# Patient Record
Sex: MALE | Race: BLACK OR AFRICAN AMERICAN | NOT HISPANIC OR LATINO | Employment: FULL TIME | URBAN - METROPOLITAN AREA
[De-identification: names, ages, dates, MRNs, and addresses within clinical notes are randomized per-mention and may not be internally consistent; named-entity substitution may affect disease eponyms.]

---

## 2023-06-23 PROBLEM — F10.10 ETOH ABUSE: Status: ACTIVE | Noted: 2023-06-23

## 2023-06-23 PROBLEM — R00.0 TACHYCARDIA: Status: ACTIVE | Noted: 2023-06-23

## 2023-06-23 PROBLEM — K92.1 GASTROINTESTINAL HEMORRHAGE WITH MELENA: Status: ACTIVE | Noted: 2023-06-23

## 2023-06-23 PROBLEM — K92.2 UPPER GI BLEED: Status: ACTIVE | Noted: 2023-06-23

## 2023-06-23 PROBLEM — D62 ACUTE BLOOD LOSS ANEMIA: Status: ACTIVE | Noted: 2023-06-23

## 2023-06-23 PROBLEM — Z72.0 TOBACCO ABUSE: Status: ACTIVE | Noted: 2023-06-23

## 2023-06-23 PROBLEM — G43.909 MIGRAINE: Status: ACTIVE | Noted: 2021-02-09

## 2023-06-29 PROBLEM — F17.200 CURRENT EVERY DAY SMOKER: Status: ACTIVE | Noted: 2023-06-23

## 2023-06-29 PROBLEM — Z83.3 FAMILY HISTORY OF DIABETES MELLITUS: Status: ACTIVE | Noted: 2023-06-29

## 2023-07-07 PROBLEM — K92.2 GI BLEED: Status: ACTIVE | Noted: 2023-07-07

## 2023-07-08 PROBLEM — D50.9 IRON DEFICIENCY ANEMIA: Status: ACTIVE | Noted: 2023-07-08

## 2023-07-10 PROBLEM — K92.2 GI BLEED: Status: RESOLVED | Noted: 2023-07-07 | Resolved: 2023-07-10

## 2023-07-12 ENCOUNTER — APPOINTMENT (OUTPATIENT)
Dept: CT IMAGING | Facility: HOSPITAL | Age: 43
DRG: 356 | End: 2023-07-12
Payer: COMMERCIAL

## 2023-07-12 ENCOUNTER — APPOINTMENT (OUTPATIENT)
Dept: GENERAL RADIOLOGY | Facility: HOSPITAL | Age: 43
DRG: 356 | End: 2023-07-12
Payer: COMMERCIAL

## 2023-07-12 ENCOUNTER — HOSPITAL ENCOUNTER (INPATIENT)
Facility: HOSPITAL | Age: 43
LOS: 9 days | Discharge: HOME | DRG: 356 | End: 2023-07-21
Attending: EMERGENCY MEDICINE | Admitting: INTERNAL MEDICINE
Payer: COMMERCIAL

## 2023-07-12 DIAGNOSIS — D72.829 LEUKOCYTOSIS, UNSPECIFIED TYPE: ICD-10-CM

## 2023-07-12 DIAGNOSIS — D62 ACUTE BLOOD LOSS ANEMIA: ICD-10-CM

## 2023-07-12 DIAGNOSIS — D62 ABLA (ACUTE BLOOD LOSS ANEMIA): Primary | ICD-10-CM

## 2023-07-12 DIAGNOSIS — K62.5 BLOOD PER RECTUM: ICD-10-CM

## 2023-07-12 DIAGNOSIS — D50.8 OTHER IRON DEFICIENCY ANEMIA: ICD-10-CM

## 2023-07-12 LAB
ABO GROUP BLD: NORMAL
ADV 40+41 DNA STL QL NAA+NON-PROBE: NOT DETECTED
ALBUMIN SERPL-MCNC: 3.8 G/DL (ref 3.5–5.2)
ALBUMIN/GLOB SERPL: 1.4 G/DL
ALP SERPL-CCNC: 70 U/L (ref 39–117)
ALT SERPL W P-5'-P-CCNC: 12 U/L (ref 1–41)
ANION GAP SERPL CALCULATED.3IONS-SCNC: 14.8 MMOL/L (ref 5–15)
ANISOCYTOSIS BLD QL: ABNORMAL
APTT PPP: 25.7 SECONDS (ref 22.7–35.4)
AST SERPL-CCNC: 13 U/L (ref 1–40)
ASTRO TYP 1-8 RNA STL QL NAA+NON-PROBE: NOT DETECTED
BILIRUB SERPL-MCNC: 0.3 MG/DL (ref 0–1.2)
BILIRUB UR QL STRIP: NEGATIVE
BLD GP AB SCN SERPL QL: NEGATIVE
BUN SERPL-MCNC: 21 MG/DL (ref 6–20)
BUN/CREAT SERPL: 15.3 (ref 7–25)
C CAYETANENSIS DNA STL QL NAA+NON-PROBE: NOT DETECTED
C COLI+JEJ+UPSA DNA STL QL NAA+NON-PROBE: NOT DETECTED
CALCIUM SPEC-SCNC: 9.1 MG/DL (ref 8.6–10.5)
CHLORIDE SERPL-SCNC: 103 MMOL/L (ref 98–107)
CLARITY UR: CLEAR
CO2 SERPL-SCNC: 18.2 MMOL/L (ref 22–29)
COLOR UR: YELLOW
CREAT SERPL-MCNC: 1.37 MG/DL (ref 0.76–1.27)
CRYPTOSP DNA STL QL NAA+NON-PROBE: NOT DETECTED
DEPRECATED RDW RBC AUTO: 48.4 FL (ref 37–54)
E HISTOLYT DNA STL QL NAA+NON-PROBE: NOT DETECTED
EAEC PAA PLAS AGGR+AATA ST NAA+NON-PRB: NOT DETECTED
EC STX1+STX2 GENES STL QL NAA+NON-PROBE: NOT DETECTED
EGFRCR SERPLBLD CKD-EPI 2021: 66 ML/MIN/1.73
EPEC EAE GENE STL QL NAA+NON-PROBE: NOT DETECTED
ERYTHROCYTE [DISTWIDTH] IN BLOOD BY AUTOMATED COUNT: 15.4 % (ref 12.3–15.4)
ETEC LTA+ST1A+ST1B TOX ST NAA+NON-PROBE: NOT DETECTED
FERRITIN SERPL-MCNC: 541 NG/ML (ref 30–400)
G LAMBLIA DNA STL QL NAA+NON-PROBE: NOT DETECTED
GLOBULIN UR ELPH-MCNC: 2.7 GM/DL
GLUCOSE SERPL-MCNC: 131 MG/DL (ref 65–99)
GLUCOSE UR STRIP-MCNC: NEGATIVE MG/DL
HCT VFR BLD AUTO: 21.3 % (ref 37.5–51)
HCT VFR BLD AUTO: 23.9 % (ref 37.5–51)
HGB BLD-MCNC: 7.1 G/DL (ref 13–17.7)
HGB BLD-MCNC: 7.7 G/DL (ref 13–17.7)
HGB UR QL STRIP.AUTO: NEGATIVE
INR PPP: 1.07 (ref 0.9–1.1)
IRON 24H UR-MRATE: 18 MCG/DL (ref 59–158)
IRON SATN MFR SERPL: 5 % (ref 20–50)
KETONES UR QL STRIP: NEGATIVE
LEUKOCYTE ESTERASE UR QL STRIP.AUTO: NEGATIVE
LIPASE SERPL-CCNC: 11 U/L (ref 13–60)
LYMPHOCYTES # BLD MANUAL: 3.93 10*3/MM3 (ref 0.7–3.1)
LYMPHOCYTES NFR BLD MANUAL: 7 % (ref 5–12)
MCH RBC QN AUTO: 29.3 PG (ref 26.6–33)
MCHC RBC AUTO-ENTMCNC: 33.3 G/DL (ref 31.5–35.7)
MCV RBC AUTO: 88 FL (ref 79–97)
MONOCYTES # BLD: 1.45 10*3/MM3 (ref 0.1–0.9)
NEUTROPHILS # BLD AUTO: 15.3 10*3/MM3 (ref 1.7–7)
NEUTROPHILS NFR BLD MANUAL: 74 % (ref 42.7–76)
NITRITE UR QL STRIP: NEGATIVE
NOROVIRUS GI+II RNA STL QL NAA+NON-PROBE: NOT DETECTED
NRBC BLD AUTO-RTO: 0.1 /100 WBC (ref 0–0.2)
P SHIGELLOIDES DNA STL QL NAA+NON-PROBE: NOT DETECTED
PH UR STRIP.AUTO: <=5 [PH] (ref 5–8)
PLAT MORPH BLD: NORMAL
PLATELET # BLD AUTO: 399 10*3/MM3 (ref 140–450)
PMV BLD AUTO: 10.4 FL (ref 6–12)
POTASSIUM SERPL-SCNC: 3.5 MMOL/L (ref 3.5–5.2)
PROCALCITONIN SERPL-MCNC: 0.15 NG/ML (ref 0–0.25)
PROT SERPL-MCNC: 6.5 G/DL (ref 6–8.5)
PROT UR QL STRIP: NEGATIVE
PROTHROMBIN TIME: 14.1 SECONDS (ref 11.7–14.2)
RBC # BLD AUTO: 2.42 10*6/MM3 (ref 4.14–5.8)
RETICS # AUTO: 0.21 10*6/MM3 (ref 0.02–0.13)
RETICS/RBC NFR AUTO: 8.45 % (ref 0.7–1.9)
RH BLD: POSITIVE
RVA RNA STL QL NAA+NON-PROBE: NOT DETECTED
S ENT+BONG DNA STL QL NAA+NON-PROBE: NOT DETECTED
SAPO I+II+IV+V RNA STL QL NAA+NON-PROBE: NOT DETECTED
SHIGELLA SP+EIEC IPAH ST NAA+NON-PROBE: NOT DETECTED
SODIUM SERPL-SCNC: 136 MMOL/L (ref 136–145)
SP GR UR STRIP: >=1.03 (ref 1–1.03)
T&S EXPIRATION DATE: NORMAL
TIBC SERPL-MCNC: 355 MCG/DL (ref 298–536)
TRANSFERRIN SERPL-MCNC: 238 MG/DL (ref 200–360)
UROBILINOGEN UR QL STRIP: NORMAL
V CHOL+PARA+VUL DNA STL QL NAA+NON-PROBE: NOT DETECTED
V CHOLERAE DNA STL QL NAA+NON-PROBE: NOT DETECTED
VARIANT LYMPHS NFR BLD MANUAL: 19 % (ref 19.6–45.3)
WBC MORPH BLD: NORMAL
WBC NRBC COR # BLD: 20.68 10*3/MM3 (ref 3.4–10.8)
Y ENTEROCOL DNA STL QL NAA+NON-PROBE: NOT DETECTED

## 2023-07-12 PROCEDURE — 99285 EMERGENCY DEPT VISIT HI MDM: CPT

## 2023-07-12 PROCEDURE — 86900 BLOOD TYPING SEROLOGIC ABO: CPT

## 2023-07-12 PROCEDURE — 86922 COMPATIBILITY TEST ANTIGLOB: CPT

## 2023-07-12 PROCEDURE — 81003 URINALYSIS AUTO W/O SCOPE: CPT | Performed by: EMERGENCY MEDICINE

## 2023-07-12 PROCEDURE — 25510000001 IOPAMIDOL 61 % SOLUTION: Performed by: EMERGENCY MEDICINE

## 2023-07-12 PROCEDURE — 86920 COMPATIBILITY TEST SPIN: CPT

## 2023-07-12 PROCEDURE — 74177 CT ABD & PELVIS W/CONTRAST: CPT

## 2023-07-12 PROCEDURE — 86850 RBC ANTIBODY SCREEN: CPT | Performed by: EMERGENCY MEDICINE

## 2023-07-12 PROCEDURE — P9016 RBC LEUKOCYTES REDUCED: HCPCS

## 2023-07-12 PROCEDURE — G0378 HOSPITAL OBSERVATION PER HR: HCPCS

## 2023-07-12 PROCEDURE — 86901 BLOOD TYPING SEROLOGIC RH(D): CPT

## 2023-07-12 PROCEDURE — 85610 PROTHROMBIN TIME: CPT | Performed by: EMERGENCY MEDICINE

## 2023-07-12 PROCEDURE — 85014 HEMATOCRIT: CPT | Performed by: STUDENT IN AN ORGANIZED HEALTH CARE EDUCATION/TRAINING PROGRAM

## 2023-07-12 PROCEDURE — 87507 IADNA-DNA/RNA PROBE TQ 12-25: CPT | Performed by: STUDENT IN AN ORGANIZED HEALTH CARE EDUCATION/TRAINING PROGRAM

## 2023-07-12 PROCEDURE — 85018 HEMOGLOBIN: CPT | Performed by: STUDENT IN AN ORGANIZED HEALTH CARE EDUCATION/TRAINING PROGRAM

## 2023-07-12 PROCEDURE — 85045 AUTOMATED RETICULOCYTE COUNT: CPT | Performed by: INTERNAL MEDICINE

## 2023-07-12 PROCEDURE — 84466 ASSAY OF TRANSFERRIN: CPT | Performed by: INTERNAL MEDICINE

## 2023-07-12 PROCEDURE — 84145 PROCALCITONIN (PCT): CPT | Performed by: STUDENT IN AN ORGANIZED HEALTH CARE EDUCATION/TRAINING PROGRAM

## 2023-07-12 PROCEDURE — 85007 BL SMEAR W/DIFF WBC COUNT: CPT | Performed by: EMERGENCY MEDICINE

## 2023-07-12 PROCEDURE — 71045 X-RAY EXAM CHEST 1 VIEW: CPT

## 2023-07-12 PROCEDURE — 83690 ASSAY OF LIPASE: CPT | Performed by: EMERGENCY MEDICINE

## 2023-07-12 PROCEDURE — 80053 COMPREHEN METABOLIC PANEL: CPT | Performed by: EMERGENCY MEDICINE

## 2023-07-12 PROCEDURE — 83540 ASSAY OF IRON: CPT | Performed by: INTERNAL MEDICINE

## 2023-07-12 PROCEDURE — 82728 ASSAY OF FERRITIN: CPT | Performed by: INTERNAL MEDICINE

## 2023-07-12 PROCEDURE — 85025 COMPLETE CBC W/AUTO DIFF WBC: CPT | Performed by: EMERGENCY MEDICINE

## 2023-07-12 PROCEDURE — 36415 COLL VENOUS BLD VENIPUNCTURE: CPT

## 2023-07-12 PROCEDURE — 36430 TRANSFUSION BLD/BLD COMPNT: CPT

## 2023-07-12 PROCEDURE — 99214 OFFICE O/P EST MOD 30 MIN: CPT | Performed by: INTERNAL MEDICINE

## 2023-07-12 PROCEDURE — 86900 BLOOD TYPING SEROLOGIC ABO: CPT | Performed by: EMERGENCY MEDICINE

## 2023-07-12 PROCEDURE — 85730 THROMBOPLASTIN TIME PARTIAL: CPT | Performed by: EMERGENCY MEDICINE

## 2023-07-12 PROCEDURE — 86901 BLOOD TYPING SEROLOGIC RH(D): CPT | Performed by: EMERGENCY MEDICINE

## 2023-07-12 RX ORDER — AMOXICILLIN 250 MG
2 CAPSULE ORAL 2 TIMES DAILY
Status: DISCONTINUED | OUTPATIENT
Start: 2023-07-12 | End: 2023-07-21 | Stop reason: HOSPADM

## 2023-07-12 RX ORDER — BISACODYL 10 MG
10 SUPPOSITORY, RECTAL RECTAL DAILY PRN
Status: DISCONTINUED | OUTPATIENT
Start: 2023-07-12 | End: 2023-07-21 | Stop reason: HOSPADM

## 2023-07-12 RX ORDER — SODIUM CHLORIDE 0.9 % (FLUSH) 0.9 %
10 SYRINGE (ML) INJECTION AS NEEDED
Status: DISCONTINUED | OUTPATIENT
Start: 2023-07-12 | End: 2023-07-21 | Stop reason: HOSPADM

## 2023-07-12 RX ORDER — PANTOPRAZOLE SODIUM 40 MG/10ML
80 INJECTION, POWDER, LYOPHILIZED, FOR SOLUTION INTRAVENOUS ONCE
Status: COMPLETED | OUTPATIENT
Start: 2023-07-12 | End: 2023-07-12

## 2023-07-12 RX ORDER — SODIUM CHLORIDE 0.9 % (FLUSH) 0.9 %
10 SYRINGE (ML) INJECTION EVERY 12 HOURS SCHEDULED
Status: DISCONTINUED | OUTPATIENT
Start: 2023-07-12 | End: 2023-07-16

## 2023-07-12 RX ORDER — ONDANSETRON 2 MG/ML
4 INJECTION INTRAMUSCULAR; INTRAVENOUS ONCE
Status: DISCONTINUED | OUTPATIENT
Start: 2023-07-12 | End: 2023-07-21 | Stop reason: HOSPADM

## 2023-07-12 RX ORDER — SODIUM CHLORIDE 9 MG/ML
40 INJECTION, SOLUTION INTRAVENOUS AS NEEDED
Status: DISCONTINUED | OUTPATIENT
Start: 2023-07-12 | End: 2023-07-21 | Stop reason: HOSPADM

## 2023-07-12 RX ORDER — POLYETHYLENE GLYCOL 3350 17 G/17G
17 POWDER, FOR SOLUTION ORAL DAILY PRN
Status: DISCONTINUED | OUTPATIENT
Start: 2023-07-12 | End: 2023-07-21 | Stop reason: HOSPADM

## 2023-07-12 RX ORDER — BISACODYL 5 MG/1
5 TABLET, DELAYED RELEASE ORAL DAILY PRN
Status: DISCONTINUED | OUTPATIENT
Start: 2023-07-12 | End: 2023-07-21 | Stop reason: HOSPADM

## 2023-07-12 RX ADMIN — PANTOPRAZOLE SODIUM 8 MG/HR: 40 INJECTION, POWDER, FOR SOLUTION INTRAVENOUS at 19:55

## 2023-07-12 RX ADMIN — IOPAMIDOL 85 ML: 612 INJECTION, SOLUTION INTRAVENOUS at 07:46

## 2023-07-12 RX ADMIN — SODIUM CHLORIDE 1000 ML: 9 INJECTION, SOLUTION INTRAVENOUS at 07:30

## 2023-07-12 RX ADMIN — PANTOPRAZOLE SODIUM 8 MG/HR: 40 INJECTION, POWDER, FOR SOLUTION INTRAVENOUS at 13:15

## 2023-07-12 RX ADMIN — PANTOPRAZOLE SODIUM 80 MG: 40 INJECTION, POWDER, FOR SOLUTION INTRAVENOUS at 06:06

## 2023-07-12 RX ADMIN — Medication 10 ML: at 20:00

## 2023-07-12 RX ADMIN — Medication 10 ML: at 13:15

## 2023-07-12 NOTE — PLAN OF CARE
Goal Outcome Evaluation: Pt doing well. Seems to understand his diagnosis well. Pt AxOx4. RA. No complaints of pain at this time. Vital signs are stable. Will continue to monitor.

## 2023-07-12 NOTE — ED NOTES
Second unit of blood to be initiated pending patient returning to bed from commode for full set of vitals

## 2023-07-12 NOTE — ED TRIAGE NOTES
To ER via EMS from home.  C/o low abd pain and rectal bleeding.  Pt was seen here on 6/23 for same and had HGB 6.0 at that time.  Pt was discharged on 6/27.  Abd pain started last pm.

## 2023-07-12 NOTE — CONSULTS
"[facility]  Initial Inpatient Consult Note    Referring Provider: Dr. BERTO Child    Reason for Consultation: Anemia, rectal bleeding, needs capsule endoscopy.  Was supposed to happen today but presented to the hospital.    Subjective     History of present illness:    43 y.o. male with a h/o alcoholism (though claims to ETOH x 1 mo) and with GI bleeding of obscure etiology who was admitted 7/12/2023 with melena that started yesterday.  He denies nonsteroidal anti-inflammatory drug use.  He has had some abdominal cramps with constipation alternating with diarrhea.  No nausea or vomiting.  No fever or chills.  His weight is stable.  He denies nonsteroidal anti-inflammatory drug use.  He was admitted 7/6/2023 through 7/10/2023 with black or bloody stool, iron deficiency anemia, alcohol abuse, and he is a smoker.  His work-up as outlined below.       On 7/9/2023 I did a small bowel enteroscopy that was normal.       On 6/25/2023 he had a colonoscopy by Dr. BERTO Lee that showed nodular terminal ileal mucosa, 2 small rectal polyps were removed, and internal hemorrhoids.  Pathology from the terminal ileal mucosa was benign and the rectal polyps were hyperplastic.        On 6/24/2023 Dr. EM Lee did an EGD showed grade a distal esophagitis and gastritis.        On 7/8/2023 the patient had a CT angiogram of the abdomen and pelvis that was normal.        The patient was supposed to have a capsule endoscopy in our office today but was admitted today instead.  This morning the patient's creatinine was 1.37 with a BUN of 21.  Also in the emergency room his hemoglobin was 7.1, hematocrit of 21.3, platelet count 399,000, white count of 20.68, his MCV was 88.  He had a CT of the abdomen and pelvis earlier today that showed \"mild gastritis is suspected.  Diffuse thickening involving the duodenum and the proximal jejunal loops favored to be secondary to peristalsis.\"    Past Medical History:  Past Medical " History:   Diagnosis Date    Anemia     ETOH abuse     Tachycardia 06/23/2023    Tobacco dependence      Past Surgical History:  Past Surgical History:   Procedure Laterality Date    APPENDECTOMY      COLONOSCOPY N/A 06/25/2023    Procedure: COLONOSCOPY TO CECUM AND TERMINAL ILEUM WITH BIOPSIES AND COLD BIOPSY POLYPECTOMIES;  Surgeon: Imtiaz Lee MD;  Location: [facility] ENDOSCOPY;  Service: Gastroenterology;  Laterality: N/A;  PRE- MELENA  POST- COLON POLYPS, HEMORRHOIDS    ENDOSCOPY Left 06/24/2023    Procedure: ESOPHAGOGASTRODUODENOSCOPY;  Surgeon: Imtiaz Lee MD;  Location: [facility] ENDOSCOPY;  Service: Gastroenterology;  Laterality: Left;  small hiatal hernia, esophagitis    ENTEROSCOPY SMALL BOWEL N/A 7/9/2023    Procedure: ENTEROSCOPY SMALL BOWEL;  Surgeon: Isrrael Gibson MD;  Location: [facility] ENDOSCOPY;  Service: Gastroenterology;  Laterality: N/A;  PRE- GI BLEED  POST- NORMAL      Social History:   Social History     Tobacco Use    Smoking status: Every Day     Packs/day: 1.00     Years: 20.00     Pack years: 20.00     Types: Cigarettes    Smokeless tobacco: Never   Substance Use Topics    Alcohol use: Not Currently     Comment: one pint to one fifth of marielos a day      Family History:  Family History   Problem Relation Age of Onset    Diabetes Father     Diabetes Sister     Hypertension Sister     Diabetes Brother     Diabetes Maternal Grandmother     Diabetes Maternal Grandfather     Diabetes Paternal Grandmother     Diabetes Paternal Grandfather     Heart attack Maternal Aunt 73    Colon cancer Neg Hx     Prostate cancer Neg Hx        Home Meds:  Medications Prior to Admission   Medication Sig Dispense Refill Last Dose    ferrous sulfate (FerrouSul) 325 (65 FE) MG tablet Take 1 tablet by mouth Daily With Breakfast. 30 tablet 0 7/11/2023    folic acid (FOLVITE) 1 MG tablet Take 1 tablet by mouth Daily for 30 days. 30 tablet 0 7/11/2023    pantoprazole (PROTONIX) 40 MG EC tablet Take 1 tablet by mouth  Consent: The patient's consent was obtained including but not limited to risks of crusting, scabbing, blistering, scarring, darker or lighter pigmentary change, recurrence, incomplete removal and infection. Every Morning. 30 tablet 0 7/11/2023    polycarbophil 625 MG tablet tablet Take 1 tablet by mouth Daily for 30 days. 30 tablet 0 7/11/2023    thiamine (VITAMIN B1) 100 MG tablet Take 1 tablet by mouth Daily. 30 tablet 0 7/11/2023     Current Meds:   ondansetron, 4 mg, Intravenous, Once  senna-docusate sodium, 2 tablet, Oral, BID  sodium chloride, 10 mL, Intravenous, Q12H      Allergies:  No Known Allergies  Review of Systems  The following systems were reviewed and negative;  respiratory, cardiovascular, musculoskeletal, and neurological     Objective     Vital Signs  Temp:  [97.1 °F (36.2 °C)-98.9 °F (37.2 °C)] 97.7 °F (36.5 °C)  Heart Rate:  [] 86  Resp:  [16-20] 16  BP: (109-128)/(61-84) 120/67  Physical Exam:  General Appearance:    Alert, cooperative, in no acute distress   Head:    Normocephalic, without obvious abnormality, atraumatic   Eyes:            Lids and lashes normal, conjunctivae and sclerae normal, no   icterus   Throat:   No oral lesions, no thrush, oral mucosa moist   Neck:   No adenopathy, supple, trachea midline, no thyromegaly, no   carotid bruit, no JVD   Lungs:     Clear to auscultation,respirations regular, even and                   unlabored    Heart:    Regular rhythm and normal rate, normal S1 and S2, no            murmur, no gallop, no rub, no click   Chest Wall:    No abnormalities observed   Abdomen:     Normal bowel sounds, no masses, no organomegaly, soft        nontender, nondistended, no guarding, no rebound                 tenderness   Rectal:     Deferred   Extremities:   no edema, no cyanosis, no redness   Skin:   No bleeding, bruising or rash   Lymph nodes:   No palpable adenopathy   Psychiatric:  Judgement and insight: normal   Orientation to person place and time: normal   Mood and affect: normal   Results Review:   I reviewed the patient's new clinical results.    Results from last 7 days   Lab Units 07/12/23  0609 07/10/23  0806 07/10/23  0011 07/08/23  1685  07/08/23  0418   WBC 10*3/mm3 20.68* 10.78  --   --  7.64   HEMOGLOBIN g/dL 7.1* 8.4* 7.5*   < > 7.4*   HEMATOCRIT % 21.3* 25.0* 22.4*   < > 22.5*   PLATELETS 10*3/mm3 399 372  --   --  300    < > = values in this interval not displayed.     Results from last 7 days   Lab Units 07/12/23  0612 07/08/23  0418 07/07/23  0026   SODIUM mmol/L 136 139 140   POTASSIUM mmol/L 3.5 4.1 3.6   CHLORIDE mmol/L 103 110* 109*   CO2 mmol/L 18.2* 23.8 19.0*   BUN mg/dL 21* 10 23*   CREATININE mg/dL 1.37* 1.17 1.36*   CALCIUM mg/dL 9.1 8.4* 8.3*   BILIRUBIN mg/dL 0.3 <0.2 <0.2   ALK PHOS U/L 70 51 56   ALT (SGPT) U/L 12 13 16   AST (SGOT) U/L 13 10 14   GLUCOSE mg/dL 131* 92 144*     Results from last 7 days   Lab Units 07/12/23  0612 07/07/23  0026   INR  1.07 1.15*     Lab Results   Lab Value Date/Time    LIPASE 11 (L) 07/12/2023 0612    LIPASE 26 06/23/2023 1201       Radiology:  CT Abdomen Pelvis With Contrast   Preliminary Result   Mild gastritis is suspected. Diffuse thickening involving   the duodenum and the proximal jejunal loops favored to be secondary to   peristalsis.       Radiation dose reduction techniques were utilized, including automated   exposure control and exposure modulation based on body size.              XR Chest 1 View   Final Result   No acute findings       This report was finalized on 7/12/2023 7:30 AM by Dr. Moise Walker M.D.              Assessment & Plan   Assessment:   GI bleeding of obscure etiology.      Plan:   I would get a tagged red cell bleeding scan see if that gives us any idea of where the bleeding is coming from.  I would check serial H&H's and transfuse as necessary.  He will eventually need to have a capsule endoscopy done as an outpatient.  I will check anemia labs.    I discussed the patient's findings and my recommendations with patient.         Isrrael Gibson M.D.  [facility]  [address] [place] [address]  Office: [phone]    Include Z78.9 (Other Specified Conditions Influencing Health Status) As An Associated Diagnosis?: No Medical Necessity Information: It is in your best interest to select a reason for this procedure from the list below. All of these items fulfill various CMS LCD requirements except the new and changing color options. Pared With?: 15 blade Post-Care Instructions: I reviewed with the patient in detail post-care instructions. Patient is to wear sunprotection, and avoid picking at any of the treated lesions. Pt may apply Vaseline to crusted or scabbing areas. Detail Level: Detailed Medical Necessity Clause: This procedure was medically necessary because the lesions that were treated were:

## 2023-07-12 NOTE — ED PROVIDER NOTES
EMERGENCY DEPARTMENT ENCOUNTER    Room Number:  14/14  PCP: Hellen Skelton APRN      HPI:  Chief Complaint: Abdominal pain  A complete HPI/ROS/PMH/PSH/SH/FH are unobtainable due to: None  Context: Radha Noriega V is a 43 y.o. male who presents to the ED c/o abdominal pain.  Right lower and suprapubic pain.  He has had associated bright red blood in his stool that occurs intermittently with lightheadedness and feeling that his heart is racing.  Symptoms been persistent.  He has had next of work-up in the recent past without any clear explanation for his anemia.          PAST MEDICAL HISTORY  Active Ambulatory Problems     Diagnosis Date Noted    Gastrointestinal hemorrhage with melena 06/23/2023    ETOH abuse 06/23/2023    Migraine 02/09/2021    Current every day smoker 06/23/2023    Tachycardia 06/23/2023    Acute blood loss anemia 06/23/2023    Family history of diabetes mellitus 06/29/2023    Iron deficiency anemia 07/08/2023     Resolved Ambulatory Problems     Diagnosis Date Noted    GI bleed 07/07/2023     Past Medical History:   Diagnosis Date    Anemia     Tobacco dependence          PAST SURGICAL HISTORY  Past Surgical History:   Procedure Laterality Date    APPENDECTOMY      COLONOSCOPY N/A 06/25/2023    Procedure: COLONOSCOPY TO CECUM AND TERMINAL ILEUM WITH BIOPSIES AND COLD BIOPSY POLYPECTOMIES;  Surgeon: Imtiaz Lee MD;  Location: [facility] ENDOSCOPY;  Service: Gastroenterology;  Laterality: N/A;  PRE- MELENA  POST- COLON POLYPS, HEMORRHOIDS    ENDOSCOPY Left 06/24/2023    Procedure: ESOPHAGOGASTRODUODENOSCOPY;  Surgeon: Imtiaz Lee MD;  Location: [facility] ENDOSCOPY;  Service: Gastroenterology;  Laterality: Left;  small hiatal hernia, esophagitis    ENTEROSCOPY SMALL BOWEL N/A 7/9/2023    Procedure: ENTEROSCOPY SMALL BOWEL;  Surgeon: Isrrael Gibson MD;  Location: [facility] ENDOSCOPY;  Service: Gastroenterology;  Laterality: N/A;  PRE- GI BLEED  POST- NORMAL         FAMILY HISTORY  Family History    Problem Relation Age of Onset    Diabetes Father     Diabetes Sister     Hypertension Sister     Diabetes Brother     Diabetes Maternal Grandmother     Diabetes Maternal Grandfather     Diabetes Paternal Grandmother     Diabetes Paternal Grandfather     Heart attack Maternal Aunt 73    Colon cancer Neg Hx     Prostate cancer Neg Hx          SOCIAL HISTORY  Social History     Socioeconomic History    Marital status:    Tobacco Use    Smoking status: Every Day     Packs/day: 1.00     Years: 20.00     Pack years: 20.00     Types: Cigarettes    Smokeless tobacco: Never   Vaping Use    Vaping Use: Never used   Substance and Sexual Activity    Alcohol use: Not Currently     Comment: one pint to one fifth of marielos a day    Drug use: Yes     Types: Marijuana     Comment: daily.    Sexual activity: Yes     Partners: Female         ALLERGIES  Patient has no known allergies.        REVIEW OF SYSTEMS  Review of Systems     All systems reviewed and negative except for those discussed in HPI.       PHYSICAL EXAM  ED Triage Vitals [07/12/23 0525]   Temp Heart Rate Resp BP SpO2   97.1 °F (36.2 °C) 120 20 109/77 99 %      Temp src Heart Rate Source Patient Position BP Location FiO2 (%)   -- -- -- -- --       Physical Exam      GENERAL: no acute distress  HENT: nares patent  EYES: no scleral icterus  CV: regular rhythm, tachycardic  RESPIRATORY: normal effort, clear to auscultation bilaterally  ABDOMEN: soft, mild suprapubic and right lower quadrant abdominal pain  MUSCULOSKELETAL: no deformity  NEURO: alert, moves all extremities, follows commands  PSYCH:  calm, cooperative  SKIN: warm, dry    Vital signs and nursing notes reviewed.          LAB RESULTS  Recent Results (from the past 24 hour(s))   CBC Auto Differential    Collection Time: 07/12/23  6:09 AM    Specimen: Blood   Result Value Ref Range    WBC 20.68 (H) 3.40 - 10.80 10*3/mm3    RBC 2.42 (L) 4.14 - 5.80 10*6/mm3    Hemoglobin 7.1 (L) 13.0 - 17.7 g/dL     Hematocrit 21.3 (L) 37.5 - 51.0 %    MCV 88.0 79.0 - 97.0 fL    MCH 29.3 26.6 - 33.0 pg    MCHC 33.3 31.5 - 35.7 g/dL    RDW 15.4 12.3 - 15.4 %    RDW-SD 48.4 37.0 - 54.0 fl    MPV 10.4 6.0 - 12.0 fL    Platelets 399 140 - 450 10*3/mm3    nRBC 0.1 0.0 - 0.2 /100 WBC   Manual Differential    Collection Time: 07/12/23  6:09 AM    Specimen: Blood   Result Value Ref Range    Neutrophil % 74.0 42.7 - 76.0 %    Lymphocyte % 19.0 (L) 19.6 - 45.3 %    Monocyte % 7.0 5.0 - 12.0 %    Neutrophils Absolute 15.30 (H) 1.70 - 7.00 10*3/mm3    Lymphocytes Absolute 3.93 (H) 0.70 - 3.10 10*3/mm3    Monocytes Absolute 1.45 (H) 0.10 - 0.90 10*3/mm3    Anisocytosis Mod/2+ None Seen    WBC Morphology Normal Normal    Platelet Morphology Normal Normal   Comprehensive Metabolic Panel    Collection Time: 07/12/23  6:12 AM    Specimen: Blood   Result Value Ref Range    Glucose 131 (H) 65 - 99 mg/dL    BUN 21 (H) 6 - 20 mg/dL    Creatinine 1.37 (H) 0.76 - 1.27 mg/dL    Sodium 136 136 - 145 mmol/L    Potassium 3.5 3.5 - 5.2 mmol/L    Chloride 103 98 - 107 mmol/L    CO2 18.2 (L) 22.0 - 29.0 mmol/L    Calcium 9.1 8.6 - 10.5 mg/dL    Total Protein 6.5 6.0 - 8.5 g/dL    Albumin 3.8 3.5 - 5.2 g/dL    ALT (SGPT) 12 1 - 41 U/L    AST (SGOT) 13 1 - 40 U/L    Alkaline Phosphatase 70 39 - 117 U/L    Total Bilirubin 0.3 0.0 - 1.2 mg/dL    Globulin 2.7 gm/dL    A/G Ratio 1.4 g/dL    BUN/Creatinine Ratio 15.3 7.0 - 25.0    Anion Gap 14.8 5.0 - 15.0 mmol/L    eGFR 66.0 >60.0 mL/min/1.73   Protime-INR    Collection Time: 07/12/23  6:12 AM    Specimen: Blood   Result Value Ref Range    Protime 14.1 11.7 - 14.2 Seconds    INR 1.07 0.90 - 1.10   aPTT    Collection Time: 07/12/23  6:12 AM    Specimen: Blood   Result Value Ref Range    PTT 25.7 22.7 - 35.4 seconds   Lipase    Collection Time: 07/12/23  6:12 AM    Specimen: Blood   Result Value Ref Range    Lipase 11 (L) 13 - 60 U/L   Type & Screen    Collection Time: 07/12/23  6:12 AM    Specimen: Blood   Result  Value Ref Range    ABO Type B     RH type Positive     Antibody Screen Negative     T&S Expiration Date 7/15/2023 11:59:59 PM    Prepare RBC, 2 Units    Collection Time: 07/12/23  7:18 AM   Result Value Ref Range    Product Code D6271M77     Unit Number [id number]     UNIT  ABO B     UNIT  RH POS     Crossmatch Interpretation Compatible     Dispense Status XM     Blood Expiration Date 202308032359     Blood Type Barcode 7300     Product Code M2570A23     Unit Number [id number]     UNIT  ABO B     UNIT  RH POS     Crossmatch Interpretation Compatible     Dispense Status IS     Blood Expiration Date 202308022359     Blood Type Barcode 7300        Ordered the above labs and reviewed the results.        RADIOLOGY  CT Abdomen Pelvis With Contrast    Result Date: 7/12/2023  CT ABDOMEN AND PELVIS WITH CONTRAST  HISTORY: 43-year-old male with lower abdominal pain and nausea and blood in stool.  TECHNIQUE: Axial CT images of the abdomen and pelvis were obtained following administration of intravenous contrast. The patient was not given oral contrast Coronal and sagittal reformats were obtained.  COMPARISON: 07/08/2023  FINDINGS: Mild wall thickening of the duodenum and the proximal jejunal loops likely related to peristalsis. Mild diffuse gastric wall thickening in a pattern consistent with gastritis. No evidence of bowel obstruction.  The liver demonstrates normal attenuation. The gallbladder is normal. The spleen is normal. The pancreas is normal. Bilateral adrenal glands are normal. No renal calculi or hydronephrosis. The urinary bladder is partially distended and normal.      Mild gastritis is suspected. Diffuse thickening involving the duodenum and the proximal jejunal loops favored to be secondary to peristalsis.  Radiation dose reduction techniques were utilized, including automated exposure control and exposure modulation based on body size.       XR Chest 1 View    Result Date: 7/12/2023  AP CHEST   HISTORY: Leukocytosis  COMPARISON: None available  FINDINGS: Mildly elevated left hemidiaphragm. No evidence of airspace infiltrate. Heart size normal. No pneumothorax. Visualized osseous structures are unremarkable.      No acute findings  This report was finalized on 7/12/2023 7:30 AM by Dr. Moise Walker M.D.       Ordered the above noted radiological studies. Reviewed by me in PACS.          PROCEDURES  Critical Care  Performed by: Tamir Harris II, MD  Authorized by: Tamir Harris II, MD     Critical care provider statement:     Critical care time (minutes):  30    Critical care was necessary to treat or prevent imminent or life-threatening deterioration of the following conditions:  Circulatory failure    Critical care was time spent personally by me on the following activities:  Ordering and performing treatments and interventions, ordering and review of laboratory studies, ordering and review of radiographic studies, pulse oximetry, re-evaluation of patient's condition, obtaining history from patient or surrogate, discussions with consultants, evaluation of patient's response to treatment and examination of patient          MEDICATIONS GIVEN IN ER  Medications   sodium chloride 0.9 % flush 10 mL (has no administration in time range)   ondansetron (ZOFRAN) injection 4 mg (0 mg Intravenous Hold 7/12/23 0731)   pantoprazole (PROTONIX) injection 80 mg (80 mg Intravenous Given 7/12/23 0606)   sodium chloride 0.9 % bolus 1,000 mL (1,000 mL Intravenous New Bag 7/12/23 0730)   iopamidol (ISOVUE-300) 61 % injection 100 mL (85 mL Intravenous Given 7/12/23 0746)         MEDICAL DECISION MAKING, PROGRESS, and CONSULTS    Discussion below represents my analysis of pertinent findings related to patient's condition, differential diagnosis, treatment plan and final disposition.      Orders placed during this visit:  Orders Placed This Encounter   Procedures    Critical Care    CT Abdomen Pelvis With Contrast     XR Chest 1 View    Comprehensive Metabolic Panel    Protime-INR    aPTT    Lipase    Urinalysis With Microscopic If Indicated (No Culture) - Urine, Clean Catch    CBC Auto Differential    Manual Differential    Monitor Blood Pressure    Pulse Oximetry, Continuous    Verify Informed Consent for Blood Product Administration    [facility] (on-call MD unless specified) Details    Type & Screen    Prepare RBC, 2 Units    Insert Peripheral IV    Initiate Observation Status    CBC & Differential         Differential diagnosis:    Differential diagnosis includes but not limited to:  - hepatobiliary pathology such as cholecystitis, cholangitis, and symptomatic cholelithiasis  - Pancreatitis  - Dyspepsia  - Small bowel obstruction  - Appendicitis  - Diverticulitis  - UTI including pyelonephritis  - Ureteral stone  - Zoster  - Colitis, including infectious and ischemic  - GI bleed      Independent interpretation of labs, radiology studies, and discussions with consultants:  ED Course as of 07/12/23 1019   Wed Jul 12, 2023   0658 Creatinine(!): 1.37 [TD]   0659 WBC(!): 20.68 [TD]   0659 Hemoglobin(!): 7.1 [TD]   0700 On medical chart review, patient was discharged on 7/10/2023.  I reviewed the discharge summary from Dr. Mccormack.  Patient has a history of alcohol dependence, admitted for GI bleed.  He had EGD/colonoscopy that were negative for source of bleeding.  Outpatient video capsule endoscopy was planned with the patient returned with continued hematochezia before could be completed.  CT angiogram of the abdomen pelvis was performed twice without an identified source of bleeding.  He underwent push enteroscopy which was negative as well. [TD]   1017 I discussed the case with Dr. Child, hospitalist.  We reviewed the patient's labs, history, imaging.  He will admit. [TD]      ED Course User Index  [TD] Tamir Harris II, MD               DIAGNOSIS  Final diagnoses:   ABLA (acute blood loss anemia)   Leukocytosis, unspecified  type         DISPOSITION  Admit      Latest Documented Vital Signs:  As of 10:19 EDT  BP- 121/78 HR- 79 Temp- 97.2 °F (36.2 °C) (Tympanic) O2 sat- 100%      --    Please note that portions of this were completed with a voice recognition program.       Note Disclaimer: At [facility], we believe that sharing information builds trust and better relationships. You are receiving this note because you are receiving care at [facility] or recently visited. It is possible you will see health information before a provider has talked with you about it. This kind of information can be easy to misunderstand. To help you fully understand what it means for your health, we urge you to discuss this note with your provider.         Tamir Harris II, MD  07/12/23 8812

## 2023-07-12 NOTE — ED NOTES
Nursing report ED to floor  Radha Noriega V  43 y.o.  male    HPI :   Chief Complaint   Patient presents with    Abdominal Pain       Admitting doctor:   Hasmukh Child MD    Admitting diagnosis:   The primary encounter diagnosis was ABLA (acute blood loss anemia). A diagnosis of Leukocytosis, unspecified type was also pertinent to this visit.    Code status:   Current Code Status       Date Active Code Status Order ID Comments User Context       Prior            Allergies:   Patient has no known allergies.    Isolation:   No active isolations    Intake and Output    Intake/Output Summary (Last 24 hours) at 7/12/2023 1056  Last data filed at 7/12/2023 1024  Gross per 24 hour   Intake 300 ml   Output --   Net 300 ml       Weight:       07/12/23  0525   Weight: 78 kg (172 lb)       Most recent vitals:   Vitals:    07/12/23 0759 07/12/23 0900 07/12/23 0901 07/12/23 1003   BP: 113/61  116/73 121/78   BP Location:       Patient Position:       Pulse: 101  95 79   Resp:       Temp: 97.7 °F (36.5 °C) 98.7 °F (37.1 °C)  97.2 °F (36.2 °C)   TempSrc: Tympanic Tympanic  Tympanic   SpO2: 100%   100%   Weight:       Height:           Active LDAs/IV Access:   Lines, Drains & Airways       Active LDAs       Name Placement date Placement time Site Days    Peripheral IV 07/12/23 0606 Anterior;Left Forearm 07/12/23  0606  Forearm  less than 1                    Labs (abnormal labs have a star):   Labs Reviewed   COMPREHENSIVE METABOLIC PANEL - Abnormal; Notable for the following components:       Result Value    Glucose 131 (*)     BUN 21 (*)     Creatinine 1.37 (*)     CO2 18.2 (*)     All other components within normal limits    Narrative:     GFR Normal >60  Chronic Kidney Disease <60  Kidney Failure <15     LIPASE - Abnormal; Notable for the following components:    Lipase 11 (*)     All other components within normal limits   CBC WITH AUTO DIFFERENTIAL - Abnormal; Notable for the following components:    WBC 20.68 (*)     RBC  2.42 (*)     Hemoglobin 7.1 (*)     Hematocrit 21.3 (*)     All other components within normal limits   MANUAL DIFFERENTIAL - Abnormal; Notable for the following components:    Lymphocyte % 19.0 (*)     Neutrophils Absolute 15.30 (*)     Lymphocytes Absolute 3.93 (*)     Monocytes Absolute 1.45 (*)     All other components within normal limits   PROTIME-INR - Normal   APTT - Normal   URINALYSIS W/ MICROSCOPIC IF INDICATED (NO CULTURE)   TYPE AND SCREEN   PREPARE RBC   CBC AND DIFFERENTIAL    Narrative:     The following orders were created for panel order CBC & Differential.  Procedure                               Abnormality         Status                     ---------                               -----------         ------                     CBC Auto Differential[[id number]]        Abnormal            Final result                 Please view results for these tests on the individual orders.       EKG:   No orders to display       Meds given in ED:   Medications   sodium chloride 0.9 % flush 10 mL (has no administration in time range)   ondansetron (ZOFRAN) injection 4 mg (0 mg Intravenous Hold 7/12/23 0731)   pantoprazole (PROTONIX) injection 80 mg (80 mg Intravenous Given 7/12/23 0606)   sodium chloride 0.9 % bolus 1,000 mL (1,000 mL Intravenous New Bag 7/12/23 0730)   iopamidol (ISOVUE-300) 61 % injection 100 mL (85 mL Intravenous Given 7/12/23 0746)       Imaging results:  CT Abdomen Pelvis With Contrast    Result Date: 7/12/2023  Mild gastritis is suspected. Diffuse thickening involving the duodenum and the proximal jejunal loops favored to be secondary to peristalsis.  Radiation dose reduction techniques were utilized, including automated exposure control and exposure modulation based on body size.       XR Chest 1 View    Result Date: 7/12/2023  No acute findings  This report was finalized on 7/12/2023 7:30 AM by Dr. Moise Walker M.D.       Ambulatory status:   - ad ignacio    Social issues:   Social  History     Socioeconomic History    Marital status:    Tobacco Use    Smoking status: Every Day     Packs/day: 1.00     Years: 20.00     Pack years: 20.00     Types: Cigarettes    Smokeless tobacco: Never   Vaping Use    Vaping Use: Never used   Substance and Sexual Activity    Alcohol use: Not Currently     Comment: one pint to one fifth of marielos a day    Drug use: Yes     Types: Marijuana     Comment: daily.    Sexual activity: Yes     Partners: Female       NIH Stroke Scale:       Mickey Jacques RN  07/12/23 10:56 EDT

## 2023-07-12 NOTE — H&P
Patient Name:  Radha Noriega V  YOB: 1980  MRN:  [id number]  Admit Date:  7/12/2023  Patient Care Team:  Hellen Skelton APRN as PCP - General (Nurse Practitioner)      Subjective   History Present Illness     Chief Complaint   Patient presents with    Abdominal Pain     This is a 43-year-old male with alcohol dependence, tobacco use as well as multiple previous admissions for GI bleed.  During a recent admission he was admitted for the same issue.  EGD and colonoscopy were negative for source of bleeding.  An outpatient video endoscopy was planned, but the patient had return to the hospital with hematochezia before could be performed.  During the most recent admission prior to this, his hemoglobin was 6.0 and he was admitted and given pRBC and iron transfusions.  GI saw the patient in consultation at that point time.  2 CT abdomen and pelvis were performed but no bleeding source was not identified.  Question anoscopy was negative as well.  He was subsequently discharged  He was actually supposed to have of video capsule study today however he started experiencing abdominal pain.  He had reported bright red blood in his stool and lightheadedness and palpitations.  His hemoglobin was noted to be 7.1, WBC 21,000, creatinine 1.37.  Urinalysis i was negative for UTI.  Chest x-ray was unremarkable.  CT of the abdomen pelvis showed mild gastritis as well as thickening of the duodenum and proximal jejunal loops, favored to be secondary to peristalsis. PRBC transfusion was initiated.  He was admitted for further evaluation and management.    Personal History     Past Medical History:   Diagnosis Date    Anemia     ETOH abuse     Tachycardia 06/23/2023    Tobacco dependence      Past Surgical History:   Procedure Laterality Date    APPENDECTOMY      COLONOSCOPY N/A 06/25/2023    Procedure: COLONOSCOPY TO CECUM AND TERMINAL ILEUM WITH BIOPSIES AND COLD BIOPSY POLYPECTOMIES;  Surgeon: Imtiaz Lee MD;   Location:  [facility];  Service: Gastroenterology;  Laterality: N/A;  PRE- MELENA  POST- COLON POLYPS, HEMORRHOIDS    ENDOSCOPY Left 06/24/2023    Procedure: ESOPHAGOGASTRODUODENOSCOPY;  Surgeon: Imtiaz Lee MD;  Location:  [facility];  Service: Gastroenterology;  Laterality: Left;  small hiatal hernia, esophagitis    ENTEROSCOPY SMALL BOWEL N/A 7/9/2023    Procedure: ENTEROSCOPY SMALL BOWEL;  Surgeon: Isrrael Gibson MD;  Location:  [facility];  Service: Gastroenterology;  Laterality: N/A;  PRE- GI BLEED  POST- NORMAL     Family History   Problem Relation Age of Onset    Diabetes Father     Diabetes Sister     Hypertension Sister     Diabetes Brother     Diabetes Maternal Grandmother     Diabetes Maternal Grandfather     Diabetes Paternal Grandmother     Diabetes Paternal Grandfather     Heart attack Maternal Aunt 73    Colon cancer Neg Hx     Prostate cancer Neg Hx      Social History     Tobacco Use    Smoking status: Every Day     Packs/day: 1.00     Years: 20.00     Pack years: 20.00     Types: Cigarettes    Smokeless tobacco: Never   Vaping Use    Vaping Use: Never used   Substance Use Topics    Alcohol use: Not Currently     Comment: one pint to one fifth of marielos a day    Drug use: Yes     Types: Marijuana     Comment: daily.     No current facility-administered medications on file prior to encounter.     Current Outpatient Medications on File Prior to Encounter   Medication Sig Dispense Refill    ferrous sulfate (FerrouSul) 325 (65 FE) MG tablet Take 1 tablet by mouth Daily With Breakfast. 30 tablet 0    folic acid (FOLVITE) 1 MG tablet Take 1 tablet by mouth Daily for 30 days. 30 tablet 0    pantoprazole (PROTONIX) 40 MG EC tablet Take 1 tablet by mouth Every Morning. 30 tablet 0    polycarbophil 625 MG tablet tablet Take 1 tablet by mouth Daily for 30 days. 30 tablet 0    thiamine (VITAMIN B1) 100 MG tablet Take 1 tablet by mouth Daily. 30 tablet 0     No Known Allergies    Objective     Objective     Vital Signs  Temp:  [97.1 °F (36.2 °C)-98.9 °F (37.2 °C)] 97.7 °F (36.5 °C)  Heart Rate:  [] 86  Resp:  [16-20] 16  BP: (109-128)/(61-84) 120/67  SpO2:  [99 %-100 %] 100 %  on   ;   Device (Oxygen Therapy): room air  Body mass index is 26.94 kg/m².    Physical Exam  Constitutional:       Appearance: Normal appearance.   HENT:      Head: Normocephalic and atraumatic.   Cardiovascular:      Rate and Rhythm: Normal rate and regular rhythm.   Pulmonary:      Effort: Pulmonary effort is normal. No respiratory distress.   Abdominal:      General: There is no distension.      Palpations: Abdomen is soft.      Tenderness: There is no abdominal tenderness.   Musculoskeletal:      Right lower leg: No edema.      Left lower leg: No edema.   Skin:     General: Skin is warm and dry.   Neurological:      General: No focal deficit present.      Mental Status: He is alert and oriented to person, place, and time.       Results Review:  I reviewed the patient's new clinical results.  I reviewed the patient's new imaging results and agree with the interpretation.  I reviewed the patient's other test results and agree with the interpretation  I personally viewed and interpreted the patient's EKG/Telemetry data  Discussed with ED provider.    Lab Results (last 24 hours)       Procedure Component Value Units Date/Time    CBC & Differential [[id number]]  (Abnormal) Collected: 07/12/23 0609    Specimen: Blood Updated: 07/12/23 0626    Narrative:      The following orders were created for panel order CBC & Differential.  Procedure                               Abnormality         Status                     ---------                               -----------         ------                     CBC Auto Differential[[id number]]        Abnormal            Final result                 Please view results for these tests on the individual orders.    CBC Auto Differential [[id number]]  (Abnormal) Collected: 07/12/23 0609     Specimen: Blood Updated: 07/12/23 0626     WBC 20.68 10*3/mm3      RBC 2.42 10*6/mm3      Hemoglobin 7.1 g/dL      Hematocrit 21.3 %      MCV 88.0 fL      MCH 29.3 pg      MCHC 33.3 g/dL      RDW 15.4 %      RDW-SD 48.4 fl      MPV 10.4 fL      Platelets 399 10*3/mm3      nRBC 0.1 /100 WBC     Manual Differential [[id number]]  (Abnormal) Collected: 07/12/23 0609    Specimen: Blood Updated: 07/12/23 0656     Neutrophil % 74.0 %      Lymphocyte % 19.0 %      Monocyte % 7.0 %      Neutrophils Absolute 15.30 10*3/mm3      Lymphocytes Absolute 3.93 10*3/mm3      Monocytes Absolute 1.45 10*3/mm3      Anisocytosis Mod/2+     WBC Morphology Normal     Platelet Morphology Normal    Comprehensive Metabolic Panel [[id number]]  (Abnormal) Collected: 07/12/23 0612    Specimen: Blood Updated: 07/12/23 0639     Glucose 131 mg/dL      BUN 21 mg/dL      Creatinine 1.37 mg/dL      Sodium 136 mmol/L      Potassium 3.5 mmol/L      Chloride 103 mmol/L      CO2 18.2 mmol/L      Calcium 9.1 mg/dL      Total Protein 6.5 g/dL      Albumin 3.8 g/dL      ALT (SGPT) 12 U/L      AST (SGOT) 13 U/L      Alkaline Phosphatase 70 U/L      Total Bilirubin 0.3 mg/dL      Globulin 2.7 gm/dL      A/G Ratio 1.4 g/dL      BUN/Creatinine Ratio 15.3     Anion Gap 14.8 mmol/L      eGFR 66.0 mL/min/1.73     Narrative:      GFR Normal >60  Chronic Kidney Disease <60  Kidney Failure <15      Protime-INR [[id number]]  (Normal) Collected: 07/12/23 0612    Specimen: Blood Updated: 07/12/23 0645     Protime 14.1 Seconds      INR 1.07    aPTT [[id number]]  (Normal) Collected: 07/12/23 0612    Specimen: Blood Updated: 07/12/23 0645     PTT 25.7 seconds     Lipase [[id number]]  (Abnormal) Collected: 07/12/23 0612    Specimen: Blood Updated: 07/12/23 0639     Lipase 11 U/L     Procalcitonin [[id number]]  (Normal) Collected: 07/12/23 0612    Specimen: Blood Updated: 07/12/23 1157     Procalcitonin 0.15 ng/mL     Narrative:      As a Marker for Sepsis (Non-Neonates):    1.  "<0.5 ng/mL represents a low risk of severe sepsis and/or septic shock.  2. >2 ng/mL represents a high risk of severe sepsis and/or septic shock.    As a Marker for Lower Respiratory Tract Infections that require antibiotic therapy:    PCT on Admission    Antibiotic Therapy       6-12 Hrs later    >0.5                Strongly Recommended  >0.25 - <0.5        Recommended   0.1 - 0.25          Discouraged              Remeasure/reassess PCT  <0.1                Strongly Discouraged     Remeasure/reassess PCT    As 28 day mortality risk marker: \"Change in Procalcitonin Result\" (>80% or <=80%) if Day 0 (or Day 1) and Day 4 values are available. Refer to http://[email or web address] [email or web address]pct[facility][email or web address]    Change in PCT <=80%  A decrease of PCT levels below or equal to 80% defines a positive change in PCT test result representing a higher risk for 28-day all-cause mortality of patients diagnosed with severe sepsis for septic shock.    Change in PCT >80%  A decrease of PCT levels of more than 80% defines a negative change in PCT result representing a lower risk for 28-day all-cause mortality of patients diagnosed with severe sepsis or septic shock.       Urinalysis With Microscopic If Indicated (No Culture) - Urine, Clean Catch [[id number]]  (Normal) Collected: 07/12/23 1219    Specimen: Urine, Clean Catch Updated: 07/12/23 1229     Color, UA Yellow     Appearance, UA Clear     pH, UA <=5.0     Specific Gravity, UA >=1.030     Glucose, UA Negative     Ketones, UA Negative     Bilirubin, UA Negative     Blood, UA Negative     Protein, UA Negative     Leuk Esterase, UA Negative     Nitrite, UA Negative     Urobilinogen, UA 0.2 E.U./dL    Narrative:      Urine microscopic not indicated.            No results found for this or any previous visit.    Imaging Results (Last 24 Hours)       Procedure Component Value Units Date/Time    CT Abdomen Pelvis With Contrast [[id number]] Collected: 07/12/23 0846     Updated: 07/12/23 0846    Narrative:   "    CT ABDOMEN AND PELVIS WITH CONTRAST     HISTORY: 43-year-old male with lower abdominal pain and nausea and blood  in stool.     TECHNIQUE: Axial CT images of the abdomen and pelvis were obtained  following administration of intravenous contrast. The patient was not  given oral contrast Coronal and sagittal reformats were obtained.     COMPARISON: 07/08/2023     FINDINGS: Mild wall thickening of the duodenum and the proximal jejunal  loops likely related to peristalsis. Mild diffuse gastric wall  thickening in a pattern consistent with gastritis. No evidence of bowel  obstruction.     The liver demonstrates normal attenuation. The gallbladder is normal.  The spleen is normal. The pancreas is normal. Bilateral adrenal glands  are normal. No renal calculi or hydronephrosis. The urinary bladder is  partially distended and normal.       Impression:      Mild gastritis is suspected. Diffuse thickening involving  the duodenum and the proximal jejunal loops favored to be secondary to  peristalsis.     Radiation dose reduction techniques were utilized, including automated  exposure control and exposure modulation based on body size.          XR Chest 1 View [[id number]] Collected: 07/12/23 0729     Updated: 07/12/23 0733    Narrative:      AP CHEST     HISTORY: Leukocytosis     COMPARISON: None available     FINDINGS: Mildly elevated left hemidiaphragm. No evidence of airspace  infiltrate. Heart size normal. No pneumothorax. Visualized osseous  structures are unremarkable.       Impression:      No acute findings     This report was finalized on 7/12/2023 7:30 AM by Dr. Moise Walker M.D.                   No orders to display              Assessment/Plan     Active Hospital Problems    Diagnosis  POA    **ABLA (acute blood loss anemia) [D62]  Yes      Resolved Hospital Problems   No resolved problems to display.     Acute blood loss anemia  GI bleed  Extensive negative work-up includes EGD, colonoscopy, CT abdomen  angiography, which enteroscopy without definite source of bleeding.  We will start pantoprazole GGT.  Every 8 hemoglobin hematocrit.  Transfuse to keep hemoglobin over 7  Consult GI for possible inpatient capsule endoscopy.  N.p.o. diet    Leukocytosis  Etiology is unclear.  Infectious work-up has been negative.  Procalcitonin is 1.5  Check GI PCR panel.  Holding off antibiotics for now.  Patient is afebrile and hemodynamically stable.      Hyperglycemia  Carbohydrate consistent diet when appropriate for p.o. intake      I discussed the patient's findings and my recommendations with patient and ED provider.    VTE Prophylaxis - SCDs.  Code Status - Full code.       Hasmukh Child MD  [facility]  07/12/23  14:23 EDT

## 2023-07-13 ENCOUNTER — APPOINTMENT (OUTPATIENT)
Dept: NUCLEAR MEDICINE | Facility: HOSPITAL | Age: 43
DRG: 356 | End: 2023-07-13
Payer: COMMERCIAL

## 2023-07-13 PROBLEM — A41.9 SEPSIS: Status: ACTIVE | Noted: 2023-07-13

## 2023-07-13 LAB
ALBUMIN SERPL-MCNC: 2.9 G/DL (ref 3.5–5.2)
ALBUMIN/GLOB SERPL: 1.2 G/DL
ALP SERPL-CCNC: 50 U/L (ref 39–117)
ALT SERPL W P-5'-P-CCNC: 11 U/L (ref 1–41)
ANION GAP SERPL CALCULATED.3IONS-SCNC: 11.1 MMOL/L (ref 5–15)
AST SERPL-CCNC: 9 U/L (ref 1–40)
BASOPHILS # BLD AUTO: 0.05 10*3/MM3 (ref 0–0.2)
BASOPHILS NFR BLD AUTO: 0.3 % (ref 0–1.5)
BH BB BLOOD EXPIRATION DATE: NORMAL
BH BB BLOOD EXPIRATION DATE: NORMAL
BH BB BLOOD TYPE BARCODE: 7300
BH BB BLOOD TYPE BARCODE: 7300
BH BB DISPENSE STATUS: NORMAL
BH BB DISPENSE STATUS: NORMAL
BH BB PRODUCT CODE: NORMAL
BH BB PRODUCT CODE: NORMAL
BH BB UNIT NUMBER: NORMAL
BH BB UNIT NUMBER: NORMAL
BILIRUB SERPL-MCNC: 0.3 MG/DL (ref 0–1.2)
BUN SERPL-MCNC: 28 MG/DL (ref 6–20)
BUN/CREAT SERPL: 21.2 (ref 7–25)
CALCIUM SPEC-SCNC: 8.3 MG/DL (ref 8.6–10.5)
CHLORIDE SERPL-SCNC: 109 MMOL/L (ref 98–107)
CO2 SERPL-SCNC: 17.9 MMOL/L (ref 22–29)
CREAT SERPL-MCNC: 1.32 MG/DL (ref 0.76–1.27)
CROSSMATCH INTERPRETATION: NORMAL
CROSSMATCH INTERPRETATION: NORMAL
D-LACTATE SERPL-SCNC: 1.2 MMOL/L (ref 0.5–2)
DEPRECATED RDW RBC AUTO: 57.1 FL (ref 37–54)
EGFRCR SERPLBLD CKD-EPI 2021: 69.1 ML/MIN/1.73
EOSINOPHIL # BLD AUTO: 0.16 10*3/MM3 (ref 0–0.4)
EOSINOPHIL NFR BLD AUTO: 0.8 % (ref 0.3–6.2)
ERYTHROCYTE [DISTWIDTH] IN BLOOD BY AUTOMATED COUNT: 17.7 % (ref 12.3–15.4)
GLOBULIN UR ELPH-MCNC: 2.4 GM/DL
GLUCOSE SERPL-MCNC: 105 MG/DL (ref 65–99)
HCT VFR BLD AUTO: 17.1 % (ref 37.5–51)
HCT VFR BLD AUTO: 21.2 % (ref 37.5–51)
HCT VFR BLD AUTO: 26.8 % (ref 37.5–51)
HGB BLD-MCNC: 5.6 G/DL (ref 13–17.7)
HGB BLD-MCNC: 7 G/DL (ref 13–17.7)
HGB BLD-MCNC: 8.7 G/DL (ref 13–17.7)
LYMPHOCYTES # BLD AUTO: 2.68 10*3/MM3 (ref 0.7–3.1)
LYMPHOCYTES NFR BLD AUTO: 14 % (ref 19.6–45.3)
MCH RBC QN AUTO: 28.8 PG (ref 26.6–33)
MCHC RBC AUTO-ENTMCNC: 32.5 G/DL (ref 31.5–35.7)
MCV RBC AUTO: 88.7 FL (ref 79–97)
MONOCYTES # BLD AUTO: 1.94 10*3/MM3 (ref 0.1–0.9)
MONOCYTES NFR BLD AUTO: 10.1 % (ref 5–12)
NEUTROPHILS NFR BLD AUTO: 13.91 10*3/MM3 (ref 1.7–7)
NEUTROPHILS NFR BLD AUTO: 72.4 % (ref 42.7–76)
PLATELET # BLD AUTO: 275 10*3/MM3 (ref 140–450)
PMV BLD AUTO: 11 FL (ref 6–12)
POTASSIUM SERPL-SCNC: 4.3 MMOL/L (ref 3.5–5.2)
PROT SERPL-MCNC: 5.3 G/DL (ref 6–8.5)
RBC # BLD AUTO: 3.02 10*6/MM3 (ref 4.14–5.8)
SODIUM SERPL-SCNC: 138 MMOL/L (ref 136–145)
UNIT  ABO: NORMAL
UNIT  ABO: NORMAL
UNIT  RH: NORMAL
UNIT  RH: NORMAL
VIT B12 BLD-MCNC: 425 PG/ML (ref 211–946)
WBC NRBC COR # BLD: 19.21 10*3/MM3 (ref 3.4–10.8)

## 2023-07-13 PROCEDURE — 82607 VITAMIN B-12: CPT | Performed by: INTERNAL MEDICINE

## 2023-07-13 PROCEDURE — 83605 ASSAY OF LACTIC ACID: CPT | Performed by: NURSE PRACTITIONER

## 2023-07-13 PROCEDURE — 99214 OFFICE O/P EST MOD 30 MIN: CPT | Performed by: NURSE PRACTITIONER

## 2023-07-13 PROCEDURE — 85018 HEMOGLOBIN: CPT | Performed by: STUDENT IN AN ORGANIZED HEALTH CARE EDUCATION/TRAINING PROGRAM

## 2023-07-13 PROCEDURE — 25010000002 PIPERACILLIN SOD-TAZOBACTAM PER 1 G: Performed by: INTERNAL MEDICINE

## 2023-07-13 PROCEDURE — A9560 TC99M LABELED RBC: HCPCS | Performed by: INTERNAL MEDICINE

## 2023-07-13 PROCEDURE — 93010 ELECTROCARDIOGRAM REPORT: CPT | Performed by: INTERNAL MEDICINE

## 2023-07-13 PROCEDURE — 25010000002 MORPHINE PER 10 MG: Performed by: INTERNAL MEDICINE

## 2023-07-13 PROCEDURE — 25010000002 MORPHINE PER 10 MG: Performed by: NURSE PRACTITIONER

## 2023-07-13 PROCEDURE — 25010000002 ONDANSETRON PER 1 MG: Performed by: INTERNAL MEDICINE

## 2023-07-13 PROCEDURE — 85025 COMPLETE CBC W/AUTO DIFF WBC: CPT | Performed by: NURSE PRACTITIONER

## 2023-07-13 PROCEDURE — 36430 TRANSFUSION BLD/BLD COMPNT: CPT

## 2023-07-13 PROCEDURE — 0 TECHNETIUM LABELED RED BLOOD CELLS: Performed by: INTERNAL MEDICINE

## 2023-07-13 PROCEDURE — 80053 COMPREHEN METABOLIC PANEL: CPT | Performed by: STUDENT IN AN ORGANIZED HEALTH CARE EDUCATION/TRAINING PROGRAM

## 2023-07-13 PROCEDURE — 78278 ACUTE GI BLOOD LOSS IMAGING: CPT

## 2023-07-13 PROCEDURE — 25010000002 ONDANSETRON PER 1 MG: Performed by: NURSE PRACTITIONER

## 2023-07-13 PROCEDURE — 86900 BLOOD TYPING SEROLOGIC ABO: CPT

## 2023-07-13 PROCEDURE — 93005 ELECTROCARDIOGRAM TRACING: CPT | Performed by: INTERNAL MEDICINE

## 2023-07-13 PROCEDURE — G0378 HOSPITAL OBSERVATION PER HR: HCPCS

## 2023-07-13 PROCEDURE — 85014 HEMATOCRIT: CPT | Performed by: STUDENT IN AN ORGANIZED HEALTH CARE EDUCATION/TRAINING PROGRAM

## 2023-07-13 PROCEDURE — 87040 BLOOD CULTURE FOR BACTERIA: CPT | Performed by: NURSE PRACTITIONER

## 2023-07-13 PROCEDURE — P9016 RBC LEUKOCYTES REDUCED: HCPCS

## 2023-07-13 RX ORDER — SODIUM CHLORIDE 9 MG/ML
100 INJECTION, SOLUTION INTRAVENOUS CONTINUOUS
Status: DISCONTINUED | OUTPATIENT
Start: 2023-07-13 | End: 2023-07-14

## 2023-07-13 RX ORDER — HEPARIN SODIUM (PORCINE) LOCK FLUSH IV SOLN 100 UNIT/ML 100 UNIT/ML
0.3 SOLUTION INTRAVENOUS
Status: DISCONTINUED | OUTPATIENT
Start: 2023-07-13 | End: 2023-07-21 | Stop reason: HOSPADM

## 2023-07-13 RX ORDER — MORPHINE SULFATE 2 MG/ML
2 INJECTION, SOLUTION INTRAMUSCULAR; INTRAVENOUS EVERY 4 HOURS PRN
Status: DISPENSED | OUTPATIENT
Start: 2023-07-13 | End: 2023-07-20

## 2023-07-13 RX ORDER — FOLIC ACID 1 MG/1
1 TABLET ORAL DAILY
Status: DISCONTINUED | OUTPATIENT
Start: 2023-07-13 | End: 2023-07-21 | Stop reason: HOSPADM

## 2023-07-13 RX ORDER — ONDANSETRON 2 MG/ML
4 INJECTION INTRAMUSCULAR; INTRAVENOUS EVERY 6 HOURS PRN
Status: DISCONTINUED | OUTPATIENT
Start: 2023-07-13 | End: 2023-07-21 | Stop reason: HOSPADM

## 2023-07-13 RX ORDER — MORPHINE SULFATE 2 MG/ML
2 INJECTION, SOLUTION INTRAMUSCULAR; INTRAVENOUS ONCE
Status: COMPLETED | OUTPATIENT
Start: 2023-07-13 | End: 2023-07-13

## 2023-07-13 RX ADMIN — ONDANSETRON 4 MG: 2 INJECTION INTRAMUSCULAR; INTRAVENOUS at 05:57

## 2023-07-13 RX ADMIN — PANTOPRAZOLE SODIUM 8 MG/HR: 40 INJECTION, POWDER, FOR SOLUTION INTRAVENOUS at 22:26

## 2023-07-13 RX ADMIN — TAZOBACTAM SODIUM AND PIPERACILLIN SODIUM 3.38 G: 375; 3 INJECTION, SOLUTION INTRAVENOUS at 23:04

## 2023-07-13 RX ADMIN — TECHNETIUM TC 99M-LABELED RED BLOOD CELLS 1 DOSE: KIT at 18:38

## 2023-07-13 RX ADMIN — MORPHINE SULFATE 2 MG: 2 INJECTION, SOLUTION INTRAMUSCULAR; INTRAVENOUS at 14:42

## 2023-07-13 RX ADMIN — FOLIC ACID 1 MG: 1 TABLET ORAL at 21:20

## 2023-07-13 RX ADMIN — ONDANSETRON 4 MG: 2 INJECTION INTRAMUSCULAR; INTRAVENOUS at 13:02

## 2023-07-13 RX ADMIN — MORPHINE SULFATE 2 MG: 2 INJECTION, SOLUTION INTRAMUSCULAR; INTRAVENOUS at 10:12

## 2023-07-13 RX ADMIN — PANTOPRAZOLE SODIUM 8 MG/HR: 40 INJECTION, POWDER, FOR SOLUTION INTRAVENOUS at 16:16

## 2023-07-13 RX ADMIN — PANTOPRAZOLE SODIUM 8 MG/HR: 40 INJECTION, POWDER, FOR SOLUTION INTRAVENOUS at 00:34

## 2023-07-13 RX ADMIN — SODIUM CHLORIDE 100 ML/HR: 9 INJECTION, SOLUTION INTRAVENOUS at 22:26

## 2023-07-13 RX ADMIN — PANTOPRAZOLE SODIUM 8 MG/HR: 40 INJECTION, POWDER, FOR SOLUTION INTRAVENOUS at 10:11

## 2023-07-13 RX ADMIN — MORPHINE SULFATE 2 MG: 2 INJECTION, SOLUTION INTRAMUSCULAR; INTRAVENOUS at 05:56

## 2023-07-13 RX ADMIN — SODIUM CHLORIDE 100 ML/HR: 9 INJECTION, SOLUTION INTRAVENOUS at 11:03

## 2023-07-13 RX ADMIN — TECHNETIUM TC 99M-LABELED RED BLOOD CELLS 1 DOSE: KIT at 13:30

## 2023-07-13 RX ADMIN — Medication 10 ML: at 09:21

## 2023-07-13 RX ADMIN — Medication 100 MG: at 21:20

## 2023-07-13 RX ADMIN — Medication 10 ML: at 21:21

## 2023-07-13 NOTE — NURSING NOTE
Nuclear medicine called this nurse to come see the patient due to shakes and pain.  This RN took patient's temp 98.5, , oxygen sats 100%.  Pt did not receive any pain medication due to his pain being a zero at that time.  PRN pain medication returned to the accudose.

## 2023-07-13 NOTE — PROGRESS NOTES
Name: Radha Noriega V ADMIT: 2023   : 1980  PCP: Hellen Skelton APRN    MRN: [id number] LOS: 0 days   AGE/SEX: 43 y.o. male  ROOM: [facility]     Subjective   Subjective   C/O abd pain, nausea and feeling dehydrated. Urinated before visit. No vomiting. Afebrile.        Objective   Objective   Vital Signs  Temp:  [97.5 °F (36.4 °C)-98.8 °F (37.1 °C)] 98.1 °F (36.7 °C)  Heart Rate:  [] 112  Resp:  [16-18] 16  BP: (103-134)/(72-93) 128/86  SpO2:  [100 %] 100 %  on   ;   Device (Oxygen Therapy): room air  Body mass index is 26.94 kg/m².  Physical Exam  Vitals and nursing note reviewed.   Constitutional:       Appearance: He is ill-appearing.   HENT:      Head: Normocephalic.      Mouth/Throat:      Mouth: Mucous membranes are moist.   Eyes:      Conjunctiva/sclera: Conjunctivae normal.   Cardiovascular:      Rate and Rhythm: Regular rhythm. Tachycardia present.   Pulmonary:      Effort: Pulmonary effort is normal. No respiratory distress.      Breath sounds: No wheezing or rales.   Abdominal:      Palpations: Abdomen is soft.      Tenderness: There is guarding.   Musculoskeletal:      Cervical back: Neck supple.      Right lower leg: No edema.      Left lower leg: No edema.   Skin:     General: Skin is warm and dry.   Neurological:      Mental Status: He is alert and oriented to person, place, and time.   Psychiatric:         Mood and Affect: Affect is flat.     Results Review     I reviewed the patient's new clinical results.  Results from last 7 days   Lab Units 23  1146 23  2356 23  1854 23  0609 07/10/23  0806 23  1619 23  0418   WBC 10*3/mm3 19.21*  --   --  20.68* 10.78  --  7.64   HEMOGLOBIN g/dL 8.7* 7.0* 7.7* 7.1* 8.4*   < > 7.4*   PLATELETS 10*3/mm3 275  --   --  399 372  --  300    < > = values in this interval not displayed.     Results from last 7 days   Lab Units 23  1146 23  0612 23  0418 23  0026   SODIUM mmol/L 138 136  139 140   POTASSIUM mmol/L 4.3 3.5 4.1 3.6   CHLORIDE mmol/L 109* 103 110* 109*   CO2 mmol/L 17.9* 18.2* 23.8 19.0*   BUN mg/dL 28* 21* 10 23*   CREATININE mg/dL 1.32* 1.37* 1.17 1.36*   GLUCOSE mg/dL 105* 131* 92 144*   EGFR mL/min/1.73 69.1 66.0 79.8 66.6     Results from last 7 days   Lab Units 07/13/23  1146 07/12/23 0612 07/08/23 0418 07/07/23  0026   ALBUMIN g/dL 2.9* 3.8 2.9* 3.3*   BILIRUBIN mg/dL 0.3 0.3 <0.2 <0.2   ALK PHOS U/L 50 70 51 56   AST (SGOT) U/L 9 13 10 14   ALT (SGPT) U/L 11 12 13 16     Results from last 7 days   Lab Units 07/13/23 1146 07/12/23 0612 07/08/23 0418 07/07/23  0026   CALCIUM mg/dL 8.3* 9.1 8.4* 8.3*   ALBUMIN g/dL 2.9* 3.8 2.9* 3.3*   MAGNESIUM mg/dL  --   --  2.0  --    PHOSPHORUS mg/dL  --   --  3.3  --      Results from last 7 days   Lab Units 07/12/23 0612   PROCALCITONIN ng/mL 0.15     No results found for: HGBA1C, POCGLU    CT Abdomen Pelvis With Contrast    Result Date: 7/12/2023  Mild gastritis is suspected. Diffuse thickening involving the duodenum and the proximal jejunal loops favored to be secondary to peristalsis.  Radiation dose reduction techniques were utilized, including automated exposure control and exposure modulation based on body size.       XR Chest 1 View    Result Date: 7/12/2023  No acute findings  This report was finalized on 7/12/2023 7:30 AM by Dr. Moise Walker M.D.     I have personally reviewed all medications:  Scheduled Medications  ondansetron, 4 mg, Intravenous, Once  senna-docusate sodium, 2 tablet, Oral, BID  sodium chloride, 10 mL, Intravenous, Q12H    Infusions  pantoprazole, 8 mg/hr, Last Rate: 8 mg/hr (07/13/23 1011)  Pharmacy to Dose Zosyn,   sodium chloride, 100 mL/hr, Last Rate: 100 mL/hr (07/13/23 1103)    Diet  NPO Diet NPO Type: Strict NPO, Ice Chips    I have personally reviewed:  [x]  Laboratory   [x]  Microbiology   [x]  Radiology   [x]  EKG/Telemetry  [x]  Cardiology/Vascular   []  Pathology    []  Records        Assessment/Plan     Active Hospital Problems    Diagnosis  POA    **ABLA (acute blood loss anemia) [D62]  Yes    Sepsis [A41.9]  Yes    Iron deficiency anemia [D50.9]  Yes    Current every day smoker [F17.200]  Yes      Resolved Hospital Problems   No resolved problems to display.       43 y.o. male admitted with ABLA (acute blood loss anemia).    Acute blood loss anemia/MARCE  GI bleed  Extensive negative work-up includes EGD, colonoscopy, CT abdomen angiography, enteroscopy without definite source of bleeding.  Continue pantoprazole GGT.  Serial H&H; up to 8.7 s/p PRBC  GI assistance appreciated. Plan tagged RBC today  N.p.o. diet, add IVF's  Holding po iron for now    Leukocytosis/Sepsis  Etiology is unclear; WBC remains 19.   Procalcitonin is 1.5. Check lactate and blood cultures as pt meets criteria for sepsis (leukocytosis, tachycardia). GI PCR neg. Cover w/IV zosyn and follow cultures. CT A/P mild gastritis, thickening duodenum & prox jejunal loops favored to be peristalsis. UA, CXR unremarkable     Hyperglycemia  Carbohydrate consistent diet when appropriate for p.o. intake      SCDs for DVT prophylaxis.  Full code.  Discussed with patient, family, and nursing staff.  Anticipate discharge  TBD  .      CHETNA Sparks  [facility]  07/13/23  13:07 EDT

## 2023-07-13 NOTE — PLAN OF CARE
Goal Outcome Evaluation:  Plan of Care Reviewed With: patient        Progress: no change  Outcome Evaluation: Pt lying in bed, reports pain and nausea better after zofran and morphine inj. Pt too weak to get into bsc, Hr was 160's after pain inj now down to 130's, ekg obtained, showing ST, denies chest pain, no adverse reaction from blood transfusion. bm with blood x1 this shift. st on tele, nad, monitored closely.

## 2023-07-13 NOTE — CASE MANAGEMENT/SOCIAL WORK
Discharge Planning Assessment  [facility]     Patient Name: Radha Noriega V  MRN: [id number]  Today's Date: 7/13/2023    Admit Date: 7/12/2023    Plan: Return home with spouse   Discharge Needs Assessment       Row Name 07/13/23 1026       Living Environment    People in Home spouse    Name(s) of People in Home Tyler Silva    Current Living Arrangements apartment    Potentially Unsafe Housing Conditions none    Primary Care Provided by self    Provides Primary Care For no one    Family Caregiver if Needed spouse    Family Caregiver Names Wife Shira [phone]    Quality of Family Relationships helpful    Able to Return to Prior Arrangements yes       Resource/Environmental Concerns    Resource/Environmental Concerns none    Transportation Concerns none       Food Insecurity    Within the past 12 months, you worried that your food would run out before you got the money to buy more. Never true    Within the past 12 months, the food you bought just didn't last and you didn't have money to get more. Never true       Transition Planning    Patient/Family Anticipates Transition to home with family    Patient/Family Anticipated Services at Transition none    Transportation Anticipated family or friend will provide       Discharge Needs Assessment    Readmission Within the Last 30 Days no previous admission in last 30 days    Equipment Currently Used at Home none    Concerns to be Addressed denies needs/concerns at this time    Anticipated Changes Related to Illness none    Equipment Needed After Discharge none                   Discharge Plan       Row Name 07/13/23 1031       Plan    Plan Return home with spouse    Patient/Family in Agreement with Plan yes    Plan Comments Spoke with patient at bedside.  Patient lives with wife Shira [phone], is IADL, uses no DME, has never used HH.  PCP is Hellen BASILIO and pharmacy is [facility].  Wife will assist if needed.  He plans to return home at  [place].  CCP will follow.  Sridhar MARIE                  Continued Care and Services - Admitted Since 7/12/2023    Coordination has not been started for this encounter.       Expected Discharge Date and Time       Expected Discharge Date Expected Discharge Time    Jul 14, 2023            Demographic Summary       Row Name 07/13/23 1024       General Information    Admission Type observation    Arrived From home    Referral Source admission list    Reason for Consult discharge planning    Preferred Language English                   Functional Status       Row Name 07/13/23 1025       Functional Status    Usual Activity Tolerance good    Current Activity Tolerance moderate       Functional Status, IADL    Medications independent    Meal Preparation independent;assistive person  Wife    Housekeeping assistive person;independent    Laundry independent;assistive person    Shopping independent;assistive person       Mental Status    General Appearance WDL WDL       Mental Status Summary    Recent Changes in Mental Status/Cognitive Functioning no changes                               Becky S. [place], CYNTHIA

## 2023-07-13 NOTE — PROGRESS NOTES
Gastroenterology   Inpatient Progress Note    Reason for Follow Up: Anemia    Subjective  Interval History:   Patient received 2 units PRBC, hemoglobin has improved, currently 8.7.  Patient with melanic stools this morning.  Patient on clear liquid diet.    Current Facility-Administered Medications:     sennosides-docusate (PERICOLACE) 8.6-50 MG per tablet 2 tablet, 2 tablet, Oral, BID **AND** polyethylene glycol (MIRALAX) packet 17 g, 17 g, Oral, Daily PRN **AND** bisacodyl (DULCOLAX) EC tablet 5 mg, 5 mg, Oral, Daily PRN **AND** bisacodyl (DULCOLAX) suppository 10 mg, 10 mg, Rectal, Daily PRN, Hasmukh Child MD    folic acid (FOLVITE) tablet 1 mg, 1 mg, Oral, Daily, Xiomy Mckeon APRN    heparin injection 30 Units, 0.3 mL, Other, Once in imaging, Harshal Casillas MD    morphine injection 2 mg, 2 mg, Intravenous, Q4H PRN, Harshal Casillas MD, 2 mg at 07/13/23 1442    ondansetron (ZOFRAN) injection 4 mg, 4 mg, Intravenous, Once, Tamir Harris II, MD    ondansetron (ZOFRAN) injection 4 mg, 4 mg, Intravenous, Q6H PRN, Kenia Pruitt APRN, 4 mg at 07/13/23 1302    pantoprazole (PROTONIX) 40 mg in 100 mL NS (VTB), 8 mg/hr, Intravenous, Continuous, Hasmukh Child MD, Last Rate: 20 mL/hr at 07/13/23 1616, 8 mg/hr at 07/13/23 1616    Pharmacy to Dose Zosyn, , Does not apply, Continuous PRN, Xiomy Mckeon APRN    piperacillin-tazobactam (ZOSYN) 3.375 g in iso-osmotic dextrose 50 ml (premix), 3.375 g, Intravenous, Once, Xiomy Mckeon APRN    piperacillin-tazobactam (ZOSYN) 3.375 g in iso-osmotic dextrose 50 ml (premix), 3.375 g, Intravenous, Q8H, Xiomy Mckeon APRN    [COMPLETED] Insert Peripheral IV, , , Once **AND** sodium chloride 0.9 % flush 10 mL, 10 mL, Intravenous, PRN, Tamir Del Rosario MD    sodium chloride 0.9 % flush 10 mL, 10 mL, Intravenous, Q12H, Hasmukh Child MD, 10 mL at 07/13/23 0921    sodium chloride 0.9 % flush 10 mL, 10 mL, Intravenous,  PRN, Hasmukh Child MD    sodium chloride 0.9 % infusion 40 mL, 40 mL, Intravenous, PRN, Hasmukh Child MD    sodium chloride 0.9 % infusion, 100 mL/hr, Intravenous, Continuous, Xiomy Mckeon APRN, Last Rate: 100 mL/hr at 07/13/23 1103, 100 mL/hr at 07/13/23 1103    technetium labeled red blood cells (ULTRATAG) injection 1 dose, 1 dose, Intravenous, Once in imaging, Harshal Casillas MD    thiamine (VITAMIN B-1) tablet 100 mg, 100 mg, Oral, Daily, Xiomy Mckeon APRN  Review of Systems:                Gastroenterology positive for melanic stools    Objective     Vital Signs  Temp:  [97.5 °F (36.4 °C)-98.8 °F (37.1 °C)] 98 °F (36.7 °C)  Heart Rate:  [] 147  Resp:  [16-18] 18  BP: (103-134)/(72-93) 126/93  Body mass index is 26.94 kg/m².                  Physical Exam:              General: patient awake, alert and cooperative              Eyes: no scleral icterus              Skin: warm and dry, not jaundiced              Abdomen: soft, normal bowel sounds, guarding              Psychiatric: Flat affect                Results Review:                I reviewed the patient's new clinical results.    Results from last 7 days   Lab Units 07/13/23  1146 07/12/23  2356 07/12/23  1854 07/12/23  0609 07/10/23  0806   WBC 10*3/mm3 19.21*  --   --  20.68* 10.78   HEMOGLOBIN g/dL 8.7* 7.0* 7.7* 7.1* 8.4*   HEMATOCRIT % 26.8* 21.2* 23.9* 21.3* 25.0*   PLATELETS 10*3/mm3 275  --   --  399 372     Results from last 7 days   Lab Units 07/13/23  1146 07/12/23  0612 07/08/23  0418   SODIUM mmol/L 138 136 139   POTASSIUM mmol/L 4.3 3.5 4.1   CHLORIDE mmol/L 109* 103 110*   CO2 mmol/L 17.9* 18.2* 23.8   BUN mg/dL 28* 21* 10   CREATININE mg/dL 1.32* 1.37* 1.17   CALCIUM mg/dL 8.3* 9.1 8.4*   BILIRUBIN mg/dL 0.3 0.3 <0.2   ALK PHOS U/L 50 70 51   ALT (SGPT) U/L 11 12 13   AST (SGOT) U/L 9 13 10   GLUCOSE mg/dL 105* 131* 92     Results from last 7 days   Lab Units 07/12/23  0612 07/07/23  0026   INR  1.07 1.15*      Lab Results   Lab Value Date/Time    LIPASE 11 (L) 07/12/2023 0612    LIPASE 26 06/23/2023 1201       Radiology:  CT Abdomen Pelvis With Contrast   Final Result   Mild gastritis is suspected. Diffuse thickening involving   the duodenum and the proximal jejunal loops favored to be secondary to   peristalsis.       Radiation dose reduction techniques were utilized, including automated   exposure control and exposure modulation based on body size.       This report was finalized on 7/13/2023 1:35 PM by Dr. Len Herndon M.D.          XR Chest 1 View   Final Result   No acute findings       This report was finalized on 7/12/2023 7:30 AM by Dr. Moise Walker M.D.          NM GI Blood Loss    (Results Pending)       Assessment & Plan     Active Hospital Problems    Diagnosis     **ABLA (acute blood loss anemia)     Sepsis     Iron deficiency anemia     Current every day smoker        Assessment:  Obscure GI bleed with previous work-up including EGD, colonoscopy, CT abdomen angiograph, enteroscopy without definitive source of bleeding  CT abdomen pelvis with mild gastritis, thickening of the duodenum and proximal jejunal loops favored to be peristalsis  Leukocytosis      Plan:  Tagged red blood cell scan ordered, pending  Continue to monitor H&H and transfuse per primary  Patient will need eventual capsule endoscopy outpatient      I discussed the patients findings and my recommendations with patient and nursing staff.           Ira BASILIO  [facility]  [address] [place] [address]

## 2023-07-13 NOTE — PROGRESS NOTES
[facility] Clinical Pharmacy Services: Piperacillin-Tazobactam Consult    Pt Name: Radha Noriega V   : 1980    Indication: Sepsis    Relevant clinical data and objective history reviewed:    Past Medical History:   Diagnosis Date    Anemia     ETOH abuse     Tachycardia 2023    Tobacco dependence      Creatinine   Date Value Ref [place] Status   2023 1.32 (H) 0.76 - 1.27 mg/dL Final   2023 1.37 (H) 0.76 - 1.27 mg/dL Final   2023 1.17 0.76 - 1.27 mg/dL Final     BUN   Date Value Ref [place] Status   2023 28 (H) 6 - 20 mg/dL Final     Estimated Creatinine Clearance: 80.4 mL/min (A) (by C-G formula based on SCr of 1.32 mg/dL (H)).    Lab Results   Component Value Date    WBC 19.21 (H) 2023     Temp Readings from Last 3 Encounters:   23 98.1 °F (36.7 °C)   23 97.4 °F (36.3 °C) (Oral)   23 98 °F (36.7 °C) (Oral)      Assessment/Plan  Estimated CrCl >20 mL/min at this time; BMI 26.94 kg/m2  Will start piperacillin-tazobactam 3.375 g IV every 8 hours     Pharmacy will continue to follow daily while on piperacillin-tazobactam and adjust as needed. Thank you for this consult.    Niko Cardoso [facility]  Clinical Pharmacist

## 2023-07-14 ENCOUNTER — APPOINTMENT (OUTPATIENT)
Dept: CT IMAGING | Facility: HOSPITAL | Age: 43
DRG: 356 | End: 2023-07-14
Payer: COMMERCIAL

## 2023-07-14 PROBLEM — K62.5 BLOOD PER RECTUM: Status: ACTIVE | Noted: 2023-07-12

## 2023-07-14 PROBLEM — E87.20 METABOLIC ACIDOSIS: Status: ACTIVE | Noted: 2023-07-14

## 2023-07-14 PROBLEM — A09 ENTERITIS OF INFECTIOUS ORIGIN: Status: ACTIVE | Noted: 2023-07-14

## 2023-07-14 LAB
ANION GAP SERPL CALCULATED.3IONS-SCNC: 6.1 MMOL/L (ref 5–15)
BASOPHILS # BLD AUTO: 0.05 10*3/MM3 (ref 0–0.2)
BASOPHILS NFR BLD AUTO: 0.4 % (ref 0–1.5)
BH BB BLOOD EXPIRATION DATE: NORMAL
BH BB BLOOD EXPIRATION DATE: NORMAL
BH BB BLOOD TYPE BARCODE: 7300
BH BB BLOOD TYPE BARCODE: 7300
BH BB DISPENSE STATUS: NORMAL
BH BB DISPENSE STATUS: NORMAL
BH BB PRODUCT CODE: NORMAL
BH BB PRODUCT CODE: NORMAL
BH BB UNIT NUMBER: NORMAL
BH BB UNIT NUMBER: NORMAL
BUN SERPL-MCNC: 19 MG/DL (ref 6–20)
BUN/CREAT SERPL: 16.5 (ref 7–25)
CALCIUM SPEC-SCNC: 7.5 MG/DL (ref 8.6–10.5)
CHLORIDE SERPL-SCNC: 108 MMOL/L (ref 98–107)
CO2 SERPL-SCNC: 18.9 MMOL/L (ref 22–29)
CREAT SERPL-MCNC: 1.15 MG/DL (ref 0.76–1.27)
CROSSMATCH INTERPRETATION: NORMAL
CROSSMATCH INTERPRETATION: NORMAL
DEPRECATED RDW RBC AUTO: 50.7 FL (ref 37–54)
EGFRCR SERPLBLD CKD-EPI 2021: 81.5 ML/MIN/1.73
EOSINOPHIL # BLD AUTO: 0.21 10*3/MM3 (ref 0–0.4)
EOSINOPHIL NFR BLD AUTO: 1.7 % (ref 0.3–6.2)
ERYTHROCYTE [DISTWIDTH] IN BLOOD BY AUTOMATED COUNT: 16.3 % (ref 12.3–15.4)
GLUCOSE SERPL-MCNC: 98 MG/DL (ref 65–99)
HCT VFR BLD AUTO: 16.5 % (ref 37.5–51)
HCT VFR BLD AUTO: 16.7 % (ref 37.5–51)
HCT VFR BLD AUTO: 18 % (ref 37.5–51)
HCT VFR BLD AUTO: 18.1 % (ref 37.5–51)
HGB BLD-MCNC: 5.5 G/DL (ref 13–17.7)
HGB BLD-MCNC: 5.5 G/DL (ref 13–17.7)
HGB BLD-MCNC: 6.1 G/DL (ref 13–17.7)
HGB BLD-MCNC: 6.1 G/DL (ref 13–17.7)
IMM GRANULOCYTES # BLD AUTO: 0.32 10*3/MM3 (ref 0–0.05)
IMM GRANULOCYTES NFR BLD AUTO: 2.6 % (ref 0–0.5)
LYMPHOCYTES # BLD AUTO: 2.17 10*3/MM3 (ref 0.7–3.1)
LYMPHOCYTES NFR BLD AUTO: 17.3 % (ref 19.6–45.3)
MCH RBC QN AUTO: 29 PG (ref 26.6–33)
MCHC RBC AUTO-ENTMCNC: 33.9 G/DL (ref 31.5–35.7)
MCV RBC AUTO: 85.7 FL (ref 79–97)
MONOCYTES # BLD AUTO: 1.4 10*3/MM3 (ref 0.1–0.9)
MONOCYTES NFR BLD AUTO: 11.2 % (ref 5–12)
NEUTROPHILS NFR BLD AUTO: 66.8 % (ref 42.7–76)
NEUTROPHILS NFR BLD AUTO: 8.39 10*3/MM3 (ref 1.7–7)
NRBC BLD AUTO-RTO: 0.6 /100 WBC (ref 0–0.2)
PLATELET # BLD AUTO: 241 10*3/MM3 (ref 140–450)
PMV BLD AUTO: 10.6 FL (ref 6–12)
POTASSIUM SERPL-SCNC: 3.8 MMOL/L (ref 3.5–5.2)
QT INTERVAL: 278 MS
RBC # BLD AUTO: 2.1 10*6/MM3 (ref 4.14–5.8)
SODIUM SERPL-SCNC: 133 MMOL/L (ref 136–145)
UNIT  ABO: NORMAL
UNIT  ABO: NORMAL
UNIT  RH: NORMAL
UNIT  RH: NORMAL
WBC NRBC COR # BLD: 12.54 10*3/MM3 (ref 3.4–10.8)

## 2023-07-14 PROCEDURE — 85018 HEMOGLOBIN: CPT | Performed by: INTERNAL MEDICINE

## 2023-07-14 PROCEDURE — 85014 HEMATOCRIT: CPT | Performed by: STUDENT IN AN ORGANIZED HEALTH CARE EDUCATION/TRAINING PROGRAM

## 2023-07-14 PROCEDURE — P9016 RBC LEUKOCYTES REDUCED: HCPCS

## 2023-07-14 PROCEDURE — 05HY33Z INSERTION OF INFUSION DEVICE INTO UPPER VEIN, PERCUTANEOUS APPROACH: ICD-10-PCS | Performed by: HOSPITALIST

## 2023-07-14 PROCEDURE — 85025 COMPLETE CBC W/AUTO DIFF WBC: CPT | Performed by: NURSE PRACTITIONER

## 2023-07-14 PROCEDURE — 36430 TRANSFUSION BLD/BLD COMPNT: CPT

## 2023-07-14 PROCEDURE — 85014 HEMATOCRIT: CPT | Performed by: INTERNAL MEDICINE

## 2023-07-14 PROCEDURE — 82747 ASSAY OF FOLIC ACID RBC: CPT | Performed by: INTERNAL MEDICINE

## 2023-07-14 PROCEDURE — 25010000002 MORPHINE PER 10 MG: Performed by: INTERNAL MEDICINE

## 2023-07-14 PROCEDURE — 25010000002 ONDANSETRON PER 1 MG: Performed by: NURSE PRACTITIONER

## 2023-07-14 PROCEDURE — 86900 BLOOD TYPING SEROLOGIC ABO: CPT

## 2023-07-14 PROCEDURE — 99232 SBSQ HOSP IP/OBS MODERATE 35: CPT | Performed by: NURSE PRACTITIONER

## 2023-07-14 PROCEDURE — 80048 BASIC METABOLIC PNL TOTAL CA: CPT | Performed by: NURSE PRACTITIONER

## 2023-07-14 PROCEDURE — 74174 CTA ABD&PLVS W/CONTRAST: CPT

## 2023-07-14 PROCEDURE — 25510000001 IOPAMIDOL PER 1 ML: Performed by: INTERNAL MEDICINE

## 2023-07-14 PROCEDURE — 25010000002 PIPERACILLIN SOD-TAZOBACTAM PER 1 G: Performed by: INTERNAL MEDICINE

## 2023-07-14 PROCEDURE — 85018 HEMOGLOBIN: CPT | Performed by: STUDENT IN AN ORGANIZED HEALTH CARE EDUCATION/TRAINING PROGRAM

## 2023-07-14 PROCEDURE — C1751 CATH, INF, PER/CENT/MIDLINE: HCPCS

## 2023-07-14 RX ORDER — SODIUM CHLORIDE 0.9 % (FLUSH) 0.9 %
10 SYRINGE (ML) INJECTION EVERY 12 HOURS SCHEDULED
Status: DISCONTINUED | OUTPATIENT
Start: 2023-07-14 | End: 2023-07-21 | Stop reason: HOSPADM

## 2023-07-14 RX ORDER — SODIUM CHLORIDE 0.9 % (FLUSH) 0.9 %
20 SYRINGE (ML) INJECTION AS NEEDED
Status: DISCONTINUED | OUTPATIENT
Start: 2023-07-14 | End: 2023-07-21 | Stop reason: HOSPADM

## 2023-07-14 RX ORDER — ACETAMINOPHEN 325 MG/1
650 TABLET ORAL ONCE
Status: COMPLETED | OUTPATIENT
Start: 2023-07-14 | End: 2023-07-14

## 2023-07-14 RX ORDER — POLYETHYLENE GLYCOL 3350 17 G/17G
0.5 POWDER, FOR SOLUTION ORAL 2 TIMES DAILY
Status: DISPENSED | OUTPATIENT
Start: 2023-07-14 | End: 2023-07-15

## 2023-07-14 RX ORDER — SODIUM CHLORIDE 9 MG/ML
40 INJECTION, SOLUTION INTRAVENOUS AS NEEDED
Status: DISCONTINUED | OUTPATIENT
Start: 2023-07-14 | End: 2023-07-21 | Stop reason: HOSPADM

## 2023-07-14 RX ORDER — FAMOTIDINE 20 MG/1
20 TABLET, FILM COATED ORAL ONCE
Status: COMPLETED | OUTPATIENT
Start: 2023-07-14 | End: 2023-07-14

## 2023-07-14 RX ORDER — SODIUM CHLORIDE 0.9 % (FLUSH) 0.9 %
10 SYRINGE (ML) INJECTION AS NEEDED
Status: DISCONTINUED | OUTPATIENT
Start: 2023-07-14 | End: 2023-07-21 | Stop reason: HOSPADM

## 2023-07-14 RX ADMIN — IOPAMIDOL 85 ML: 755 INJECTION, SOLUTION INTRAVENOUS at 12:27

## 2023-07-14 RX ADMIN — FAMOTIDINE 20 MG: 20 TABLET, FILM COATED ORAL at 09:10

## 2023-07-14 RX ADMIN — ACETAMINOPHEN 650 MG: 325 TABLET, FILM COATED ORAL at 09:10

## 2023-07-14 RX ADMIN — Medication 10 ML: at 20:40

## 2023-07-14 RX ADMIN — PANTOPRAZOLE SODIUM 8 MG/HR: 40 INJECTION, POWDER, FOR SOLUTION INTRAVENOUS at 14:06

## 2023-07-14 RX ADMIN — SODIUM CHLORIDE 500 ML: 9 INJECTION, SOLUTION INTRAVENOUS at 17:21

## 2023-07-14 RX ADMIN — PANTOPRAZOLE SODIUM 8 MG/HR: 40 INJECTION, POWDER, FOR SOLUTION INTRAVENOUS at 20:39

## 2023-07-14 RX ADMIN — Medication 10 ML: at 11:54

## 2023-07-14 RX ADMIN — Medication 10 ML: at 08:33

## 2023-07-14 RX ADMIN — PIPERACILLIN SODIUM AND TAZOBACTAM SODIUM 3.38 G: 3; .375 INJECTION, SOLUTION INTRAVENOUS at 15:15

## 2023-07-14 RX ADMIN — FOLIC ACID 1 MG: 1 TABLET ORAL at 08:33

## 2023-07-14 RX ADMIN — PIPERACILLIN SODIUM AND TAZOBACTAM SODIUM 3.38 G: 3; .375 INJECTION, SOLUTION INTRAVENOUS at 23:04

## 2023-07-14 RX ADMIN — Medication 10 ML: at 20:39

## 2023-07-14 RX ADMIN — Medication 100 MG: at 08:33

## 2023-07-14 RX ADMIN — ONDANSETRON 4 MG: 2 INJECTION INTRAMUSCULAR; INTRAVENOUS at 21:45

## 2023-07-14 RX ADMIN — PIPERACILLIN SODIUM AND TAZOBACTAM SODIUM 3.38 G: 3; .375 INJECTION, SOLUTION INTRAVENOUS at 05:38

## 2023-07-14 RX ADMIN — MORPHINE SULFATE 2 MG: 2 INJECTION, SOLUTION INTRAMUSCULAR; INTRAVENOUS at 20:54

## 2023-07-14 RX ADMIN — PANTOPRAZOLE SODIUM 8 MG/HR: 40 INJECTION, POWDER, FOR SOLUTION INTRAVENOUS at 03:36

## 2023-07-14 RX ADMIN — POLYETHYLENE GLYCOL 3350 0.5 BOTTLE: 17 POWDER, FOR SOLUTION ORAL at 20:39

## 2023-07-14 RX ADMIN — PANTOPRAZOLE SODIUM 8 MG/HR: 40 INJECTION, POWDER, FOR SOLUTION INTRAVENOUS at 09:10

## 2023-07-14 NOTE — PAYOR COMM NOTE
"Radha Noriega (43 y.o. Male)        PLEASE SEE ATTACHED FOR INPT AUTH AND DAYS APPROVED.     PLEASE CALL   OR  [phone]     THANK YOU    HERBERTH WHALEY LPN CCP       Date of Birth   1980    Social Security Number       Address   [address] [facility] [address] [facility] [address]    Home Phone   [phone]    MRN   [id number]       Spiritism   None    Marital Status                               Admission Date   7/12/23    Admission Type   Emergency    Admitting Provider   Hasmukh Child MD    Attending Provider   Harshal Casillas MD    Department, Room/Bed   [address], S601/1       Discharge Date       Discharge Disposition       Discharge Destination                                 Attending Provider: Harshal Casillas MD    Allergies: No Known Allergies    Isolation: None   Infection: None   Code Status: CPR    Ht: 170.2 cm (67\")   Wt: 78 kg (172 lb)    Admission Cmt: None   Principal Problem: ABLA (acute blood loss anemia) [D62]                   Active Insurance as of 7/12/2023       Primary Coverage       Payor Plan Insurance Group Employer/Plan Group    [facility] [employer] [facility] BY [employer] [id number]       Payor Plan Address Payor Plan Phone Number Payor Plan Fax Number Effective Dates    [address] [address]   1/1/2021 - None Entered    [facility] [phone]         Subscriber Name Subscriber Birth Date Member ID       RADHA NORIEGA 1980 [id number]                     Emergency Contacts        (Rel.) Home Phone Work Phone Mobile Phone    NED NORIEGA (Spouse) [phone] -- [phone]    CheleZainab paredes (Mother) -- -- [phone]              [place]: [facility] [id number]  Tax ID [id number]     History & Physical        Hasmukh Child MD at 07/12/23 1423              Patient Name:  Radha Noriega V  YOB: 1980  MRN:  [id number]  Admit Date:  7/12/2023  Patient Care Team:  Hellen Seklton APRN " as PCP - General (Nurse Practitioner)      Subjective  History Present Illness     Chief Complaint   Patient presents with    Abdominal Pain     This is a 43-year-old male with alcohol dependence, tobacco use as well as multiple previous admissions for GI bleed.  During a recent admission he was admitted for the same issue.  EGD and colonoscopy were negative for source of bleeding.  An outpatient video endoscopy was planned, but the patient had return to the hospital with hematochezia before could be performed.  During the most recent admission prior to this, his hemoglobin was 6.0 and he was admitted and given pRBC and iron transfusions.  GI saw the patient in consultation at that point time.  2 CT abdomen and pelvis were performed but no bleeding source was not identified.  Question anoscopy was negative as well.  He was subsequently discharged  He was actually supposed to have of video capsule study today however he started experiencing abdominal pain.  He had reported bright red blood in his stool and lightheadedness and palpitations.  His hemoglobin was noted to be 7.1, WBC 21,000, creatinine 1.37.  Urinalysis i was negative for UTI.  Chest x-ray was unremarkable.  CT of the abdomen pelvis showed mild gastritis as well as thickening of the duodenum and proximal jejunal loops, favored to be secondary to peristalsis. PRBC transfusion was initiated.  He was admitted for further evaluation and management.    Personal History     Past Medical History:   Diagnosis Date    Anemia     ETOH abuse     Tachycardia 06/23/2023    Tobacco dependence      Past Surgical History:   Procedure Laterality Date    APPENDECTOMY      COLONOSCOPY N/A 06/25/2023    Procedure: COLONOSCOPY TO CECUM AND TERMINAL ILEUM WITH BIOPSIES AND COLD BIOPSY POLYPECTOMIES;  Surgeon: Imtiaz Lee MD;  Location: [facility] ENDOSCOPY;  Service: Gastroenterology;  Laterality: N/A;  PRE- MELENA  POST- COLON POLYPS, HEMORRHOIDS    ENDOSCOPY Left 06/24/2023     Procedure: ESOPHAGOGASTRODUODENOSCOPY;  Surgeon: Imtiaz Lee MD;  Location:  [facility] ENDOSCOPY;  Service: Gastroenterology;  Laterality: Left;  small hiatal hernia, esophagitis    ENTEROSCOPY SMALL BOWEL N/A 7/9/2023    Procedure: ENTEROSCOPY SMALL BOWEL;  Surgeon: Isrrael Gibson MD;  Location:  [facility] ENDOSCOPY;  Service: Gastroenterology;  Laterality: N/A;  PRE- GI BLEED  POST- NORMAL     Family History   Problem Relation Age of Onset    Diabetes Father     Diabetes Sister     Hypertension Sister     Diabetes Brother     Diabetes Maternal Grandmother     Diabetes Maternal Grandfather     Diabetes Paternal Grandmother     Diabetes Paternal Grandfather     Heart attack Maternal Aunt 73    Colon cancer Neg Hx     Prostate cancer Neg Hx      Social History     Tobacco Use    Smoking status: Every Day     Packs/day: 1.00     Years: 20.00     Pack years: 20.00     Types: Cigarettes    Smokeless tobacco: Never   Vaping Use    Vaping Use: Never used   Substance Use Topics    Alcohol use: Not Currently     Comment: one pint to one fifth of marielos a day    Drug use: Yes     Types: Marijuana     Comment: daily.     No current facility-administered medications on file prior to encounter.     Current Outpatient Medications on File Prior to Encounter   Medication Sig Dispense Refill    ferrous sulfate (FerrouSul) 325 (65 FE) MG tablet Take 1 tablet by mouth Daily With Breakfast. 30 tablet 0    folic acid (FOLVITE) 1 MG tablet Take 1 tablet by mouth Daily for 30 days. 30 tablet 0    pantoprazole (PROTONIX) 40 MG EC tablet Take 1 tablet by mouth Every Morning. 30 tablet 0    polycarbophil 625 MG tablet tablet Take 1 tablet by mouth Daily for 30 days. 30 tablet 0    thiamine (VITAMIN B1) 100 MG tablet Take 1 tablet by mouth Daily. 30 tablet 0     No Known Allergies    Objective   Objective     Vital Signs  Temp:  [97.1 °F (36.2 °C)-98.9 °F (37.2 °C)] 97.7 °F (36.5 °C)  Heart Rate:  [] 86  Resp:  [16-20] 16  BP:  (109-128)/(61-84) 120/67  SpO2:  [99 %-100 %] 100 %  on   ;   Device (Oxygen Therapy): room air  Body mass index is 26.94 kg/m².    Physical Exam  Constitutional:       Appearance: Normal appearance.   HENT:      Head: Normocephalic and atraumatic.   Cardiovascular:      Rate and Rhythm: Normal rate and regular rhythm.   Pulmonary:      Effort: Pulmonary effort is normal. No respiratory distress.   Abdominal:      General: There is no distension.      Palpations: Abdomen is soft.      Tenderness: There is no abdominal tenderness.   Musculoskeletal:      Right lower leg: No edema.      Left lower leg: No edema.   Skin:     General: Skin is warm and dry.   Neurological:      General: No focal deficit present.      Mental Status: He is alert and oriented to person, place, and time.       Results Review:  I reviewed the patient's new clinical results.  I reviewed the patient's new imaging results and agree with the interpretation.  I reviewed the patient's other test results and agree with the interpretation  I personally viewed and interpreted the patient's EKG/Telemetry data  Discussed with ED provider.    Lab Results (last 24 hours)       Procedure Component Value Units Date/Time    CBC & Differential [[id number]]  (Abnormal) Collected: 07/12/23 0609    Specimen: Blood Updated: 07/12/23 0626    Narrative:      The following orders were created for panel order CBC & Differential.  Procedure                               Abnormality         Status                     ---------                               -----------         ------                     CBC Auto Differential[[id number]]        Abnormal            Final result                 Please view results for these tests on the individual orders.    CBC Auto Differential [[id number]]  (Abnormal) Collected: 07/12/23 0609    Specimen: Blood Updated: 07/12/23 0626     WBC 20.68 10*3/mm3      RBC 2.42 10*6/mm3      Hemoglobin 7.1 g/dL      Hematocrit 21.3 %      MCV 88.0  fL      MCH 29.3 pg      MCHC 33.3 g/dL      RDW 15.4 %      RDW-SD 48.4 fl      MPV 10.4 fL      Platelets 399 10*3/mm3      nRBC 0.1 /100 WBC     Manual Differential [[id number]]  (Abnormal) Collected: 07/12/23 0609    Specimen: Blood Updated: 07/12/23 0656     Neutrophil % 74.0 %      Lymphocyte % 19.0 %      Monocyte % 7.0 %      Neutrophils Absolute 15.30 10*3/mm3      Lymphocytes Absolute 3.93 10*3/mm3      Monocytes Absolute 1.45 10*3/mm3      Anisocytosis Mod/2+     WBC Morphology Normal     Platelet Morphology Normal    Comprehensive Metabolic Panel [[id number]]  (Abnormal) Collected: 07/12/23 0612    Specimen: Blood Updated: 07/12/23 0639     Glucose 131 mg/dL      BUN 21 mg/dL      Creatinine 1.37 mg/dL      Sodium 136 mmol/L      Potassium 3.5 mmol/L      Chloride 103 mmol/L      CO2 18.2 mmol/L      Calcium 9.1 mg/dL      Total Protein 6.5 g/dL      Albumin 3.8 g/dL      ALT (SGPT) 12 U/L      AST (SGOT) 13 U/L      Alkaline Phosphatase 70 U/L      Total Bilirubin 0.3 mg/dL      Globulin 2.7 gm/dL      A/G Ratio 1.4 g/dL      BUN/Creatinine Ratio 15.3     Anion Gap 14.8 mmol/L      eGFR 66.0 mL/min/1.73     Narrative:      GFR Normal >60  Chronic Kidney Disease <60  Kidney Failure <15      Protime-INR [[id number]]  (Normal) Collected: 07/12/23 0612    Specimen: Blood Updated: 07/12/23 0645     Protime 14.1 Seconds      INR 1.07    aPTT [[id number]]  (Normal) Collected: 07/12/23 0612    Specimen: Blood Updated: 07/12/23 0645     PTT 25.7 seconds     Lipase [[id number]]  (Abnormal) Collected: 07/12/23 0612    Specimen: Blood Updated: 07/12/23 0639     Lipase 11 U/L     Procalcitonin [[id number]]  (Normal) Collected: 07/12/23 0612    Specimen: Blood Updated: 07/12/23 1157     Procalcitonin 0.15 ng/mL     Narrative:      As a Marker for Sepsis (Non-Neonates):    1. <0.5 ng/mL represents a low risk of severe sepsis and/or septic shock.  2. >2 ng/mL represents a high risk of severe sepsis and/or septic shock.    As  "a Marker for Lower Respiratory Tract Infections that require antibiotic therapy:    PCT on Admission    Antibiotic Therapy       6-12 Hrs later    >0.5                Strongly Recommended  >0.25 - <0.5        Recommended   0.1 - 0.25          Discouraged              Remeasure/reassess PCT  <0.1                Strongly Discouraged     Remeasure/reassess PCT    As 28 day mortality risk marker: \"Change in Procalcitonin Result\" (>80% or <=80%) if Day 0 (or Day 1) and Day 4 values are available. Refer to http://[email or web address]-pct-calculator.com    Change in PCT <=80%  A decrease of PCT levels below or equal to 80% defines a positive change in PCT test result representing a higher risk for 28-day all-cause mortality of patients diagnosed with severe sepsis for septic shock.    Change in PCT >80%  A decrease of PCT levels of more than 80% defines a negative change in PCT result representing a lower risk for 28-day all-cause mortality of patients diagnosed with severe sepsis or septic shock.       Urinalysis With Microscopic If Indicated (No Culture) - Urine, Clean Catch [[id number]]  (Normal) Collected: 07/12/23 1219    Specimen: Urine, Clean Catch Updated: 07/12/23 1229     Color, UA Yellow     Appearance, UA Clear     pH, UA <=5.0     Specific Gravity, UA >=1.030     Glucose, UA Negative     Ketones, UA Negative     Bilirubin, UA Negative     Blood, UA Negative     Protein, UA Negative     Leuk Esterase, UA Negative     Nitrite, UA Negative     Urobilinogen, UA 0.2 E.U./dL    Narrative:      Urine microscopic not indicated.            No results found for this or any previous visit.    Imaging Results (Last 24 Hours)       Procedure Component Value Units Date/Time    CT Abdomen Pelvis With Contrast [[id number]] Collected: 07/12/23 0846     Updated: 07/12/23 0846    Narrative:      CT ABDOMEN AND PELVIS WITH CONTRAST     HISTORY: 43-year-old male with lower abdominal pain and nausea and blood  in stool.     TECHNIQUE: Axial CT " images of the abdomen and pelvis were obtained  following administration of intravenous contrast. The patient was not  given oral contrast Coronal and sagittal reformats were obtained.     COMPARISON: 07/08/2023     FINDINGS: Mild wall thickening of the duodenum and the proximal jejunal  loops likely related to peristalsis. Mild diffuse gastric wall  thickening in a pattern consistent with gastritis. No evidence of bowel  obstruction.     The liver demonstrates normal attenuation. The gallbladder is normal.  The spleen is normal. The pancreas is normal. Bilateral adrenal glands  are normal. No renal calculi or hydronephrosis. The urinary bladder is  partially distended and normal.       Impression:      Mild gastritis is suspected. Diffuse thickening involving  the duodenum and the proximal jejunal loops favored to be secondary to  peristalsis.     Radiation dose reduction techniques were utilized, including automated  exposure control and exposure modulation based on body size.          XR Chest 1 View [[id number]] Collected: 07/12/23 0729     Updated: 07/12/23 0733    Narrative:      AP CHEST     HISTORY: Leukocytosis     COMPARISON: None available     FINDINGS: Mildly elevated left hemidiaphragm. No evidence of airspace  infiltrate. Heart size normal. No pneumothorax. Visualized osseous  structures are unremarkable.       Impression:      No acute findings     This report was finalized on 7/12/2023 7:30 AM by Dr. Moise Walker M.D.                   No orders to display              Assessment/Plan     Active Hospital Problems    Diagnosis  POA    **ABLA (acute blood loss anemia) [D62]  Yes      Resolved Hospital Problems   No resolved problems to display.     Acute blood loss anemia  GI bleed  Extensive negative work-up includes EGD, colonoscopy, CT abdomen angiography, which enteroscopy without definite source of bleeding.  We will start pantoprazole GGT.  Every 8 hemoglobin hematocrit.  Transfuse to keep  hemoglobin over 7  Consult GI for possible inpatient capsule endoscopy.  N.p.o. diet    Leukocytosis  Etiology is unclear.  Infectious work-up has been negative.  Procalcitonin is 1.5  Check GI PCR panel.  Holding off antibiotics for now.  Patient is afebrile and hemodynamically stable.      Hyperglycemia  Carbohydrate consistent diet when appropriate for p.o. intake      I discussed the patient's findings and my recommendations with patient and ED provider.    VTE Prophylaxis - SCDs.  Code Status - Full code.       Hasmukh Child MD  [facility]  07/12/23  14:23 EDT      Electronically signed by Hasmukh Child MD at 07/12/23 1434          Emergency Department Notes        Minesh Mccray, RN at 07/12/23 1143          Second unit of blood to be initiated pending patient returning to bed from commode for full set of vitals    Electronically signed by Minesh Mccray, RN at 07/12/23 1144       Mickey Jacques, RN at 07/12/23 1056          Nursing report ED to floor  Radha Noriega V  43 y.o.  male    HPI :   Chief Complaint   Patient presents with    Abdominal Pain       Admitting doctor:   Hasmukh Child MD    Admitting diagnosis:   The primary encounter diagnosis was ABLA (acute blood loss anemia). A diagnosis of Leukocytosis, unspecified type was also pertinent to this visit.    Code status:   Current Code Status       Date Active Code Status Order ID Comments User Context       Prior            Allergies:   Patient has no known allergies.    Isolation:   No active isolations    Intake and Output    Intake/Output Summary (Last 24 hours) at 7/12/2023 1056  Last data filed at 7/12/2023 1024  Gross per 24 hour   Intake 300 ml   Output --   Net 300 ml       Weight:       07/12/23  0525   Weight: 78 kg (172 lb)       Most recent vitals:   Vitals:    07/12/23 0759 07/12/23 0900 07/12/23 0901 07/12/23 1003   BP: 113/61  116/73 121/78   BP Location:       Patient Position:       Pulse: 101  95 79    Resp:       Temp: 97.7 °F (36.5 °C) 98.7 °F (37.1 °C)  97.2 °F (36.2 °C)   TempSrc: Tympanic Tympanic  Tympanic   SpO2: 100%   100%   Weight:       Height:           Active LDAs/IV Access:   Lines, Drains & Airways       Active LDAs       Name Placement date Placement time Site Days    Peripheral IV 07/12/23 0606 Anterior;Left Forearm 07/12/23 0606  Forearm  less than 1                    Labs (abnormal labs have a star):   Labs Reviewed   COMPREHENSIVE METABOLIC PANEL - Abnormal; Notable for the following components:       Result Value    Glucose 131 (*)     BUN 21 (*)     Creatinine 1.37 (*)     CO2 18.2 (*)     All other components within normal limits    Narrative:     GFR Normal >60  Chronic Kidney Disease <60  Kidney Failure <15     LIPASE - Abnormal; Notable for the following components:    Lipase 11 (*)     All other components within normal limits   CBC WITH AUTO DIFFERENTIAL - Abnormal; Notable for the following components:    WBC 20.68 (*)     RBC 2.42 (*)     Hemoglobin 7.1 (*)     Hematocrit 21.3 (*)     All other components within normal limits   MANUAL DIFFERENTIAL - Abnormal; Notable for the following components:    Lymphocyte % 19.0 (*)     Neutrophils Absolute 15.30 (*)     Lymphocytes Absolute 3.93 (*)     Monocytes Absolute 1.45 (*)     All other components within normal limits   PROTIME-INR - Normal   APTT - Normal   URINALYSIS W/ MICROSCOPIC IF INDICATED (NO CULTURE)   TYPE AND SCREEN   PREPARE RBC   CBC AND DIFFERENTIAL    Narrative:     The following orders were created for panel order CBC & Differential.  Procedure                               Abnormality         Status                     ---------                               -----------         ------                     CBC Auto Differential[[id number]]        Abnormal            Final result                 Please view results for these tests on the individual orders.       EKG:   No orders to display       Meds given in ED:    Medications   sodium chloride 0.9 % flush 10 mL (has no administration in time range)   ondansetron (ZOFRAN) injection 4 mg (0 mg Intravenous Hold 7/12/23 0731)   pantoprazole (PROTONIX) injection 80 mg (80 mg Intravenous Given 7/12/23 0606)   sodium chloride 0.9 % bolus 1,000 mL (1,000 mL Intravenous New Bag 7/12/23 0730)   iopamidol (ISOVUE-300) 61 % injection 100 mL (85 mL Intravenous Given 7/12/23 0746)       Imaging results:  CT Abdomen Pelvis With Contrast    Result Date: 7/12/2023  Mild gastritis is suspected. Diffuse thickening involving the duodenum and the proximal jejunal loops favored to be secondary to peristalsis.  Radiation dose reduction techniques were utilized, including automated exposure control and exposure modulation based on body size.       XR Chest 1 View    Result Date: 7/12/2023  No acute findings  This report was finalized on 7/12/2023 7:30 AM by Dr. Moise Walker M.D.       Ambulatory status:   - ad ignacio    Social issues:   Social History     Socioeconomic History    Marital status:    Tobacco Use    Smoking status: Every Day     Packs/day: 1.00     Years: 20.00     Pack years: 20.00     Types: Cigarettes    Smokeless tobacco: Never   Vaping Use    Vaping Use: Never used   Substance and Sexual Activity    Alcohol use: Not Currently     Comment: one pint to one fifth of marielos a day    Drug use: Yes     Types: Marijuana     Comment: daily.    Sexual activity: Yes     Partners: Female       NIH Stroke Scale:       Mickey Jacques RN  07/12/23 10:56 EDT          Electronically signed by Mickey Jacques RN at 07/12/23 1056       Tamir Harris II, MD at 07/12/23 0704        Procedure Orders    1. Critical Care [[id number]] ordered by Tamir Harris II, MD                  EMERGENCY DEPARTMENT ENCOUNTER    Room Number:  14/14  PCP: Hellen Skelton APRN      HPI:  Chief Complaint: Abdominal pain  A complete HPI/ROS/PMH/PSH/SH/FH are unobtainable due to: None  Context:  Radha Noriega V is a 43 y.o. male who presents to the ED c/o abdominal pain.  Right lower and suprapubic pain.  He has had associated bright red blood in his stool that occurs intermittently with lightheadedness and feeling that his heart is racing.  Symptoms been persistent.  He has had next of work-up in the recent past without any clear explanation for his anemia.          PAST MEDICAL HISTORY  Active Ambulatory Problems     Diagnosis Date Noted    Gastrointestinal hemorrhage with melena 06/23/2023    ETOH abuse 06/23/2023    Migraine 02/09/2021    Current every day smoker 06/23/2023    Tachycardia 06/23/2023    Acute blood loss anemia 06/23/2023    Family history of diabetes mellitus 06/29/2023    Iron deficiency anemia 07/08/2023     Resolved Ambulatory Problems     Diagnosis Date Noted    GI bleed 07/07/2023     Past Medical History:   Diagnosis Date    Anemia     Tobacco dependence          PAST SURGICAL HISTORY  Past Surgical History:   Procedure Laterality Date    APPENDECTOMY      COLONOSCOPY N/A 06/25/2023    Procedure: COLONOSCOPY TO CECUM AND TERMINAL ILEUM WITH BIOPSIES AND COLD BIOPSY POLYPECTOMIES;  Surgeon: Imtiaz Lee MD;  Location: [facility] ENDOSCOPY;  Service: Gastroenterology;  Laterality: N/A;  PRE- MELENA  POST- COLON POLYPS, HEMORRHOIDS    ENDOSCOPY Left 06/24/2023    Procedure: ESOPHAGOGASTRODUODENOSCOPY;  Surgeon: Imtiaz Lee MD;  Location: [facility] ENDOSCOPY;  Service: Gastroenterology;  Laterality: Left;  small hiatal hernia, esophagitis    ENTEROSCOPY SMALL BOWEL N/A 7/9/2023    Procedure: ENTEROSCOPY SMALL BOWEL;  Surgeon: Isrrael Gibson MD;  Location: [facility] ENDOSCOPY;  Service: Gastroenterology;  Laterality: N/A;  PRE- GI BLEED  POST- NORMAL         FAMILY HISTORY  Family History   Problem Relation Age of Onset    Diabetes Father     Diabetes Sister     Hypertension Sister     Diabetes Brother     Diabetes Maternal Grandmother     Diabetes Maternal Grandfather     Diabetes Paternal  Grandmother     Diabetes Paternal Grandfather     Heart attack Maternal Aunt 73    Colon cancer Neg Hx     Prostate cancer Neg Hx          SOCIAL HISTORY  Social History     Socioeconomic History    Marital status:    Tobacco Use    Smoking status: Every Day     Packs/day: 1.00     Years: 20.00     Pack years: 20.00     Types: Cigarettes    Smokeless tobacco: Never   Vaping Use    Vaping Use: Never used   Substance and Sexual Activity    Alcohol use: Not Currently     Comment: one pint to one fifth of marielos a day    Drug use: Yes     Types: Marijuana     Comment: daily.    Sexual activity: Yes     Partners: Female         ALLERGIES  Patient has no known allergies.        REVIEW OF SYSTEMS  Review of Systems     All systems reviewed and negative except for those discussed in HPI.       PHYSICAL EXAM  ED Triage Vitals [07/12/23 0525]   Temp Heart Rate Resp BP SpO2   97.1 °F (36.2 °C) 120 20 109/77 99 %      Temp src Heart Rate Source Patient Position BP Location FiO2 (%)   -- -- -- -- --       Physical Exam      GENERAL: no acute distress  HENT: nares patent  EYES: no scleral icterus  CV: regular rhythm, tachycardic  RESPIRATORY: normal effort, clear to auscultation bilaterally  ABDOMEN: soft, mild suprapubic and right lower quadrant abdominal pain  MUSCULOSKELETAL: no deformity  NEURO: alert, moves all extremities, follows commands  PSYCH:  calm, cooperative  SKIN: warm, dry    Vital signs and nursing notes reviewed.          LAB RESULTS  Recent Results (from the past 24 hour(s))   CBC Auto Differential    Collection Time: 07/12/23  6:09 AM    Specimen: Blood   Result Value Ref Range    WBC 20.68 (H) 3.40 - 10.80 10*3/mm3    RBC 2.42 (L) 4.14 - 5.80 10*6/mm3    Hemoglobin 7.1 (L) 13.0 - 17.7 g/dL    Hematocrit 21.3 (L) 37.5 - 51.0 %    MCV 88.0 79.0 - 97.0 fL    MCH 29.3 26.6 - 33.0 pg    MCHC 33.3 31.5 - 35.7 g/dL    RDW 15.4 12.3 - 15.4 %    RDW-SD 48.4 37.0 - 54.0 fl    MPV 10.4 6.0 - 12.0 fL    Platelets  399 140 - 450 10*3/mm3    nRBC 0.1 0.0 - 0.2 /100 WBC   Manual Differential    Collection Time: 07/12/23  6:09 AM    Specimen: Blood   Result Value Ref Range    Neutrophil % 74.0 42.7 - 76.0 %    Lymphocyte % 19.0 (L) 19.6 - 45.3 %    Monocyte % 7.0 5.0 - 12.0 %    Neutrophils Absolute 15.30 (H) 1.70 - 7.00 10*3/mm3    Lymphocytes Absolute 3.93 (H) 0.70 - 3.10 10*3/mm3    Monocytes Absolute 1.45 (H) 0.10 - 0.90 10*3/mm3    Anisocytosis Mod/2+ None Seen    WBC Morphology Normal Normal    Platelet Morphology Normal Normal   Comprehensive Metabolic Panel    Collection Time: 07/12/23  6:12 AM    Specimen: Blood   Result Value Ref Range    Glucose 131 (H) 65 - 99 mg/dL    BUN 21 (H) 6 - 20 mg/dL    Creatinine 1.37 (H) 0.76 - 1.27 mg/dL    Sodium 136 136 - 145 mmol/L    Potassium 3.5 3.5 - 5.2 mmol/L    Chloride 103 98 - 107 mmol/L    CO2 18.2 (L) 22.0 - 29.0 mmol/L    Calcium 9.1 8.6 - 10.5 mg/dL    Total Protein 6.5 6.0 - 8.5 g/dL    Albumin 3.8 3.5 - 5.2 g/dL    ALT (SGPT) 12 1 - 41 U/L    AST (SGOT) 13 1 - 40 U/L    Alkaline Phosphatase 70 39 - 117 U/L    Total Bilirubin 0.3 0.0 - 1.2 mg/dL    Globulin 2.7 gm/dL    A/G Ratio 1.4 g/dL    BUN/Creatinine Ratio 15.3 7.0 - 25.0    Anion Gap 14.8 5.0 - 15.0 mmol/L    eGFR 66.0 >60.0 mL/min/1.73   Protime-INR    Collection Time: 07/12/23  6:12 AM    Specimen: Blood   Result Value Ref Range    Protime 14.1 11.7 - 14.2 Seconds    INR 1.07 0.90 - 1.10   aPTT    Collection Time: 07/12/23  6:12 AM    Specimen: Blood   Result Value Ref Range    PTT 25.7 22.7 - 35.4 seconds   Lipase    Collection Time: 07/12/23  6:12 AM    Specimen: Blood   Result Value Ref Range    Lipase 11 (L) 13 - 60 U/L   Type & Screen    Collection Time: 07/12/23  6:12 AM    Specimen: Blood   Result Value Ref Range    ABO Type B     RH type Positive     Antibody Screen Negative     T&S Expiration Date 7/15/2023 11:59:59 PM    Prepare RBC, 2 Units    Collection Time: 07/12/23  7:18 AM   Result Value Ref Range     Product Code N9210P83     Unit Number [id number]     UNIT  ABO B     UNIT  RH POS     Crossmatch Interpretation Compatible     Dispense Status XM     Blood Expiration Date 202308032359     Blood Type Barcode 7300     Product Code E3011H31     Unit Number [id number]     UNIT  ABO B     UNIT  RH POS     Crossmatch Interpretation Compatible     Dispense Status IS     Blood Expiration Date 202308022359     Blood Type Barcode 7300        Ordered the above labs and reviewed the results.        RADIOLOGY  CT Abdomen Pelvis With Contrast    Result Date: 7/12/2023  CT ABDOMEN AND PELVIS WITH CONTRAST  HISTORY: 43-year-old male with lower abdominal pain and nausea and blood in stool.  TECHNIQUE: Axial CT images of the abdomen and pelvis were obtained following administration of intravenous contrast. The patient was not given oral contrast Coronal and sagittal reformats were obtained.  COMPARISON: 07/08/2023  FINDINGS: Mild wall thickening of the duodenum and the proximal jejunal loops likely related to peristalsis. Mild diffuse gastric wall thickening in a pattern consistent with gastritis. No evidence of bowel obstruction.  The liver demonstrates normal attenuation. The gallbladder is normal. The spleen is normal. The pancreas is normal. Bilateral adrenal glands are normal. No renal calculi or hydronephrosis. The urinary bladder is partially distended and normal.      Mild gastritis is suspected. Diffuse thickening involving the duodenum and the proximal jejunal loops favored to be secondary to peristalsis.  Radiation dose reduction techniques were utilized, including automated exposure control and exposure modulation based on body size.       XR Chest 1 View    Result Date: 7/12/2023  AP CHEST  HISTORY: Leukocytosis  COMPARISON: None available  FINDINGS: Mildly elevated left hemidiaphragm. No evidence of airspace infiltrate. Heart size normal. No pneumothorax. Visualized osseous structures are unremarkable.       No acute findings  This report was finalized on 7/12/2023 7:30 AM by Dr. Moise Walker M.D.       Ordered the above noted radiological studies. Reviewed by me in PACS.          PROCEDURES  Critical Care  Performed by: Tamir Harris II, MD  Authorized by: Tamir Harris II, MD     Critical care provider statement:     Critical care time (minutes):  30    Critical care was necessary to treat or prevent imminent or life-threatening deterioration of the following conditions:  Circulatory failure    Critical care was time spent personally by me on the following activities:  Ordering and performing treatments and interventions, ordering and review of laboratory studies, ordering and review of radiographic studies, pulse oximetry, re-evaluation of patient's condition, obtaining history from patient or surrogate, discussions with consultants, evaluation of patient's response to treatment and examination of patient          MEDICATIONS GIVEN IN ER  Medications   sodium chloride 0.9 % flush 10 mL (has no administration in time range)   ondansetron (ZOFRAN) injection 4 mg (0 mg Intravenous Hold 7/12/23 0731)   pantoprazole (PROTONIX) injection 80 mg (80 mg Intravenous Given 7/12/23 0606)   sodium chloride 0.9 % bolus 1,000 mL (1,000 mL Intravenous New Bag 7/12/23 0730)   iopamidol (ISOVUE-300) 61 % injection 100 mL (85 mL Intravenous Given 7/12/23 0746)         MEDICAL DECISION MAKING, PROGRESS, and CONSULTS    Discussion below represents my analysis of pertinent findings related to patient's condition, differential diagnosis, treatment plan and final disposition.      Orders placed during this visit:  Orders Placed This Encounter   Procedures    Critical Care    CT Abdomen Pelvis With Contrast    XR Chest 1 View    Comprehensive Metabolic Panel    Protime-INR    aPTT    Lipase    Urinalysis With Microscopic If Indicated (No Culture) - Urine, Clean Catch    CBC Auto Differential    Manual Differential     Monitor Blood Pressure    Pulse Oximetry, Continuous    Verify Informed Consent for Blood Product Administration    [facility] (on-call MD unless specified) Details    Type & Screen    Prepare RBC, 2 Units    Insert Peripheral IV    Initiate Observation Status    CBC & Differential         Differential diagnosis:    Differential diagnosis includes but not limited to:  - hepatobiliary pathology such as cholecystitis, cholangitis, and symptomatic cholelithiasis  - Pancreatitis  - Dyspepsia  - Small bowel obstruction  - Appendicitis  - Diverticulitis  - UTI including pyelonephritis  - Ureteral stone  - Zoster  - Colitis, including infectious and ischemic  - GI bleed      Independent interpretation of labs, radiology studies, and discussions with consultants:  ED Course as of 07/12/23 1019   Wed Jul 12, 2023   0658 Creatinine(!): 1.37 [TD]   0659 WBC(!): 20.68 [TD]   0659 Hemoglobin(!): 7.1 [TD]   0700 On medical chart review, patient was discharged on 7/10/2023.  I reviewed the discharge summary from Dr. Mccormack.  Patient has a history of alcohol dependence, admitted for GI bleed.  He had EGD/colonoscopy that were negative for source of bleeding.  Outpatient video capsule endoscopy was planned with the patient returned with continued hematochezia before could be completed.  CT angiogram of the abdomen pelvis was performed twice without an identified source of bleeding.  He underwent push enteroscopy which was negative as well. [TD]   1017 I discussed the case with Dr. Child, hospitalist.  We reviewed the patient's labs, history, imaging.  He will admit. [TD]      ED Course User Index  [TD] Tamir Harris II, MD               DIAGNOSIS  Final diagnoses:   ABLA (acute blood loss anemia)   Leukocytosis, unspecified type         DISPOSITION  Admit      Latest Documented Vital Signs:  As of 10:19 EDT  BP- 121/78 HR- 79 Temp- 97.2 °F (36.2 °C) (Tympanic) O2 sat- 100%      --    Please note that portions of this were completed  with a voice recognition program.       Note Disclaimer: At [facility], we believe that sharing information builds trust and better relationships. You are receiving this note because you are receiving care at [facility] or recently visited. It is possible you will see health information before a provider has talked with you about it. This kind of information can be easy to misunderstand. To help you fully understand what it means for your health, we urge you to discuss this note with your provider.         Tamir Harris II, MD  07/12/23 1019      Electronically signed by Tamir Harris II, MD at 07/12/23 1019       Carmen Berumen, RN at 07/12/23 0522          To ER via EMS from home.  C/o low abd pain and rectal bleeding.  Pt was seen here on 6/23 for same and had HGB 6.0 at that time.  Pt was discharged on 6/27.  Abd pain started last pm.      Electronically signed by Carmen Berumen, RN at 07/12/23 0526       Oxygen Therapy (since admission)       Date/Time SpO2 Device (Oxygen Therapy) Flow (L/min) Oxygen Concentration (%) ETCO2 (mmHg)    07/14/23 1202 100 -- -- -- --    07/14/23 1134 100 room air -- -- --    07/14/23 0840 -- room air -- -- --    07/14/23 0639 100 room air -- -- --    07/14/23 0401 100 room air -- -- --    07/14/23 0044 100 room air -- -- --    07/14/23 0021 100 room air -- -- --    07/14/23 0003 100 room air -- -- --    07/13/23 2121 -- room air -- -- --    07/13/23 1955 100 room air -- -- --    07/13/23 1835 100 room air -- -- --    07/13/23 1435 100 room air -- -- --    07/13/23 0921 100 -- -- -- --    07/13/23 0854 -- room air -- -- --    07/13/23 0731 100 room air -- -- --    07/13/23 0626 100 -- -- -- --    07/13/23 0531 -- room air -- -- --    07/12/23 2300 100 -- -- -- --    07/12/23 1959 -- room air -- -- --    07/12/23 1900 100 -- -- -- --    07/12/23 1740 100 room air -- -- --    07/12/23 1730 100 -- -- -- --    07/12/23 1505 100 room air -- -- --    07/12/23 3211  100 room air -- -- --    07/12/23 1304 -- room air -- -- --    07/12/23 1255 100 room air -- -- --    07/12/23 1200 100 -- -- -- --    07/12/23 1141 99 -- -- -- --    07/12/23 1003 100 -- -- -- --    07/12/23 0759 100 -- -- -- --    07/12/23 0732 100 -- -- -- --    07/12/23 0709 100 room air -- -- --    07/12/23 0630 100 -- -- -- --    07/12/23 0600 100 -- -- -- --    07/12/23 0525 99 room air -- -- --          Intake & Output (last 3 days)         07/11 0701  07/12 0700 07/12 0701  07/13 0700 07/13 0701  07/14 0700 07/14 0701  07/15 0700    P.O.   480     Blood  793.8 640.8     Total Intake(mL/kg)  793.8 (10.2) 1120.8 (14.4)     Urine (mL/kg/hr)   1800 (1)     Stool  300 0     Total Output  300 1800     Net  +493.8 -679.2             Stool Unmeasured Occurrence   1 x            Lines, Drains & Airways       Active LDAs       Name Placement date Placement time Site Days    PICC Double Lumen 07/14/23 Left Brachial 07/14/23  1144  Brachial  less than 1    Peripheral IV 07/13/23 1300 Posterior;Right Hand 07/13/23  1300  Hand  less than 1              Inactive LDAs       Name Placement date Placement time Removal date Removal time Site Days    [REMOVED] Peripheral IV 07/12/23 0606 Anterior;Left Forearm 07/12/23  0606  07/13/23  1442  Forearm  1    [REMOVED] Peripheral IV 07/13/23 0720 Left;Anterior Forearm 07/13/23  0720  07/13/23  1442  Forearm  less than 1    [REMOVED] Peripheral IV 07/13/23 1811 Anterior;Right;Upper Arm 07/13/23  1811  07/13/23  2124  Arm  less than 1    [REMOVED] Peripheral IV 07/13/23 2300 Left Antecubital 07/13/23  2300  07/14/23  1143  Antecubital  less than 1                  Blood Administration Record (From admission, onward)      Transfusions already released       Ordered     Start    07/14/23 0746  Transfuse RBC Infuse Each Unit Over: 3.5H  Transfusion        Released Time Blood Unit Number Status   07/14/23 1137   23  [id number]  [id number] Transfusing       07/14/23 0739             Completed transfusions       Ordered     Start    07/13/23 2305  Transfuse RBC Infuse Each Unit Over: 3.5H  Transfusion        Released Time Blood Unit Number Status   07/14/23 0023   23  [id number]  [id number] Completed 07/14/23 0403       07/13/23 2302    07/13/23 0345  Transfuse RBC Infuse Each Unit Over: 3.5H  Transfusion        Released Time Blood Unit Number Status   07/13/23 0608   23  [id number]  [id number] Completed 07/13/23 0924       07/13/23 0345    07/12/23 0642  Transfuse RBC, 2 Units Infuse Each Unit Over: 3.5H  Transfusion      Released Time Blood Unit Number Status   07/12/23 0710   23  [id number]  [id number] Completed 07/12/23 1743   07/12/23 1436   23  [id number]  [id number] Completed 07/12/23 1803       07/12/23 0642            Canceled transfusions       Ordered     Start    07/13/23 2305  Transfuse RBC, 1 Units Infuse Each Unit Over: 3.5H  Transfusion,   Status:  Canceled         07/13/23 2302                  Operative/Procedure Notes (last 72 hours)  Notes from 07/11/23 1240 through 07/14/23 1240   No notes of this type exist for this encounter.          Physician Progress Notes (all)        Ira Santiago APRN at 07/13/23 1710          Gastroenterology   Inpatient Progress Note    Reason for Follow Up: Anemia    Subjective  Interval History:   Patient received 2 units PRBC, hemoglobin has improved, currently 8.7.  Patient with melanic stools this morning.  Patient on clear liquid diet.    Current Facility-Administered Medications:     sennosides-docusate (PERICOLACE) 8.6-50 MG per tablet 2 tablet, 2 tablet, Oral, BID **AND** polyethylene glycol (MIRALAX) packet 17 g, 17 g, Oral, Daily PRN **AND** bisacodyl (DULCOLAX) EC tablet 5 mg, 5 mg, Oral, Daily PRN **AND** bisacodyl (DULCOLAX) suppository 10 mg, 10 mg, Rectal, Daily PRN, Hasmukh Child MD    folic acid (FOLVITE) tablet 1 mg, 1 mg, Oral, Daily, Xiomy Mckeon APRN    heparin injection 30 Units, 0.3 mL, Other, Once in  imaging, Harshal Casillas MD    morphine injection 2 mg, 2 mg, Intravenous, Q4H PRN, Harshal Casillas MD, 2 mg at 07/13/23 1442    ondansetron (ZOFRAN) injection 4 mg, 4 mg, Intravenous, Once, Tamir Harris II, MD    ondansetron (ZOFRAN) injection 4 mg, 4 mg, Intravenous, Q6H PRN, Kenia Pruitt APRN, 4 mg at 07/13/23 1302    pantoprazole (PROTONIX) 40 mg in 100 mL NS (VTB), 8 mg/hr, Intravenous, Continuous, Hasmukh Child MD, Last Rate: 20 mL/hr at 07/13/23 1616, 8 mg/hr at 07/13/23 1616    Pharmacy to Dose Zosyn, , Does not apply, Continuous PRN, Xiomy Mckeon APRN    piperacillin-tazobactam (ZOSYN) 3.375 g in iso-osmotic dextrose 50 ml (premix), 3.375 g, Intravenous, Once, Xiomy Mckeon APRN    piperacillin-tazobactam (ZOSYN) 3.375 g in iso-osmotic dextrose 50 ml (premix), 3.375 g, Intravenous, Q8H, Xiomy Mckeon APRN    [COMPLETED] Insert Peripheral IV, , , Once **AND** sodium chloride 0.9 % flush 10 mL, 10 mL, Intravenous, PRN, Tamir Del Rosario MD    sodium chloride 0.9 % flush 10 mL, 10 mL, Intravenous, Q12H, Hasmukh Child MD, 10 mL at 07/13/23 0921    sodium chloride 0.9 % flush 10 mL, 10 mL, Intravenous, PRN, Hasmukh Child MD    sodium chloride 0.9 % infusion 40 mL, 40 mL, Intravenous, PRN, Hasmukh Child MD    sodium chloride 0.9 % infusion, 100 mL/hr, Intravenous, Continuous, Xiomy Mckeon APRN, Last Rate: 100 mL/hr at 07/13/23 1103, 100 mL/hr at 07/13/23 1103    technetium labeled red blood cells (ULTRATAG) injection 1 dose, 1 dose, Intravenous, Once in imaging, Harshal Casillas MD    thiamine (VITAMIN B-1) tablet 100 mg, 100 mg, Oral, Daily, Xiomy Mckeon APRN  Review of Systems:                Gastroenterology positive for melanic stools    Objective     Vital Signs  Temp:  [97.5 °F (36.4 °C)-98.8 °F (37.1 °C)] 98 °F (36.7 °C)  Heart Rate:  [] 147  Resp:  [16-18] 18  BP: (103-134)/(72-93) 126/93  Body mass index  is 26.94 kg/m².                  Physical Exam:              General: patient awake, alert and cooperative              Eyes: no scleral icterus              Skin: warm and dry, not jaundiced              Abdomen: soft, normal bowel sounds, guarding              Psychiatric: Flat affect                Results Review:                I reviewed the patient's new clinical results.    Results from last 7 days   Lab Units 07/13/23  1146 07/12/23  2356 07/12/23  1854 07/12/23  0609 07/10/23  0806   WBC 10*3/mm3 19.21*  --   --  20.68* 10.78   HEMOGLOBIN g/dL 8.7* 7.0* 7.7* 7.1* 8.4*   HEMATOCRIT % 26.8* 21.2* 23.9* 21.3* 25.0*   PLATELETS 10*3/mm3 275  --   --  399 372     Results from last 7 days   Lab Units 07/13/23  1146 07/12/23  0612 07/08/23  0418   SODIUM mmol/L 138 136 139   POTASSIUM mmol/L 4.3 3.5 4.1   CHLORIDE mmol/L 109* 103 110*   CO2 mmol/L 17.9* 18.2* 23.8   BUN mg/dL 28* 21* 10   CREATININE mg/dL 1.32* 1.37* 1.17   CALCIUM mg/dL 8.3* 9.1 8.4*   BILIRUBIN mg/dL 0.3 0.3 <0.2   ALK PHOS U/L 50 70 51   ALT (SGPT) U/L 11 12 13   AST (SGOT) U/L 9 13 10   GLUCOSE mg/dL 105* 131* 92     Results from last 7 days   Lab Units 07/12/23  0612 07/07/23  0026   INR  1.07 1.15*     Lab Results   Lab Value Date/Time    LIPASE 11 (L) 07/12/2023 0612    LIPASE 26 06/23/2023 1201       Radiology:  CT Abdomen Pelvis With Contrast   Final Result   Mild gastritis is suspected. Diffuse thickening involving   the duodenum and the proximal jejunal loops favored to be secondary to   peristalsis.       Radiation dose reduction techniques were utilized, including automated   exposure control and exposure modulation based on body size.       This report was finalized on 7/13/2023 1:35 PM by Dr. Len Herndon M.D.          XR Chest 1 View   Final Result   No acute findings       This report was finalized on 7/12/2023 7:30 AM by Dr. Moise Walker M.D.          NM GI Blood Loss    (Results Pending)       Assessment & Plan      Active Hospital Problems    Diagnosis     **ABLA (acute blood loss anemia)     Sepsis     Iron deficiency anemia     Current every day smoker        Assessment:  Obscure GI bleed with previous work-up including EGD, colonoscopy, CT abdomen angiograph, enteroscopy without definitive source of bleeding  CT abdomen pelvis with mild gastritis, thickening of the duodenum and proximal jejunal loops favored to be peristalsis  Leukocytosis      Plan:  Tagged red blood cell scan ordered, pending  Continue to monitor H&H and transfuse per primary  Patient will need eventual capsule endoscopy outpatient      I discussed the patients findings and my recommendations with patient and nursing staff.           Ira BASILIO  [facility]  [address] [place] [address]      Electronically signed by Ira Santiago APRN at [phone]       Xiomy Mckeon APRN at [phone]              Name: Radha Noriega V ADMIT: 2023   : 1980  PCP: Hellen Skelton APRN    MRN: [id number] LOS: 0 days   AGE/SEX: 43 y.o. male  ROOM: [facility]     Subjective   Subjective   C/O abd pain, nausea and feeling dehydrated. Urinated before visit. No vomiting. Afebrile.       Objective   Objective   Vital Signs  Temp:  [97.5 °F (36.4 °C)-98.8 °F (37.1 °C)] 98.1 °F (36.7 °C)  Heart Rate:  [] 112  Resp:  [16-18] 16  BP: (103-134)/(72-93) 128/86  SpO2:  [100 %] 100 %  on   ;   Device (Oxygen Therapy): room air  Body mass index is 26.94 kg/m².  Physical Exam  Vitals and nursing note reviewed.   Constitutional:       Appearance: He is ill-appearing.   HENT:      Head: Normocephalic.      Mouth/Throat:      Mouth: Mucous membranes are moist.   Eyes:      Conjunctiva/sclera: Conjunctivae normal.   Cardiovascular:      Rate and Rhythm: Regular rhythm. Tachycardia present.   Pulmonary:      Effort: Pulmonary effort is normal. No respiratory distress.      Breath sounds: No  wheezing or rales.   Abdominal:      Palpations: Abdomen is soft.      Tenderness: There is guarding.   Musculoskeletal:      Cervical back: Neck supple.      Right lower leg: No edema.      Left lower leg: No edema.   Skin:     General: Skin is warm and dry.   Neurological:      Mental Status: He is alert and oriented to person, place, and time.   Psychiatric:         Mood and Affect: Affect is flat.     Results Review     I reviewed the patient's new clinical results.  Results from last 7 days   Lab Units 07/13/23  1146 07/12/23  2356 07/12/23  1854 07/12/23  0609 07/10/23  0806 07/08/23  1619 07/08/23  0418   WBC 10*3/mm3 19.21*  --   --  20.68* 10.78  --  7.64   HEMOGLOBIN g/dL 8.7* 7.0* 7.7* 7.1* 8.4*   < > 7.4*   PLATELETS 10*3/mm3 275  --   --  399 372  --  300    < > = values in this interval not displayed.     Results from last 7 days   Lab Units 07/13/23  1146 07/12/23  0612 07/08/23 0418 07/07/23  0026   SODIUM mmol/L 138 136 139 140   POTASSIUM mmol/L 4.3 3.5 4.1 3.6   CHLORIDE mmol/L 109* 103 110* 109*   CO2 mmol/L 17.9* 18.2* 23.8 19.0*   BUN mg/dL 28* 21* 10 23*   CREATININE mg/dL 1.32* 1.37* 1.17 1.36*   GLUCOSE mg/dL 105* 131* 92 144*   EGFR mL/min/1.73 69.1 66.0 79.8 66.6     Results from last 7 days   Lab Units 07/13/23  1146 07/12/23  0612 07/08/23 0418 07/07/23  0026   ALBUMIN g/dL 2.9* 3.8 2.9* 3.3*   BILIRUBIN mg/dL 0.3 0.3 <0.2 <0.2   ALK PHOS U/L 50 70 51 56   AST (SGOT) U/L 9 13 10 14   ALT (SGPT) U/L 11 12 13 16     Results from last 7 days   Lab Units 07/13/23  1146 07/12/23  0612 07/08/23  0418 07/07/23  0026   CALCIUM mg/dL 8.3* 9.1 8.4* 8.3*   ALBUMIN g/dL 2.9* 3.8 2.9* 3.3*   MAGNESIUM mg/dL  --   --  2.0  --    PHOSPHORUS mg/dL  --   --  3.3  --      Results from last 7 days   Lab Units 07/12/23  0612   PROCALCITONIN ng/mL 0.15     No results found for: HGBA1C, POCGLU    CT Abdomen Pelvis With Contrast    Result Date: 7/12/2023  Mild gastritis is suspected. Diffuse thickening  involving the duodenum and the proximal jejunal loops favored to be secondary to peristalsis.  Radiation dose reduction techniques were utilized, including automated exposure control and exposure modulation based on body size.       XR Chest 1 View    Result Date: 7/12/2023  No acute findings  This report was finalized on 7/12/2023 7:30 AM by Dr. Moise Walker M.D.     I have personally reviewed all medications:  Scheduled Medications  ondansetron, 4 mg, Intravenous, Once  senna-docusate sodium, 2 tablet, Oral, BID  sodium chloride, 10 mL, Intravenous, Q12H    Infusions  pantoprazole, 8 mg/hr, Last Rate: 8 mg/hr (07/13/23 1011)  Pharmacy to Dose Zosyn,   sodium chloride, 100 mL/hr, Last Rate: 100 mL/hr (07/13/23 1103)    Diet  NPO Diet NPO Type: Strict NPO, Ice Chips    I have personally reviewed:  [x]  Laboratory   [x]  Microbiology   [x]  Radiology   [x]  EKG/Telemetry  [x]  Cardiology/Vascular   []  Pathology    []  Records      Assessment/Plan     Active Hospital Problems    Diagnosis  POA    **ABLA (acute blood loss anemia) [D62]  Yes    Sepsis [A41.9]  Yes    Iron deficiency anemia [D50.9]  Yes    Current every day smoker [F17.200]  Yes      Resolved Hospital Problems   No resolved problems to display.       43 y.o. male admitted with ABLA (acute blood loss anemia).    Acute blood loss anemia/MARCE  GI bleed  Extensive negative work-up includes EGD, colonoscopy, CT abdomen angiography, enteroscopy without definite source of bleeding.  Continue pantoprazole GGT.  Serial H&H; up to 8.7 s/p PRBC  GI assistance appreciated. Plan tagged RBC today  N.p.o. diet, add IVF's  Holding po iron for now    Leukocytosis/Sepsis  Etiology is unclear; WBC remains 19.   Procalcitonin is 1.5. Check lactate and blood cultures as pt meets criteria for sepsis (leukocytosis, tachycardia). GI PCR neg. Cover w/IV zosyn and follow cultures. CT A/P mild gastritis, thickening duodenum & prox jejunal loops favored to be peristalsis. UA,  CXR unremarkable     Hyperglycemia  Carbohydrate consistent diet when appropriate for p.o. intake      SCDs for DVT prophylaxis.  Full code.  Discussed with patient, family, and nursing staff.  Anticipate discharge  TBD  .      CHETNA Sparks  [facility]  07/13/23  13:07 EDT        Electronically signed by Xiomy Mckeon APRN at 07/13/23 1308          Consult Notes (all)        Isrrael Gibson MD at 07/12/23 1430        Consult Orders    1. Inpatient Gastroenterology Consult [[id number]] ordered by Hasmukh Child MD at 07/12/23 1123                 [facility] [place], [facility] by [facility]  Initial Inpatient Consult Note    Referring Provider: Dr. BERTO Child    Reason for Consultation: Anemia, rectal bleeding, needs capsule endoscopy.  Was supposed to happen today but presented to the hospital.    Subjective     History of present illness:    43 y.o. male with a h/o alcoholism (though claims to ETOH x 1 mo) and with GI bleeding of obscure etiology who was admitted 7/12/2023 with melena that started yesterday.  He denies nonsteroidal anti-inflammatory drug use.  He has had some abdominal cramps with constipation alternating with diarrhea.  No nausea or vomiting.  No fever or chills.  His weight is stable.  He denies nonsteroidal anti-inflammatory drug use.  He was admitted 7/6/2023 through 7/10/2023 with black or bloody stool, iron deficiency anemia, alcohol abuse, and he is a smoker.  His work-up as outlined below.       On 7/9/2023 I did a small bowel enteroscopy that was normal.       On 6/25/2023 he had a colonoscopy by Dr. BERTO Lee that showed nodular terminal ileal mucosa, 2 small rectal polyps were removed, and internal hemorrhoids.  Pathology from the terminal ileal mucosa was benign and the rectal polyps were hyperplastic.        On 6/24/2023 Dr. EM Lee did an EGD showed grade a distal esophagitis and gastritis.        On 7/8/2023 the patient had a CT angiogram of the abdomen  "and pelvis that was normal.        The patient was supposed to have a capsule endoscopy in our office today but was admitted today instead.  This morning the patient's creatinine was 1.37 with a BUN of 21.  Also in the emergency room his hemoglobin was 7.1, hematocrit of 21.3, platelet count 399,000, white count of 20.68, his MCV was 88.  He had a CT of the abdomen and pelvis earlier today that showed \"mild gastritis is suspected.  Diffuse thickening involving the duodenum and the proximal jejunal loops favored to be secondary to peristalsis.\"    Past Medical History:  Past Medical History:   Diagnosis Date    Anemia     ETOH abuse     Tachycardia 06/23/2023    Tobacco dependence      Past Surgical History:  Past Surgical History:   Procedure Laterality Date    APPENDECTOMY      COLONOSCOPY N/A 06/25/2023    Procedure: COLONOSCOPY TO CECUM AND TERMINAL ILEUM WITH BIOPSIES AND COLD BIOPSY POLYPECTOMIES;  Surgeon: Imtiaz Lee MD;  Location: [facility] ENDOSCOPY;  Service: Gastroenterology;  Laterality: N/A;  PRE- MELENA  POST- COLON POLYPS, HEMORRHOIDS    ENDOSCOPY Left 06/24/2023    Procedure: ESOPHAGOGASTRODUODENOSCOPY;  Surgeon: Imtiaz Lee MD;  Location: [facility] ENDOSCOPY;  Service: Gastroenterology;  Laterality: Left;  small hiatal hernia, esophagitis    ENTEROSCOPY SMALL BOWEL N/A 7/9/2023    Procedure: ENTEROSCOPY SMALL BOWEL;  Surgeon: Isrrael Gibson MD;  Location: [facility] ENDOSCOPY;  Service: Gastroenterology;  Laterality: N/A;  PRE- GI BLEED  POST- NORMAL      Social History:   Social History     Tobacco Use    Smoking status: Every Day     Packs/day: 1.00     Years: 20.00     Pack years: 20.00     Types: Cigarettes    Smokeless tobacco: Never   Substance Use Topics    Alcohol use: Not Currently     Comment: one pint to one fifth of marielos a day      Family History:  Family History   Problem Relation Age of Onset    Diabetes Father     Diabetes Sister     Hypertension Sister     Diabetes Brother     Diabetes " Maternal Grandmother     Diabetes Maternal Grandfather     Diabetes Paternal Grandmother     Diabetes Paternal Grandfather     Heart attack Maternal Aunt 73    Colon cancer Neg Hx     Prostate cancer Neg Hx        Home Meds:  Medications Prior to Admission   Medication Sig Dispense Refill Last Dose    ferrous sulfate (FerrouSul) 325 (65 FE) MG tablet Take 1 tablet by mouth Daily With Breakfast. 30 tablet 0 7/11/2023    folic acid (FOLVITE) 1 MG tablet Take 1 tablet by mouth Daily for 30 days. 30 tablet 0 7/11/2023    pantoprazole (PROTONIX) 40 MG EC tablet Take 1 tablet by mouth Every Morning. 30 tablet 0 7/11/2023    polycarbophil 625 MG tablet tablet Take 1 tablet by mouth Daily for 30 days. 30 tablet 0 7/11/2023    thiamine (VITAMIN B1) 100 MG tablet Take 1 tablet by mouth Daily. 30 tablet 0 7/11/2023     Current Meds:   ondansetron, 4 mg, Intravenous, Once  senna-docusate sodium, 2 tablet, Oral, BID  sodium chloride, 10 mL, Intravenous, Q12H      Allergies:  No Known Allergies  Review of Systems  The following systems were reviewed and negative;  respiratory, cardiovascular, musculoskeletal, and neurological     Objective     Vital Signs  Temp:  [97.1 °F (36.2 °C)-98.9 °F (37.2 °C)] 97.7 °F (36.5 °C)  Heart Rate:  [] 86  Resp:  [16-20] 16  BP: (109-128)/(61-84) 120/67  Physical Exam:  General Appearance:    Alert, cooperative, in no acute distress   Head:    Normocephalic, without obvious abnormality, atraumatic   Eyes:            Lids and lashes normal, conjunctivae and sclerae normal, no   icterus   Throat:   No oral lesions, no thrush, oral mucosa moist   Neck:   No adenopathy, supple, trachea midline, no thyromegaly, no   carotid bruit, no JVD   Lungs:     Clear to auscultation,respirations regular, even and                   unlabored    Heart:    Regular rhythm and normal rate, normal S1 and S2, no            murmur, no gallop, no rub, no click   Chest Wall:    No abnormalities observed   Abdomen:      Normal bowel sounds, no masses, no organomegaly, soft        nontender, nondistended, no guarding, no rebound                 tenderness   Rectal:     Deferred   Extremities:   no edema, no cyanosis, no redness   Skin:   No bleeding, bruising or rash   Lymph nodes:   No palpable adenopathy   Psychiatric:  Judgement and insight: normal   Orientation to person place and time: normal   Mood and affect: normal   Results Review:   I reviewed the patient's new clinical results.    Results from last 7 days   Lab Units 07/12/23  0609 07/10/23  0806 07/10/23  0011 07/08/23  1619 07/08/23  0418   WBC 10*3/mm3 20.68* 10.78  --   --  7.64   HEMOGLOBIN g/dL 7.1* 8.4* 7.5*   < > 7.4*   HEMATOCRIT % 21.3* 25.0* 22.4*   < > 22.5*   PLATELETS 10*3/mm3 399 372  --   --  300    < > = values in this interval not displayed.     Results from last 7 days   Lab Units 07/12/23  0612 07/08/23  0418 07/07/23  0026   SODIUM mmol/L 136 139 140   POTASSIUM mmol/L 3.5 4.1 3.6   CHLORIDE mmol/L 103 110* 109*   CO2 mmol/L 18.2* 23.8 19.0*   BUN mg/dL 21* 10 23*   CREATININE mg/dL 1.37* 1.17 1.36*   CALCIUM mg/dL 9.1 8.4* 8.3*   BILIRUBIN mg/dL 0.3 <0.2 <0.2   ALK PHOS U/L 70 51 56   ALT (SGPT) U/L 12 13 16   AST (SGOT) U/L 13 10 14   GLUCOSE mg/dL 131* 92 144*     Results from last 7 days   Lab Units 07/12/23  0612 07/07/23  0026   INR  1.07 1.15*     Lab Results   Lab Value Date/Time    LIPASE 11 (L) 07/12/2023 0612    LIPASE 26 06/23/2023 1201       Radiology:  CT Abdomen Pelvis With Contrast   Preliminary Result   Mild gastritis is suspected. Diffuse thickening involving   the duodenum and the proximal jejunal loops favored to be secondary to   peristalsis.       Radiation dose reduction techniques were utilized, including automated   exposure control and exposure modulation based on body size.              XR Chest 1 View   Final Result   No acute findings       This report was finalized on 7/12/2023 7:30 AM by Dr. Moise Walker M.D.               Assessment & Plan   Assessment:   GI bleeding of obscure etiology.      Plan:   I would get a tagged red cell bleeding scan see if that gives us any idea of where the bleeding is coming from.  I would check serial H&H's and transfuse as necessary.  He will eventually need to have a capsule endoscopy done as an outpatient.  I will check anemia labs.    I discussed the patient's findings and my recommendations with patient.         Isrrael Gibson M.D.  [facility]  [address] [place] [address]  Office: [phone]       Electronically signed by Isrrael Gibson MD at 07/12/23 9505

## 2023-07-14 NOTE — PROGRESS NOTES
[facility] Medicine Services  PROGRESS NOTE    Patient Name: Radha Noriega V  : 1980  MRN: [id number]    Date of Admission: 2023  Primary Care Physician: Hellen Skelton APRN    Subjective   Subjective     CC:  Follow-up GI bleed    Subjective  Patient still having dark stools.  Feels weak.    Review of Systems  No current fevers or chills  No current shortness of breath or cough  No current chest pain or palpitations      Objective   Objective     Vital Signs:   Temp:  [98 °F (36.7 °C)-99.1 °F (37.3 °C)] 98.4 °F (36.9 °C)  Heart Rate:  [] 94  Resp:  [16-18] 16  BP: (117-129)/(52-93) 123/75        Physical Exam:  Constitutional:Awake, alert  HENT: NCAT, mucous membranes moist, neck supple  Respiratory: No cough or wheezing, respiratory effort normal, nonlabored breathing   Cardiovascular: Pulse rate is normal, normal radial pulses  Gastrointestinal:  soft, nontender, nondistended  Musculoskeletal: Normal musculature for age, no lower extremity edema, BMI 26  Psychiatric: Appropriate affect, cooperative, conversational  Neurologic: No slurred speech or facial droop, follows commands  Skin: No rashes or jaundice, warm      Results Reviewed:  Results from last 7 days   Lab Units 23  0747 23  0608 23  2356 23  2157 23  1146 23  1854 23  0612 23  0609   WBC 10*3/mm3  --  12.54*  --   --  19.21*  --   --  20.68*   HEMOGLOBIN g/dL 5.5* 6.1* 6.1*   < > 8.7*   < >  --  7.1*   HEMATOCRIT % 16.7* 18.0* 18.1*   < > 26.8*   < >  --  21.3*   PLATELETS 10*3/mm3  --  241  --   --  275  --   --  399   INR   --   --   --   --   --   --  1.07  --    PROCALCITONIN ng/mL  --   --   --   --   --   --  0.15  --     < > = values in this interval not displayed.     Results from last 7 days   Lab Units 23  0608 23  1146 23  0612 23  0418   SODIUM mmol/L 133* 138 136 139   POTASSIUM mmol/L 3.8 4.3 3.5 4.1   CHLORIDE mmol/L 108* 109* 103 110*    CO2 mmol/L 18.9* 17.9* 18.2* 23.8   BUN mg/dL 19 28* 21* 10   CREATININE mg/dL 1.15 1.32* 1.37* 1.17   GLUCOSE mg/dL 98 105* 131* 92   CALCIUM mg/dL 7.5* 8.3* 9.1 8.4*   ALT (SGPT) U/L  --  11 12 13   AST (SGOT) U/L  --  9 13 10     Estimated Creatinine Clearance: 92.3 mL/min (by C-G formula based on SCr of 1.15 mg/dL).    Microbiology Results Abnormal       Procedure Component Value - Date/Time    Gastrointestinal Panel, PCR - Stool, Per Rectum [[id number]]  (Normal) Collected: 07/12/23 1555    Lab Status: Final result Specimen: Stool from Per Rectum Updated: 07/12/23 1722     Campylobacter Not Detected     Plesiomonas shigelloides Not Detected     Salmonella Not Detected     Vibrio Not Detected     Vibrio cholerae Not Detected     Yersinia enterocolitica Not Detected     Enteroaggregative E. coli (EAEC) Not Detected     Enteropathogenic E. coli (EPEC) Not Detected     Enterotoxigenic E. coli (ETEC) lt/st Not Detected     Shiga-like toxin-producing E. coli (STEC) stx1/stx2 Not Detected     Shigella/Enteroinvasive E. coli (EIEC) Not Detected     Cryptosporidium Not Detected     Cyclospora cayetanensis Not Detected     Entamoeba histolytica Not Detected     Giardia lamblia Not Detected     Adenovirus F40/41 Not Detected     Astrovirus Not Detected     Norovirus GI/GII Not Detected     Rotavirus A Not Detected     Sapovirus (I, II, IV or V) Not Detected    Narrative:      If Aeromonas, Staphylococcus aureus or Bacillus cereus are suspected, please order [id number]: Stool Culture, Aeromonas, S aureus, B Cereus.            Imaging Results (Last 24 Hours)       Procedure Component Value Units Date/Time    CT Angiogram Abdomen Pelvis [[id number]] Collected: 07/14/23 1246     Updated: 07/14/23 1311    Narrative:      CT ANGIOGRAM OF THE ABDOMEN AND PELVIS     HISTORY: GI bleed     TECHNIQUE: Radiation dose reduction techniques were utilized, including  automated exposure control and exposure modulation based on body size.    CT angiogram of the abdomen and pelvis was performed following the  administration of IV contrast. Multiple coronal, sagittal, and 3-D  reconstruction images were obtained. Precontrast and venous phase  imaging performed.     COMPARISON: 07/12/2023, 07/08/2023     FINDINGS:      CTA:  There is no evidence of active contrast extravasation into the GI tract  to suggest active GI bleeding at this time. There is some high  attenuation material within the dependent portion of the stomach that  was present on the noncontrast portion of the study and is unchanged  following the administration of contrast. This is consistent with  ingested material. The celiac artery, SMA, and inferior mesenteric  artery are all widely patent. The renal arteries are widely patent. The  abdominal aorta is nonaneurysmal. The iliac arteries are widely patent  and nonaneurysmal.     CT ABDOMEN/PELVIS:  The lung bases are clear. The liver, gallbladder, and spleen are  unremarkable. The kidneys, adrenal glands, and pancreas are  unremarkable. Previously demonstrated thickening of the duodenum and  proximal jejunum has resolved. The bladder is unremarkable. The colon is  unremarkable. Previous appendectomy. No free fluid in the pelvis. No  acute bony abnormality.       Impression:      1. No evidence of active contrast extravasation into the GI tract to  suggest active GI bleeding at this time  2. Previously demonstrated thickening of the duodenum and proximal  jejunum has resolved  3. Appendectomy        Radiation dose reduction techniques were utilized, including automated  exposure control and exposure modulation based on body size.     This report was finalized on 7/14/2023 1:07 PM by Dr. Moise Walker M.D.       NM GI Blood Loss [[id number]] Collected: 07/13/23 1911     Updated: 07/13/23 1923    Narrative:      NM GI BLOOD LOSS-     INDICATIONS: Gastrointestinal bleeding     TECHNIQUE: Bleeding scan     COMPARISON: None of same type      FINDINGS:     Radiotracer: 29.7 mCi and 24.5 mCi technetium 99m radiolabeled red blood  cells, intravenous.     Despite special care in injecting the radiotracer, both attempted doses  extravasated. As such, the bowel could not be evaluated for bleeding.          Impression:         Unsuccessful bleeding scan evaluation, as described. Follow-up/further  evaluation can be obtained as indications persist.        This report was finalized on 7/13/2023 7:19 PM by Dr. Brice Velasco M.D.                   I have reviewed the medications:  Scheduled Meds:folic acid, 1 mg, Oral, Daily  heparin, 0.3 mL, Other, Once in imaging  ondansetron, 4 mg, Intravenous, Once  piperacillin-tazobactam, 3.375 g, Intravenous, Q8H  senna-docusate sodium, 2 tablet, Oral, BID  sodium chloride, 500 mL, Intravenous, Once  sodium chloride, 10 mL, Intravenous, Q12H  sodium chloride, 10 mL, Intravenous, Q12H  sodium chloride, 10 mL, Intravenous, Q12H  thiamine, 100 mg, Oral, Daily      Continuous Infusions:pantoprazole, 8 mg/hr, Last Rate: 8 mg/hr (07/14/23 0910)      PRN Meds:.  senna-docusate sodium **AND** polyethylene glycol **AND** bisacodyl **AND** bisacodyl    Morphine    ondansetron    [COMPLETED] Insert Peripheral IV **AND** sodium chloride    sodium chloride    sodium chloride    sodium chloride    sodium chloride    sodium chloride    Assessment & Plan   Assessment & Plan     Active Hospital Problems    Diagnosis  POA    **ABLA (acute blood loss anemia) [D62]  Yes    Metabolic acidosis [E87.20]  Yes    Enteritis of possible infectious origin [A09]  Yes    Sepsis [A41.9]  Yes    Iron deficiency anemia [D50.9]  Yes    Current every day smoker [F17.200]  Yes    Gastrointestinal hemorrhage with melena [K92.1]  Yes      Resolved Hospital Problems   No resolved problems to display.        Brief Hospital Course to date:  Radha Noriega V is a 43 y.o. male presents the hospital with sepsis and acute blood loss anemia with possible  enteritis noted on imaging.    Discussion/plan for today:  Case discussed with gastroenterology.  Patient again with significant blood loss.  Patient having tachycardia and intermittent borderline low blood pressures.  Stat blood transfusion ordered for today.  Plan to recheck hemoglobin 1 hour after transfusion and very likely may need a second unit.  Plan to give IV fluid bolus.  Stat CT angiogram tested today.  Neck for any obvious source of bleeding that could be intervened on with interventional radiology.    Patient has lost multiple IVs.  Access has been an issue.  IV PICC line ordered for today.  We will draw labs to the PICC line to expedite lab results for this patient.    Continue Zosyn.  Leukocytosis trending down.  CT images reviewed and possible enteritis noted.  Patient seems to be responding to therapy.  Monitor closely while requiring IV pain medicine.    Tobacco cessation counseled.    Supportive care and symptomatic treatment.    Careful monitoring.  Low threshold to transfer to intensive care unit if patient has any further adjustment.    35 minutes of critical care provided. This time excludes other billable procedures. Time does include preparation of documents, medical consultations, review of old records, and direct bedside care. Patient is at high risk for life-threatening deterioration due to critically low anemia, GI bleed.    DVT Prophylaxis: Ambulatory, mechanical      Disposition: I expect the patient to be discharged home pending stability    CODE STATUS:   Code Status and Medical Interventions:   Ordered at: 07/12/23 1658     Code Status (Patient has no pulse and is not breathing):    CPR (Attempt to Resuscitate)     Medical Interventions (Patient has pulse or is breathing):    Full Support       Harshal Casillas MD  07/14/23

## 2023-07-14 NOTE — PLAN OF CARE
Goal Outcome Evaluation:  Plan of Care Reviewed With: patient        Progress: no change  Outcome Evaluation: VSS, no complaints of pain this shift.  1 unit PRBC transfused this shift for hgb 5.5.  Repeat hgb reamined 5.5, another unit order to be given.  Pt had CTA of the abdomin to see where the bleeding is coming from, CTA normal, GI is proceding with a EGD and colonoscopy tomorrow.  Bowel prep ordered for tonight.  Consent signed and on the chart.  PICC line placed for multiple infusions and blood draws.  Conintinue IV Zosyn.

## 2023-07-14 NOTE — SIGNIFICANT NOTE
"   07/14/23 1145   PICC Double Lumen 07/14/23 Left Brachial   Placement date: If unknown, DO NOT use \"Add Comment\" note/Placement time: If unknown, DO NOT use \"Add Comment\" note: 07/14/23 1144   Hand Hygiene Completed: Yes  Size (Fr): 4  Description (optional): Lot# [id number]; Exp: 2024-04-30  Length (cm): 44 cm ...   Site Assessment Clean;Dry;Intact   #1 Lumen Status Blood return noted;Capped;Normal saline locked;Flushed   #2 Lumen Status Blood return noted;Capped;Normal saline locked;Flushed   Length irma (cm) 44 cm   Extremity Circumference (cm) 31 cm   Dressing Type Border Dressing;Transparent;Securing device;Antimicrobial dressing/disc   Dressing Status Clean;Dry;Intact   Dressing Change Due 07/21/23   Indication/Daily Review of Necessity multiple infusates     3 needles, 2 guidewires, 1 scalpel properly disposed of post procedure      "

## 2023-07-14 NOTE — PROGRESS NOTES
Gastroenterology   Inpatient Progress Note    Reason for Follow Up:  acute blood loss anemia    Subjective  Interval History:   Patient still having maroon stools, feels weak.  Tagged red blood cell scan was unable to be performed as 2 of his IVs did not tolerate contrast.  PICC line was placed today.      Current Facility-Administered Medications:     sennosides-docusate (PERICOLACE) 8.6-50 MG per tablet 2 tablet, 2 tablet, Oral, BID **AND** polyethylene glycol (MIRALAX) packet 17 g, 17 g, Oral, Daily PRN **AND** bisacodyl (DULCOLAX) EC tablet 5 mg, 5 mg, Oral, Daily PRN **AND** bisacodyl (DULCOLAX) suppository 10 mg, 10 mg, Rectal, Daily PRN, Hasmukh Child MD    folic acid (FOLVITE) tablet 1 mg, 1 mg, Oral, Daily, Xiomy Mckeon, APRN, 1 mg at 07/14/23 0833    heparin injection 30 Units, 0.3 mL, Other, Once in imaging, Harshal Casillas MD    morphine injection 2 mg, 2 mg, Intravenous, Q4H PRN, Harshal Casillas MD, 2 mg at 07/13/23 1442    ondansetron (ZOFRAN) injection 4 mg, 4 mg, Intravenous, Once, Tamir Harris II, MD    ondansetron (ZOFRAN) injection 4 mg, 4 mg, Intravenous, Q6H PRN, Kenia Pruitt APRN, 4 mg at 07/13/23 1302    pantoprazole (PROTONIX) 40 mg in 100 mL NS (VTB), 8 mg/hr, Intravenous, Continuous, Hasmukh Child MD, Last Rate: 20 mL/hr at 07/14/23 1406, 8 mg/hr at 07/14/23 1406    piperacillin-tazobactam (ZOSYN) 3.375 g in iso-osmotic dextrose 50 ml (premix), 3.375 g, Intravenous, Q8H, Harshal Casillas MD, 3.375 g at 07/14/23 1515    polyethylene glycol (MIRALAX) powder 0.5 bottle, 0.5 bottle, Oral, BID, Jack, Ira C, APRN    sodium chloride 0.9 % bolus 500 mL, 500 mL, Intravenous, Once, Harshal Casillas MD    [COMPLETED] Insert Peripheral IV, , , Once **AND** sodium chloride 0.9 % flush 10 mL, 10 mL, Intravenous, PRN, Tamir Del Rosario MD    sodium chloride 0.9 % flush 10 mL, 10 mL, Intravenous, Q12H, Hasmukh Child MD, 10 mL at  07/14/23 0833    sodium chloride 0.9 % flush 10 mL, 10 mL, Intravenous, PRN, Hasmukh Child MD    sodium chloride 0.9 % flush 10 mL, 10 mL, Intravenous, Q12H, Harshal Casillas MD, 10 mL at 07/14/23 1154    sodium chloride 0.9 % flush 10 mL, 10 mL, Intravenous, Q12H, Harshal Casillas MD, 10 mL at 07/14/23 1154    sodium chloride 0.9 % flush 10 mL, 10 mL, Intravenous, PRN, Harshal Casillas MD    sodium chloride 0.9 % flush 20 mL, 20 mL, Intravenous, PRSonja SANDRA Stephen Matthew, MD    sodium chloride 0.9 % infusion 40 mL, 40 mL, Intravenous, PRN, Hasmukh Child MD    sodium chloride 0.9 % infusion 40 mL, 40 mL, Intravenous, PRSonja SANDRA Stephen Matthew, MD    thiamine (VITAMIN B-1) tablet 100 mg, 100 mg, Oral, Daily, Xiomy Mckeon APRN, 100 mg at 07/14/23 0833  Review of Systems:               Gastroenterology positive for blood per rectum    Objective     Vital Signs  Temp:  [98 °F (36.7 °C)-99.1 °F (37.3 °C)] 98.7 °F (37.1 °C)  Heart Rate:  [] 82  Resp:  [16-18] 16  BP: (117-129)/(52-80) 119/78  Body mass index is 26.94 kg/m².                  Physical Exam:              General: patient awake, alert and cooperative, appears to not feel well but not hypotensive, no acute distress              Eyes: no scleral icterus              Skin: warm and dry, not jaundiced              Abdomen: soft, normal bowel sounds, nontender, nondistended              Psychiatric: Appropriate affect and behavior                Results Review:                I reviewed the patient's new clinical results.    Results from last 7 days   Lab Units 07/14/23  0747 07/14/23  0608 07/13/23  2356 07/13/23  2157 07/13/23  1146 07/12/23  1854 07/12/23  0609   WBC 10*3/mm3  --  12.54*  --   --  19.21*  --  20.68*   HEMOGLOBIN g/dL 5.5* 6.1* 6.1*   < > 8.7*   < > 7.1*   HEMATOCRIT % 16.7* 18.0* 18.1*   < > 26.8*   < > 21.3*   PLATELETS 10*3/mm3  --  241  --   --  275  --  399    < > = values in this interval not  displayed.     Results from last 7 days   Lab Units 07/14/23  0608 07/13/23  1146 07/12/23  0612 07/08/23  0418   SODIUM mmol/L 133* 138 136 139   POTASSIUM mmol/L 3.8 4.3 3.5 4.1   CHLORIDE mmol/L 108* 109* 103 110*   CO2 mmol/L 18.9* 17.9* 18.2* 23.8   BUN mg/dL 19 28* 21* 10   CREATININE mg/dL 1.15 1.32* 1.37* 1.17   CALCIUM mg/dL 7.5* 8.3* 9.1 8.4*   BILIRUBIN mg/dL  --  0.3 0.3 <0.2   ALK PHOS U/L  --  50 70 51   ALT (SGPT) U/L  --  11 12 13   AST (SGOT) U/L  --  9 13 10   GLUCOSE mg/dL 98 105* 131* 92     Results from last 7 days   Lab Units 07/12/23  0612   INR  1.07     Lab Results   Lab Value Date/Time    LIPASE 11 (L) 07/12/2023 0612    LIPASE 26 06/23/2023 1201       Radiology:  CT Angiogram Abdomen Pelvis   Final Result   1. No evidence of active contrast extravasation into the GI tract to   suggest active GI bleeding at this time   2. Previously demonstrated thickening of the duodenum and proximal   jejunum has resolved   3. Appendectomy           Radiation dose reduction techniques were utilized, including automated   exposure control and exposure modulation based on body size.       This report was finalized on 7/14/2023 1:07 PM by Dr. Moise Walker M.D.          NM GI Blood Loss   Final Result       Unsuccessful bleeding scan evaluation, as described. Follow-up/further   evaluation can be obtained as indications persist.           This report was finalized on 7/13/2023 7:19 PM by Dr. Brice Velasco M.D.          CT Abdomen Pelvis With Contrast   Final Result   Mild gastritis is suspected. Diffuse thickening involving   the duodenum and the proximal jejunal loops favored to be secondary to   peristalsis.       Radiation dose reduction techniques were utilized, including automated   exposure control and exposure modulation based on body size.       This report was finalized on 7/13/2023 1:35 PM by Dr. Len Herndon M.D.          XR Chest 1 View   Final Result   No acute findings        This report was finalized on 7/12/2023 7:30 AM by Dr. Moise Walker M.D.              Assessment & Plan     Active Hospital Problems    Diagnosis     **ABLA (acute blood loss anemia)     Metabolic acidosis     Enteritis of possible infectious origin     Sepsis     Blood per rectum     Iron deficiency anemia     Current every day smoker     Gastrointestinal hemorrhage with melena     Acute blood loss anemia        Assessment:  Blood loss anemia  Maroon stools  Possible enteritis on imaging  Leukocytosis, trending down  Iron deficiency anemia        Plan:  Continue pantoprazole drip  I spoke with interventional radiologist Dr. Walker, patient with maroon blood per rectum, assistant declining hemoglobin despite recent transfusions, he recommended CTA instead of tagged red blood cell scan and this was performed, no cause for GI bleed noted  Patient has had recent work-up with EGD, colonoscopy, CT angiogram, enteroscopy without definitive source of bleeding, he was scheduled for small bowel capsule endoscopy the day he was remitted to the hospital.  Discussed with Dr. Salvador, orders placed for EGD and colonoscopy.  Continue to monitor H&H and transfuse per primary  Based on clinical course, patient will need eventual capsule endoscopy as an outpatient.    I discussed the patients findings and my recommendations with patient, nursing staff, and primary care team.           Ira BASILIO  [facility]  [address] [place] [address]

## 2023-07-15 ENCOUNTER — APPOINTMENT (OUTPATIENT)
Dept: NUCLEAR MEDICINE | Facility: HOSPITAL | Age: 43
DRG: 356 | End: 2023-07-15
Payer: COMMERCIAL

## 2023-07-15 ENCOUNTER — APPOINTMENT (OUTPATIENT)
Dept: INTERVENTIONAL RADIOLOGY/VASCULAR | Facility: HOSPITAL | Age: 43
DRG: 356 | End: 2023-07-15
Payer: COMMERCIAL

## 2023-07-15 LAB
ALBUMIN SERPL-MCNC: 1.9 G/DL (ref 3.5–5.2)
ALBUMIN/GLOB SERPL: 1.6 G/DL
ALP SERPL-CCNC: 32 U/L (ref 39–117)
ALT SERPL W P-5'-P-CCNC: 5 U/L (ref 1–41)
ANION GAP SERPL CALCULATED.3IONS-SCNC: 6 MMOL/L (ref 5–15)
AST SERPL-CCNC: 9 U/L (ref 1–40)
BASOPHILS # BLD AUTO: 0.02 10*3/MM3 (ref 0–0.2)
BASOPHILS NFR BLD AUTO: 0.2 % (ref 0–1.5)
BH BB BLOOD EXPIRATION DATE: NORMAL
BH BB BLOOD TYPE BARCODE: 7300
BH BB DISPENSE STATUS: NORMAL
BH BB PRODUCT CODE: NORMAL
BH BB UNIT NUMBER: NORMAL
BILIRUB SERPL-MCNC: 0.2 MG/DL (ref 0–1.2)
BUN SERPL-MCNC: 9 MG/DL (ref 6–20)
BUN/CREAT SERPL: 6.9 (ref 7–25)
CALCIUM SPEC-SCNC: 6.8 MG/DL (ref 8.6–10.5)
CHLORIDE SERPL-SCNC: 110 MMOL/L (ref 98–107)
CO2 SERPL-SCNC: 19 MMOL/L (ref 22–29)
CREAT SERPL-MCNC: 1.3 MG/DL (ref 0.76–1.27)
CROSSMATCH INTERPRETATION: NORMAL
DEPRECATED RDW RBC AUTO: 45.9 FL (ref 37–54)
DEPRECATED RDW RBC AUTO: 49 FL (ref 37–54)
EGFRCR SERPLBLD CKD-EPI 2021: 70.3 ML/MIN/1.73
EOSINOPHIL # BLD AUTO: 0.15 10*3/MM3 (ref 0–0.4)
EOSINOPHIL NFR BLD AUTO: 1.1 % (ref 0.3–6.2)
ERYTHROCYTE [DISTWIDTH] IN BLOOD BY AUTOMATED COUNT: 14.2 % (ref 12.3–15.4)
ERYTHROCYTE [DISTWIDTH] IN BLOOD BY AUTOMATED COUNT: 15.2 % (ref 12.3–15.4)
GLOBULIN UR ELPH-MCNC: 1.2 GM/DL
GLUCOSE SERPL-MCNC: 105 MG/DL (ref 65–99)
HCT VFR BLD AUTO: 14.9 % (ref 37.5–51)
HCT VFR BLD AUTO: 17.5 % (ref 37.5–51)
HCT VFR BLD AUTO: 18.4 % (ref 37.5–51)
HCT VFR BLD AUTO: 18.8 % (ref 37.5–51)
HGB BLD-MCNC: 4.9 G/DL (ref 13–17.7)
HGB BLD-MCNC: 5.9 G/DL (ref 13–17.7)
HGB BLD-MCNC: 6.1 G/DL (ref 13–17.7)
HGB BLD-MCNC: 6.2 G/DL (ref 13–17.7)
LYMPHOCYTES # BLD AUTO: 2.51 10*3/MM3 (ref 0.7–3.1)
LYMPHOCYTES NFR BLD AUTO: 19 % (ref 19.6–45.3)
MCH RBC QN AUTO: 30.2 PG (ref 26.6–33)
MCH RBC QN AUTO: 30.6 PG (ref 26.6–33)
MCHC RBC AUTO-ENTMCNC: 32.9 G/DL (ref 31.5–35.7)
MCHC RBC AUTO-ENTMCNC: 33.7 G/DL (ref 31.5–35.7)
MCV RBC AUTO: 90.7 FL (ref 79–97)
MCV RBC AUTO: 92 FL (ref 79–97)
MONOCYTES # BLD AUTO: 1.55 10*3/MM3 (ref 0.1–0.9)
MONOCYTES NFR BLD AUTO: 11.7 % (ref 5–12)
NEUTROPHILS NFR BLD AUTO: 64.3 % (ref 42.7–76)
NEUTROPHILS NFR BLD AUTO: 8.48 10*3/MM3 (ref 1.7–7)
PLATELET # BLD AUTO: 132 10*3/MM3 (ref 140–450)
PLATELET # BLD AUTO: 145 10*3/MM3 (ref 140–450)
PMV BLD AUTO: 10.4 FL (ref 6–12)
PMV BLD AUTO: 10.5 FL (ref 6–12)
POTASSIUM SERPL-SCNC: 3.6 MMOL/L (ref 3.5–5.2)
PROT SERPL-MCNC: 3.1 G/DL (ref 6–8.5)
RBC # BLD AUTO: 1.62 10*6/MM3 (ref 4.14–5.8)
RBC # BLD AUTO: 1.93 10*6/MM3 (ref 4.14–5.8)
SODIUM SERPL-SCNC: 135 MMOL/L (ref 136–145)
UNIT  ABO: NORMAL
UNIT  RH: NORMAL
WBC NRBC COR # BLD: 13.2 10*3/MM3 (ref 3.4–10.8)
WBC NRBC COR # BLD: 15.2 10*3/MM3 (ref 3.4–10.8)

## 2023-07-15 PROCEDURE — 25510000001 IOPAMIDOL PER 1 ML: Performed by: INTERNAL MEDICINE

## 2023-07-15 PROCEDURE — P9016 RBC LEUKOCYTES REDUCED: HCPCS

## 2023-07-15 PROCEDURE — C1889 IMPLANT/INSERT DEVICE, NOC: HCPCS

## 2023-07-15 PROCEDURE — 25010000002 ONDANSETRON PER 1 MG: Performed by: INTERNAL MEDICINE

## 2023-07-15 PROCEDURE — C1887 CATHETER, GUIDING: HCPCS

## 2023-07-15 PROCEDURE — 75894 X-RAYS TRANSCATH THERAPY: CPT

## 2023-07-15 PROCEDURE — 25010000002 PIPERACILLIN SOD-TAZOBACTAM PER 1 G: Performed by: INTERNAL MEDICINE

## 2023-07-15 PROCEDURE — 99222 1ST HOSP IP/OBS MODERATE 55: CPT | Performed by: SURGERY

## 2023-07-15 PROCEDURE — 85018 HEMOGLOBIN: CPT | Performed by: INTERNAL MEDICINE

## 2023-07-15 PROCEDURE — 36430 TRANSFUSION BLD/BLD COMPNT: CPT

## 2023-07-15 PROCEDURE — 85027 COMPLETE CBC AUTOMATED: CPT | Performed by: INTERNAL MEDICINE

## 2023-07-15 PROCEDURE — 0DJ08ZZ INSPECTION OF UPPER INTESTINAL TRACT, VIA NATURAL OR ARTIFICIAL OPENING ENDOSCOPIC: ICD-10-PCS | Performed by: INTERNAL MEDICINE

## 2023-07-15 PROCEDURE — 86901 BLOOD TYPING SEROLOGIC RH(D): CPT

## 2023-07-15 PROCEDURE — C1894 INTRO/SHEATH, NON-LASER: HCPCS

## 2023-07-15 PROCEDURE — 86900 BLOOD TYPING SEROLOGIC ABO: CPT

## 2023-07-15 PROCEDURE — 25010000002 FENTANYL CITRATE (PF) 50 MCG/ML SOLUTION: Performed by: RADIOLOGY

## 2023-07-15 PROCEDURE — 0 TECHNETIUM LABELED RED BLOOD CELLS: Performed by: INTERNAL MEDICINE

## 2023-07-15 PROCEDURE — A9560 TC99M LABELED RBC: HCPCS | Performed by: INTERNAL MEDICINE

## 2023-07-15 PROCEDURE — B4141ZZ FLUOROSCOPY OF SUPERIOR MESENTERIC ARTERY USING LOW OSMOLAR CONTRAST: ICD-10-PCS | Performed by: RADIOLOGY

## 2023-07-15 PROCEDURE — 04L53DZ OCCLUSION OF SUPERIOR MESENTERIC ARTERY WITH INTRALUMINAL DEVICE, PERCUTANEOUS APPROACH: ICD-10-PCS | Performed by: RADIOLOGY

## 2023-07-15 PROCEDURE — 80053 COMPREHEN METABOLIC PANEL: CPT | Performed by: INTERNAL MEDICINE

## 2023-07-15 PROCEDURE — 86920 COMPATIBILITY TEST SPIN: CPT

## 2023-07-15 PROCEDURE — 43235 EGD DIAGNOSTIC BRUSH WASH: CPT | Performed by: INTERNAL MEDICINE

## 2023-07-15 PROCEDURE — C1769 GUIDE WIRE: HCPCS

## 2023-07-15 PROCEDURE — 99153 MOD SED SAME PHYS/QHP EA: CPT

## 2023-07-15 PROCEDURE — 86922 COMPATIBILITY TEST ANTIGLOB: CPT

## 2023-07-15 PROCEDURE — 25010000002 MORPHINE PER 10 MG: Performed by: INTERNAL MEDICINE

## 2023-07-15 PROCEDURE — 85025 COMPLETE CBC W/AUTO DIFF WBC: CPT | Performed by: INTERNAL MEDICINE

## 2023-07-15 PROCEDURE — 85014 HEMATOCRIT: CPT | Performed by: INTERNAL MEDICINE

## 2023-07-15 PROCEDURE — B41F1ZZ FLUOROSCOPY OF RIGHT LOWER EXTREMITY ARTERIES USING LOW OSMOLAR CONTRAST: ICD-10-PCS | Performed by: RADIOLOGY

## 2023-07-15 PROCEDURE — 25010000002 ONDANSETRON PER 1 MG: Performed by: NURSE PRACTITIONER

## 2023-07-15 PROCEDURE — B41B1ZZ FLUOROSCOPY OF OTHER INTRA-ABDOMINAL ARTERIES USING LOW OSMOLAR CONTRAST: ICD-10-PCS | Performed by: RADIOLOGY

## 2023-07-15 PROCEDURE — 25010000002 ONDANSETRON PER 1 MG: Performed by: RADIOLOGY

## 2023-07-15 PROCEDURE — 25010000002 MIDAZOLAM PER 1 MG: Performed by: RADIOLOGY

## 2023-07-15 PROCEDURE — 75726 ARTERY X-RAYS ABDOMEN: CPT

## 2023-07-15 PROCEDURE — 75774 ARTERY X-RAY EACH VESSEL: CPT

## 2023-07-15 PROCEDURE — 78278 ACUTE GI BLOOD LOSS IMAGING: CPT

## 2023-07-15 PROCEDURE — 25010000002 HEPARIN LOCK FLUSH PER 10 UNITS: Performed by: INTERNAL MEDICINE

## 2023-07-15 PROCEDURE — 99152 MOD SED SAME PHYS/QHP 5/>YRS: CPT

## 2023-07-15 PROCEDURE — C1760 CLOSURE DEV, VASC: HCPCS

## 2023-07-15 RX ORDER — ONDANSETRON 2 MG/ML
4 INJECTION INTRAMUSCULAR; INTRAVENOUS ONCE
Status: COMPLETED | OUTPATIENT
Start: 2023-07-15 | End: 2023-07-15

## 2023-07-15 RX ORDER — ACETAMINOPHEN 325 MG/1
650 TABLET ORAL ONCE
Status: DISCONTINUED | OUTPATIENT
Start: 2023-07-15 | End: 2023-07-16 | Stop reason: SDUPTHER

## 2023-07-15 RX ORDER — FENTANYL CITRATE 50 UG/ML
INJECTION, SOLUTION INTRAMUSCULAR; INTRAVENOUS AS NEEDED
Status: COMPLETED | OUTPATIENT
Start: 2023-07-15 | End: 2023-07-15

## 2023-07-15 RX ORDER — ACETAMINOPHEN 650 MG/1
650 SUPPOSITORY RECTAL ONCE
Status: DISCONTINUED | OUTPATIENT
Start: 2023-07-15 | End: 2023-07-21 | Stop reason: HOSPADM

## 2023-07-15 RX ORDER — FAMOTIDINE 20 MG/1
20 TABLET, FILM COATED ORAL ONCE
Status: DISCONTINUED | OUTPATIENT
Start: 2023-07-15 | End: 2023-07-16 | Stop reason: SDUPTHER

## 2023-07-15 RX ORDER — ACETAMINOPHEN 160 MG/5ML
650 SOLUTION ORAL ONCE
Status: DISCONTINUED | OUTPATIENT
Start: 2023-07-15 | End: 2023-07-21 | Stop reason: HOSPADM

## 2023-07-15 RX ORDER — MIDAZOLAM HYDROCHLORIDE 1 MG/ML
INJECTION INTRAMUSCULAR; INTRAVENOUS AS NEEDED
Status: COMPLETED | OUTPATIENT
Start: 2023-07-15 | End: 2023-07-15

## 2023-07-15 RX ORDER — SODIUM CHLORIDE 9 MG/ML
INJECTION, SOLUTION INTRAVENOUS CONTINUOUS PRN
Status: COMPLETED | OUTPATIENT
Start: 2023-07-15 | End: 2023-07-15

## 2023-07-15 RX ORDER — LIDOCAINE HYDROCHLORIDE 10 MG/ML
INJECTION, SOLUTION EPIDURAL; INFILTRATION; INTRACAUDAL; PERINEURAL AS NEEDED
Status: COMPLETED | OUTPATIENT
Start: 2023-07-15 | End: 2023-07-15

## 2023-07-15 RX ORDER — HEPARIN SODIUM (PORCINE) LOCK FLUSH IV SOLN 100 UNIT/ML 100 UNIT/ML
0.3 SOLUTION INTRAVENOUS
Status: COMPLETED | OUTPATIENT
Start: 2023-07-15 | End: 2023-07-15

## 2023-07-15 RX ADMIN — PANTOPRAZOLE SODIUM 8 MG/HR: 40 INJECTION, POWDER, FOR SOLUTION INTRAVENOUS at 15:56

## 2023-07-15 RX ADMIN — MIDAZOLAM 1 MG: 1 INJECTION INTRAMUSCULAR; INTRAVENOUS at 17:47

## 2023-07-15 RX ADMIN — SODIUM CHLORIDE: 900 IRRIGANT IRRIGATION at 18:08

## 2023-07-15 RX ADMIN — ONDANSETRON 4 MG: 2 INJECTION INTRAMUSCULAR; INTRAVENOUS at 18:00

## 2023-07-15 RX ADMIN — Medication 10 ML: at 22:35

## 2023-07-15 RX ADMIN — HEPARIN 30 UNITS: 100 SYRINGE at 12:15

## 2023-07-15 RX ADMIN — LIDOCAINE HYDROCHLORIDE 8 ML: 10 INJECTION, SOLUTION EPIDURAL; INFILTRATION; INTRACAUDAL; PERINEURAL at 19:38

## 2023-07-15 RX ADMIN — FENTANYL CITRATE 50 MCG: 50 INJECTION INTRAMUSCULAR; INTRAVENOUS at 17:47

## 2023-07-15 RX ADMIN — SODIUM CHLORIDE 40 ML/HR: 9 INJECTION, SOLUTION INTRAVENOUS at 17:45

## 2023-07-15 RX ADMIN — ONDANSETRON 4 MG: 2 INJECTION INTRAMUSCULAR; INTRAVENOUS at 03:04

## 2023-07-15 RX ADMIN — IOPAMIDOL 129 ML: 510 INJECTION, SOLUTION INTRAVASCULAR at 20:34

## 2023-07-15 RX ADMIN — PANTOPRAZOLE SODIUM 8 MG/HR: 40 INJECTION, POWDER, FOR SOLUTION INTRAVENOUS at 06:35

## 2023-07-15 RX ADMIN — PANTOPRAZOLE SODIUM 8 MG/HR: 40 INJECTION, POWDER, FOR SOLUTION INTRAVENOUS at 11:57

## 2023-07-15 RX ADMIN — PIPERACILLIN SODIUM AND TAZOBACTAM SODIUM 3.38 G: 3; .375 INJECTION, SOLUTION INTRAVENOUS at 15:56

## 2023-07-15 RX ADMIN — MORPHINE SULFATE 2 MG: 2 INJECTION, SOLUTION INTRAMUSCULAR; INTRAVENOUS at 20:08

## 2023-07-15 RX ADMIN — FENTANYL CITRATE 50 MCG: 50 INJECTION INTRAMUSCULAR; INTRAVENOUS at 19:14

## 2023-07-15 RX ADMIN — SENNOSIDES AND DOCUSATE SODIUM 2 TABLET: 50; 8.6 TABLET ORAL at 22:34

## 2023-07-15 RX ADMIN — PIPERACILLIN SODIUM AND TAZOBACTAM SODIUM 3.38 G: 3; .375 INJECTION, SOLUTION INTRAVENOUS at 06:20

## 2023-07-15 RX ADMIN — TECHNETIUM TC 99M-LABELED RED BLOOD CELLS 1 DOSE: KIT at 12:16

## 2023-07-15 RX ADMIN — PANTOPRAZOLE SODIUM 8 MG/HR: 40 INJECTION, POWDER, FOR SOLUTION INTRAVENOUS at 01:49

## 2023-07-15 RX ADMIN — ONDANSETRON 4 MG: 2 INJECTION INTRAMUSCULAR; INTRAVENOUS at 23:33

## 2023-07-15 NOTE — NURSING NOTE
Spoke with Dr. Lagos with GI about pt unable to keep prep down even with zofran dose. She told this RN to try again for pt to drink prep at [facility] and if patient still unable to keep prep down to just hold off on prep and they will continue with egd in AM without c-scope.

## 2023-07-15 NOTE — PROGRESS NOTES
Brief GI note  His tagged red blood cell scan indicates bleeding in the proximal to mid ileum.    Discussed the case with Dr. Narvaez with general surgery and we will plan to discuss with Dr Walker with interventional radiology for IR embolization.  If this is unsuccessful, will consider surgical intervention.    Ermelinda Dixon MD  [facility]

## 2023-07-15 NOTE — NURSING NOTE
Patient arrived to bay [facility] in IR preop area.  PPE worn per patient and care providers for any bedside care as needed.

## 2023-07-15 NOTE — PROGRESS NOTES
[facility] Medicine Services  PROGRESS NOTE    Patient Name: Radha Noriega V  : 1980  MRN: [id number]    Date of Admission: 2023  Primary Care Physician: Hellen Skelton APRN    Subjective   Subjective     CC:  Follow-up GI bleed    Subjective  Patient is frustrated with his bowel prep.  He feels better since the blood transfusions.  Still some dark stools.    Review of Systems  No current fevers or chills  No current shortness of breath or cough  No current chest pain or palpitations      Objective   Objective     Vital Signs:   Temp:  [98.4 °F (36.9 °C)-99.4 °F (37.4 °C)] 98.4 °F (36.9 °C)  Heart Rate:  [] 101  Resp:  [14-18] 14  BP: (117-144)/(66-88) 128/77        Physical Exam:  Constitutional:Awake, alert  HENT: NCAT, mucous membranes moist, neck supple  Respiratory: No cough or wheezing, respiratory effort normal, nonlabored breathing   Cardiovascular: Pulse rate is normal, normal radial pulses  Gastrointestinal:  soft, nontender, nondistended  Musculoskeletal: Normal musculature for age, no lower extremity edema, BMI 26  Psychiatric: Appropriate affect, cooperative, conversational  Neurologic: No slurred speech or facial droop, follows commands  Skin: No rashes or jaundice, warm      Results Reviewed:  Results from last 7 days   Lab Units 07/15/23  0625 07/15/23  0010 23  1650 23  0747 23  0608 23  2157 23  1146 23  1854 23  0612 23  0609   WBC 10*3/mm3  --   --   --   --  12.54*  --  19.21*  --   --  20.68*   HEMOGLOBIN g/dL 6.2* 6.1* 5.5*   < > 6.1*   < > 8.7*   < >  --  7.1*   HEMATOCRIT % 18.4* 18.8* 16.5*   < > 18.0*   < > 26.8*   < >  --  21.3*   PLATELETS 10*3/mm3  --   --   --   --  241  --  275  --   --  399   INR   --   --   --   --   --   --   --   --  1.07  --    PROCALCITONIN ng/mL  --   --   --   --   --   --   --   --  0.15  --     < > = values in this interval not displayed.       Results from last 7 days   Lab Units  07/14/23  0608 07/13/23  1146 07/12/23  0612   SODIUM mmol/L 133* 138 136   POTASSIUM mmol/L 3.8 4.3 3.5   CHLORIDE mmol/L 108* 109* 103   CO2 mmol/L 18.9* 17.9* 18.2*   BUN mg/dL 19 28* 21*   CREATININE mg/dL 1.15 1.32* 1.37*   GLUCOSE mg/dL 98 105* 131*   CALCIUM mg/dL 7.5* 8.3* 9.1   ALT (SGPT) U/L  --  11 12   AST (SGOT) U/L  --  9 13       Estimated Creatinine Clearance: 92.3 mL/min (by C-G formula based on SCr of 1.15 mg/dL).    Microbiology Results Abnormal       Procedure Component Value - Date/Time    Blood Culture - Blood, Hand, Left [[id number]]  (Normal) Collected: 07/13/23 2204    Lab Status: Preliminary result Specimen: Blood from Hand, Left Updated: 07/14/23 2232     Blood Culture No growth at 24 hours    Narrative:      Less than seven (7) mL's of blood was collected.  Insufficient quantity may yield false negative results.    Blood Culture - Blood, Arm, Right [[id number]]  (Normal) Collected: 07/13/23 2157    Lab Status: Preliminary result Specimen: Blood from Arm, Right Updated: 07/14/23 2232     Blood Culture No growth at 24 hours    Gastrointestinal Panel, PCR - Stool, Per Rectum [[id number]]  (Normal) Collected: 07/12/23 1555    Lab Status: Final result Specimen: Stool from Per Rectum Updated: 07/12/23 1722     Campylobacter Not Detected     Plesiomonas shigelloides Not Detected     Salmonella Not Detected     Vibrio Not Detected     Vibrio cholerae Not Detected     Yersinia enterocolitica Not Detected     Enteroaggregative E. coli (EAEC) Not Detected     Enteropathogenic E. coli (EPEC) Not Detected     Enterotoxigenic E. coli (ETEC) lt/st Not Detected     Shiga-like toxin-producing E. coli (STEC) stx1/stx2 Not Detected     Shigella/Enteroinvasive E. coli (EIEC) Not Detected     Cryptosporidium Not Detected     Cyclospora cayetanensis Not Detected     Entamoeba histolytica Not Detected     Giardia lamblia Not Detected     Adenovirus F40/41 Not Detected     Astrovirus Not Detected     Norovirus  GI/GII Not Detected     Rotavirus A Not Detected     Sapovirus (I, II, IV or V) Not Detected    Narrative:      If Aeromonas, Staphylococcus aureus or Bacillus cereus are suspected, please order [id number]: Stool Culture, Aeromonas, S aureus, B Cereus.            Imaging Results (Last 24 Hours)       Procedure Component Value Units Date/Time    CT Angiogram Abdomen Pelvis [[id number]] Collected: 07/14/23 1246     Updated: 07/14/23 1311    Narrative:      CT ANGIOGRAM OF THE ABDOMEN AND PELVIS     HISTORY: GI bleed     TECHNIQUE: Radiation dose reduction techniques were utilized, including  automated exposure control and exposure modulation based on body size.   CT angiogram of the abdomen and pelvis was performed following the  administration of IV contrast. Multiple coronal, sagittal, and 3-D  reconstruction images were obtained. Precontrast and venous phase  imaging performed.     COMPARISON: 07/12/2023, 07/08/2023     FINDINGS:      CTA:  There is no evidence of active contrast extravasation into the GI tract  to suggest active GI bleeding at this time. There is some high  attenuation material within the dependent portion of the stomach that  was present on the noncontrast portion of the study and is unchanged  following the administration of contrast. This is consistent with  ingested material. The celiac artery, SMA, and inferior mesenteric  artery are all widely patent. The renal arteries are widely patent. The  abdominal aorta is nonaneurysmal. The iliac arteries are widely patent  and nonaneurysmal.     CT ABDOMEN/PELVIS:  The lung bases are clear. The liver, gallbladder, and spleen are  unremarkable. The kidneys, adrenal glands, and pancreas are  unremarkable. Previously demonstrated thickening of the duodenum and  proximal jejunum has resolved. The bladder is unremarkable. The colon is  unremarkable. Previous appendectomy. No free fluid in the pelvis. No  acute bony abnormality.       Impression:      1. No  evidence of active contrast extravasation into the GI tract to  suggest active GI bleeding at this time  2. Previously demonstrated thickening of the duodenum and proximal  jejunum has resolved  3. Appendectomy        Radiation dose reduction techniques were utilized, including automated  exposure control and exposure modulation based on body size.     This report was finalized on 7/14/2023 1:07 PM by Dr. Moise Walker M.D.                   I have reviewed the medications:  Scheduled Meds:acetaminophen, 650 mg, Oral, Once   Or  acetaminophen, 650 mg, Oral, Once   Or  acetaminophen, 650 mg, Rectal, Once  famotidine, 20 mg, Oral, Once  folic acid, 1 mg, Oral, Daily  heparin, 0.3 mL, Other, Once in imaging  ondansetron, 4 mg, Intravenous, Once  piperacillin-tazobactam, 3.375 g, Intravenous, Q8H  polyethylene glycol, 0.5 bottle, Oral, BID  senna-docusate sodium, 2 tablet, Oral, BID  sodium chloride, 10 mL, Intravenous, Q12H  sodium chloride, 10 mL, Intravenous, Q12H  sodium chloride, 10 mL, Intravenous, Q12H  thiamine, 100 mg, Oral, Daily      Continuous Infusions:pantoprazole, 8 mg/hr, Last Rate: 8 mg/hr (07/15/23 0635)      PRN Meds:.  senna-docusate sodium **AND** polyethylene glycol **AND** bisacodyl **AND** bisacodyl    Morphine    ondansetron    [COMPLETED] Insert Peripheral IV **AND** sodium chloride    sodium chloride    sodium chloride    sodium chloride    sodium chloride    sodium chloride    Assessment & Plan   Assessment & Plan     Active Hospital Problems    Diagnosis  POA    **ABLA (acute blood loss anemia) [D62]  Yes    Metabolic acidosis [E87.20]  Yes    Enteritis of possible infectious origin [A09]  Yes    Sepsis [A41.9]  Yes    Blood per rectum [K62.5]  Unknown    Iron deficiency anemia [D50.9]  Yes    Current every day smoker [F17.200]  Yes    Gastrointestinal hemorrhage with melena [K92.1]  Yes    Acute blood loss anemia [D62]  Unknown      Resolved Hospital Problems   No resolved problems to  display.        Brief Hospital Course to date:  Radha Noriega V is a 43 y.o. male presents the hospital with sepsis and acute blood loss anemia with possible enteritis noted on imaging.    Discussion/plan for today:  Hemoglobin remains low this morning.  Transfuse 1 additional unit of blood and recheck hemoglobin.  Continue to trend and monitor for bleeding.      Case was discussed with gastroenterology.  They are planning upper and lower endoscopy today.  Plan to monitor on findings.      Blood pressure now improved.  Still some intermittent tachycardia likely related to anemia.    PICC line placed due to difficulty with IV access and multiple lost IVs.    Continue Zosyn for enteritis.  Leukocytosis has improved on antibiotic coverage.  Patient initially meeting sepsis criteria.  Possible gastrointestinal bacterial infection/enteritis but etiology somewhat unclear.    Patient seems to be responding to therapy.  Monitor closely while requiring IV pain medicine.    Tobacco cessation counseled.    Supportive care and symptomatic treatment.    Careful monitoring.  Low threshold to transfer to intensive care unit if patient has any further adjustment.    DVT Prophylaxis: Ambulatory, mechanical      Disposition: I expect the patient to be discharged home pending stability    CODE STATUS:   Code Status and Medical Interventions:   Ordered at: 07/12/23 1658     Code Status (Patient has no pulse and is not breathing):    CPR (Attempt to Resuscitate)     Medical Interventions (Patient has pulse or is breathing):    Full Support       Harshal Casillas MD  07/15/23

## 2023-07-15 NOTE — CONSULTS
[facility]   Consult Note    Patient Name: Radha Noriega V  : 1980  MRN: [id number]  Primary Care Physician:  Hellen Skelton APRN  Referring Physician: No ref. provider found  Date of admission: 2023    Inpatient General Surgery Consult  Consult performed by: Fortunato Narvaez Jr., MD  Consult ordered by: Ermelinda Dixon MD      Subjective   Subjective     Reason for Consult/ Chief Complaint: GI bleed    History of Present Illness  Radha Noriega V is a very pleasant 43 y.o. male has a history of an obscure GI bleed and was admitted on 2023 with melena.  He had a previous evaluation for a GI bleed that included an EGD on 2023 that showed esophagitis and gastritis but no source of bleeding, a colonoscopy on 2023 that showed benign polyps but no source of bleeding, a CT angiogram of the abdomen and pelvis on 2023 that was normal, and a small bowel enteroscopy on 2023 that was normal.  He had an EGD performed this morning that was normal.  He was then sent down for a bleeding scan and appears to have a small bowel source of bleeding.    Review of Systems   Constitutional:  Negative for fatigue and fever.   Respiratory:  Negative for chest tightness and shortness of breath.    Cardiovascular:  Negative for chest pain and palpitations.   Gastrointestinal:  Positive for blood in stool. Negative for abdominal pain, constipation, diarrhea, nausea and vomiting.      Personal History     Past Medical History:   Diagnosis Date    Anemia     ETOH abuse     Tachycardia 2023    Tobacco dependence        Past Surgical History:   Procedure Laterality Date    APPENDECTOMY      COLONOSCOPY N/A 2023    Procedure: COLONOSCOPY TO CECUM AND TERMINAL ILEUM WITH BIOPSIES AND COLD BIOPSY POLYPECTOMIES;  Surgeon: Imtiaz Lee MD;  Location: [facility] ENDOSCOPY;  Service: Gastroenterology;  Laterality: N/A;  PRE- MELENA  POST- COLON POLYPS, HEMORRHOIDS    ENDOSCOPY Left 2023     Procedure: ESOPHAGOGASTRODUODENOSCOPY;  Surgeon: Imtiaz Lee MD;  Location: [facility] ENDOSCOPY;  Service: Gastroenterology;  Laterality: Left;  small hiatal hernia, esophagitis    ENTEROSCOPY SMALL BOWEL N/A 7/9/2023    Procedure: ENTEROSCOPY SMALL BOWEL;  Surgeon: Isrrael Gibson MD;  Location: [facility] ENDOSCOPY;  Service: Gastroenterology;  Laterality: N/A;  PRE- GI BLEED  POST- NORMAL       Family History: family history includes Diabetes in his brother, father, maternal grandfather, maternal grandmother, paternal grandfather, paternal grandmother, and sister; Heart attack (age of onset: 73) in his maternal aunt; Hypertension in his sister. Otherwise pertinent FHx was reviewed and not pertinent to current issue.    Social History:  reports that he has been smoking cigarettes. He has a 20.00 pack-year smoking history. He has never used smokeless tobacco. He reports that he does not currently use alcohol. He reports current drug use. Drug: Marijuana.    Home Medications:   ferrous sulfate, folic acid, pantoprazole, polycarbophil, and thiamine    Allergies:  No Known Allergies    Objective    Objective     Vitals:  Temp:  [98.4 °F (36.9 °C)-99.4 °F (37.4 °C)] 98.9 °F (37.2 °C)  Heart Rate:  [] 128  Resp:  [14-18] 16  BP: (101-144)/(61-88) 137/82    Physical Exam  Constitutional:       Appearance: Normal appearance. He is well-developed. He is not toxic-appearing.   Eyes:      General: No scleral icterus.  Pulmonary:      Effort: Pulmonary effort is normal. No respiratory distress.   Abdominal:      Palpations: Abdomen is soft.      Tenderness: There is no abdominal tenderness.   Skin:     General: Skin is warm and dry.   Neurological:      Mental Status: He is alert and oriented to person, place, and time.   Psychiatric:         Behavior: Behavior normal.         Thought Content: Thought content normal.         Judgment: Judgment normal.       Result Review    Result Review:  I have personally reviewed the  results from the time of this admission to 7/15/2023 15:14 EDT and agree with these findings:  [x]  Laboratory list / accordion  []  Microbiology  [x]  Radiology  []  EKG/Telemetry   []  Cardiology/Vascular   []  Pathology  [x]  Old records  []  Other:      Assessment & Plan   Assessment / Plan     Brief Patient Summary with assessment and plan:  Radha Noriega V is a 43 y.o. male who has persistent GI bleeding that appears to be related to a small bowel source.    1.  GI bleed: The patient has a persistent GI bleed that appears to be from a small bowel source based on the GI bleeding scan performed today.  This does not allow adequate localization for surgical intervention as the bleeding is unlikely to be easy to find.  This was discussed with Dr. Dixon.  She will contact interventional radiology to see if an embolization can be performed to stop the bleeding.  If the embolization is not successful, he will likely need a combined procedure including an exploratory laparotomy along with an enteroscopy at which time I can assist the endoscopist to evaluate the entire small bowel until the bleeding source is found and a resection of that side can be performed.  This was discussed with the patient.      Fortunato Narvaez Jr., MD

## 2023-07-15 NOTE — POST-PROCEDURE NOTE
POST PROCEDURE NOTE    Procedure: Mesenteric angiogram    Pre-Procedure Diagnosis: Small bowel bleed, acute blood loss anemia    Post-procedure Diagnosis: same    Findings: Angiography of SMA demonstrated active bleeding from a distal small bowel branch in the LLQ. Superselective angiography confirmed the active extravasation from a distal small bowel branch vessel from the SMA. Technically successful coil embolization of the bleeding vessel. No further active extravasation demonstrated on post embolization angiography. Hemostasis of right common femoral artery achieved with Angioseal vascular closure device. Full report to follow.    Complications: No immediate    Blood loss: Minimal    Specimen Removed: None    Disposition:   Transfer back to inpatient room following recovery in PACU

## 2023-07-16 LAB
ABO GROUP BLD: NORMAL
ALBUMIN SERPL-MCNC: 2.1 G/DL (ref 3.5–5.2)
ALBUMIN/GLOB SERPL: 1.9 G/DL
ALP SERPL-CCNC: 33 U/L (ref 39–117)
ALT SERPL W P-5'-P-CCNC: 8 U/L (ref 1–41)
ANION GAP SERPL CALCULATED.3IONS-SCNC: 6.7 MMOL/L (ref 5–15)
AST SERPL-CCNC: 7 U/L (ref 1–40)
BH BB BLOOD EXPIRATION DATE: NORMAL
BH BB BLOOD TYPE BARCODE: 7300
BH BB DISPENSE STATUS: NORMAL
BH BB PRODUCT CODE: NORMAL
BH BB UNIT NUMBER: NORMAL
BILIRUB SERPL-MCNC: <0.2 MG/DL (ref 0–1.2)
BLD GP AB SCN SERPL QL: NEGATIVE
BUN SERPL-MCNC: 7 MG/DL (ref 6–20)
BUN/CREAT SERPL: 5.6 (ref 7–25)
CALCIUM SPEC-SCNC: 6.9 MG/DL (ref 8.6–10.5)
CHLORIDE SERPL-SCNC: 110 MMOL/L (ref 98–107)
CO2 SERPL-SCNC: 19.3 MMOL/L (ref 22–29)
CREAT SERPL-MCNC: 1.26 MG/DL (ref 0.76–1.27)
CROSSMATCH INTERPRETATION: NORMAL
DEPRECATED RDW RBC AUTO: 46.3 FL (ref 37–54)
EGFRCR SERPLBLD CKD-EPI 2021: 73 ML/MIN/1.73
ERYTHROCYTE [DISTWIDTH] IN BLOOD BY AUTOMATED COUNT: 14.9 % (ref 12.3–15.4)
FOLATE BLD-MCNC: 338 NG/ML
FOLATE RBC-MCNC: 2113 NG/ML
GLOBULIN UR ELPH-MCNC: 1.1 GM/DL
GLUCOSE SERPL-MCNC: 127 MG/DL (ref 65–99)
HCT VFR BLD AUTO: 16 % (ref 37.5–51)
HCT VFR BLD AUTO: 18.8 % (ref 37.5–51)
HCT VFR BLD AUTO: 19.7 % (ref 37.5–51)
HCT VFR BLD AUTO: 20.5 % (ref 37.5–51)
HGB BLD-MCNC: 6.2 G/DL (ref 13–17.7)
HGB BLD-MCNC: 6.7 G/DL (ref 13–17.7)
HGB BLD-MCNC: 7.1 G/DL (ref 13–17.7)
MCH RBC QN AUTO: 29.8 PG (ref 26.6–33)
MCHC RBC AUTO-ENTMCNC: 34 G/DL (ref 31.5–35.7)
MCV RBC AUTO: 87.6 FL (ref 79–97)
PLATELET # BLD AUTO: 130 10*3/MM3 (ref 140–450)
PMV BLD AUTO: 10.4 FL (ref 6–12)
POTASSIUM SERPL-SCNC: 3.8 MMOL/L (ref 3.5–5.2)
PROT SERPL-MCNC: 3.2 G/DL (ref 6–8.5)
RBC # BLD AUTO: 2.25 10*6/MM3 (ref 4.14–5.8)
RH BLD: POSITIVE
SODIUM SERPL-SCNC: 136 MMOL/L (ref 136–145)
T&S EXPIRATION DATE: NORMAL
UNIT  ABO: NORMAL
UNIT  RH: NORMAL
WBC NRBC COR # BLD: 17.39 10*3/MM3 (ref 3.4–10.8)

## 2023-07-16 PROCEDURE — 86901 BLOOD TYPING SEROLOGIC RH(D): CPT

## 2023-07-16 PROCEDURE — 85027 COMPLETE CBC AUTOMATED: CPT | Performed by: INTERNAL MEDICINE

## 2023-07-16 PROCEDURE — 99231 SBSQ HOSP IP/OBS SF/LOW 25: CPT | Performed by: INTERNAL MEDICINE

## 2023-07-16 PROCEDURE — 25010000002 MORPHINE PER 10 MG: Performed by: INTERNAL MEDICINE

## 2023-07-16 PROCEDURE — 86922 COMPATIBILITY TEST ANTIGLOB: CPT

## 2023-07-16 PROCEDURE — 36430 TRANSFUSION BLD/BLD COMPNT: CPT

## 2023-07-16 PROCEDURE — 85018 HEMOGLOBIN: CPT | Performed by: INTERNAL MEDICINE

## 2023-07-16 PROCEDURE — 86900 BLOOD TYPING SEROLOGIC ABO: CPT | Performed by: INTERNAL MEDICINE

## 2023-07-16 PROCEDURE — 86920 COMPATIBILITY TEST SPIN: CPT

## 2023-07-16 PROCEDURE — 99231 SBSQ HOSP IP/OBS SF/LOW 25: CPT | Performed by: SURGERY

## 2023-07-16 PROCEDURE — P9016 RBC LEUKOCYTES REDUCED: HCPCS

## 2023-07-16 PROCEDURE — 86901 BLOOD TYPING SEROLOGIC RH(D): CPT | Performed by: INTERNAL MEDICINE

## 2023-07-16 PROCEDURE — 86900 BLOOD TYPING SEROLOGIC ABO: CPT

## 2023-07-16 PROCEDURE — 85014 HEMATOCRIT: CPT | Performed by: INTERNAL MEDICINE

## 2023-07-16 PROCEDURE — 80053 COMPREHEN METABOLIC PANEL: CPT | Performed by: INTERNAL MEDICINE

## 2023-07-16 PROCEDURE — 86850 RBC ANTIBODY SCREEN: CPT | Performed by: INTERNAL MEDICINE

## 2023-07-16 PROCEDURE — 25010000002 PIPERACILLIN SOD-TAZOBACTAM PER 1 G: Performed by: NURSE PRACTITIONER

## 2023-07-16 PROCEDURE — 25010000002 PIPERACILLIN SOD-TAZOBACTAM PER 1 G: Performed by: INTERNAL MEDICINE

## 2023-07-16 RX ORDER — ACETAMINOPHEN 325 MG/1
650 TABLET ORAL ONCE
Status: COMPLETED | OUTPATIENT
Start: 2023-07-16 | End: 2023-07-16

## 2023-07-16 RX ORDER — FAMOTIDINE 20 MG/1
20 TABLET, FILM COATED ORAL ONCE
Status: COMPLETED | OUTPATIENT
Start: 2023-07-16 | End: 2023-07-16

## 2023-07-16 RX ORDER — L.ACID,PARA/B.BIFIDUM/S.THERM 8B CELL
1 CAPSULE ORAL 2 TIMES DAILY
Status: DISCONTINUED | OUTPATIENT
Start: 2023-07-16 | End: 2023-07-21 | Stop reason: HOSPADM

## 2023-07-16 RX ADMIN — Medication 10 ML: at 08:06

## 2023-07-16 RX ADMIN — FOLIC ACID 1 MG: 1 TABLET ORAL at 08:07

## 2023-07-16 RX ADMIN — PANTOPRAZOLE SODIUM 8 MG/HR: 40 INJECTION, POWDER, FOR SOLUTION INTRAVENOUS at 02:32

## 2023-07-16 RX ADMIN — PANTOPRAZOLE SODIUM 8 MG/HR: 40 INJECTION, POWDER, FOR SOLUTION INTRAVENOUS at 11:43

## 2023-07-16 RX ADMIN — PANTOPRAZOLE SODIUM 8 MG/HR: 40 INJECTION, POWDER, FOR SOLUTION INTRAVENOUS at 16:35

## 2023-07-16 RX ADMIN — Medication 100 MG: at 08:07

## 2023-07-16 RX ADMIN — MORPHINE SULFATE 2 MG: 2 INJECTION, SOLUTION INTRAMUSCULAR; INTRAVENOUS at 01:17

## 2023-07-16 RX ADMIN — Medication 10 ML: at 21:03

## 2023-07-16 RX ADMIN — PANTOPRAZOLE SODIUM 8 MG/HR: 40 INJECTION, POWDER, FOR SOLUTION INTRAVENOUS at 22:19

## 2023-07-16 RX ADMIN — PANTOPRAZOLE SODIUM 8 MG/HR: 40 INJECTION, POWDER, FOR SOLUTION INTRAVENOUS at 07:25

## 2023-07-16 RX ADMIN — Medication 10 ML: at 08:07

## 2023-07-16 RX ADMIN — PIPERACILLIN SODIUM AND TAZOBACTAM SODIUM 3.38 G: 3; .375 INJECTION, SOLUTION INTRAVENOUS at 01:12

## 2023-07-16 RX ADMIN — Medication 1 CAPSULE: at 14:07

## 2023-07-16 RX ADMIN — PIPERACILLIN SODIUM AND TAZOBACTAM SODIUM 3.38 G: 3; .375 INJECTION, SOLUTION INTRAVENOUS at 05:03

## 2023-07-16 RX ADMIN — ACETAMINOPHEN 650 MG: 325 TABLET, FILM COATED ORAL at 17:35

## 2023-07-16 RX ADMIN — PIPERACILLIN SODIUM AND TAZOBACTAM SODIUM 3.38 G: 3; .375 INJECTION, SOLUTION INTRAVENOUS at 14:07

## 2023-07-16 RX ADMIN — FAMOTIDINE 20 MG: 20 TABLET, FILM COATED ORAL at 17:35

## 2023-07-16 RX ADMIN — Medication 10 ML: at 21:05

## 2023-07-16 RX ADMIN — Medication 1 CAPSULE: at 21:03

## 2023-07-16 RX ADMIN — PIPERACILLIN SODIUM AND TAZOBACTAM SODIUM 3.38 G: 3; .375 INJECTION, SOLUTION INTRAVENOUS at 21:03

## 2023-07-16 NOTE — PLAN OF CARE
Goal Outcome Evaluation:  Plan of Care Reviewed With: patient        Progress: improving  Outcome Evaluation: Patient is currently resting in bed, calm, appropriate, and cooperative. Patient returned back from IR with successful embolization of the mesenteric artery x3 coils. Upon arrival to the unit recheck of Hgb was 4.9. Blood product administered. Hgb now 6.7. Patient currently receiving 2nd back of PRBC's. H&H recheck is due at 0800. Patient VSS. One episode of emesis. PRN Zofran given. PICC line CDI. CLABSI prevention in place. Protonix drip infusing. Zofran order modified to infuse over 30 minutes instead of 4 hours due to lack of PIV access. Tolerated clear diet, advanced to regular, but with episode of emesis placed back on clear liquids for comfort. Patient continues to have asymptomatic tachycardia. Will continue to monitor for remainder of shift.

## 2023-07-16 NOTE — PROGRESS NOTES
[facility] Medicine Services  PROGRESS NOTE    Patient Name: Radha Noriega V  : 1980  MRN: [id number]    Date of Admission: 2023  Primary Care Physician: Hellen Skelton APRN    Subjective   Subjective     CC:  Follow-up GI bleed    Subjective  Patient feels he is doing well today.  He is very pleased they were able to coil his bleed in interventional radiology.  No further new blood in stools.  Just some residual old blood.      Review of Systems  No current fevers or chills  No current shortness of breath or cough  No current chest pain or palpitations      Objective   Objective     Vital Signs:   Temp:  [97.4 °F (36.3 °C)-98.8 °F (37.1 °C)] 98.5 °F (36.9 °C)  Heart Rate:  [] 85  Resp:  [11-20] 18  BP: (111-160)/(59-93) 126/65        Physical Exam:  Constitutional:Awake, alert  HENT: NCAT, mucous membranes moist, neck supple  Respiratory: No cough or wheezing, respiratory effort normal, nonlabored breathing   Cardiovascular: Pulse rate is normal, normal radial pulses  Gastrointestinal:  soft, nontender, nondistended  Musculoskeletal: Normal musculature for age, no lower extremity edema, BMI 26  Psychiatric: Appropriate affect, cooperative, conversational  Neurologic: No slurred speech or facial droop, follows commands  Skin: No rashes or jaundice, warm      Results Reviewed:  Results from last 7 days   Lab Units 23  0814 23  0111 07/15/23  2050 07/15/23  1603 23  1854 23  0612   WBC 10*3/mm3  --  17.39* 15.20* 13.20*   < >  --    HEMOGLOBIN g/dL 7.1* 6.7* 4.9* 5.9*   < >  --    HEMATOCRIT % 20.5* 19.7* 14.9* 17.5*   < >  --    PLATELETS 10*3/mm3  --  130* 145 132*   < >  --    INR   --   --   --   --   --  1.07   PROCALCITONIN ng/mL  --   --   --   --   --  0.15    < > = values in this interval not displayed.       Results from last 7 days   Lab Units 23  0111 07/15/23  1603 23  0608 23  1146   SODIUM mmol/L 136 135* 133* 138   POTASSIUM mmol/L  3.8 3.6 3.8 4.3   CHLORIDE mmol/L 110* 110* 108* 109*   CO2 mmol/L 19.3* 19.0* 18.9* 17.9*   BUN mg/dL 7 9 19 28*   CREATININE mg/dL 1.26 1.30* 1.15 1.32*   GLUCOSE mg/dL 127* 105* 98 105*   CALCIUM mg/dL 6.9* 6.8* 7.5* 8.3*   ALT (SGPT) U/L 8 5  --  11   AST (SGOT) U/L 7 9  --  9       Estimated Creatinine Clearance: 84.3 mL/min (by C-G formula based on SCr of 1.26 mg/dL).    Microbiology Results Abnormal       Procedure Component Value - Date/Time    Blood Culture - Blood, Hand, Left [[id number]]  (Normal) Collected: 07/13/23 2204    Lab Status: Preliminary result Specimen: Blood from Hand, Left Updated: 07/15/23 2231     Blood Culture No growth at 2 days    Narrative:      Less than seven (7) mL's of blood was collected.  Insufficient quantity may yield false negative results.    Blood Culture - Blood, Arm, Right [[id number]]  (Normal) Collected: 07/13/23 2157    Lab Status: Preliminary result Specimen: Blood from Arm, Right Updated: 07/15/23 2231     Blood Culture No growth at 2 days    Gastrointestinal Panel, PCR - Stool, Per Rectum [[id number]]  (Normal) Collected: 07/12/23 1555    Lab Status: Final result Specimen: Stool from Per Rectum Updated: 07/12/23 1722     Campylobacter Not Detected     Plesiomonas shigelloides Not Detected     Salmonella Not Detected     Vibrio Not Detected     Vibrio cholerae Not Detected     Yersinia enterocolitica Not Detected     Enteroaggregative E. coli (EAEC) Not Detected     Enteropathogenic E. coli (EPEC) Not Detected     Enterotoxigenic E. coli (ETEC) lt/st Not Detected     Shiga-like toxin-producing E. coli (STEC) stx1/stx2 Not Detected     Shigella/Enteroinvasive E. coli (EIEC) Not Detected     Cryptosporidium Not Detected     Cyclospora cayetanensis Not Detected     Entamoeba histolytica Not Detected     Giardia lamblia Not Detected     Adenovirus F40/41 Not Detected     Astrovirus Not Detected     Norovirus GI/GII Not Detected     Rotavirus A Not Detected     Sapovirus (I,  II, IV or V) Not Detected    Narrative:      If Aeromonas, Staphylococcus aureus or Bacillus cereus are suspected, please order [id number]: Stool Culture, Aeromonas, S aureus, B Cereus.            Imaging Results (Last 24 Hours)       Procedure Component Value Units Date/Time    IR Embolization [[id number]] Collected: 07/15/23 2028     Updated: 07/15/23 2039    Narrative:      PROCEDURE:  Superior mesenteric angiogram, embolization of bleeding distal small  bowel branch artery.     INDICATION:  Acute blood loss anemia. Patient with maroon stools and requiring  frequent transfusions. Patient has had prior upper endoscopy and  colonoscopy without demonstrating source of bleeding. Tagged red blood  cell scan performed earlier today localized active bleeding to the small  bowel.        Reference air kerma: 985.21 mGy  Contrast: Approximately 129 mL of Isovue     Moderate sedation was provided under my direct supervision using 1 mg IV  Versed and 100 mcg IV Fentanyl. The patient was independently monitored  by a trained Department of Radiology RN using automated blood pressure,  EKG, and pulse oximetry. My total intra-service time was 105 minutes.      The risks, benefits, and alternatives of the procedure were discussed  with the patient, and informed consent was obtained. In the procedure  room a timeout was performed confirming correct patient and procedure.     Maximum sterile barrier technique was used including sterile cap, mask,  gloves, gown and large sterile sheet as well as pre-procedure hand  washing and cutaneous antisepsis with 2 % chlorhexidine.      TECHNIQUE/FINDINGS:  The right common femoral artery was accessed under ultrasound guidance  with a 4 [place] micropuncture introducer set. This was upsized to a 5  [place]ian working sheath. A 5 [place] C2 catheter was then advanced through  the sheath and utilized to select the superior mesenteric artery.  Selective angiography of the superior mesenteric artery was  then  performed. An area of active contrast extravasation was demonstrated in  the left lower quadrant arising from one of the branch arteries, likely  an ileal artery. Next, a coaxial fashion, a 2.8 Setswana Progreat  microcatheter was advanced into the branch artery supplying the area of  extravasation. Selective angiography was performed. This confirmed  extravasation of contrast into the small bowel. The microcatheter was  then superselectively advanced as distal as possible into the branch  artery that demonstrated active extravasation, and coil embolization of  this vessel was then performed. Two 2 mm Azur CX microcoils were  deployed followed by a single 2 mm Azur microcoil. Postembolization  angiography was then performed using multiple obliquities. Initially,  there appeared to be some persistent active extravasation into the small  bowel following embolization. Angiography of multiple adjacent branch  vessels was then performed, however, no additional actively bleeding  vessels were clearly identified. Final selective postembolization  angiogram of the ileal branch artery proximal to level embolization was  performed. This demonstrated no further active contrast extravasation.  The microcatheter was removed. Final postembolization angiogram of the  superior mesenteric artery was then performed. This again demonstrated  no evidence of any active contrast extravasation at the location of the  embolization coils and no additional areas of active contrast  extravasation. The C2 catheter was then removed. Angiography of the  right common femoral artery was performed. The sheath was exchanged for  an Angio-Seal vascular closure device and hemostasis was achieved. The  patient tolerated procedure well without immediate complication.          Impression:      Superior mesenteric angiogram with embolization as described. There was  an area of active contrast extravasation into the small bowel of the  left lower  quadrant arising from one of the ileal branch arteries of the  SMA. This was superselectively catheterized and coil embolization of the  actively bleeding branch vessel was performed as described above.  Technically successful result with no further active extravasation  demonstrated upon completion of the procedure.     This report was finalized on 7/15/2023 8:36 PM by Dr. Moise Walker M.D.       NM GI Blood Loss [[id number]] Collected: 07/15/23 1414     Updated: 07/15/23 1422    Narrative:      NM GI BLOOD LOSS-     INDICATIONS: Gastrointestinal bleeding     TECHNIQUE: Bleeding scan     COMPARISON: Correlated with CT from 07/14/2023     FINDINGS:     Radiotracer: 36.0 mCi technetium 99m radiolabeled red blood cells,  intravenous. Projections of the abdomen and pelvis were obtained at 2  seconds interval is for 2 minutes, then 1 minute intervals for 90  minutes. Cine sequence was created.     Intestinal bleeding was observed during the exam, localized to the mid  small bowel (proximal to mid ileum).        Otherwise, expected soft tissue localization of radiotracer is noted.             Impression:         Intestinal bleeding was observed during the exam, localized to the mid  small bowel.     Discussed by telephone with patient's nurse, Brigette, at time of  interpretation, 1414, 07/15/2023.        This report was finalized on 7/15/2023 2:19 PM by Dr. Brice Velasco M.D.                   I have reviewed the medications:  Scheduled Meds:acetaminophen, 650 mg, Oral, Once   Or  acetaminophen, 650 mg, Oral, Once   Or  acetaminophen, 650 mg, Rectal, Once  acetaminophen, 650 mg, Oral, Once   Or  acetaminophen, 650 mg, Oral, Once   Or  acetaminophen, 650 mg, Rectal, Once  famotidine, 20 mg, Oral, Once  folic acid, 1 mg, Oral, Daily  heparin, 0.3 mL, Other, Once in imaging  ondansetron, 4 mg, Intravenous, Once  piperacillin-tazobactam, 3.375 g, Intravenous, Q8H  senna-docusate sodium, 2 tablet, Oral, BID  sodium  chloride, 10 mL, Intravenous, Q12H  sodium chloride, 10 mL, Intravenous, Q12H  sodium chloride, 10 mL, Intravenous, Q12H  thiamine, 100 mg, Oral, Daily      Continuous Infusions:pantoprazole, 8 mg/hr, Last Rate: 8 mg/hr (07/16/23 1143)      PRN Meds:.  senna-docusate sodium **AND** polyethylene glycol **AND** bisacodyl **AND** bisacodyl    Morphine    ondansetron    [COMPLETED] Insert Peripheral IV **AND** sodium chloride    sodium chloride    sodium chloride    sodium chloride    sodium chloride    sodium chloride    Assessment & Plan   Assessment & Plan     Active Hospital Problems    Diagnosis  POA    **ABLA (acute blood loss anemia) [D62]  Yes    Metabolic acidosis [E87.20]  Yes    Enteritis of possible infectious origin [A09]  Yes    Sepsis [A41.9]  Yes    Blood per rectum [K62.5]  Unknown    Iron deficiency anemia [D50.9]  Yes    Current every day smoker [F17.200]  Yes    Gastrointestinal hemorrhage with melena [K92.1]  Yes    Acute blood loss anemia [D62]  Unknown      Resolved Hospital Problems   No resolved problems to display.        Brief Hospital Course to date:  Radha Noriega V is a 43 y.o. male presents the hospital with sepsis and acute blood loss anemia with possible enteritis noted on imaging.  Ultimately tagged red blood scan was able to determine small bowel bleeding localized to the mid small bowel and patient went under interventional radiology coiling of a small branch of the SMA.    Discussion/plan for today:  Patient still anemic this morning.  Blood transfusion given.  Continue to trend hemoglobin.  Case discussed with gastroenterology we will continue to monitor.  Hemoglobin remains low this morning.  Transfuse 1 additional unit of blood and recheck hemoglobin.  Continue to trend and monitor for bleeding.      Blood pressure now improved.  Tachycardia related to anemia/bleeding resolving.    PICC line placed due to difficulty with IV access and multiple lost IVs.    Treated with Zosyn for  enteritis.   Patient initially meeting sepsis criteria.  Possible gastrointestinal bacterial infection/enteritis but etiology somewhat unclear.  Probiotics added.    Patient seems to be responding to therapy.  Monitor closely while requiring IV pain medicine.    Tobacco cessation counseled.    Supportive care and symptomatic treatment.    Careful monitoring.      DVT Prophylaxis: Ambulatory, mechanical      Disposition: I expect the patient to be discharged home pending stability    CODE STATUS:   Code Status and Medical Interventions:   Ordered at: 07/12/23 4477     Code Status (Patient has no pulse and is not breathing):    CPR (Attempt to Resuscitate)     Medical Interventions (Patient has pulse or is breathing):    Full Support       Harshal Casillas MD  07/16/23

## 2023-07-16 NOTE — PROGRESS NOTES
Chief Complaint:    GI bleed    Subjective:    The patient had successful embolization of the small bowel bleeder yesterday.  Today he feels much better.  He is able to stand without being dizzy or lightheaded.  He did have a dark bowel movement earlier this morning but the frequency of bowel movements has significantly decreased.    Objective:    Temp:  [97.4 °F (36.3 °C)-98.8 °F (37.1 °C)] 98.5 °F (36.9 °C)  Heart Rate:  [] 85  Resp:  [11-20] 18  BP: (111-160)/(59-93) 126/65    Physical Exam  Constitutional:       Appearance: He is not ill-appearing or toxic-appearing.   Neurological:      Mental Status: He is alert.   Psychiatric:         Behavior: Behavior is cooperative.       Results:    H/H is 7.1/20.5.    Assessment/Plan:    The patient had a lower GI bleed with no clinical evidence of bleeding at this time.  He will likely need another transfusion of packed red blood cells or 2 as he equilibrates after resolution of the GI bleed.  There is no indication for surgical management at this time.  I will sign off but remain available if needed.    Fortunato Narvaez Jr., M.D.

## 2023-07-16 NOTE — PROGRESS NOTES
[facility]  Inpatient Progress Note    Reason for Follow Up: Anemia    Subjective     Interval History:   Hemoglobin 7.1 hematocrit 20.5.  Appears stable post interventional radiology embolization of the small bowel source of bleeding.  Patient reports some residual blood in stools but no evidence of active bleeding.    Current Facility-Administered Medications:     acetaminophen (TYLENOL) tablet 650 mg, 650 mg, Oral, Once **OR** acetaminophen (TYLENOL) 160 MG/5ML solution 650 mg, 650 mg, Oral, Once **OR** acetaminophen (TYLENOL) suppository 650 mg, 650 mg, Rectal, Once, Ermelinda Dixon MD    acetaminophen (TYLENOL) tablet 650 mg, 650 mg, Oral, Once **OR** acetaminophen (TYLENOL) 160 MG/5ML solution 650 mg, 650 mg, Oral, Once **OR** acetaminophen (TYLENOL) suppository 650 mg, 650 mg, Rectal, Once, Harshal Casillas MD    sennosides-docusate (PERICOLACE) 8.6-50 MG per tablet 2 tablet, 2 tablet, Oral, BID, 2 tablet at 07/15/23 2234 **AND** polyethylene glycol (MIRALAX) packet 17 g, 17 g, Oral, Daily PRN **AND** bisacodyl (DULCOLAX) EC tablet 5 mg, 5 mg, Oral, Daily PRN **AND** bisacodyl (DULCOLAX) suppository 10 mg, 10 mg, Rectal, Daily PRN, Ermelinda Dixon MD    famotidine (PEPCID) tablet 20 mg, 20 mg, Oral, Once, Ermelinda Dixon MD    folic acid (FOLVITE) tablet 1 mg, 1 mg, Oral, Daily, Ermelinda Dixon MD, 1 mg at 07/16/23 0807    heparin injection 30 Units, 0.3 mL, Other, Once in imaging, Ermelinda Dixon MD    lactobacillus acidophilus (RISAQUAD) capsule 1 capsule, 1 capsule, Oral, BID, Harshal Casillas MD    morphine injection 2 mg, 2 mg, Intravenous, Q4H PRN, Ermelinda Dixon MD, 2 mg at 07/16/23 0117    ondansetron (ZOFRAN) injection 4 mg, 4 mg, Intravenous, Once, Ermelinda Dixon MD    ondansetron (ZOFRAN) injection 4 mg, 4 mg, Intravenous, Q6H PRN, Ermelinda Dixon MD, 4 mg at 07/15/23 9707    pantoprazole (PROTONIX) 40 mg in 100 mL NS (VTB), 8 mg/hr, Intravenous,  Continuous, Ermelinda Dixon MD, Last Rate: 20 mL/hr at 07/16/23 1143, 8 mg/hr at 07/16/23 1143    piperacillin-tazobactam (ZOSYN) 3.375 g in iso-osmotic dextrose 50 ml (premix), 3.375 g, Intravenous, Q8H, Tonja Shima Okeefe, APRN, 3.375 g at 07/16/23 0503    [COMPLETED] Insert Peripheral IV, , , Once **AND** sodium chloride 0.9 % flush 10 mL, 10 mL, Intravenous, PRNZack Sarah A, MD    sodium chloride 0.9 % flush 10 mL, 10 mL, Intravenous, Q12H, Ermelinda Dixon MD, 10 mL at 07/16/23 0806    sodium chloride 0.9 % flush 10 mL, 10 mL, Intravenous, PRNZack Sarah A, MD    sodium chloride 0.9 % flush 10 mL, 10 mL, Intravenous, Q12H, Ermelinda Dixon MD, 10 mL at 07/16/23 0807    sodium chloride 0.9 % flush 10 mL, 10 mL, Intravenous, Q12H, Ermelinda Dixon MD, 10 mL at 07/16/23 0806    sodium chloride 0.9 % flush 10 mL, 10 mL, Intravenous, PRZack SANDRA Sarah A, MD    sodium chloride 0.9 % flush 20 mL, 20 mL, Intravenous, PRZack SANDRA Sarah A, MD    sodium chloride 0.9 % infusion 40 mL, 40 mL, Intravenous, PRZack SANDRA Sarah A, MD    sodium chloride 0.9 % infusion 40 mL, 40 mL, Intravenous, PRZack SANDRA Sarah A, MD    thiamine (VITAMIN B-1) tablet 100 mg, 100 mg, Oral, Daily, Ermelinda Dixon MD, 100 mg at 07/16/23 0807  Review of Systems:    All systems were reviewed and negative except for:  Gastrointestinal: positive for  See HPI    Objective     Vital Signs  Temp:  [97.4 °F (36.3 °C)-98.8 °F (37.1 °C)] 98.5 °F (36.9 °C)  Heart Rate:  [] 85  Resp:  [11-20] 18  BP: (111-160)/(59-93) 126/65  Body mass index is 26.15 kg/m².    Intake/Output Summary (Last 24 hours) at 7/16/2023 1227  Last data filed at 7/16/2023 1200  Gross per 24 hour   Intake 1177.5 ml   Output 200 ml   Net 977.5 ml     I/O this shift:  In: -   Out: 200 [Urine:200]     Physical Exam:   General: patient awake, alert and cooperative   Eyes: Normal lids and lashes, no scleral icterus   Neck: supple, normal ROM   Skin: warm and dry, not  jaundiced   Cardiovascular: regular rhythm and rate, no murmurs auscultated   Pulm: clear to auscultation bilaterally, regular and unlabored   Abdomen: soft, nontender, nondistended; normal bowel sounds   Extremities: no rash or edema   Psychiatric: Normal mood and behavior; memory intact     Results Review:     I reviewed the patient's new clinical results.    Results from last 7 days   Lab Units 07/16/23  0814 07/16/23  0111 07/15/23  2050 07/15/23  1603   WBC 10*3/mm3  --  17.39* 15.20* 13.20*   HEMOGLOBIN g/dL 7.1* 6.7* 4.9* 5.9*   HEMATOCRIT % 20.5* 19.7* 14.9* 17.5*   PLATELETS 10*3/mm3  --  130* 145 132*     Results from last 7 days   Lab Units 07/16/23  0111 07/15/23  1603 07/14/23  0608 07/13/23  1146   SODIUM mmol/L 136 135* 133* 138   POTASSIUM mmol/L 3.8 3.6 3.8 4.3   CHLORIDE mmol/L 110* 110* 108* 109*   CO2 mmol/L 19.3* 19.0* 18.9* 17.9*   BUN mg/dL 7 9 19 28*   CREATININE mg/dL 1.26 1.30* 1.15 1.32*   CALCIUM mg/dL 6.9* 6.8* 7.5* 8.3*   BILIRUBIN mg/dL <0.2 0.2  --  0.3   ALK PHOS U/L 33* 32*  --  50   ALT (SGPT) U/L 8 5  --  11   AST (SGOT) U/L 7 9  --  9   GLUCOSE mg/dL 127* 105* 98 105*     Results from last 7 days   Lab Units 07/12/23  0612   INR  1.07     Lab Results   Lab Value Date/Time    LIPASE 11 (L) 07/12/2023 0612    LIPASE 26 06/23/2023 1201       Radiology:  IR Embolization   Final Result   Superior mesenteric angiogram with embolization as described. There was   an area of active contrast extravasation into the small bowel of the   left lower quadrant arising from one of the ileal branch arteries of the   SMA. This was superselectively catheterized and coil embolization of the   actively bleeding branch vessel was performed as described above.   Technically successful result with no further active extravasation   demonstrated upon completion of the procedure.       This report was finalized on 7/15/2023 8:36 PM by Dr. Moise Walker M.D.          NM GI Blood Loss   Final Result        Intestinal bleeding was observed during the exam, localized to the mid   small bowel.       Discussed by telephone with patient's nurse, Brigette, at time of   interpretation, 1414, 07/15/2023.           This report was finalized on 7/15/2023 2:19 PM by Dr. Brice Velasco M.D.          CT Angiogram Abdomen Pelvis   Final Result   1. No evidence of active contrast extravasation into the GI tract to   suggest active GI bleeding at this time   2. Previously demonstrated thickening of the duodenum and proximal   jejunum has resolved   3. Appendectomy           Radiation dose reduction techniques were utilized, including automated   exposure control and exposure modulation based on body size.       This report was finalized on 7/14/2023 1:07 PM by Dr. Moise Walker M.D.          NM GI Blood Loss   Final Result       Unsuccessful bleeding scan evaluation, as described. Follow-up/further   evaluation can be obtained as indications persist.           This report was finalized on 7/13/2023 7:19 PM by Dr. Brice Velasco M.D.          CT Abdomen Pelvis With Contrast   Final Result   Mild gastritis is suspected. Diffuse thickening involving   the duodenum and the proximal jejunal loops favored to be secondary to   peristalsis.       Radiation dose reduction techniques were utilized, including automated   exposure control and exposure modulation based on body size.       This report was finalized on 7/13/2023 1:35 PM by Dr. Len Herndon M.D.          XR Chest 1 View   Final Result   No acute findings       This report was finalized on 7/12/2023 7:30 AM by Dr. Moise Walker M.D.              Assessment & Plan     Active Hospital Problems    Diagnosis     **ABLA (acute blood loss anemia)     Metabolic acidosis     Enteritis of possible infectious origin     Sepsis     Blood per rectum     Iron deficiency anemia     Current every day smoker     Gastrointestinal hemorrhage with melena     Acute blood loss  anemia        Assessment:  Blood loss anemia: Tagged RBC scan revealed small bowel source in the ileum, status post interventional radiology treatment with coiling and eventual hemostasis.      Plan:  Continue to monitor H&H, will defer transfusion to hospital service.  I discussed the patients findings and my recommendations with patient.    Mickey Miller MD

## 2023-07-17 LAB
ALBUMIN SERPL-MCNC: 1.8 G/DL (ref 3.5–5.2)
ALBUMIN/GLOB SERPL: 1.1 G/DL
ALP SERPL-CCNC: 44 U/L (ref 39–117)
ALT SERPL W P-5'-P-CCNC: 15 U/L (ref 1–41)
ANION GAP SERPL CALCULATED.3IONS-SCNC: 4.2 MMOL/L (ref 5–15)
AST SERPL-CCNC: 17 U/L (ref 1–40)
BH BB BLOOD EXPIRATION DATE: NORMAL
BH BB BLOOD EXPIRATION DATE: NORMAL
BH BB BLOOD TYPE BARCODE: 7300
BH BB BLOOD TYPE BARCODE: 7300
BH BB DISPENSE STATUS: NORMAL
BH BB DISPENSE STATUS: NORMAL
BH BB PRODUCT CODE: NORMAL
BH BB PRODUCT CODE: NORMAL
BH BB UNIT NUMBER: NORMAL
BH BB UNIT NUMBER: NORMAL
BILIRUB SERPL-MCNC: <0.2 MG/DL (ref 0–1.2)
BUN SERPL-MCNC: 3 MG/DL (ref 6–20)
BUN/CREAT SERPL: 2.4 (ref 7–25)
CALCIUM SPEC-SCNC: 7.3 MG/DL (ref 8.6–10.5)
CHLORIDE SERPL-SCNC: 112 MMOL/L (ref 98–107)
CO2 SERPL-SCNC: 22.8 MMOL/L (ref 22–29)
CREAT SERPL-MCNC: 1.23 MG/DL (ref 0.76–1.27)
CROSSMATCH INTERPRETATION: NORMAL
CROSSMATCH INTERPRETATION: NORMAL
DEPRECATED RDW RBC AUTO: 47 FL (ref 37–54)
EGFRCR SERPLBLD CKD-EPI 2021: 75.2 ML/MIN/1.73
ERYTHROCYTE [DISTWIDTH] IN BLOOD BY AUTOMATED COUNT: 15.6 % (ref 12.3–15.4)
GLOBULIN UR ELPH-MCNC: 1.7 GM/DL
GLUCOSE SERPL-MCNC: 90 MG/DL (ref 65–99)
HCT VFR BLD AUTO: 21.7 % (ref 37.5–51)
HCT VFR BLD AUTO: 22.1 % (ref 37.5–51)
HCT VFR BLD AUTO: 22.9 % (ref 37.5–51)
HCT VFR BLD AUTO: 24.7 % (ref 37.5–51)
HGB BLD-MCNC: 7.4 G/DL (ref 13–17.7)
HGB BLD-MCNC: 7.4 G/DL (ref 13–17.7)
HGB BLD-MCNC: 7.7 G/DL (ref 13–17.7)
HGB BLD-MCNC: 8 G/DL (ref 13–17.7)
MCH RBC QN AUTO: 29.4 PG (ref 26.6–33)
MCHC RBC AUTO-ENTMCNC: 34.1 G/DL (ref 31.5–35.7)
MCV RBC AUTO: 86.1 FL (ref 79–97)
PLATELET # BLD AUTO: 138 10*3/MM3 (ref 140–450)
PMV BLD AUTO: 10.5 FL (ref 6–12)
POTASSIUM SERPL-SCNC: 3.5 MMOL/L (ref 3.5–5.2)
PROT SERPL-MCNC: 3.5 G/DL (ref 6–8.5)
RBC # BLD AUTO: 2.52 10*6/MM3 (ref 4.14–5.8)
SODIUM SERPL-SCNC: 139 MMOL/L (ref 136–145)
UNIT  ABO: NORMAL
UNIT  ABO: NORMAL
UNIT  RH: NORMAL
UNIT  RH: NORMAL
WBC NRBC COR # BLD: 10.02 10*3/MM3 (ref 3.4–10.8)

## 2023-07-17 PROCEDURE — 99232 SBSQ HOSP IP/OBS MODERATE 35: CPT | Performed by: PHYSICIAN ASSISTANT

## 2023-07-17 PROCEDURE — 25010000002 PIPERACILLIN SOD-TAZOBACTAM PER 1 G: Performed by: NURSE PRACTITIONER

## 2023-07-17 PROCEDURE — 85014 HEMATOCRIT: CPT | Performed by: INTERNAL MEDICINE

## 2023-07-17 PROCEDURE — 80053 COMPREHEN METABOLIC PANEL: CPT | Performed by: INTERNAL MEDICINE

## 2023-07-17 PROCEDURE — 85027 COMPLETE CBC AUTOMATED: CPT | Performed by: INTERNAL MEDICINE

## 2023-07-17 PROCEDURE — 85018 HEMOGLOBIN: CPT | Performed by: INTERNAL MEDICINE

## 2023-07-17 RX ORDER — PANTOPRAZOLE SODIUM 40 MG/1
40 TABLET, DELAYED RELEASE ORAL
Status: DISCONTINUED | OUTPATIENT
Start: 2023-07-18 | End: 2023-07-21 | Stop reason: HOSPADM

## 2023-07-17 RX ADMIN — Medication 10 ML: at 08:01

## 2023-07-17 RX ADMIN — Medication 1 CAPSULE: at 21:18

## 2023-07-17 RX ADMIN — PIPERACILLIN SODIUM AND TAZOBACTAM SODIUM 3.38 G: 3; .375 INJECTION, SOLUTION INTRAVENOUS at 21:19

## 2023-07-17 RX ADMIN — PIPERACILLIN SODIUM AND TAZOBACTAM SODIUM 3.38 G: 3; .375 INJECTION, SOLUTION INTRAVENOUS at 13:26

## 2023-07-17 RX ADMIN — PANTOPRAZOLE SODIUM 8 MG/HR: 40 INJECTION, POWDER, FOR SOLUTION INTRAVENOUS at 10:06

## 2023-07-17 RX ADMIN — Medication 10 ML: at 21:19

## 2023-07-17 RX ADMIN — PANTOPRAZOLE SODIUM 8 MG/HR: 40 INJECTION, POWDER, FOR SOLUTION INTRAVENOUS at 03:36

## 2023-07-17 RX ADMIN — FOLIC ACID 1 MG: 1 TABLET ORAL at 08:00

## 2023-07-17 RX ADMIN — PIPERACILLIN SODIUM AND TAZOBACTAM SODIUM 3.38 G: 3; .375 INJECTION, SOLUTION INTRAVENOUS at 05:40

## 2023-07-17 RX ADMIN — Medication 10 ML: at 21:18

## 2023-07-17 RX ADMIN — Medication 100 MG: at 08:00

## 2023-07-17 RX ADMIN — Medication 1 CAPSULE: at 08:00

## 2023-07-17 NOTE — PROGRESS NOTES
[facility]  Inpatient Progress Note    Reason for Follow-up: Anemia    Subjective     Interval History:   Dark stools overnight.  Brown stool with specks of black this morning-visualized by me.  Denies abdominal pain, nausea, vomiting.  Tolerating p.o. intake well.    Hemoglobin trend: 6.2 on 7/16, received 1 unit PRBC-completed at 2000, was 8.0 at 2330 and 7.4 0800 this morning.    Current Facility-Administered Medications:     [DISCONTINUED] acetaminophen (TYLENOL) tablet 650 mg, 650 mg, Oral, Once **OR** acetaminophen (TYLENOL) 160 MG/5ML solution 650 mg, 650 mg, Oral, Once **OR** acetaminophen (TYLENOL) suppository 650 mg, 650 mg, Rectal, Once, Ermelinda Dixon MD    [DISCONTINUED] acetaminophen (TYLENOL) tablet 650 mg, 650 mg, Oral, Once **OR** acetaminophen (TYLENOL) 160 MG/5ML solution 650 mg, 650 mg, Oral, Once **OR** acetaminophen (TYLENOL) suppository 650 mg, 650 mg, Rectal, Once, Harshal Casillas MD    sennosides-docusate (PERICOLACE) 8.6-50 MG per tablet 2 tablet, 2 tablet, Oral, BID, 2 tablet at 07/15/23 2234 **AND** polyethylene glycol (MIRALAX) packet 17 g, 17 g, Oral, Daily PRN **AND** bisacodyl (DULCOLAX) EC tablet 5 mg, 5 mg, Oral, Daily PRN **AND** bisacodyl (DULCOLAX) suppository 10 mg, 10 mg, Rectal, Daily PRN, Ermelinda Dixon MD    folic acid (FOLVITE) tablet 1 mg, 1 mg, Oral, Daily, Ermelinda Dixon MD, 1 mg at 07/17/23 0800    heparin injection 30 Units, 0.3 mL, Other, Once in imaging, Ermelinda Dixon MD    lactobacillus acidophilus (RISAQUAD) capsule 1 capsule, 1 capsule, Oral, BID, Harshal Casillas MD, 1 capsule at 07/17/23 0800    morphine injection 2 mg, 2 mg, Intravenous, Q4H PRN, Ermelinda Dixon MD, 2 mg at 07/16/23 0117    ondansetron (ZOFRAN) injection 4 mg, 4 mg, Intravenous, Once, Ermelinda Dixon MD    ondansetron (ZOFRAN) injection 4 mg, 4 mg, Intravenous, Q6H PRN, Ermelinda Dixon MD, 4 mg at 07/15/23 7994    pantoprazole (PROTONIX) 40  mg in 100 mL NS (VTB), 8 mg/hr, Intravenous, Continuous, Ermelinda Dixon MD, Last Rate: 20 mL/hr at 07/17/23 1006, 8 mg/hr at 07/17/23 1006    piperacillin-tazobactam (ZOSYN) 3.375 g in iso-osmotic dextrose 50 ml (premix), 3.375 g, Intravenous, Q8H, Shima Evangelista APRN, Last Rate: 0 mL/hr at 07/16/23 2219, 3.375 g at 07/17/23 1326    [COMPLETED] Insert Peripheral IV, , , Once **AND** sodium chloride 0.9 % flush 10 mL, 10 mL, Intravenous, PRN, Ermelinda Dixon MD    sodium chloride 0.9 % flush 10 mL, 10 mL, Intravenous, PRN, Ermelinda Dixon MD    sodium chloride 0.9 % flush 10 mL, 10 mL, Intravenous, Q12H, Ermelinda Dixon MD, 10 mL at 07/17/23 0801    sodium chloride 0.9 % flush 10 mL, 10 mL, Intravenous, Q12H, Ermelinda Dixon MD, 10 mL at 07/17/23 0801    sodium chloride 0.9 % flush 10 mL, 10 mL, Intravenous, PRN, Ermelinda Dixon MD    sodium chloride 0.9 % flush 20 mL, 20 mL, Intravenous, PRNZack Sarah A, MD    sodium chloride 0.9 % infusion 40 mL, 40 mL, Intravenous, PRNZack Sarah A, MD    sodium chloride 0.9 % infusion 40 mL, 40 mL, Intravenous, PRNZack Sarah A, MD    thiamine (VITAMIN B-1) tablet 100 mg, 100 mg, Oral, Daily, Ermelinda Dixon MD, 100 mg at 07/17/23 0800  Review of Systems:    The following systems were reviewed and negative;  respiratory and cardiovascular    Objective     Vital Signs  Temp:  [97.9 °F (36.6 °C)-98.4 °F (36.9 °C)] 98.2 °F (36.8 °C)  Heart Rate:  [75-89] 89  Resp:  [16-18] 18  BP: (106-119)/(64-72) 118/72  Body mass index is 26.15 kg/m².    Intake/Output Summary (Last 24 hours) at 7/17/2023 1411  Last data filed at 7/16/2023 2109  Gross per 24 hour   Intake 312.5 ml   Output 225 ml   Net 87.5 ml     No intake/output data recorded.     Physical Exam:    General: patient awake, alert and cooperative   Eyes: Normal lids and lashes, no scleral icterus   Skin: warm and dry, not jaundiced   Pulm: regular and unlabored   Abdomen: soft, nontender, nondistended;  normal bowel sounds   Psychiatric: Normal mood and behavior; memory intact     Results Review:     I reviewed the patient's new clinical results.    Results from last 7 days   Lab Units 07/17/23  0807 07/17/23  0543 07/16/23  2348 07/16/23  0814 07/16/23  0111 07/15/23  2050   WBC 10*3/mm3  --  10.02  --   --  17.39* 15.20*   HEMOGLOBIN g/dL 7.4* 7.4* 8.0*   < > 6.7* 4.9*   HEMATOCRIT % 22.1* 21.7* 24.7*   < > 19.7* 14.9*   PLATELETS 10*3/mm3  --  138*  --   --  130* 145    < > = values in this interval not displayed.     Results from last 7 days   Lab Units 07/17/23  0543 07/16/23  0111 07/15/23  1603   SODIUM mmol/L 139 136 135*   POTASSIUM mmol/L 3.5 3.8 3.6   CHLORIDE mmol/L 112* 110* 110*   CO2 mmol/L 22.8 19.3* 19.0*   BUN mg/dL 3* 7 9   CREATININE mg/dL 1.23 1.26 1.30*   CALCIUM mg/dL 7.3* 6.9* 6.8*   BILIRUBIN mg/dL <0.2 <0.2 0.2   ALK PHOS U/L 44 33* 32*   ALT (SGPT) U/L 15 8 5   AST (SGOT) U/L 17 7 9   GLUCOSE mg/dL 90 127* 105*     Results from last 7 days   Lab Units 07/12/23  0612   INR  1.07     Lab Results   Lab Value Date/Time    LIPASE 11 (L) 07/12/2023 0612    LIPASE 26 06/23/2023 1201       Radiology:  IR Embolization   Final Result   Superior mesenteric angiogram with embolization as described. There was   an area of active contrast extravasation into the small bowel of the   left lower quadrant arising from one of the ileal branch arteries of the   SMA. This was superselectively catheterized and coil embolization of the   actively bleeding branch vessel was performed as described above.   Technically successful result with no further active extravasation   demonstrated upon completion of the procedure.       This report was finalized on 7/15/2023 8:36 PM by Dr. Moise Walker M.D.          NM GI Blood Loss   Final Result       Intestinal bleeding was observed during the exam, localized to the mid   small bowel.       Discussed by telephone with patient's nurse, Brigette, at time of    interpretation, 1414, 07/15/2023.           This report was finalized on 7/15/2023 2:19 PM by Dr. Brice Velasco M.D.          CT Angiogram Abdomen Pelvis   Final Result   1. No evidence of active contrast extravasation into the GI tract to   suggest active GI bleeding at this time   2. Previously demonstrated thickening of the duodenum and proximal   jejunum has resolved   3. Appendectomy           Radiation dose reduction techniques were utilized, including automated   exposure control and exposure modulation based on body size.       This report was finalized on 7/14/2023 1:07 PM by Dr. Moise Walker M.D.          NM GI Blood Loss   Final Result       Unsuccessful bleeding scan evaluation, as described. Follow-up/further   evaluation can be obtained as indications persist.           This report was finalized on 7/13/2023 7:19 PM by Dr. Brice Velasco M.D.          CT Abdomen Pelvis With Contrast   Final Result   Mild gastritis is suspected. Diffuse thickening involving   the duodenum and the proximal jejunal loops favored to be secondary to   peristalsis.       Radiation dose reduction techniques were utilized, including automated   exposure control and exposure modulation based on body size.       This report was finalized on 7/13/2023 1:35 PM by Dr. Len Herndon M.D.          XR Chest 1 View   Final Result   No acute findings       This report was finalized on 7/12/2023 7:30 AM by Dr. Moise Walker M.D.              Assessment & Plan     Active Hospital Problems    Diagnosis     **ABLA (acute blood loss anemia)     Metabolic acidosis     Enteritis of possible infectious origin     Sepsis     Blood per rectum     Iron deficiency anemia     Current every day smoker     Gastrointestinal hemorrhage with melena     Acute blood loss anemia        Assessment:  Acute blood loss anemia:      Plan:  IR intervention with coiling to small bowel vessel in the ileum on 7/15  Hemoglobin dropped on  7/16 and received 1 unit PRBC  Continue to monitor H&H and transfuse per primary hospital team.  Continue to monitor for evidence of overt GI bleed.  Stool in bedside commode does appear to have some specks of black in it but mostly brown.  Do not see bright red blood  We will await hemoglobin trend and monitor for further evidence of overt GI bleed.  Hopefully drop in hemoglobin yesterday was residual from ileal blood vessel coiled the night prior.  Will need video capsule endoscopy (VCE) ASAP as outpatient given repeated/persistent bleeding and unknown source of bleed noted in ileum.  Diet as tolerated.  Discussed with Dr. Kern and Dr. Casillas.  Hopeful to discharge tomorrow if stable hemoglobin with ASAP VCE.  My staff is working on insurance approval and scheduling.    I discussed the patients findings and my recommendations with patient and family.    Dragon dictation used throughout this note.            Kenia Vuong PA-C  [facility]  [address] [place] [address]  Office: [phone]               normal

## 2023-07-17 NOTE — PROGRESS NOTES
Name: Radha Noriega V ADMIT: 2023   : 1980  PCP: Hellen Skelton APRN    MRN: [id number] LOS: 3 days   AGE/SEX: 43 y.o. male  ROOM: [facility]     Subjective   Subjective   Feeling much better. Tolerating reg diet. Stools more brown. Denies pain, dizziness/lightheadedness       Objective   Objective   Vital Signs  Temp:  [97.9 °F (36.6 °C)-98.4 °F (36.9 °C)] 98.2 °F (36.8 °C)  Heart Rate:  [75-89] 89  Resp:  [16-18] 18  BP: (106-119)/(64-72) 118/72  SpO2:  [98 %-100 %] 99 %  on   ;   Device (Oxygen Therapy): room air  Body mass index is 26.15 kg/m².  Physical Exam  Vitals and nursing note reviewed.   Constitutional:       General: He is not in acute distress.  HENT:      Head: Normocephalic.      Mouth/Throat:      Mouth: Mucous membranes are moist.   Eyes:      Conjunctiva/sclera: Conjunctivae normal.   Cardiovascular:      Rate and Rhythm: Normal rate.   Pulmonary:      Effort: Pulmonary effort is normal. No respiratory distress.   Abdominal:      Palpations: Abdomen is soft.   Musculoskeletal:      Cervical back: Neck supple.      Right lower leg: No edema.      Left lower leg: No edema.   Skin:     General: Skin is warm and dry.   Neurological:      Mental Status: He is alert and oriented to person, place, and time.   Psychiatric:         Mood and Affect: Mood normal.         Behavior: Behavior normal.     Results Review     I reviewed the patient's new clinical results.  Results from last 7 days   Lab Units 23  0807 23  0543 23  2348 23  1635 23  0814 23  0111 07/15/23  2050 07/15/23  1603   WBC 10*3/mm3  --  10.02  --   --   --  17.39* 15.20* 13.20*   HEMOGLOBIN g/dL 7.4* 7.4* 8.0* 6.2*   < > 6.7* 4.9* 5.9*   PLATELETS 10*3/mm3  --  138*  --   --   --  130* 145 132*    < > = values in this interval not displayed.     Results from last 7 days   Lab Units 23  0543 23  0111 07/15/23  1603 23  0608   SODIUM mmol/L 139 136 135* 133*   POTASSIUM  mmol/L 3.5 3.8 3.6 3.8   CHLORIDE mmol/L 112* 110* 110* 108*   CO2 mmol/L 22.8 19.3* 19.0* 18.9*   BUN mg/dL 3* 7 9 19   CREATININE mg/dL 1.23 1.26 1.30* 1.15   GLUCOSE mg/dL 90 127* 105* 98   EGFR mL/min/1.73 75.2 73.0 70.3 81.5     Results from last 7 days   Lab Units 07/17/23  0543 07/16/23  0111 07/15/23  1603 07/13/23  1146   ALBUMIN g/dL 1.8* 2.1* 1.9* 2.9*   BILIRUBIN mg/dL <0.2 <0.2 0.2 0.3   ALK PHOS U/L 44 33* 32* 50   AST (SGOT) U/L 17 7 9 9   ALT (SGPT) U/L 15 8 5 11     Results from last 7 days   Lab Units 07/17/23  0543 07/16/23  0111 07/15/23  1603 07/14/23  0608 07/13/23  1146   CALCIUM mg/dL 7.3* 6.9* 6.8* 7.5* 8.3*   ALBUMIN g/dL 1.8* 2.1* 1.9*  --  2.9*     Results from last 7 days   Lab Units 07/13/23  2156 07/12/23  0612   PROCALCITONIN ng/mL  --  0.15   LACTATE mmol/L 1.2  --      No results found for: HGBA1C, POCGLU    IR Embolization    Result Date: 7/15/2023  Superior mesenteric angiogram with embolization as described. There was an area of active contrast extravasation into the small bowel of the left lower quadrant arising from one of the ileal branch arteries of the SMA. This was superselectively catheterized and coil embolization of the actively bleeding branch vessel was performed as described above. Technically successful result with no further active extravasation demonstrated upon completion of the procedure.  This report was finalized on 7/15/2023 8:36 PM by Dr. Moise Walker M.D.     I have personally reviewed all medications:  Scheduled Medications  acetaminophen, 650 mg, Oral, Once   Or  acetaminophen, 650 mg, Rectal, Once  acetaminophen, 650 mg, Oral, Once   Or  acetaminophen, 650 mg, Rectal, Once  folic acid, 1 mg, Oral, Daily  heparin, 0.3 mL, Other, Once in imaging  lactobacillus acidophilus, 1 capsule, Oral, BID  ondansetron, 4 mg, Intravenous, Once  piperacillin-tazobactam, 3.375 g, Intravenous, Q8H  senna-docusate sodium, 2 tablet, Oral, BID  sodium chloride, 10 mL,  Intravenous, Q12H  sodium chloride, 10 mL, Intravenous, Q12H  thiamine, 100 mg, Oral, Daily    Infusions  pantoprazole, 8 mg/hr, Last Rate: 8 mg/hr (07/17/23 1006)    Diet  Diet: Regular/House Diet, Gastrointestinal Diets; Low Irritant; Texture: Regular Texture (IDDSI 7); Fluid Consistency: Thin (IDDSI 0)    I have personally reviewed:  [x]  Laboratory   [x]  Microbiology   [x]  Radiology   [x]  EKG/Telemetry  [x]  Cardiology/Vascular   []  Pathology    []  Records       Assessment/Plan     Active Hospital Problems    Diagnosis  POA    **ABLA (acute blood loss anemia) [D62]  Yes    Metabolic acidosis [E87.20]  Yes    Enteritis of possible infectious origin [A09]  Yes    Sepsis [A41.9]  Yes    Blood per rectum [K62.5]  Unknown    Iron deficiency anemia [D50.9]  Yes    Current every day smoker [F17.200]  Yes    Gastrointestinal hemorrhage with melena [K92.1]  Yes    Acute blood loss anemia [D62]  Unknown      Resolved Hospital Problems   No resolved problems to display.       43 y.o. male admitted with ABLA (acute blood loss anemia).    Brief Hospital Course to date:  Radha Noriega V is a 43 y.o. male presents the hospital with sepsis and acute blood loss anemia with possible enteritis noted on imaging.  Ultimately tagged red blood scan was able to determine small bowel bleeding localized to the mid small bowel and patient went under interventional radiology coiling of a small branch of the SMA.     Discussion/plan for today:  Hgb 7.4 on last 2 draws. Received PRBC yesterday.  Blood transfusion given.  Continue to trend hemoglobin. Hemodynamically stable. Tolerating regular diet. Stools reported as brown, not black today. GI following. Continue PPI. Zosyn stop date in place. WBC has normalized.  Continue to trend and monitor for bleeding.       Blood pressure now improved.  Tachycardia related to anemia/bleeding resolving.     PICC line placed due to difficulty with IV access and multiple lost IVs.     Treated with  Zosyn for enteritis.   Patient initially meeting sepsis criteria.  Possible gastrointestinal bacterial infection/enteritis but etiology somewhat unclear.  Probiotics added.     Supportive care and symptomatic treatment.     Careful monitoring.           SCDs for DVT prophylaxis.  Full code.  Discussed with patient and family.  Anticipate discharge home in 1-2 days. Or when cleared by consultants.      CHETNA Sparks  [facility]  07/17/23  14:31 EDT

## 2023-07-17 NOTE — PLAN OF CARE
Goal Outcome Evaluation:           Progress: improving  Outcome Evaluation: Patient alert and oriented. VSS on RA. S/p 1 unit PRBC overnight, rechceck hgb 8.0, nect check at 0600. Left brachial double lumen PICC line CDI. Protonix drip infusing.IV ABX Q6 Zosyn. Tolerating clear liquids at this time, will advance as tolerated as orders for regular diet. Using BSC x2 episodes of dark stool. Using urinal approprately adequate output.  Will continue to monitor for remainder of shift.

## 2023-07-17 NOTE — PAYOR COMM NOTE
"Radha Noriega (43 y.o. Male)        PLEASE SEE ATTACHED FOR CONTINUED STAY REVIEW.     REF#[id number]    PLEASE CALL   OR  [phone] WITH CONTINUED STAY REVIEW.     THANK YOU    HERBERTH WHALEY LPN CCP       Date of Birth   1980    Social Security Number       Address   [address] [facility] [address] [address]    Home Phone   [phone]    MRN   [id number]       [place]   None    Marital Status                               Admission Date   7/12/23    Admission Type   Emergency    Admitting Provider   Harshal Casillas MD    Attending Provider   Harshal Casillas MD    Department, Room/Bed   [address], S601/1       Discharge Date       Discharge Disposition       Discharge Destination                                 Attending Provider: Harshal Casillas MD    Allergies: No Known Allergies    Isolation: None   Infection: None   Code Status: CPR    Ht: 172.7 cm (68\")   Wt: 78 kg (172 lb)    Admission Cmt: None   Principal Problem: ABLA (acute blood loss anemia) [D62]                   Active Insurance as of 7/12/2023       Primary Coverage       Payor Plan Insurance Group Employer/Plan Group    [facility] [employer] BY [employer] [id number]       Payor Plan Address Payor Plan Phone Number Payor Plan Fax Number Effective Dates    [address] [address]   1/1/2021 - None Entered    [facility] [id number]         Subscriber Name Subscriber Birth Date Member ID       RADHA NORIEGA 1980 [id number]                     Emergency Contacts        (Rel.) Home Phone Work Phone Mobile Phone    MAGDALENANED ESPITIA (Spouse) [phone] -- [phone]    CheleZainab paredes (Mother) -- -- [phone]              Oxygen Therapy (last day)       Date/Time SpO2 Device (Oxygen Therapy) Flow (L/min) Oxygen Concentration (%) ETCO2 (mmHg)    07/17/23 1419 99 room air -- -- --    07/17/23 0900 -- room air -- -- --    07/17/23 0010 " -- room air -- -- --    07/16/23 2319 99 -- -- -- --    07/16/23 2140 -- room air -- -- --    07/16/23 2039 98 room air -- -- --    07/16/23 1900 99 -- -- -- --    07/16/23 1821 98 -- -- -- --    07/16/23 1801 100 -- -- -- --    07/16/23 1747 98 room air -- -- --    07/16/23 1514 100 -- -- -- --    07/16/23 1400 -- room air -- -- --    07/16/23 0807 -- room air -- -- --    07/16/23 0803 99 -- -- -- --    07/16/23 0459 99 room air -- -- --    07/16/23 0230 99 room air -- -- --    07/16/23 0159 97 room air -- -- --    07/16/23 0101 98 room air -- -- --    07/16/23 0052 98 room air -- -- --    07/16/23 0034 -- room air -- -- --          Intake & Output (last day)         07/16 0701  07/17 0700 07/17 0701  07/18 0700    P.O.      Blood 312.5     Total Intake(mL/kg) 312.5 (4)     Urine (mL/kg/hr) 425 (0.2) 1850 (2.8)    Stool 0     Total Output 425 1850    Net -112.5 -1850          Stool Unmeasured Occurrence 2 x           Lines, Drains & Airways       Active LDAs       Name Placement date Placement time Site Days    PICC Double Lumen 07/14/23 Left Brachial 07/14/23  1144  Brachial  3                  CIWA (last day)       None          Medication Administration Report for Radha Noriega as of 07/17/23 1531     Legend:    Given Hold Not Given Due Canceled Entry Other Actions    Time Time (Time) Time Time-Action         Discontinued     Completed     Future     MAR Hold     Linked             Medications 07/17/23      acetaminophen (TYLENOL) tablet 650 mg  Dose: 650 mg  Freq: Once Route: PO  Start: 07/15/23 1730   End: 07/16/23 1734   Admin Instructions:   Give Prior to First Transfusion  Do not exceed 4 grams of acetaminophen in a 24 hr period. Max dose of 2gm for AST/ALT greater than 120 units/L    If given for fever, use fever parameter: fever greater than 100.4 °F.    If given for pain, use the following pain scale:   Mild Pain = Pain Score of 1-3, CPOT 1-2  Moderate Pain = Pain Score of 4-6, CPOT 3-4  Severe Pain  = Pain Score of 7-10, CPOT 5-8       Or  acetaminophen (TYLENOL) 160 MG/5ML solution 650 mg  Dose: 650 mg  Freq: Once Route: PO  Start: 07/15/23 1730   Admin Instructions:   Give Prior to First Transfusion  Do not exceed 4 grams of acetaminophen in a 24 hr period. Max dose of 2gm for AST/ALT greater than 120 units/L    If given for fever, use fever parameter: fever greater than 100.4 °F.    If given for pain, use the following pain scale:   Mild Pain = Pain Score of 1-3, CPOT 1-2  Moderate Pain = Pain Score of 4-6, CPOT 3-4  Severe Pain = Pain Score of 7-10, CPOT 5-8       Or  acetaminophen (TYLENOL) suppository 650 mg  Dose: 650 mg  Freq: Once Route: RE  Start: 07/15/23 1730   Admin Instructions:   Give Prior to First Transfusion  Do not exceed 4 grams of acetaminophen in a 24 hr period. Max dose of 2gm for AST/ALT greater than 120 units/L    If given for fever, use fever parameter: fever greater than 100.4 °F.    If given for pain, use the following pain scale:   Mild Pain = Pain Score of 1-3, CPOT 1-2  Moderate Pain = Pain Score of 4-6, CPOT 3-4  Severe Pain = Pain Score of 7-10, CPOT 5-8        acetaminophen (TYLENOL) tablet 650 mg  Dose: 650 mg  Freq: Once Route: PO  Start: 07/15/23 0815   End: 07/16/23 1734   Admin Instructions:   Give Prior to First Transfusion  Do not exceed 4 grams of acetaminophen in a 24 hr period. Max dose of 2gm for AST/ALT greater than 120 units/L    If given for fever, use fever parameter: fever greater than 100.4 °F.    If given for pain, use the following pain scale:   Mild Pain = Pain Score of 1-3, CPOT 1-2  Moderate Pain = Pain Score of 4-6, CPOT 3-4  Severe Pain = Pain Score of 7-10, CPOT 5-8       Or  acetaminophen (TYLENOL) 160 MG/5ML solution 650 mg  Dose: 650 mg  Freq: Once Route: PO  Start: 07/15/23 0815   Admin Instructions:   Give Prior to First Transfusion  Do not exceed 4 grams of acetaminophen in a 24 hr period. Max dose of 2gm for AST/ALT greater than 120 units/L    If  given for fever, use fever parameter: fever greater than 100.4 °F.    If given for pain, use the following pain scale:   Mild Pain = Pain Score of 1-3, CPOT 1-2  Moderate Pain = Pain Score of 4-6, CPOT 3-4  Severe Pain = Pain Score of 7-10, CPOT 5-8       Or  acetaminophen (TYLENOL) suppository 650 mg  Dose: 650 mg  Freq: Once Route: RE  Start: 07/15/23 0815   Admin Instructions:   Give Prior to First Transfusion  Do not exceed 4 grams of acetaminophen in a 24 hr period. Max dose of 2gm for AST/ALT greater than 120 units/L    If given for fever, use fever parameter: fever greater than 100.4 °F.    If given for pain, use the following pain scale:   Mild Pain = Pain Score of 1-3, CPOT 1-2  Moderate Pain = Pain Score of 4-6, CPOT 3-4  Severe Pain = Pain Score of 7-10, CPOT 5-8        sennosides-docusate (PERICOLACE) 8.6-50 MG per tablet 2 tablet  Dose: 2 tablet  Freq: 2 Times Daily Route: PO  Start: 07/12/23 1118   Admin Instructions:   HOLD MEDICATION IF PATIENT HAS HAD BOWEL MOVEMENT. Start bowel management regimen if patient has not had a bowel movement after 12 hours.    (0802)-Not Given     2100                  And  polyethylene glycol (MIRALAX) packet 17 g  Dose: 17 g  Freq: Daily PRN Route: PO  PRN Reason: Constipation  PRN Comment: Use if senna-docusate is ineffective  Start: 07/12/23 1059   Admin Instructions:   Use if no bowel movement after 12 hours. Mix in 6-8 ounces of water.  Use 4-8 ounces of water, tea, or juice for each 17 gram dose.       And  bisacodyl (DULCOLAX) EC tablet 5 mg  Dose: 5 mg  Freq: Daily PRN Route: PO  PRN Reason: Constipation  PRN Comment: Use if polyethylene glycol is ineffective  Start: 07/12/23 1059   Admin Instructions:   Use if no bowel movement after 12 hours.  Swallow whole. Do not crush, split, or chew tablet.       And  bisacodyl (DULCOLAX) suppository 10 mg  Dose: 10 mg  Freq: Daily PRN Route: RE  PRN Reason: Constipation  PRN Comment: Use if bisacodyl oral is  "ineffective  Start: 07/12/23 1059   Admin Instructions:   Use if no bowel movement after 12 hours.  Hold for diarrhea        folic acid (FOLVITE) tablet 1 mg  Dose: 1 mg  Freq: Daily Route: PO  Start: 07/13/23 1400   End: 07/28/23 0859    0800-Given                    heparin injection 30 Units  Dose: 0.3 mL  Freq: Once in Imaging Route: OTHER  Start: 07/13/23 1415   Admin Instructions:   Use for heparinized syringe for Ultra Tag RBC kit reconstitution for GI Bleed, MUGA, and Hemangioma exams.        lactobacillus acidophilus (RISAQUAD) capsule 1 capsule  Dose: 1 capsule  Freq: 2 Times Daily Route: PO  Start: 07/16/23 1300    0800-Given     2100                   morphine injection 2 mg  Dose: 2 mg  Freq: Every 4 Hours PRN Route: IV  PRN Reason: Severe Pain  Start: 07/13/23 0939   End: 07/20/23 0938   Admin Instructions:   Based on patient request - if ordered for moderate or severe pain, provider allows for administration of a medication prescribed for a lower pain scale.  If given for pain, use the following pain scale:  Mild Pain = Pain Score of 1-3, CPOT 1-2  Moderate Pain = Pain Score of 4-6, CPOT 3-4  Severe Pain = Pain Score of 7-10, CPOT 5-8        ondansetron (ZOFRAN) injection 4 mg  Dose: 4 mg  Freq: Every 6 Hours PRN Route: IV  PRN Reasons: Nausea,Vomiting  Start: 07/13/23 0548   Admin Instructions:   \"If multiple N/V medications ordered, use in the following order: Ondansetron, Prochlorperazine, Promethazine. Use PO unless patient refuses or patient unable to swallow.\"          ondansetron (ZOFRAN) injection 4 mg  Dose: 4 mg  Freq: Once Route: IV  Start: 07/12/23 0722   Admin Instructions:   \"If multiple N/V medications ordered, use in the following order: Ondansetron, Prochlorperazine, Promethazine. Use PO unless patient refuses or patient unable to swallow.\"          pantoprazole (PROTONIX) 40 mg in 100 mL NS (VTB)  Rate: 20 mL/hr Dose: 8 mg/hr  Freq: Continuous Route: IV  Indications of Use: " Gastrointestinal (GI) Bleed  Start: 07/12/23 1118   End: 07/17/23 1438    0336-New Bag     1006-New Bag     1516-Stopped                  piperacillin-tazobactam (ZOSYN) 3.375 g in iso-osmotic dextrose 50 ml (premix)  Dose: 3.375 g  Freq: Every 8 Hours Route: IV  Indications of Use: SEPSIS  Start: 07/16/23 0600   End: 07/19/23 0559   Admin Instructions:   Refrigerate    0540-New Bag     1326-New Bag     2200                  sodium chloride 0.9 % flush 10 mL  Dose: 10 mL  Freq: As Needed Route: IV  PRN Reason: Line Care  PRN Comment: After Medication Administration  Start: 07/14/23 1142        sodium chloride 0.9 % flush 10 mL  Dose: 10 mL  Freq: Every 12 Hours Scheduled Route: IV  Start: 07/14/23 1230   Admin Instructions:   Lumen #2    0801-Given     2100                   sodium chloride 0.9 % flush 10 mL  Dose: 10 mL  Freq: Every 12 Hours Scheduled Route: IV  Start: 07/14/23 1230   Admin Instructions:   Lumen #1    0801-Given     2100                   sodium chloride 0.9 % flush 10 mL  Dose: 10 mL  Freq: As Needed Route: IV  PRN Reason: Line Care  Start: 07/12/23 1059        sodium chloride 0.9 % flush 10 mL  Dose: 10 mL  Freq: As Needed Route: IV  PRN Reason: Line Care  Start: 07/12/23 0536        sodium chloride 0.9 % flush 20 mL  Dose: 20 mL  Freq: As Needed Route: IV  PRN Reason: Line Care  PRN Comment: After Blood Draws or Blood Product Administration  Start: 07/14/23 1142        sodium chloride 0.9 % infusion 40 mL  Dose: 40 mL  Freq: As Needed Route: IV  PRN Reason: Line Care  Start: 07/14/23 1142   Admin Instructions:   Following administration of an IV intermittent medication, flush line with 40mL NS at 100mL/hr.        sodium chloride 0.9 % infusion 40 mL  Dose: 40 mL  Freq: As Needed Route: IV  PRN Reason: Line Care  Start: 07/12/23 1059   Admin Instructions:   Following administration of an IV intermittent medication, flush line with 40mL NS at 100mL/hr.        thiamine (VITAMIN B-1) tablet 100  mg  Dose: 100 mg  Freq: Daily Route: PO  Start: 07/13/23 1400    0800-Given                   Future Medications  Medications 07/17/23       pantoprazole (PROTONIX) EC tablet 40 mg  Dose: 40 mg  Freq: Every Early Morning Route: PO  Start: 07/18/23 0600   Admin Instructions:   Swallow whole; do not crush, split, or chew.       Completed Medications  Medications 07/17/23       acetaminophen (TYLENOL) tablet 650 mg  Dose: 650 mg  Freq: Once Route: PO  Start: 07/16/23 1815   End: 07/16/23 1735   Admin Instructions:   Based on patient request - if ordered for moderate or severe pain, provider allows for administration of a medication prescribed for a lower pain scale.  Do not exceed 4 grams of acetaminophen in a 24 hr period. Max dose of 2gm for AST/ALT greater than 120 units/L    If given for fever, use fever parameter: fever greater than 100.4 °F.    If given for pain, use the following pain scale:   Mild Pain = Pain Score of 1-3, CPOT 1-2  Moderate Pain = Pain Score of 4-6, CPOT 3-4  Severe Pain = Pain Score of 7-10, CPOT 5-8        acetaminophen (TYLENOL) tablet 650 mg  Dose: 650 mg  Freq: Once Route: PO  Start: 07/14/23 0845   End: 07/14/23 0910   Admin Instructions:   Based on patient request - if ordered for moderate or severe pain, provider allows for administration of a medication prescribed for a lower pain scale.  Do not exceed 4 grams of acetaminophen in a 24 hr period. Max dose of 2gm for AST/ALT greater than 120 units/L    If given for fever, use fever parameter: fever greater than 100.4 °F.    If given for pain, use the following pain scale:   Mild Pain = Pain Score of 1-3, CPOT 1-2  Moderate Pain = Pain Score of 4-6, CPOT 3-4  Severe Pain = Pain Score of 7-10, CPOT 5-8        famotidine (PEPCID) tablet 20 mg  Dose: 20 mg  Freq: Once Route: PO  Start: 07/16/23 1815   End: 07/16/23 1735        famotidine (PEPCID) tablet 20 mg  Dose: 20 mg  Freq: Once Route: PO  Start: 07/14/23 0845   End: 07/14/23 0910      "   fentaNYL citrate (PF) (SUBLIMAZE) injection  Freq: As Needed Route: IV  Start: 07/15/23 1747   End: 07/15/23 1914        heparin injection 30 Units  Dose: 0.3 mL  Freq: Once in Imaging Route: OTHER  Start: 07/15/23 1214   End: 07/15/23 1215   Admin Instructions:   Use for heparinized syringe for Ultra Tag RBC kit reconstitution for GI Bleed, MUGA, and Hemangioma exams.        iopamidol (ISOVUE-250) 51 % injection 200 mL  Dose: 200 mL  Freq: Once in Imaging Route: IA  Start: 07/15/23 2130   End: 07/15/23 2034        iopamidol (ISOVUE-300) 61 % injection 100 mL  Dose: 100 mL  Freq: Once in Imaging Route: IV  Start: 07/12/23 0802   End: 07/12/23 0746        iopamidol (ISOVUE-370) 76 % injection 100 mL  Dose: 100 mL  Freq: Once in Imaging Route: IV  Start: 07/14/23 1315   End: 07/14/23 1227        lidocaine PF 1% (XYLOCAINE) injection  Freq: As Needed  Start: 07/15/23 1938   End: 07/15/23 1938        midazolam (VERSED) injection  Freq: As Needed Route: IV  Start: 07/15/23 1747   End: 07/15/23 1747        morphine injection 2 mg  Dose: 2 mg  Freq: Once Route: IV  Start: 07/13/23 0645   End: 07/13/23 0556   Admin Instructions:   If given for pain, use the following pain scale:  Mild Pain = Pain Score of 1-3, CPOT 1-2  Moderate Pain = Pain Score of 4-6, CPOT 3-4  Severe Pain = Pain Score of 7-10, CPOT 5-8        ondansetron (ZOFRAN) injection 4 mg  Dose: 4 mg  Freq: Once Route: IV  Start: 07/15/23 1758   End: 07/15/23 1800   Admin Instructions:   \"If multiple N/V medications ordered, use in the following order: Ondansetron, Prochlorperazine, Promethazine. Use PO unless patient refuses or patient unable to swallow.\"          pantoprazole (PROTONIX) injection 80 mg  Dose: 80 mg  Freq: Once Route: IV  Indications of Use: Gastrointestinal (GI) Bleed  Start: 07/12/23 0605   End: 07/12/23 0606   Admin Instructions:   Dilute with 10 mL of 0.9% NaCl and give IV push over 2 minutes.        piperacillin-tazobactam (ZOSYN) 3.375 g " in iso-osmotic dextrose 50 ml (premix)  Dose: 3.375 g  Freq: Once Route: IV  Start: 07/13/23 2030   End: 07/13/23 2340   Admin Instructions:   Refrigerate        sodium chloride 0.9 % bolus 1,000 mL  Dose: 1,000 mL  Freq: Once Route: IV  Start: 07/12/23 0722   End: 07/12/23 0800        sodium chloride 0.9 % bolus 500 mL  Dose: 500 mL  Freq: Once Route: IV  Start: 07/14/23 1445   End: 07/14/23 1821        sodium chloride 0.9 % infusion  Freq: Continuous PRN Route: IV  Start: 07/15/23 1745   End: 07/15/23 1745        sodium chloride 1,000 mL mixture  Freq: As Needed  Start: 07/15/23 1808   End: 07/15/23 1808        technetium labeled red blood cells (ULTRATAG) injection 1 dose  Dose: 1 dose  Freq: Once in Imaging Route: IV  Start: 07/15/23 1300   End: 07/15/23 1216   Order specific questions:   Millicuries: 36          technetium labeled red blood cells (ULTRATAG) injection 1 dose  Dose: 1 dose  Freq: Once in Imaging Route: IV  Start: 07/13/23 1930   End: 07/13/23 1838   Order specific questions:   Millicuries: 24.5          technetium labeled red blood cells (ULTRATAG) injection 1 dose  Dose: 1 dose  Freq: Once in Imaging Route: IV  Start: 07/13/23 1415   End: 07/13/23 1330   Order specific questions:   Millicuries: 29.7         Discontinued Medications  Medications 07/17/23       famotidine (PEPCID) tablet 20 mg  Dose: 20 mg  Freq: Once Route: PO  Start: 07/15/23 0815   End: 07/16/23 1733        Pharmacy to Dose Zosyn  Freq: Continuous PRN Route: XX  PRN Reason: Consult  Indications of Use: SEPSIS  Start: 07/13/23 1306   End: 07/14/23 1132        piperacillin-tazobactam (ZOSYN) 3.375 g in iso-osmotic dextrose 50 ml (premix)  Dose: 3.375 g  Freq: Every 8 Hours Route: IV  Indications of Use: SEPSIS  Start: 07/14/23 0600   End: 07/16/23 0106   Admin Instructions:   Refrigerate        piperacillin-tazobactam (ZOSYN) 3.375 g in iso-osmotic dextrose 50 ml (premix)  Dose: 3.375 g  Freq: Every 8 Hours Route: IV  Indications  of Use: SEPSIS  Start: 07/13/23 2015   End: 07/13/23 1930   Admin Instructions:   Refrigerate        piperacillin-tazobactam (ZOSYN) 3.375 g in iso-osmotic dextrose 50 ml (premix)  Dose: 3.375 g  Freq: Once Route: IV  Start: 07/13/23 1415   End: 07/13/23 1930   Admin Instructions:   Refrigerate        polyethylene glycol (MIRALAX) powder 0.5 bottle  Dose: 0.5 bottle  Freq: 2 Times Daily Route: PO  Start: 07/14/23 2000   End: 07/15/23 1959   Admin Instructions:   1600: Mix One half bottle in non carbonated clear liquids and drink 8 ounces every 15 min until gone  2000:Mix the remaining half bottle in noncarbonated clear liquids and drink 8 ounces every 15 min until gone  Dissolve entire contents with 64oz of liquid. Give 1/2 of the diluted soln at 5pm the day before colonoscopy. Give remaining soln on day of colonoscopy. Complete at least 4hrs before procedure        sodium chloride 0.9 % flush 10 mL  Dose: 10 mL  Freq: Every 12 Hours Scheduled Route: IV  Start: 07/12/23 1118   End: 07/16/23 1951        sodium chloride 0.9 % infusion  Rate: 100 mL/hr Dose: 100 mL/hr  Freq: Continuous Route: IV  Start: 07/13/23 1115   End: 07/14/23 0814                   Blood Administration Record (From admission, onward)      Completed transfusions       Ordered     Start    07/16/23 1727  Transfuse RBC Infuse Each Unit Over: 3.5H  Transfusion        Released Time Blood Unit Number Status   07/16/23 1749   23  [id number]  [id number] Completed 07/16/23 2040       07/16/23 1727    07/15/23 1643  Transfuse RBC, 2 Units Infuse Each Unit Over: 3.5H  Transfusion      Released Time Blood Unit Number Status   07/15/23 2141   23  [id number]  [id number] Completed 07/16/23 0052   07/16/23 0200   23  [id number]  [id number] Completed 07/16/23 0501       07/15/23 1643    07/15/23 0724  Transfuse RBC Infuse Each Unit Over: 3.5H  Transfusion        Released Time Blood Unit Number Status   07/15/23 0737   23  [id number]  [id number] Completed  07/15/23 1115       07/15/23 0724    07/15/23 0108  Transfuse RBC Infuse Each Unit Over: 3.5H  Transfusion        Released Time Blood Unit Number Status   07/15/23 0133   23  [id number]  [id number] Completed 07/15/23 0418       07/15/23 0108    07/14/23 1757  Transfuse RBC Infuse Each Unit Over: 3.5H  Transfusion        Released Time Blood Unit Number Status   07/14/23 2011   23  [id number]  [id number] Completed 07/14/23 2301       07/14/23 1757    07/14/23 0746  Transfuse RBC Infuse Each Unit Over: 3.5H  Transfusion        Released Time Blood Unit Number Status   07/14/23 1137   23  [id number]  [id number] Completed 07/14/23 1439       07/14/23 0739    07/13/23 2305  Transfuse RBC Infuse Each Unit Over: 3.5H  Transfusion        Released Time Blood Unit Number Status   07/14/23 0023   23  [id number]  [id number] Completed 07/14/23 0403       07/13/23 2302    07/13/23 0345  Transfuse RBC Infuse Each Unit Over: 3.5H  Transfusion        Released Time Blood Unit Number Status   07/13/23 0608   23  [id number]  [id number] Completed 07/13/23 0924       07/13/23 0345    07/12/23 0642  Transfuse RBC, 2 Units Infuse Each Unit Over: 3.5H  Transfusion      Released Time Blood Unit Number Status   07/12/23 0710   23  [id number]  [id number] Completed 07/12/23 1743   07/12/23 1436   23  [id number]  [id number] Completed 07/12/23 1803       07/12/23 0642            Canceled transfusions       Ordered     Start    07/13/23 2305  Transfuse RBC, 1 Units Infuse Each Unit Over: 3.5H  Transfusion,   Status:  Canceled         07/13/23 2302                  Operative/Procedure Notes (last 24 hours)  Notes from 07/16/23 1531 through 07/17/23 1531   No notes of this type exist for this encounter.          Physician Progress Notes (last 24 hours)        Xiomy Mckeon APRN at 07/17/23 1431       Attestation signed by Harshal Casillas MD at 07/17/23 1440    I have reviewed this documentation and agree.  Brief Attending  Progress Note   I have seen and examined the patient, performing an independent face-to-face diagnostic evaluation with plan of care reviewed and developed with the advanced practice clinician (APC).   Subjective: Feels well.  No further bleeding.  Old blood is resolving stool.  Tolerating diet so far and agrees with advancing diet.  Attending Physical Exam:    Constitutional:Awake, alert  HENT: NCAT, mucous membranes moist, neck supple  Respiratory: No cough or wheezing, respiratory effort normal, nonlabored breathing   Cardiovascular: Pulse rate is normal, normal radial pulses  Gastrointestinal:  soft, nontender, nondistended  Musculoskeletal: Normal musculature for age, no lower extremity edema, BMI 26  Psychiatric: Appropriate affect, cooperative, conversational  Neurologic: No slurred speech or facial droop, follows commands  Skin: No rashes or jaundice, warm  Brief Assessment/Plan:  I performed greater than 50% of medical decision making  Case discussed with Kenia Vuong with gastroenterology.  Per our conversation I will stop PPI drip and transition to once daily oral PPI.  Diet has been advanced today.  Discontinue Zosyn has of possible bacterial enteritis is completed.  I strongly feel patient could benefit from a pill camera to rule out any concerning pathology in the small bowel that led to the bleed.  GI is working on arranging this outpatient in the very near future.  If labs stable tomorrow and patient tolerating diet patient can discharge tomorrow.  Plan discussed with patient who is in agreement.  For additional information see note and plan below as developed with APC Xiomy Mckeon which I have reviewed    Harshal Casillas MD  23                           Name: Radha Noriega V ADMIT: 2023   : 1980  PCP: Hellen Skelton APRN    MRN: [id number] LOS: 3 days   AGE/SEX: 43 y.o. male  ROOM: [facility]     Subjective   Subjective   Feeling much better. Tolerating reg diet.  Stools more brown. Denies pain, dizziness/lightheadedness      Objective   Objective   Vital Signs  Temp:  [97.9 °F (36.6 °C)-98.4 °F (36.9 °C)] 98.2 °F (36.8 °C)  Heart Rate:  [75-89] 89  Resp:  [16-18] 18  BP: (106-119)/(64-72) 118/72  SpO2:  [98 %-100 %] 99 %  on   ;   Device (Oxygen Therapy): room air  Body mass index is 26.15 kg/m².  Physical Exam  Vitals and nursing note reviewed.   Constitutional:       General: He is not in acute distress.  HENT:      Head: Normocephalic.      Mouth/Throat:      Mouth: Mucous membranes are moist.   Eyes:      Conjunctiva/sclera: Conjunctivae normal.   Cardiovascular:      Rate and Rhythm: Normal rate.   Pulmonary:      Effort: Pulmonary effort is normal. No respiratory distress.   Abdominal:      Palpations: Abdomen is soft.   Musculoskeletal:      Cervical back: Neck supple.      Right lower leg: No edema.      Left lower leg: No edema.   Skin:     General: Skin is warm and dry.   Neurological:      Mental Status: He is alert and oriented to person, place, and time.   Psychiatric:         Mood and Affect: Mood normal.         Behavior: Behavior normal.     Results Review     I reviewed the patient's new clinical results.  Results from last 7 days   Lab Units 07/17/23  0807 07/17/23  0543 07/16/23  2348 07/16/23  1635 07/16/23  0814 07/16/23  0111 07/15/23  2050 07/15/23  1603   WBC 10*3/mm3  --  10.02  --   --   --  17.39* 15.20* 13.20*   HEMOGLOBIN g/dL 7.4* 7.4* 8.0* 6.2*   < > 6.7* 4.9* 5.9*   PLATELETS 10*3/mm3  --  138*  --   --   --  130* 145 132*    < > = values in this interval not displayed.     Results from last 7 days   Lab Units 07/17/23  0543 07/16/23  0111 07/15/23  1603 07/14/23  0608   SODIUM mmol/L 139 136 135* 133*   POTASSIUM mmol/L 3.5 3.8 3.6 3.8   CHLORIDE mmol/L 112* 110* 110* 108*   CO2 mmol/L 22.8 19.3* 19.0* 18.9*   BUN mg/dL 3* 7 9 19   CREATININE mg/dL 1.23 1.26 1.30* 1.15   GLUCOSE mg/dL 90 127* 105* 98   EGFR mL/min/1.73 75.2 73.0 70.3 81.5      Results from last 7 days   Lab Units 07/17/23  0543 07/16/23  0111 07/15/23  1603 07/13/23  1146   ALBUMIN g/dL 1.8* 2.1* 1.9* 2.9*   BILIRUBIN mg/dL <0.2 <0.2 0.2 0.3   ALK PHOS U/L 44 33* 32* 50   AST (SGOT) U/L 17 7 9 9   ALT (SGPT) U/L 15 8 5 11     Results from last 7 days   Lab Units 07/17/23  0543 07/16/23  0111 07/15/23  1603 07/14/23  0608 07/13/23  1146   CALCIUM mg/dL 7.3* 6.9* 6.8* 7.5* 8.3*   ALBUMIN g/dL 1.8* 2.1* 1.9*  --  2.9*     Results from last 7 days   Lab Units 07/13/23  2156 07/12/23  0612   PROCALCITONIN ng/mL  --  0.15   LACTATE mmol/L 1.2  --      No results found for: HGBA1C, POCGLU    IR Embolization    Result Date: 7/15/2023  Superior mesenteric angiogram with embolization as described. There was an area of active contrast extravasation into the small bowel of the left lower quadrant arising from one of the ileal branch arteries of the SMA. This was superselectively catheterized and coil embolization of the actively bleeding branch vessel was performed as described above. Technically successful result with no further active extravasation demonstrated upon completion of the procedure.  This report was finalized on 7/15/2023 8:36 PM by Dr. Moise Walker M.D.     I have personally reviewed all medications:  Scheduled Medications  acetaminophen, 650 mg, Oral, Once   Or  acetaminophen, 650 mg, Rectal, Once  acetaminophen, 650 mg, Oral, Once   Or  acetaminophen, 650 mg, Rectal, Once  folic acid, 1 mg, Oral, Daily  heparin, 0.3 mL, Other, Once in imaging  lactobacillus acidophilus, 1 capsule, Oral, BID  ondansetron, 4 mg, Intravenous, Once  piperacillin-tazobactam, 3.375 g, Intravenous, Q8H  senna-docusate sodium, 2 tablet, Oral, BID  sodium chloride, 10 mL, Intravenous, Q12H  sodium chloride, 10 mL, Intravenous, Q12H  thiamine, 100 mg, Oral, Daily    Infusions  pantoprazole, 8 mg/hr, Last Rate: 8 mg/hr (07/17/23 1006)    Diet  Diet: Regular/House Diet, Gastrointestinal Diets; Low  Irritant; Texture: Regular Texture (IDDSI 7); Fluid Consistency: Thin (IDDSI 0)    I have personally reviewed:  [x]  Laboratory   [x]  Microbiology   [x]  Radiology   [x]  EKG/Telemetry  [x]  Cardiology/Vascular   []  Pathology    []  Records      Assessment/Plan     Active Hospital Problems    Diagnosis  POA    **ABLA (acute blood loss anemia) [D62]  Yes    Metabolic acidosis [E87.20]  Yes    Enteritis of possible infectious origin [A09]  Yes    Sepsis [A41.9]  Yes    Blood per rectum [K62.5]  Unknown    Iron deficiency anemia [D50.9]  Yes    Current every day smoker [F17.200]  Yes    Gastrointestinal hemorrhage with melena [K92.1]  Yes    Acute blood loss anemia [D62]  Unknown      Resolved Hospital Problems   No resolved problems to display.       43 y.o. male admitted with ABLA (acute blood loss anemia).    Brief Hospital Course to date:  Radha Noriega V is a 43 y.o. male presents the hospital with sepsis and acute blood loss anemia with possible enteritis noted on imaging.  Ultimately tagged red blood scan was able to determine small bowel bleeding localized to the mid small bowel and patient went under interventional radiology coiling of a small branch of the SMA.     Discussion/plan for today:  Hgb 7.4 on last 2 draws. Received PRBC yesterday.  Blood transfusion given.  Continue to trend hemoglobin. Hemodynamically stable. Tolerating regular diet. Stools reported as brown, not black today. GI following. Continue PPI. Zosyn stop date in place. WBC has normalized.  Continue to trend and monitor for bleeding.       Blood pressure now improved.  Tachycardia related to anemia/bleeding resolving.     PICC line placed due to difficulty with IV access and multiple lost IVs.     Treated with Zosyn for enteritis.   Patient initially meeting sepsis criteria.  Possible gastrointestinal bacterial infection/enteritis but etiology somewhat unclear.  Probiotics added.     Supportive care and symptomatic treatment.      Careful monitoring.           SCDs for DVT prophylaxis.  Full code.  Discussed with patient and family.  Anticipate discharge home in 1-2 days. Or when cleared by consultants.      CHETNA Sparks  [facility]  07/17/23  14:31 EDT        Electronically signed by Harshal Casillas MD at 07/17/23 1440       Kenia Vuong PA-C at 07/17/23 1411          [facility]  Inpatient Progress Note    Reason for Follow-up: Anemia    Subjective     Interval History:   Dark stools overnight.  Brown stool with specks of black this morning-visualized by me.  Denies abdominal pain, nausea, vomiting.  Tolerating p.o. intake well.    Hemoglobin trend: 6.2 on 7/16, received 1 unit PRBC-completed at 2000, was 8.0 at 2330 and 7.4 0800 this morning.    Current Facility-Administered Medications:     [DISCONTINUED] acetaminophen (TYLENOL) tablet 650 mg, 650 mg, Oral, Once **OR** acetaminophen (TYLENOL) 160 MG/5ML solution 650 mg, 650 mg, Oral, Once **OR** acetaminophen (TYLENOL) suppository 650 mg, 650 mg, Rectal, Once, Emrelinda Dixon MD    [DISCONTINUED] acetaminophen (TYLENOL) tablet 650 mg, 650 mg, Oral, Once **OR** acetaminophen (TYLENOL) 160 MG/5ML solution 650 mg, 650 mg, Oral, Once **OR** acetaminophen (TYLENOL) suppository 650 mg, 650 mg, Rectal, Once, Harshal Casillas MD    sennosides-docusate (PERICOLACE) 8.6-50 MG per tablet 2 tablet, 2 tablet, Oral, BID, 2 tablet at 07/15/23 2234 **AND** polyethylene glycol (MIRALAX) packet 17 g, 17 g, Oral, Daily PRN **AND** bisacodyl (DULCOLAX) EC tablet 5 mg, 5 mg, Oral, Daily PRN **AND** bisacodyl (DULCOLAX) suppository 10 mg, 10 mg, Rectal, Daily PRN, Ermelinda Dixon MD    folic acid (FOLVITE) tablet 1 mg, 1 mg, Oral, Daily, Ermelinda Dixon MD, 1 mg at 07/17/23 0800    heparin injection 30 Units, 0.3 mL, Other, Once in imaging, Ermelinda iDxon MD    lactobacillus acidophilus (RISAQUAD) capsule 1 capsule, 1  capsule, Oral, BID, Harshal Casillas MD, 1 capsule at 07/17/23 0800    morphine injection 2 mg, 2 mg, Intravenous, Q4H PRN, Ermelinda Dixon MD, 2 mg at 07/16/23 0117    ondansetron (ZOFRAN) injection 4 mg, 4 mg, Intravenous, Once, Ermelinda Dixon MD    ondansetron (ZOFRAN) injection 4 mg, 4 mg, Intravenous, Q6H PRN, Ermelinda Dixon MD, 4 mg at 07/15/23 2333    pantoprazole (PROTONIX) 40 mg in 100 mL NS (VTB), 8 mg/hr, Intravenous, Continuous, Ermelinda Dixon MD, Last Rate: 20 mL/hr at 07/17/23 1006, 8 mg/hr at 07/17/23 1006    piperacillin-tazobactam (ZOSYN) 3.375 g in iso-osmotic dextrose 50 ml (premix), 3.375 g, Intravenous, Q8H, Shima Evangelista, CHETNA, Last Rate: 0 mL/hr at 07/16/23 2219, 3.375 g at 07/17/23 1326    [COMPLETED] Insert Peripheral IV, , , Once **AND** sodium chloride 0.9 % flush 10 mL, 10 mL, Intravenous, PRN, Ermelinda Dixon MD    sodium chloride 0.9 % flush 10 mL, 10 mL, Intravenous, PRN, Ermelinda Dixon MD    sodium chloride 0.9 % flush 10 mL, 10 mL, Intravenous, Q12H, Ermelinda Dixon MD, 10 mL at 07/17/23 0801    sodium chloride 0.9 % flush 10 mL, 10 mL, Intravenous, Q12H, Ermelinda Dixon MD, 10 mL at 07/17/23 0801    sodium chloride 0.9 % flush 10 mL, 10 mL, Intravenous, PRN, Ermelinda Dixon MD    sodium chloride 0.9 % flush 20 mL, 20 mL, Intravenous, PRN, Ermelinda Dixon MD    sodium chloride 0.9 % infusion 40 mL, 40 mL, Intravenous, PRN, Ermelinda Dixon MD    sodium chloride 0.9 % infusion 40 mL, 40 mL, Intravenous, PRN, Ermelinda Dixon MD    thiamine (VITAMIN B-1) tablet 100 mg, 100 mg, Oral, Daily, Ermelinda Dixon MD, 100 mg at 07/17/23 0800  Review of Systems:    The following systems were reviewed and negative;  respiratory and cardiovascular    Objective     Vital Signs  Temp:  [97.9 °F (36.6 °C)-98.4 °F (36.9 °C)] 98.2 °F (36.8 °C)  Heart Rate:  [75-89] 89  Resp:  [16-18] 18  BP: (106-119)/(64-72) 118/72  Body mass index is 26.15 kg/m².    Intake/Output Summary  (Last 24 hours) at 7/17/2023 1411  Last data filed at 7/16/2023 2109  Gross per 24 hour   Intake 312.5 ml   Output 225 ml   Net 87.5 ml     No intake/output data recorded.     Physical Exam:    General: patient awake, alert and cooperative   Eyes: Normal lids and lashes, no scleral icterus   Skin: warm and dry, not jaundiced   Pulm: regular and unlabored   Abdomen: soft, nontender, nondistended; normal bowel sounds   Psychiatric: Normal mood and behavior; memory intact     Results Review:     I reviewed the patient's new clinical results.    Results from last 7 days   Lab Units 07/17/23  0807 07/17/23  0543 07/16/23  2348 07/16/23  0814 07/16/23  0111 07/15/23  2050   WBC 10*3/mm3  --  10.02  --   --  17.39* 15.20*   HEMOGLOBIN g/dL 7.4* 7.4* 8.0*   < > 6.7* 4.9*   HEMATOCRIT % 22.1* 21.7* 24.7*   < > 19.7* 14.9*   PLATELETS 10*3/mm3  --  138*  --   --  130* 145    < > = values in this interval not displayed.     Results from last 7 days   Lab Units 07/17/23  0543 07/16/23  0111 07/15/23  1603   SODIUM mmol/L 139 136 135*   POTASSIUM mmol/L 3.5 3.8 3.6   CHLORIDE mmol/L 112* 110* 110*   CO2 mmol/L 22.8 19.3* 19.0*   BUN mg/dL 3* 7 9   CREATININE mg/dL 1.23 1.26 1.30*   CALCIUM mg/dL 7.3* 6.9* 6.8*   BILIRUBIN mg/dL <0.2 <0.2 0.2   ALK PHOS U/L 44 33* 32*   ALT (SGPT) U/L 15 8 5   AST (SGOT) U/L 17 7 9   GLUCOSE mg/dL 90 127* 105*     Results from last 7 days   Lab Units 07/12/23  0612   INR  1.07     Lab Results   Lab Value Date/Time    LIPASE 11 (L) 07/12/2023 0612    LIPASE 26 06/23/2023 1201       Radiology:  IR Embolization   Final Result   Superior mesenteric angiogram with embolization as described. There was   an area of active contrast extravasation into the small bowel of the   left lower quadrant arising from one of the ileal branch arteries of the   SMA. This was superselectively catheterized and coil embolization of the   actively bleeding branch vessel was performed as described above.   Technically  successful result with no further active extravasation   demonstrated upon completion of the procedure.       This report was finalized on 7/15/2023 8:36 PM by Dr. Moise Walker M.D.          NM GI Blood Loss   Final Result       Intestinal bleeding was observed during the exam, localized to the mid   small bowel.       Discussed by telephone with patient's nurse, Brigette, at time of   interpretation, 1414, 07/15/2023.           This report was finalized on 7/15/2023 2:19 PM by Dr. Brice Velasco M.D.          CT Angiogram Abdomen Pelvis   Final Result   1. No evidence of active contrast extravasation into the GI tract to   suggest active GI bleeding at this time   2. Previously demonstrated thickening of the duodenum and proximal   jejunum has resolved   3. Appendectomy           Radiation dose reduction techniques were utilized, including automated   exposure control and exposure modulation based on body size.       This report was finalized on 7/14/2023 1:07 PM by Dr. Moise Walker M.D.          NM GI Blood Loss   Final Result       Unsuccessful bleeding scan evaluation, as described. Follow-up/further   evaluation can be obtained as indications persist.           This report was finalized on 7/13/2023 7:19 PM by Dr. Brice Velasco M.D.          CT Abdomen Pelvis With Contrast   Final Result   Mild gastritis is suspected. Diffuse thickening involving   the duodenum and the proximal jejunal loops favored to be secondary to   peristalsis.       Radiation dose reduction techniques were utilized, including automated   exposure control and exposure modulation based on body size.       This report was finalized on 7/13/2023 1:35 PM by Dr. Len Herndon M.D.          XR Chest 1 View   Final Result   No acute findings       This report was finalized on 7/12/2023 7:30 AM by Dr. Moise Walker M.D.              Assessment & Plan     Active Hospital Problems    Diagnosis     **ABLA (acute blood loss  anemia)     Metabolic acidosis     Enteritis of possible infectious origin     Sepsis     Blood per rectum     Iron deficiency anemia     Current every day smoker     Gastrointestinal hemorrhage with melena     Acute blood loss anemia        Assessment:  Acute blood loss anemia:      Plan:  IR intervention with coiling to small bowel vessel in the ileum on 7/15  Hemoglobin dropped on 7/16 and received 1 unit PRBC  Continue to monitor H&H and transfuse per primary hospital team.  Continue to monitor for evidence of overt GI bleed.  Stool in bedside commode does appear to have some specks of black in it but mostly brown.  Do not see bright red blood  We will await hemoglobin trend and monitor for further evidence of overt GI bleed.  Hopefully drop in hemoglobin yesterday was residual from ileal blood vessel coiled the night prior.  May need video capsule endoscopy ASAP as outpatient given repeated/persistent bleeding.  Diet as tolerated.    I discussed the patients findings and my recommendations with patient and family.    Dragon dictation used throughout this note.            Kenia Vuong PA-C  [facility]  [address] [place] [address]  Office: [phone]                Electronically signed by Kenia Vuong PA-C at 07/17/23 1423       Consult Notes (last 24 hours)  Notes from 07/16/23 1531 through 07/17/23 1531   No notes of this type exist for this encounter.

## 2023-07-18 ENCOUNTER — APPOINTMENT (OUTPATIENT)
Dept: INTERVENTIONAL RADIOLOGY/VASCULAR | Facility: HOSPITAL | Age: 43
DRG: 356 | End: 2023-07-18
Payer: COMMERCIAL

## 2023-07-18 ENCOUNTER — APPOINTMENT (OUTPATIENT)
Dept: NUCLEAR MEDICINE | Facility: HOSPITAL | Age: 43
DRG: 356 | End: 2023-07-18
Payer: COMMERCIAL

## 2023-07-18 LAB
ALBUMIN SERPL-MCNC: 2.1 G/DL (ref 3.5–5.2)
ALBUMIN/GLOB SERPL: 1.2 G/DL
ALP SERPL-CCNC: 48 U/L (ref 39–117)
ALT SERPL W P-5'-P-CCNC: 14 U/L (ref 1–41)
ANION GAP SERPL CALCULATED.3IONS-SCNC: 5 MMOL/L (ref 5–15)
AST SERPL-CCNC: 16 U/L (ref 1–40)
BACTERIA SPEC AEROBE CULT: NORMAL
BACTERIA SPEC AEROBE CULT: NORMAL
BILIRUB SERPL-MCNC: <0.2 MG/DL (ref 0–1.2)
BUN SERPL-MCNC: 4 MG/DL (ref 6–20)
BUN/CREAT SERPL: 3.5 (ref 7–25)
CALCIUM SPEC-SCNC: 7.8 MG/DL (ref 8.6–10.5)
CHLORIDE SERPL-SCNC: 113 MMOL/L (ref 98–107)
CO2 SERPL-SCNC: 26 MMOL/L (ref 22–29)
CREAT SERPL-MCNC: 1.13 MG/DL (ref 0.76–1.27)
D-LACTATE SERPL-SCNC: 1 MMOL/L (ref 0.5–2)
DEPRECATED RDW RBC AUTO: 47.6 FL (ref 37–54)
EGFRCR SERPLBLD CKD-EPI 2021: 83.2 ML/MIN/1.73
ERYTHROCYTE [DISTWIDTH] IN BLOOD BY AUTOMATED COUNT: 15.6 % (ref 12.3–15.4)
GLOBULIN UR ELPH-MCNC: 1.7 GM/DL
GLUCOSE SERPL-MCNC: 117 MG/DL (ref 65–99)
HCT VFR BLD AUTO: 20.6 % (ref 37.5–51)
HCT VFR BLD AUTO: 22.6 % (ref 37.5–51)
HCT VFR BLD AUTO: 23 % (ref 37.5–51)
HGB BLD-MCNC: 6.9 G/DL (ref 13–17.7)
HGB BLD-MCNC: 7.6 G/DL (ref 13–17.7)
HGB BLD-MCNC: 7.8 G/DL (ref 13–17.7)
MCH RBC QN AUTO: 28.6 PG (ref 26.6–33)
MCHC RBC AUTO-ENTMCNC: 33.5 G/DL (ref 31.5–35.7)
MCV RBC AUTO: 85.5 FL (ref 79–97)
PLATELET # BLD AUTO: 198 10*3/MM3 (ref 140–450)
PMV BLD AUTO: 9.6 FL (ref 6–12)
POTASSIUM SERPL-SCNC: 3.5 MMOL/L (ref 3.5–5.2)
PROT SERPL-MCNC: 3.8 G/DL (ref 6–8.5)
RBC # BLD AUTO: 2.41 10*6/MM3 (ref 4.14–5.8)
SODIUM SERPL-SCNC: 144 MMOL/L (ref 136–145)
WBC NRBC COR # BLD: 8.98 10*3/MM3 (ref 3.4–10.8)

## 2023-07-18 PROCEDURE — 0 TECHNETIUM LABELED RED BLOOD CELLS: Performed by: INTERNAL MEDICINE

## 2023-07-18 PROCEDURE — 86900 BLOOD TYPING SEROLOGIC ABO: CPT

## 2023-07-18 PROCEDURE — 99152 MOD SED SAME PHYS/QHP 5/>YRS: CPT

## 2023-07-18 PROCEDURE — 85610 PROTHROMBIN TIME: CPT

## 2023-07-18 PROCEDURE — 25010000002 PIPERACILLIN SOD-TAZOBACTAM PER 1 G: Performed by: NURSE PRACTITIONER

## 2023-07-18 PROCEDURE — 0 NITROGLYCERIN 100-5 MCG/ML-% SOLUTION: Performed by: RADIOLOGY

## 2023-07-18 PROCEDURE — 75894 X-RAYS TRANSCATH THERAPY: CPT

## 2023-07-18 PROCEDURE — 36430 TRANSFUSION BLD/BLD COMPNT: CPT

## 2023-07-18 PROCEDURE — 83605 ASSAY OF LACTIC ACID: CPT | Performed by: RADIOLOGY

## 2023-07-18 PROCEDURE — 25010000002 FENTANYL CITRATE (PF) 50 MCG/ML SOLUTION: Performed by: RADIOLOGY

## 2023-07-18 PROCEDURE — C1769 GUIDE WIRE: HCPCS

## 2023-07-18 PROCEDURE — 99153 MOD SED SAME PHYS/QHP EA: CPT

## 2023-07-18 PROCEDURE — 78278 ACUTE GI BLOOD LOSS IMAGING: CPT

## 2023-07-18 PROCEDURE — C1887 CATHETER, GUIDING: HCPCS

## 2023-07-18 PROCEDURE — C1894 INTRO/SHEATH, NON-LASER: HCPCS

## 2023-07-18 PROCEDURE — 80053 COMPREHEN METABOLIC PANEL: CPT | Performed by: INTERNAL MEDICINE

## 2023-07-18 PROCEDURE — 25510000001 IOPAMIDOL PER 1 ML: Performed by: RADIOLOGY

## 2023-07-18 PROCEDURE — 25010000002 GLUCAGON (RDNA) PER 1 MG: Performed by: RADIOLOGY

## 2023-07-18 PROCEDURE — A9560 TC99M LABELED RBC: HCPCS | Performed by: INTERNAL MEDICINE

## 2023-07-18 PROCEDURE — C1889 IMPLANT/INSERT DEVICE, NOC: HCPCS

## 2023-07-18 PROCEDURE — 85014 HEMATOCRIT: CPT | Performed by: INTERNAL MEDICINE

## 2023-07-18 PROCEDURE — 04L53DZ OCCLUSION OF SUPERIOR MESENTERIC ARTERY WITH INTRALUMINAL DEVICE, PERCUTANEOUS APPROACH: ICD-10-PCS | Performed by: RADIOLOGY

## 2023-07-18 PROCEDURE — B4141ZZ FLUOROSCOPY OF SUPERIOR MESENTERIC ARTERY USING LOW OSMOLAR CONTRAST: ICD-10-PCS | Performed by: RADIOLOGY

## 2023-07-18 PROCEDURE — C1760 CLOSURE DEV, VASC: HCPCS

## 2023-07-18 PROCEDURE — 75726 ARTERY X-RAYS ABDOMEN: CPT

## 2023-07-18 PROCEDURE — 85018 HEMOGLOBIN: CPT | Performed by: INTERNAL MEDICINE

## 2023-07-18 PROCEDURE — 85027 COMPLETE CBC AUTOMATED: CPT | Performed by: INTERNAL MEDICINE

## 2023-07-18 PROCEDURE — P9016 RBC LEUKOCYTES REDUCED: HCPCS

## 2023-07-18 PROCEDURE — 25010000002 MIDAZOLAM PER 1 MG: Performed by: RADIOLOGY

## 2023-07-18 PROCEDURE — 99232 SBSQ HOSP IP/OBS MODERATE 35: CPT | Performed by: INTERNAL MEDICINE

## 2023-07-18 RX ORDER — SODIUM CHLORIDE 9 MG/ML
INJECTION, SOLUTION INTRAVENOUS CONTINUOUS PRN
Status: COMPLETED | OUTPATIENT
Start: 2023-07-18 | End: 2023-07-18

## 2023-07-18 RX ORDER — IBUPROFEN 600 MG/1
TABLET ORAL AS NEEDED
Status: COMPLETED | OUTPATIENT
Start: 2023-07-18 | End: 2023-07-18

## 2023-07-18 RX ORDER — MIDAZOLAM HYDROCHLORIDE 1 MG/ML
INJECTION INTRAMUSCULAR; INTRAVENOUS AS NEEDED
Status: COMPLETED | OUTPATIENT
Start: 2023-07-18 | End: 2023-07-18

## 2023-07-18 RX ORDER — LIDOCAINE HYDROCHLORIDE 10 MG/ML
INJECTION, SOLUTION EPIDURAL; INFILTRATION; INTRACAUDAL; PERINEURAL AS NEEDED
Status: COMPLETED | OUTPATIENT
Start: 2023-07-18 | End: 2023-07-18

## 2023-07-18 RX ORDER — HEPARIN SODIUM (PORCINE) LOCK FLUSH IV SOLN 100 UNIT/ML 100 UNIT/ML
0.3 SOLUTION INTRAVENOUS
Status: DISCONTINUED | OUTPATIENT
Start: 2023-07-18 | End: 2023-07-21 | Stop reason: HOSPADM

## 2023-07-18 RX ORDER — FENTANYL CITRATE 50 UG/ML
INJECTION, SOLUTION INTRAMUSCULAR; INTRAVENOUS AS NEEDED
Status: COMPLETED | OUTPATIENT
Start: 2023-07-18 | End: 2023-07-18

## 2023-07-18 RX ADMIN — Medication 10 ML: at 08:41

## 2023-07-18 RX ADMIN — TECHNETIUM TC 99M-LABELED RED BLOOD CELLS 1 DOSE: KIT at 12:31

## 2023-07-18 RX ADMIN — PANTOPRAZOLE SODIUM 40 MG: 40 TABLET, DELAYED RELEASE ORAL at 06:10

## 2023-07-18 RX ADMIN — SODIUM CHLORIDE: 900 IRRIGANT IRRIGATION at 16:53

## 2023-07-18 RX ADMIN — Medication 10 ML: at 20:23

## 2023-07-18 RX ADMIN — Medication 1 CAPSULE: at 08:41

## 2023-07-18 RX ADMIN — FENTANYL CITRATE 25 MCG: 50 INJECTION INTRAMUSCULAR; INTRAVENOUS at 16:37

## 2023-07-18 RX ADMIN — PIPERACILLIN SODIUM AND TAZOBACTAM SODIUM 3.38 G: 3; .375 INJECTION, SOLUTION INTRAVENOUS at 14:55

## 2023-07-18 RX ADMIN — GLUCAGON 1 MG: KIT at 16:49

## 2023-07-18 RX ADMIN — GLUCAGON 1 MG: KIT at 18:23

## 2023-07-18 RX ADMIN — MIDAZOLAM 0.5 MG: 1 INJECTION INTRAMUSCULAR; INTRAVENOUS at 18:29

## 2023-07-18 RX ADMIN — LIDOCAINE HYDROCHLORIDE 2 ML: 10 INJECTION, SOLUTION EPIDURAL; INFILTRATION; INTRACAUDAL; PERINEURAL at 16:40

## 2023-07-18 RX ADMIN — IOPAMIDOL 124 ML: 510 INJECTION, SOLUTION INTRAVASCULAR at 19:20

## 2023-07-18 RX ADMIN — FENTANYL CITRATE 25 MCG: 50 INJECTION INTRAMUSCULAR; INTRAVENOUS at 17:42

## 2023-07-18 RX ADMIN — SODIUM CHLORIDE 20 ML/HR: 9 INJECTION, SOLUTION INTRAVENOUS at 16:26

## 2023-07-18 RX ADMIN — FENTANYL CITRATE 25 MCG: 50 INJECTION INTRAMUSCULAR; INTRAVENOUS at 18:29

## 2023-07-18 RX ADMIN — PIPERACILLIN SODIUM AND TAZOBACTAM SODIUM 3.38 G: 3; .375 INJECTION, SOLUTION INTRAVENOUS at 06:11

## 2023-07-18 RX ADMIN — Medication 10 ML: at 19:00

## 2023-07-18 RX ADMIN — MIDAZOLAM 0.5 MG: 1 INJECTION INTRAMUSCULAR; INTRAVENOUS at 16:37

## 2023-07-18 RX ADMIN — Medication 1 CAPSULE: at 20:19

## 2023-07-18 RX ADMIN — FENTANYL CITRATE 25 MCG: 50 INJECTION INTRAMUSCULAR; INTRAVENOUS at 18:15

## 2023-07-18 RX ADMIN — Medication 100 MG: at 08:41

## 2023-07-18 RX ADMIN — FOLIC ACID 1 MG: 1 TABLET ORAL at 08:41

## 2023-07-18 RX ADMIN — SODIUM CHLORIDE: 900 IRRIGANT IRRIGATION at 16:54

## 2023-07-18 RX ADMIN — SENNOSIDES AND DOCUSATE SODIUM 2 TABLET: 50; 8.6 TABLET ORAL at 08:41

## 2023-07-18 RX ADMIN — PIPERACILLIN SODIUM AND TAZOBACTAM SODIUM 3.38 G: 3; .375 INJECTION, SOLUTION INTRAVENOUS at 21:25

## 2023-07-18 NOTE — CASE MANAGEMENT/SOCIAL WORK
Continued Stay Note  [facility]     Patient Name: Radha Noriega V  MRN: [id number]  Today's Date: 7/18/2023    Admit Date: 7/12/2023    Plan: Return home with spouse   Discharge Plan       Row Name 07/18/23 1525       Plan    Plan Return home with spouse    Patient/Family in Agreement with Plan yes    Plan Comments Per nursing, patient had questions about housing resources.  Given [employer] and given housing resources form website.  Plan remains to return home at [place].  CCP following.  Sridhar MARIE                 Expected Discharge Date and Time       Expected Discharge Date Expected Discharge Time    Jul 19, 2023               Becky S. [place], RN

## 2023-07-18 NOTE — PLAN OF CARE
Goal Outcome Evaluation:        Problem: Adult Inpatient Plan of Care  Goal: Absence of Hospital-Acquired Illness or Injury  Intervention: Prevent and Manage VTE (Venous Thromboembolism) Risk  Recent Flowsheet Documentation  Taken 7/18/2023 1500 by Milvia Bobby RN  VTE Prevention/Management: patient refused intervention  Taken 7/18/2023 0841 by Milvia Bobby RN  Activity Management: activity encouraged  VTE Prevention/Management: patient refused intervention  Range of Motion: active ROM (range of motion) encouraged     Problem: Bleeding (Sepsis/Septic Shock)  Goal: Absence of Bleeding  Outcome: Ongoing, Progressing  Intervention: Monitor and Manage Bleeding  Recent Flowsheet Documentation  Taken 7/18/2023 0841 by Milvia Bobby RN  Bleeding Precautions: monitored for signs of bleeding  Bleeding Management: blood products administered     Pt AAOx4, ad ignacio walking on own to bathroom, VS stable, received 1 unit of blood today. HGB was 6.9 before blood and re check was 7.8. Repeat RBC scan showed GI bleed in small bowel at aortic bifurcation. Pt taken down to have an embolization in IR. Awaiting report from IR at this time.

## 2023-07-18 NOTE — NURSING NOTE
Pt arrived to radiology bay 16 for an emergent visceral angiography and embolization with Dr Pinto

## 2023-07-18 NOTE — PLAN OF CARE
Goal Outcome Evaluation:           Progress: improving  Outcome Evaluation: Patient alert and oriented. VSS on RA. Continued H&H last check 7.6. Left brachial double lumen PICC line CDI. IV ABX Q6 Zosyn. Tolerating regular diet, no complaints of nausea or abdominal pain. Up ad ignacio no bowel movements overnight. Using urinal approprately adequate output. Will continue to monitor for remainder of shift. Plans for possible discharge today.

## 2023-07-18 NOTE — PROGRESS NOTES
Name: Radha Noriega V ADMIT: 2023   : 1980  PCP: Hellen Skelton APRN    MRN: [id number] LOS: 4 days   AGE/SEX: 43 y.o. male  ROOM: [facility]     Subjective   Subjective   Hgb down to 6.9 this a.m. GI ordered repeat tagged RBC scan today w/results showing positive bleeding. Pt feels well. Denies pain, palpitations, soa, dizziness       Objective   Objective   Vital Signs  Temp:  [97.7 °F (36.5 °C)-99 °F (37.2 °C)] 98.4 °F (36.9 °C)  Heart Rate:  [70-89] 70  Resp:  [18] 18  BP: (115-124)/(68-82) 118/72  SpO2:  [100 %] 100 %  on   ;   Device (Oxygen Therapy): room air  Body mass index is 26.15 kg/m².  Physical Exam  Vitals and nursing note reviewed.   Constitutional:       General: He is not in acute distress.  HENT:      Head: Normocephalic.      Mouth/Throat:      Mouth: Mucous membranes are moist.   Eyes:      Conjunctiva/sclera: Conjunctivae normal.   Cardiovascular:      Rate and Rhythm: Normal rate and regular rhythm.   Pulmonary:      Effort: Pulmonary effort is normal. No respiratory distress.      Breath sounds: No wheezing or rales.   Abdominal:      General: Bowel sounds are normal.      Palpations: Abdomen is soft.   Musculoskeletal:      Cervical back: Neck supple.      Right lower leg: No edema.      Left lower leg: No edema.   Skin:     General: Skin is warm and dry.   Neurological:      Mental Status: He is alert and oriented to person, place, and time.   Psychiatric:         Mood and Affect: Mood normal.         Behavior: Behavior normal.     Results Review     I reviewed the patient's new clinical results.  Results from last 7 days   Lab Units 23  0614 23  2357 23  1547 23  0807 23  0543 23  0814 23  0111 07/15/23  2050   WBC 10*3/mm3 8.98  --   --   --  10.02  --  17.39* 15.20*   HEMOGLOBIN g/dL 6.9* 7.6* 7.7* 7.4* 7.4*   < > 6.7* 4.9*   PLATELETS 10*3/mm3 198  --   --   --  138*  --  130* 145    < > = values in this interval not displayed.      Results from last 7 days   Lab Units 07/18/23  0614 07/17/23  0543 07/16/23  0111 07/15/23  1603   SODIUM mmol/L 144 139 136 135*   POTASSIUM mmol/L 3.5 3.5 3.8 3.6   CHLORIDE mmol/L 113* 112* 110* 110*   CO2 mmol/L 26.0 22.8 19.3* 19.0*   BUN mg/dL 4* 3* 7 9   CREATININE mg/dL 1.13 1.23 1.26 1.30*   GLUCOSE mg/dL 117* 90 127* 105*   EGFR mL/min/1.73 83.2 75.2 73.0 70.3     Results from last 7 days   Lab Units 07/18/23  0614 07/17/23  0543 07/16/23  0111 07/15/23  1603   ALBUMIN g/dL 2.1* 1.8* 2.1* 1.9*   BILIRUBIN mg/dL <0.2 <0.2 <0.2 0.2   ALK PHOS U/L 48 44 33* 32*   AST (SGOT) U/L 16 17 7 9   ALT (SGPT) U/L 14 15 8 5     Results from last 7 days   Lab Units 07/18/23  0614 07/17/23  0543 07/16/23  0111 07/15/23  1603   CALCIUM mg/dL 7.8* 7.3* 6.9* 6.8*   ALBUMIN g/dL 2.1* 1.8* 2.1* 1.9*     Results from last 7 days   Lab Units 07/13/23  2156 07/12/23  0612   PROCALCITONIN ng/mL  --  0.15   LACTATE mmol/L 1.2  --      No results found for: HGBA1C, POCGLU    NM GI Blood Loss    Result Date: 7/18/2023  Positive GI bleed exam localizing to the small bowel at about the level of the aortic bifurcation. This roughly approximates the area of bleeding observed 2 days ago. The nurse caring for the patient was notified of the findings by the nuclear medicine technologist at around 1:30 PM.  This report was finalized on 7/18/2023 2:08 PM by Dr. Imtiaz Claire M.D.     I have personally reviewed all medications:  Scheduled Medications  acetaminophen, 650 mg, Oral, Once   Or  acetaminophen, 650 mg, Rectal, Once  acetaminophen, 650 mg, Oral, Once   Or  acetaminophen, 650 mg, Rectal, Once  folic acid, 1 mg, Oral, Daily  heparin, 0.3 mL, Other, Once in imaging  heparin, 0.3 mL, Other, Once in imaging  lactobacillus acidophilus, 1 capsule, Oral, BID  ondansetron, 4 mg, Intravenous, Once  pantoprazole, 40 mg, Oral, Q AM  piperacillin-tazobactam, 3.375 g, Intravenous, Q8H  senna-docusate sodium, 2 tablet, Oral, BID  sodium  chloride, 10 mL, Intravenous, Q12H  sodium chloride, 10 mL, Intravenous, Q12H  thiamine, 100 mg, Oral, Daily    Infusions   Diet  Diet: Regular/House Diet, Gastrointestinal Diets; Low Irritant; Texture: Regular Texture (IDDSI 7); Fluid Consistency: Thin (IDDSI 0)    I have personally reviewed:  [x]  Laboratory   [x]  Microbiology   [x]  Radiology   [x]  EKG/Telemetry  [x]  Cardiology/Vascular   []  Pathology    []  Records       Assessment/Plan     Active Hospital Problems    Diagnosis  POA    **ABLA (acute blood loss anemia) [D62]  Yes    Metabolic acidosis [E87.20]  Yes    Enteritis of possible infectious origin [A09]  Yes    Sepsis [A41.9]  Yes    Blood per rectum [K62.5]  Unknown    Iron deficiency anemia [D50.9]  Yes    Current every day smoker [F17.200]  Yes    Gastrointestinal hemorrhage with melena [K92.1]  Yes    Acute blood loss anemia [D62]  Unknown      Resolved Hospital Problems   No resolved problems to display.       43 y.o. male admitted with ABLA (acute blood loss anemia).    Brief Hospital Course to date:  Radha Noriega V is a 43 y.o. male presents the hospital with sepsis and acute blood loss anemia with possible enteritis noted on imaging.  Ultimately tagged red blood scan was able to determine small bowel bleeding localized to the mid small bowel and patient underwent interventional radiology coiling of a small branch of the SMA.     Discussion/plan for today:  Hgb back down to 6.9 this a.m. (7.4-7.7 on serial lab draws yesterday). Given 1 unit PRBC. Underwent RBC tagged scan that was positive for bleeding localizing to small bowel about level of aortic bifurcation, roughly approximates the area of bleeding seen 2 days ago. GI aware, notifying IR for intervention.  Continue to trend hemoglobin. Hemodynamically stable. Tolerating regular diet.  Continue PPI now po. Zosyn stop date in place. WBC has normalized     Blood pressure now improved.  Tachycardia improved; related to anemia/bleeding       PICC line placed due to difficulty with IV access and multiple lost IVs.     Treated with Zosyn for enteritis.   Patient initially meeting sepsis criteria.  Possible gastrointestinal bacterial infection/enteritis but etiology somewhat unclear.  Probiotics added.     Supportive care and symptomatic treatment.     Careful monitoring.           SCDs for DVT prophylaxis.  Full code.  Discussed with patient and Dr. Casillas .  Anticipate discharge home when cleared by consultants..      CHETNA Sparks  [facility]  07/18/23  15:01 EDT

## 2023-07-18 NOTE — PAYOR COMM NOTE
"Radha Noriega (43 y.o. Male)        PLEASE SEE ATTACHED FOR INPT AUTH AND DAYS APPROVED.     REF#[id number]    PLEASE CALL  OR  [phone]    THANK YOU    HERBERTH WHALEY LPN CCP   Date of Birth   1980    Social Security Number       Address   [address] [place] [address]    Home Phone   [phone]    MRN   [id number]       Pentecostalism   None    Marital Status                               Admission Date   7/12/23    Admission Type   Emergency    Admitting Provider   Harshal Casillas MD    Attending Provider   Harshal Casillas MD    Department, Room/Bed   [address], S601/1       Discharge Date       Discharge Disposition       Discharge Destination                                 Attending Provider: Harshal Casillas MD    Allergies: No Known Allergies    Isolation: None   Infection: None   Code Status: CPR    Ht: 172.7 cm (68\")   Wt: 78 kg (172 lb)    Admission Cmt: None   Principal Problem: ABLA (acute blood loss anemia) [D62]                   Active Insurance as of 7/12/2023       Primary Coverage       Payor Plan Insurance Group Employer/Plan Group    [employer]HEM BLUE CROSS ANTHEM BLUE CROSS BLUE SHIELD PPO [id number]       Payor Plan Address Payor Plan Phone Number Payor Plan Fax Number Effective Dates    [address] [phone]  3/17/2022 - None Entered    [facility] [id number]         Subscriber Name Subscriber Birth Date Member ID       RADHA NORIEGA 1980 [id number]               Secondary Coverage       Payor Plan Insurance Group Employer/Plan Group    [facility] BY LUISA [facility] BY LUISA [id number]       Payor Plan Address Payor Plan Phone Number Payor Plan Fax Number Effective Dates    [address] [address]   1/1/2021 - None Entered    [facility] [id number]         Subscriber Name Subscriber Birth Date Member ID       RADHA NORIEGA 1980 [id number]                     Emergency Contacts  "       (Rel.) Home Phone Work Phone Mobile Phone    NED NORIEGA (Spouse) [phone] -- [phone]    Zainab Elaine (Mother) -- -- [phone]              [place]: [facility] [id number]  Tax ID [id number]     History & Physical        Hasmukh Child MD at 07/12/23 1429              Patient Name:  Rahda Noriega V  YOB: 1980  MRN:  [id number]  Admit Date:  7/12/2023  Patient Care Team:  Hellen Skelton APRN as PCP - General (Nurse Practitioner)      Subjective  History Present Illness     Chief Complaint   Patient presents with    Abdominal Pain     This is a 43-year-old male with alcohol dependence, tobacco use as well as multiple previous admissions for GI bleed.  During a recent admission he was admitted for the same issue.  EGD and colonoscopy were negative for source of bleeding.  An outpatient video endoscopy was planned, but the patient had return to the hospital with hematochezia before could be performed.  During the most recent admission prior to this, his hemoglobin was 6.0 and he was admitted and given pRBC and iron transfusions.  GI saw the patient in consultation at that point time.  2 CT abdomen and pelvis were performed but no bleeding source was not identified.  Question anoscopy was negative as well.  He was subsequently discharged  He was actually supposed to have of video capsule study today however he started experiencing abdominal pain.  He had reported bright red blood in his stool and lightheadedness and palpitations.  His hemoglobin was noted to be 7.1, WBC 21,000, creatinine 1.37.  Urinalysis i was negative for UTI.  Chest x-ray was unremarkable.  CT of the abdomen pelvis showed mild gastritis as well as thickening of the duodenum and proximal jejunal loops, favored to be secondary to peristalsis. PRBC transfusion was initiated.  He was admitted for further evaluation and management.    Personal History     Past Medical History:   Diagnosis Date    Anemia      ETOH abuse     Tachycardia 06/23/2023    Tobacco dependence      Past Surgical History:   Procedure Laterality Date    APPENDECTOMY      COLONOSCOPY N/A 06/25/2023    Procedure: COLONOSCOPY TO CECUM AND TERMINAL ILEUM WITH BIOPSIES AND COLD BIOPSY POLYPECTOMIES;  Surgeon: Imtiaz Lee MD;  Location: [facility] ENDOSCOPY;  Service: Gastroenterology;  Laterality: N/A;  PRE- MELENA  POST- COLON POLYPS, HEMORRHOIDS    ENDOSCOPY Left 06/24/2023    Procedure: ESOPHAGOGASTRODUODENOSCOPY;  Surgeon: Imtiaz Lee MD;  Location:  [facility] ENDOSCOPY;  Service: Gastroenterology;  Laterality: Left;  small hiatal hernia, esophagitis    ENTEROSCOPY SMALL BOWEL N/A 7/9/2023    Procedure: ENTEROSCOPY SMALL BOWEL;  Surgeon: Isrrael Gibson MD;  Location: [facility] ENDOSCOPY;  Service: Gastroenterology;  Laterality: N/A;  PRE- GI BLEED  POST- NORMAL     Family History   Problem Relation Age of Onset    Diabetes Father     Diabetes Sister     Hypertension Sister     Diabetes Brother     Diabetes Maternal Grandmother     Diabetes Maternal Grandfather     Diabetes Paternal Grandmother     Diabetes Paternal Grandfather     Heart attack Maternal Aunt 73    Colon cancer Neg Hx     Prostate cancer Neg Hx      Social History     Tobacco Use    Smoking status: Every Day     Packs/day: 1.00     Years: 20.00     Pack years: 20.00     Types: Cigarettes    Smokeless tobacco: Never   Vaping Use    Vaping Use: Never used   Substance Use Topics    Alcohol use: Not Currently     Comment: one pint to one fifth of marielos a day    Drug use: Yes     Types: Marijuana     Comment: daily.     No current facility-administered medications on file prior to encounter.     Current Outpatient Medications on File Prior to Encounter   Medication Sig Dispense Refill    ferrous sulfate (FerrouSul) 325 (65 FE) MG tablet Take 1 tablet by mouth Daily With Breakfast. 30 tablet 0    folic acid (FOLVITE) 1 MG tablet Take 1 tablet by mouth Daily for 30 days. 30 tablet 0     pantoprazole (PROTONIX) 40 MG EC tablet Take 1 tablet by mouth Every Morning. 30 tablet 0    polycarbophil 625 MG tablet tablet Take 1 tablet by mouth Daily for 30 days. 30 tablet 0    thiamine (VITAMIN B1) 100 MG tablet Take 1 tablet by mouth Daily. 30 tablet 0     No Known Allergies    Objective   Objective     Vital Signs  Temp:  [97.1 °F (36.2 °C)-98.9 °F (37.2 °C)] 97.7 °F (36.5 °C)  Heart Rate:  [] 86  Resp:  [16-20] 16  BP: (109-128)/(61-84) 120/67  SpO2:  [99 %-100 %] 100 %  on   ;   Device (Oxygen Therapy): room air  Body mass index is 26.94 kg/m².    Physical Exam  Constitutional:       Appearance: Normal appearance.   HENT:      Head: Normocephalic and atraumatic.   Cardiovascular:      Rate and Rhythm: Normal rate and regular rhythm.   Pulmonary:      Effort: Pulmonary effort is normal. No respiratory distress.   Abdominal:      General: There is no distension.      Palpations: Abdomen is soft.      Tenderness: There is no abdominal tenderness.   Musculoskeletal:      Right lower leg: No edema.      Left lower leg: No edema.   Skin:     General: Skin is warm and dry.   Neurological:      General: No focal deficit present.      Mental Status: He is alert and oriented to person, place, and time.       Results Review:  I reviewed the patient's new clinical results.  I reviewed the patient's new imaging results and agree with the interpretation.  I reviewed the patient's other test results and agree with the interpretation  I personally viewed and interpreted the patient's EKG/Telemetry data  Discussed with ED provider.    Lab Results (last 24 hours)       Procedure Component Value Units Date/Time    CBC & Differential [[id number]]  (Abnormal) Collected: 07/12/23 0609    Specimen: Blood Updated: 07/12/23 0626    Narrative:          CBC Auto Differential [[id number]]  (Abnormal) Collected: 07/12/23 0609    Specimen: Blood Updated: 07/12/23 0626     WBC 20.68 10*3/mm3      RBC 2.42 10*6/mm3      Hemoglobin  7.1 g/dL      Hematocrit 21.3 %      MCV 88.0 fL      MCH 29.3 pg      MCHC 33.3 g/dL      RDW 15.4 %      RDW-SD 48.4 fl      MPV 10.4 fL      Platelets 399 10*3/mm3      nRBC 0.1 /100 WBC     Manual Differential [[id number]]  (Abnormal) Collected: 07/12/23 0609    Specimen: Blood Updated: 07/12/23 0656     Neutrophil % 74.0 %      Lymphocyte % 19.0 %      Monocyte % 7.0 %      Neutrophils Absolute 15.30 10*3/mm3      Lymphocytes Absolute 3.93 10*3/mm3      Monocytes Absolute 1.45 10*3/mm3      Anisocytosis Mod/2+     WBC Morphology Normal     Platelet Morphology Normal    Comprehensive Metabolic Panel [[id number]]  (Abnormal) Collected: 07/12/23 0612    Specimen: Blood Updated: 07/12/23 0639     Glucose 131 mg/dL      BUN 21 mg/dL      Creatinine 1.37 mg/dL      Sodium 136 mmol/L      Potassium 3.5 mmol/L      Chloride 103 mmol/L      CO2 18.2 mmol/L      Calcium 9.1 mg/dL      Total Protein 6.5 g/dL      Albumin 3.8 g/dL      ALT (SGPT) 12 U/L      AST (SGOT) 13 U/L      Alkaline Phosphatase 70 U/L      Total Bilirubin 0.3 mg/dL      Globulin 2.7 gm/dL      A/G Ratio 1.4 g/dL      BUN/Creatinine Ratio 15.3     Anion Gap 14.8 mmol/L      eGFR 66.0 mL/min/1.73     Narrative:          Protime-INR [[id number]]  (Normal) Collected: 07/12/23 0612    Specimen: Blood Updated: 07/12/23 0645     Protime 14.1 Seconds      INR 1.07    aPTT [[id number]]  (Normal) Collected: 07/12/23 0612    Specimen: Blood Updated: 07/12/23 0645     PTT 25.7 seconds     Lipase [[id number]]  (Abnormal) Collected: 07/12/23 0612    Specimen: Blood Updated: 07/12/23 0639     Lipase 11 U/L     Procalcitonin [[id number]]  (Normal) Collected: 07/12/23 0612    Specimen: Blood Updated: 07/12/23 1157     Procalcitonin 0.15 ng/mL     Narrative:          Urinalysis With Microscopic If Indicated (No Culture) - Urine, Clean Catch [[id number]]  (Normal) Collected: 07/12/23 1219    Specimen: Urine, Clean Catch Updated: 07/12/23 1229     Color, UA Yellow      Appearance, UA Clear     pH, UA <=5.0     Specific Gravity, UA >=1.030     Glucose, UA Negative     Ketones, UA Negative     Bilirubin, UA Negative     Blood, UA Negative     Protein, UA Negative     Leuk Esterase, UA Negative     Nitrite, UA Negative     Urobilinogen, UA 0.2 E.U./dL    Narrative:      Urine microscopic not indicated.            No results found for this or any previous visit.    Imaging Results (Last 24 Hours)       Procedure Component Value Units Date/Time    CT Abdomen Pelvis With Contrast [[id number]] Collected: 07/12/23 0846     Updated: 07/12/23 0846    Narrative:      CT ABDOMEN AND PELVIS WITH CONTRAST     HISTORY: 43-year-old male with lower abdominal pain and nausea and blood  in stool.     TECHNIQUE: Axial CT images of the abdomen and pelvis were obtained  following administration of intravenous contrast. The patient was not  given oral contrast Coronal and sagittal reformats were obtained.     COMPARISON: 07/08/2023     FINDINGS: Mild wall thickening of the duodenum and the proximal jejunal  loops likely related to peristalsis. Mild diffuse gastric wall  thickening in a pattern consistent with gastritis. No evidence of bowel  obstruction.     The liver demonstrates normal attenuation. The gallbladder is normal.  The spleen is normal. The pancreas is normal. Bilateral adrenal glands  are normal. No renal calculi or hydronephrosis. The urinary bladder is  partially distended and normal.       Impression:      Mild gastritis is suspected. Diffuse thickening involving  the duodenum and the proximal jejunal loops favored to be secondary to  peristalsis.     Radiation dose reduction techniques were utilized, including automated  exposure control and exposure modulation based on body size.          XR Chest 1 View [[id number]] Collected: 07/12/23 0729     Updated: 07/12/23 0733    Narrative:      AP CHEST     HISTORY: Leukocytosis     COMPARISON: None available     FINDINGS: Mildly elevated left  hemidiaphragm. No evidence of airspace  infiltrate. Heart size normal. No pneumothorax. Visualized osseous  structures are unremarkable.       Impression:      No acute findings     This report was finalized on 7/12/2023 7:30 AM by Dr. Moise Walker M.D.                   No orders to display              Assessment/Plan     Active Hospital Problems    Diagnosis  POA    **ABLA (acute blood loss anemia) [D62]  Yes      Resolved Hospital Problems   No resolved problems to display.     Acute blood loss anemia  GI bleed  Extensive negative work-up includes EGD, colonoscopy, CT abdomen angiography, which enteroscopy without definite source of bleeding.  We will start pantoprazole GGT.  Every 8 hemoglobin hematocrit.  Transfuse to keep hemoglobin over 7  Consult GI for possible inpatient capsule endoscopy.  N.p.o. diet    Leukocytosis  Etiology is unclear.  Infectious work-up has been negative.  Procalcitonin is 1.5  Check GI PCR panel.  Holding off antibiotics for now.  Patient is afebrile and hemodynamically stable.      Hyperglycemia  Carbohydrate consistent diet when appropriate for p.o. intake      I discussed the patient's findings and my recommendations with patient and ED provider.    VTE Prophylaxis - SCDs.  Code Status - Full code.       Hasmukh Child MD  [facility]  07/12/23  14:23 EDT      Electronically signed by Hasmukh Child MD at 07/12/23 1434          Emergency Department Notes        Minesh Mccray RN at 07/12/23 1143          Second unit of blood to be initiated pending patient returning to bed from commode for full set of vitals    Electronically signed by Minesh Mccray RN at 07/12/23 1144       Mickey Jacques RN at 07/12/23 1056              Tamir Harris II, MD at 07/12/23 0704        Procedure Orders    1. Critical Care [[id number]] ordered by Tamir Harris II, MD                  EMERGENCY DEPARTMENT ENCOUNTER    Room Number:  14/14  PCP: [place]  CHETNA Macedo      HPI:  Chief Complaint: Abdominal pain  A complete HPI/ROS/PMH/PSH/SH/FH are unobtainable due to: None  Context: Radha Noriega V is a 43 y.o. male who presents to the ED c/o abdominal pain.  Right lower and suprapubic pain.  He has had associated bright red blood in his stool that occurs intermittently with lightheadedness and feeling that his heart is racing.  Symptoms been persistent.  He has had next of work-up in the recent past without any clear explanation for his anemia.          PAST MEDICAL HISTORY  Active Ambulatory Problems     Diagnosis Date Noted    Gastrointestinal hemorrhage with melena 06/23/2023    ETOH abuse 06/23/2023    Migraine 02/09/2021    Current every day smoker 06/23/2023    Tachycardia 06/23/2023    Acute blood loss anemia 06/23/2023    Family history of diabetes mellitus 06/29/2023    Iron deficiency anemia 07/08/2023     Resolved Ambulatory Problems     Diagnosis Date Noted    GI bleed 07/07/2023     Past Medical History:   Diagnosis Date    Anemia     Tobacco dependence          PAST SURGICAL HISTORY  Past Surgical History:   Procedure Laterality Date    APPENDECTOMY      COLONOSCOPY N/A 06/25/2023    Procedure: COLONOSCOPY TO CECUM AND TERMINAL ILEUM WITH BIOPSIES AND COLD BIOPSY POLYPECTOMIES;  Surgeon: Imtiaz Lee MD;  Location: [facility] ENDOSCOPY;  Service: Gastroenterology;  Laterality: N/A;  PRE- MELENA  POST- COLON POLYPS, HEMORRHOIDS    ENDOSCOPY Left 06/24/2023    Procedure: ESOPHAGOGASTRODUODENOSCOPY;  Surgeon: Imtiaz Lee MD;  Location: [facility] ENDOSCOPY;  Service: Gastroenterology;  Laterality: Left;  small hiatal hernia, esophagitis    ENTEROSCOPY SMALL BOWEL N/A 7/9/2023    Procedure: ENTEROSCOPY SMALL BOWEL;  Surgeon: Isrrael Gibson MD;  Location: [facility] ENDOSCOPY;  Service: Gastroenterology;  Laterality: N/A;  PRE- GI BLEED  POST- NORMAL         FAMILY HISTORY  Family History   Problem Relation Age of Onset    Diabetes Father     Diabetes Sister      Hypertension Sister     Diabetes Brother     Diabetes Maternal Grandmother     Diabetes Maternal Grandfather     Diabetes Paternal Grandmother     Diabetes Paternal Grandfather     Heart attack Maternal Aunt 73    Colon cancer Neg Hx     Prostate cancer Neg Hx          SOCIAL HISTORY  Social History     Socioeconomic History    Marital status:    Tobacco Use    Smoking status: Every Day     Packs/day: 1.00     Years: 20.00     Pack years: 20.00     Types: Cigarettes    Smokeless tobacco: Never   Vaping Use    Vaping Use: Never used   Substance and Sexual Activity    Alcohol use: Not Currently     Comment: one pint to one fifth of marielos a day    Drug use: Yes     Types: Marijuana     Comment: daily.    Sexual activity: Yes     Partners: Female         ALLERGIES  Patient has no known allergies.        REVIEW OF SYSTEMS  Review of Systems     All systems reviewed and negative except for those discussed in HPI.       PHYSICAL EXAM  ED Triage Vitals [07/12/23 0525]   Temp Heart Rate Resp BP SpO2   97.1 °F (36.2 °C) 120 20 109/77 99 %      Temp src Heart Rate Source Patient Position BP Location FiO2 (%)   -- -- -- -- --       Physical Exam      GENERAL: no acute distress  HENT: nares patent  EYES: no scleral icterus  CV: regular rhythm, tachycardic  RESPIRATORY: normal effort, clear to auscultation bilaterally  ABDOMEN: soft, mild suprapubic and right lower quadrant abdominal pain  MUSCULOSKELETAL: no deformity  NEURO: alert, moves all extremities, follows commands  PSYCH:  calm, cooperative  SKIN: warm, dry    Vital signs and nursing notes reviewed.          LAB RESULTS  Recent Results (from the past 24 hour(s))   CBC Auto Differential    Collection Time: 07/12/23  6:09 AM    Specimen: Blood   Result Value Ref Range    WBC 20.68 (H) 3.40 - 10.80 10*3/mm3    RBC 2.42 (L) 4.14 - 5.80 10*6/mm3    Hemoglobin 7.1 (L) 13.0 - 17.7 g/dL    Hematocrit 21.3 (L) 37.5 - 51.0 %    MCV 88.0 79.0 - 97.0 fL    MCH 29.3 26.6 -  33.0 pg    MCHC 33.3 31.5 - 35.7 g/dL    RDW 15.4 12.3 - 15.4 %    RDW-SD 48.4 37.0 - 54.0 fl    MPV 10.4 6.0 - 12.0 fL    Platelets 399 140 - 450 10*3/mm3    nRBC 0.1 0.0 - 0.2 /100 WBC   Manual Differential    Collection Time: 07/12/23  6:09 AM    Specimen: Blood   Result Value Ref Range    Neutrophil % 74.0 42.7 - 76.0 %    Lymphocyte % 19.0 (L) 19.6 - 45.3 %    Monocyte % 7.0 5.0 - 12.0 %    Neutrophils Absolute 15.30 (H) 1.70 - 7.00 10*3/mm3    Lymphocytes Absolute 3.93 (H) 0.70 - 3.10 10*3/mm3    Monocytes Absolute 1.45 (H) 0.10 - 0.90 10*3/mm3    Anisocytosis Mod/2+ None Seen    WBC Morphology Normal Normal    Platelet Morphology Normal Normal   Comprehensive Metabolic Panel    Collection Time: 07/12/23  6:12 AM    Specimen: Blood   Result Value Ref Range    Glucose 131 (H) 65 - 99 mg/dL    BUN 21 (H) 6 - 20 mg/dL    Creatinine 1.37 (H) 0.76 - 1.27 mg/dL    Sodium 136 136 - 145 mmol/L    Potassium 3.5 3.5 - 5.2 mmol/L    Chloride 103 98 - 107 mmol/L    CO2 18.2 (L) 22.0 - 29.0 mmol/L    Calcium 9.1 8.6 - 10.5 mg/dL    Total Protein 6.5 6.0 - 8.5 g/dL    Albumin 3.8 3.5 - 5.2 g/dL    ALT (SGPT) 12 1 - 41 U/L    AST (SGOT) 13 1 - 40 U/L    Alkaline Phosphatase 70 39 - 117 U/L    Total Bilirubin 0.3 0.0 - 1.2 mg/dL    Globulin 2.7 gm/dL    A/G Ratio 1.4 g/dL    BUN/Creatinine Ratio 15.3 7.0 - 25.0    Anion Gap 14.8 5.0 - 15.0 mmol/L    eGFR 66.0 >60.0 mL/min/1.73   Protime-INR    Collection Time: 07/12/23  6:12 AM    Specimen: Blood   Result Value Ref Range    Protime 14.1 11.7 - 14.2 Seconds    INR 1.07 0.90 - 1.10   aPTT    Collection Time: 07/12/23  6:12 AM    Specimen: Blood   Result Value Ref Range    PTT 25.7 22.7 - 35.4 seconds   Lipase    Collection Time: 07/12/23  6:12 AM    Specimen: Blood   Result Value Ref Range    Lipase 11 (L) 13 - 60 U/L   Type & Screen    Collection Time: 07/12/23  6:12 AM    Specimen: Blood   Result Value Ref Range    ABO Type B     RH type Positive     Antibody Screen Negative      T&S Expiration Date 7/15/2023 11:59:59 PM    Prepare RBC, 2 Units    Collection Time: 07/12/23  7:18 AM   Result Value Ref Range    Product Code A4937R82     Unit Number [id number]     UNIT  ABO B     UNIT  RH POS     Crossmatch Interpretation Compatible     Dispense Status XM     Blood Expiration Date 202308032359     Blood Type Barcode 7300     Product Code Y3102Q76     Unit Number [id number]     UNIT  ABO B     UNIT  RH POS     Crossmatch Interpretation Compatible     Dispense Status IS     Blood Expiration Date 202308022359     Blood Type Barcode 7300        Ordered the above labs and reviewed the results.        RADIOLOGY  CT Abdomen Pelvis With Contrast    Result Date: 7/12/2023  CT ABDOMEN AND PELVIS WITH CONTRAST  HISTORY: 43-year-old male with lower abdominal pain and nausea and blood in stool.  TECHNIQUE: Axial CT images of the abdomen and pelvis were obtained following administration of intravenous contrast. The patient was not given oral contrast Coronal and sagittal reformats were obtained.  COMPARISON: 07/08/2023  FINDINGS: Mild wall thickening of the duodenum and the proximal jejunal loops likely related to peristalsis. Mild diffuse gastric wall thickening in a pattern consistent with gastritis. No evidence of bowel obstruction.  The liver demonstrates normal attenuation. The gallbladder is normal. The spleen is normal. The pancreas is normal. Bilateral adrenal glands are normal. No renal calculi or hydronephrosis. The urinary bladder is partially distended and normal.      Mild gastritis is suspected. Diffuse thickening involving the duodenum and the proximal jejunal loops favored to be secondary to peristalsis.  Radiation dose reduction techniques were utilized, including automated exposure control and exposure modulation based on body size.       XR Chest 1 View    Result Date: 7/12/2023  AP CHEST  HISTORY: Leukocytosis  COMPARISON: None available  FINDINGS: Mildly elevated left  hemidiaphragm. No evidence of airspace infiltrate. Heart size normal. No pneumothorax. Visualized osseous structures are unremarkable.      No acute findings  This report was finalized on 7/12/2023 7:30 AM by Dr. Moise Walker M.D.       Ordered the above noted radiological studies. Reviewed by me in PACS.          PROCEDURES  Critical Care  Performed by: Tamir Harris II, MD  Authorized by: Tamir Harris II, MD     Critical care provider statement:     Critical care time (minutes):  30    Critical care was necessary to treat or prevent imminent or life-threatening deterioration of the following conditions:  Circulatory failure    Critical care was time spent personally by me on the following activities:  Ordering and performing treatments and interventions, ordering and review of laboratory studies, ordering and review of radiographic studies, pulse oximetry, re-evaluation of patient's condition, obtaining history from patient or surrogate, discussions with consultants, evaluation of patient's response to treatment and examination of patient          MEDICATIONS GIVEN IN ER  Medications   sodium chloride 0.9 % flush 10 mL (has no administration in time range)   ondansetron (ZOFRAN) injection 4 mg (0 mg Intravenous Hold 7/12/23 0731)   pantoprazole (PROTONIX) injection 80 mg (80 mg Intravenous Given 7/12/23 0606)   sodium chloride 0.9 % bolus 1,000 mL (1,000 mL Intravenous New Bag 7/12/23 0730)   iopamidol (ISOVUE-300) 61 % injection 100 mL (85 mL Intravenous Given 7/12/23 0746)         MEDICAL DECISION MAKING, PROGRESS, and CONSULTS    Discussion below represents my analysis of pertinent findings related to patient's condition, differential diagnosis, treatment plan and final disposition.      Orders placed during this visit:  Orders Placed This Encounter   Procedures    Critical Care    CT Abdomen Pelvis With Contrast    XR Chest 1 View    Comprehensive Metabolic Panel    Protime-INR    aPTT     Lipase    Urinalysis With Microscopic If Indicated (No Culture) - Urine, Clean Catch    CBC Auto Differential    Manual Differential    Monitor Blood Pressure    Pulse Oximetry, Continuous    Verify Informed Consent for Blood Product Administration    [facility] (on-call MD unless specified) Details    Type & Screen    Prepare RBC, 2 Units    Insert Peripheral IV    Initiate Observation Status    CBC & Differential         Differential diagnosis:    Differential diagnosis includes but not limited to:  - hepatobiliary pathology such as cholecystitis, cholangitis, and symptomatic cholelithiasis  - Pancreatitis  - Dyspepsia  - Small bowel obstruction  - Appendicitis  - Diverticulitis  - UTI including pyelonephritis  - Ureteral stone  - Zoster  - Colitis, including infectious and ischemic  - GI bleed      Independent interpretation of labs, radiology studies, and discussions with consultants:  ED Course as of 07/12/23 1019   Wed Jul 12, 2023   0658 Creatinine(!): 1.37 [TD]   0659 WBC(!): 20.68 [TD]   0659 Hemoglobin(!): 7.1 [TD]   0700 On medical chart review, patient was discharged on 7/10/2023.  I reviewed the discharge summary from Dr. Mccormack.  Patient has a history of alcohol dependence, admitted for GI bleed.  He had EGD/colonoscopy that were negative for source of bleeding.  Outpatient video capsule endoscopy was planned with the patient returned with continued hematochezia before could be completed.  CT angiogram of the abdomen pelvis was performed twice without an identified source of bleeding.  He underwent push enteroscopy which was negative as well. [TD]   1017 I discussed the case with Dr. Child, hospitalist.  We reviewed the patient's labs, history, imaging.  He will admit. [TD]      ED Course User Index  [TD] Tamir Harris II, MD               DIAGNOSIS  Final diagnoses:   ABLA (acute blood loss anemia)   Leukocytosis, unspecified type         DISPOSITION  Admit      Latest Documented Vital Signs:  As of  10:19 EDT  BP- 121/78 HR- 79 Temp- 97.2 °F (36.2 °C) (Tympanic) O2 sat- 100%      --    Please note that portions of this were completed with a voice recognition program.       Note Disclaimer: At [facility], we believe that sharing information builds trust and better relationships. You are receiving this note because you are receiving care at [facility] or recently visited. It is possible you will see health information before a provider has talked with you about it. This kind of information can be easy to misunderstand. To help you fully understand what it means for your health, we urge you to discuss this note with your provider.         Tamir Harris II, MD  07/12/23 1019      Electronically signed by Tamir Harris II, MD at 07/12/23 1019       Carmen Berumen, RN at 07/12/23 0522          To ER via EMS from home.  C/o low abd pain and rectal bleeding.  Pt was seen here on 6/23 for same and had HGB 6.0 at that time.  Pt was discharged on 6/27.  Abd pain started last pm.      Electronically signed by Carmen Berumen, RN at 07/12/23 0526       Oxygen Therapy (since admission)       Date/Time SpO2 Device (Oxygen Therapy) Flow (L/min) Oxygen Concentration (%) ETCO2 (mmHg)    07/18/23 0849 100 room air -- -- --    07/17/23 1419 99 room air -- -- --    07/17/23 0900 -- room air -- -- --    07/16/23 0803 99 -- -- -- --    07/15/23 0716 100 -- -- -- --    07/14/23 0840 -- room air -- -- --    07/13/23 0731 100 room air -- -- --    07/12/23 0525 99 room air -- -- --          Intake & Output (last 7 days)         07/11 0701 07/12 0700 07/12 0701 07/13 0700 07/13 0701 07/14 0700 07/14 0701  07/15 0700 07/15 0701  07/16 0700 07/16 0701  07/17 0700 07/17 0701  07/18 0700 07/18 0701 07/19 0700    P.O.   480 300 360   240    Blood  793.8 640.8 1000 1192.5 312.5      IV Piggyback    50        Total Intake(mL/kg)  793.8 (10.2) 1120.8 (14.4) 1350 (17.3) 1552.5 (19.9) 312.5 (4)  240 (3.1)    Urine  (mL/kg/hr)   1800 (1) 275 (0.1)  425 (0.2) 3050 (1.6)     Emesis/NG output    0        Stool  300 0 0 700 0      Total Output  300 1800 275      Net  +493.8 -679.2 +1075 +852.5 -112.5 -3050 +240                Stool Unmeasured Occurrence   1 x 1 x  2 x      Emesis Unmeasured Occurrence    1 x              Lines, Drains & Airways       Active LDAs       Name Placement date Placement time Site Days    PICC Double Lumen 07/14/23 Left Brachial 07/14/23  1144  Brachial  3              Inactive LDAs       Name Placement date Placement time Removal date Removal time Site Days    [REMOVED] Peripheral IV 07/12/23 0606 Anterior;Left Forearm 07/12/23  0606  07/13/23  1442  Forearm  1    [REMOVED] Peripheral IV 07/13/23 0720 Left;Anterior Forearm 07/13/23  0720  07/13/23  1442  Forearm  less than 1    [REMOVED] Peripheral IV 07/13/23 1300 Posterior;Right Hand 07/13/23  1300  07/15/23  1630  Hand  2    [REMOVED] Peripheral IV 07/13/23 1811 Anterior;Right;Upper Arm 07/13/23  1811  07/13/23  2124  Arm  less than 1    [REMOVED] Peripheral IV 07/13/23 2300 Left Antecubital 07/13/23  2300  07/14/23  1143  Antecubital  less than 1    [REMOVED] Arterial Sheath 5 Fr. Right Femoral 07/15/23  1754  07/15/23  1925  Femoral  less than 1                    Medication Administration Report for Radha Noriega as of 07/18/23 1021     Legend:    Given Hold Not Given Due Canceled Entry Other Actions    Time Time (Time) Time Time-Action         Discontinued     Completed     Future     MAR Hold     Linked             Medications 07/12/23 07/13/23 07/14/23 07/15/23 07/16/23 07/17/23 07/18/23      acetaminophen (TYLENOL) tablet 650 mg  Dose: 650 mg  Freq: Once Route: PO  Start: 07/15/23 1730   End: 07/16/23 1734   Admin Instructions:   Give Prior to First Transfusion  Do not exceed 4 grams of acetaminophen in a 24 hr period. Max dose of 2gm for AST/ALT greater than 120 units/L    If given for fever, use fever parameter: fever greater  than 100.4 °F.    If given for pain, use the following pain scale:   Mild Pain = Pain Score of 1-3, CPOT 1-2  Moderate Pain = Pain Score of 4-6, CPOT 3-4  Severe Pain = Pain Score of 7-10, CPOT 5-8       2233-Canceled Entry [C]             Or  acetaminophen (TYLENOL) 160 MG/5ML solution 650 mg  Dose: 650 mg  Freq: Once Route: PO  Start: 07/15/23 1730   Admin Instructions:   Give Prior to First Transfusion  Do not exceed 4 grams of acetaminophen in a 24 hr period. Max dose of 2gm for AST/ALT greater than 120 units/L    If given for fever, use fever parameter: fever greater than 100.4 °F.    If given for pain, use the following pain scale:   Mild Pain = Pain Score of 1-3, CPOT 1-2  Moderate Pain = Pain Score of 4-6, CPOT 3-4  Severe Pain = Pain Score of 7-10, CPOT 5-8       2233-Not Given:  See Alt             Or  acetaminophen (TYLENOL) suppository 650 mg  Dose: 650 mg  Freq: Once Route: RE  Start: 07/15/23 1730   Admin Instructions:   Give Prior to First Transfusion  Do not exceed 4 grams of acetaminophen in a 24 hr period. Max dose of 2gm for AST/ALT greater than 120 units/L    If given for fever, use fever parameter: fever greater than 100.4 °F.    If given for pain, use the following pain scale:   Mild Pain = Pain Score of 1-3, CPOT 1-2  Moderate Pain = Pain Score of 4-6, CPOT 3-4  Severe Pain = Pain Score of 7-10, CPOT 5-8       2233-Not Given:  See Alt              acetaminophen (TYLENOL) tablet 650 mg  Dose: 650 mg  Freq: Once Route: PO  Start: 07/15/23 0815   End: 07/16/23 1734   Admin Instructions:   Give Prior to First Transfusion  Do not exceed 4 grams of acetaminophen in a 24 hr period. Max dose of 2gm for AST/ALT greater than 120 units/L    If given for fever, use fever parameter: fever greater than 100.4 °F.    If given for pain, use the following pain scale:   Mild Pain = Pain Score of 1-3, CPOT 1-2  Moderate Pain = Pain Score of 4-6, CPOT 3-4  Severe Pain = Pain Score of 7-10, CPOT 5-8        (0811)-Not Given             Or  acetaminophen (TYLENOL) 160 MG/5ML solution 650 mg  Dose: 650 mg  Freq: Once Route: PO  Start: 07/15/23 0815   Admin Instructions:   Give Prior to First Transfusion  Do not exceed 4 grams of acetaminophen in a 24 hr period. Max dose of 2gm for AST/ALT greater than 120 units/L    If given for fever, use fever parameter: fever greater than 100.4 °F.    If given for pain, use the following pain scale:   Mild Pain = Pain Score of 1-3, CPOT 1-2  Moderate Pain = Pain Score of 4-6, CPOT 3-4  Severe Pain = Pain Score of 7-10, CPOT 5-8       0811-Not Given:  See Alt             Or  acetaminophen (TYLENOL) suppository 650 mg  Dose: 650 mg  Freq: Once Route: RE  Start: 07/15/23 0815   Admin Instructions:   Give Prior to First Transfusion  Do not exceed 4 grams of acetaminophen in a 24 hr period. Max dose of 2gm for AST/ALT greater than 120 units/L    If given for fever, use fever parameter: fever greater than 100.4 °F.    If given for pain, use the following pain scale:   Mild Pain = Pain Score of 1-3, CPOT 1-2  Moderate Pain = Pain Score of 4-6, CPOT 3-4  Severe Pain = Pain Score of 7-10, CPOT 5-8       0811-Not Given:  See Alt              sennosides-docusate (PERICOLACE) 8.6-50 MG per tablet 2 tablet  Dose: 2 tablet  Freq: 2 Times Daily Route: PO  Start: 07/12/23 1118   Admin Instructions:   HOLD MEDICATION IF PATIENT HAS HAD BOWEL MOVEMENT. Start bowel management regimen if patient has not had a bowel movement after 12 hours.    (1310)-Not Given       (2155)-Not Given [C]        (1017)-Not Given       (2121)-Not Given        (1154)-Not Given       (2040)-Not Given        (0812)-Not Given       2234-Given        (0809)-Not Given       (2040)-Not Given        (0802)-Not Given       (2119)-Not Given        0841-Given       2100          And  polyethylene glycol (MIRALAX) packet 17 g  Dose: 17 g  Freq: Daily PRN Route: PO  PRN Reason: Constipation  PRN Comment: Use if senna-docusate is  ineffective  Start: 07/12/23 1059   Admin Instructions:   Use if no bowel movement after 12 hours. Mix in 6-8 ounces of water.  Use 4-8 ounces of water, tea, or juice for each 17 gram dose.             And  bisacodyl (DULCOLAX) EC tablet 5 mg  Dose: 5 mg  Freq: Daily PRN Route: PO  PRN Reason: Constipation  PRN Comment: Use if polyethylene glycol is ineffective  Start: 07/12/23 1059   Admin Instructions:   Use if no bowel movement after 12 hours.  Swallow whole. Do not crush, split, or chew tablet.             And  bisacodyl (DULCOLAX) suppository 10 mg  Dose: 10 mg  Freq: Daily PRN Route: RE  PRN Reason: Constipation  PRN Comment: Use if bisacodyl oral is ineffective  Start: 07/12/23 1059   Admin Instructions:   Use if no bowel movement after 12 hours.  Hold for diarrhea              folic acid (FOLVITE) tablet 1 mg  Dose: 1 mg  Freq: Daily Route: PO  Start: 07/13/23 1400   End: 07/28/23 0859     2120-Given        0833-Given        (0812)-Not Given        0807-Given        0800-Given        0841-Given           heparin injection 30 Units  Dose: 0.3 mL  Freq: Once in Imaging Route: OTHER  Start: 07/13/23 1415   Admin Instructions:   Use for heparinized syringe for Ultra Tag RBC kit reconstitution for GI Bleed, MUGA, and Hemangioma exams.     1415                lactobacillus acidophilus (RISAQUAD) capsule 1 capsule  Dose: 1 capsule  Freq: 2 Times Daily Route: PO  Start: 07/16/23 1300        1407-Given       2103-Given        0800-Given       2118-Given        0841-Given       2100           morphine injection 2 mg  Dose: 2 mg  Freq: Every 4 Hours PRN Route: IV  PRN Reason: Severe Pain  Start: 07/13/23 0939   End: 07/20/23 0938   Admin Instructions:   Based on patient request - if ordered for moderate or severe pain, provider allows for administration of a medication prescribed for a lower pain scale.  If given for pain, use the following pain scale:  Mild Pain = Pain Score of 1-3, CPOT 1-2  Moderate Pain = Pain  "Score of 4-6, CPOT 3-4  Severe Pain = Pain Score of 7-10, CPOT 5-8     1012-Given       1442-Given       1845-Return to Cabinet [C]        2054-Given        2008-Given        0117-Given             ondansetron (ZOFRAN) injection 4 mg  Dose: 4 mg  Freq: Every 6 Hours PRN Route: IV  PRN Reasons: Nausea,Vomiting  Start: 07/13/23 0548   Admin Instructions:   \"If multiple N/V medications ordered, use in the following order: Ondansetron, Prochlorperazine, Promethazine. Use PO unless patient refuses or patient unable to swallow.\"       0557-Given       1302-Given        2145-Given        0304-Given       2333-Given              ondansetron (ZOFRAN) injection 4 mg  Dose: 4 mg  Freq: Once Route: IV  Start: 07/12/23 0722   Admin Instructions:   \"If multiple N/V medications ordered, use in the following order: Ondansetron, Prochlorperazine, Promethazine. Use PO unless patient refuses or patient unable to swallow.\"      0731-Hold [C]                 pantoprazole (PROTONIX) EC tablet 40 mg  Dose: 40 mg  Freq: Every Early Morning Route: PO  Start: 07/18/23 0600   Admin Instructions:   Swallow whole; do not crush, split, or chew.          0610-Given           piperacillin-tazobactam (ZOSYN) 3.375 g in iso-osmotic dextrose 50 ml (premix)  Dose: 3.375 g  Freq: Every 8 Hours Route: IV  Indications of Use: SEPSIS  Start: 07/16/23 0600   End: 07/19/23 0559   Admin Instructions:   Refrigerate        0503-New Bag       1407-New Bag       2103-New Bag       2219-Stopped        0540-New Bag       1326-New Bag       2119-New Bag        0611-New Bag       1400       2200           sodium chloride 0.9 % flush 10 mL  Dose: 10 mL  Freq: As Needed Route: IV  PRN Reason: Line Care  PRN Comment: After Medication Administration  Start: 07/14/23 1142              sodium chloride 0.9 % flush 10 mL  Dose: 10 mL  Freq: Every 12 Hours Scheduled Route: IV  Start: 07/14/23 1230   Admin Instructions:   Lumen #2      1154-Given       2039-Given        " (0845)-Not Given       2235-Given        0806-Given       2103-Given        0801-Given       2118-Given        0841-Given       2100           sodium chloride 0.9 % flush 10 mL  Dose: 10 mL  Freq: Every 12 Hours Scheduled Route: IV  Start: 07/14/23 1230   Admin Instructions:   Lumen #1      1154-Given       2040-Given        (0845)-Not Given       2235-Given        0807-Given       2105-Given        0801-Given       2119-Given        0841-Given       2100           sodium chloride 0.9 % flush 10 mL  Dose: 10 mL  Freq: As Needed Route: IV  PRN Reason: Line Care  Start: 07/12/23 1059              sodium chloride 0.9 % flush 10 mL  Dose: 10 mL  Freq: As Needed Route: IV  PRN Reason: Line Care  Start: 07/12/23 0536              sodium chloride 0.9 % flush 20 mL  Dose: 20 mL  Freq: As Needed Route: IV  PRN Reason: Line Care  PRN Comment: After Blood Draws or Blood Product Administration  Start: 07/14/23 1142              sodium chloride 0.9 % infusion 40 mL  Dose: 40 mL  Freq: As Needed Route: IV  PRN Reason: Line Care  Start: 07/14/23 1142   Admin Instructions:   Following administration of an IV intermittent medication, flush line with 40mL NS at 100mL/hr.              sodium chloride 0.9 % infusion 40 mL  Dose: 40 mL  Freq: As Needed Route: IV  PRN Reason: Line Care  Start: 07/12/23 1059   Admin Instructions:   Following administration of an IV intermittent medication, flush line with 40mL NS at 100mL/hr.              thiamine (VITAMIN B-1) tablet 100 mg  Dose: 100 mg  Freq: Daily Route: PO  Start: 07/13/23 1400     2120-Given        0833-Given        (0812)-Not Given        0807-Given        0800-Given        0841-Given          Completed Medications  Medications 07/12/23 07/13/23 07/14/23 07/15/23 07/16/23 07/17/23 07/18/23       acetaminophen (TYLENOL) tablet 650 mg  Dose: 650 mg  Freq: Once Route: PO  Start: 07/16/23 1815   End: 07/16/23 1735   Admin Instructions:   Based on patient request - if ordered for  moderate or severe pain, provider allows for administration of a medication prescribed for a lower pain scale.  Do not exceed 4 grams of acetaminophen in a 24 hr period. Max dose of 2gm for AST/ALT greater than 120 units/L    If given for fever, use fever parameter: fever greater than 100.4 °F.    If given for pain, use the following pain scale:   Mild Pain = Pain Score of 1-3, CPOT 1-2  Moderate Pain = Pain Score of 4-6, CPOT 3-4  Severe Pain = Pain Score of 7-10, CPOT 5-8        1735-Given             acetaminophen (TYLENOL) tablet 650 mg  Dose: 650 mg  Freq: Once Route: PO  Start: 07/14/23 0845   End: 07/14/23 0910   Admin Instructions:   Based on patient request - if ordered for moderate or severe pain, provider allows for administration of a medication prescribed for a lower pain scale.  Do not exceed 4 grams of acetaminophen in a 24 hr period. Max dose of 2gm for AST/ALT greater than 120 units/L    If given for fever, use fever parameter: fever greater than 100.4 °F.    If given for pain, use the following pain scale:   Mild Pain = Pain Score of 1-3, CPOT 1-2  Moderate Pain = Pain Score of 4-6, CPOT 3-4  Severe Pain = Pain Score of 7-10, CPOT 5-8      0910-Given               famotidine (PEPCID) tablet 20 mg  Dose: 20 mg  Freq: Once Route: PO  Start: 07/16/23 1815   End: 07/16/23 1735        1735-Given             famotidine (PEPCID) tablet 20 mg  Dose: 20 mg  Freq: Once Route: PO  Start: 07/14/23 0845   End: 07/14/23 0910      0910-Given               fentaNYL citrate (PF) (SUBLIMAZE) injection  Freq: As Needed Route: IV  Start: 07/15/23 1747   End: 07/15/23 1914       1747-Given       1914-Given              heparin injection 30 Units  Dose: 0.3 mL  Freq: Once in Imaging Route: OTHER  Start: 07/15/23 1214   End: 07/15/23 1215   Admin Instructions:   Use for heparinized syringe for Ultra Tag RBC kit reconstitution for GI Bleed, MUGA, and Hemangioma exams.       1215-Given [C]              iopamidol  "(ISOVUE-250) 51 % injection 200 mL  Dose: 200 mL  Freq: Once in Imaging Route: IA  Start: 07/15/23 2130   End: 07/15/23 2034       2034-Given by Other              iopamidol (ISOVUE-300) 61 % injection 100 mL  Dose: 100 mL  Freq: Once in Imaging Route: IV  Start: 07/12/23 0802   End: 07/12/23 0746    0746-Given                 iopamidol (ISOVUE-370) 76 % injection 100 mL  Dose: 100 mL  Freq: Once in Imaging Route: IV  Start: 07/14/23 1315   End: 07/14/23 1227      1227-Given by Other               lidocaine PF 1% (XYLOCAINE) injection  Freq: As Needed  Start: 07/15/23 1938   End: 07/15/23 1938       1938-Given [C]              midazolam (VERSED) injection  Freq: As Needed Route: IV  Start: 07/15/23 1747   End: 07/15/23 1747       1747-Given              morphine injection 2 mg  Dose: 2 mg  Freq: Once Route: IV  Start: 07/13/23 0645   End: 07/13/23 0556   Admin Instructions:   If given for pain, use the following pain scale:  Mild Pain = Pain Score of 1-3, CPOT 1-2  Moderate Pain = Pain Score of 4-6, CPOT 3-4  Severe Pain = Pain Score of 7-10, CPOT 5-8     0556-Given                ondansetron (ZOFRAN) injection 4 mg  Dose: 4 mg  Freq: Once Route: IV  Start: 07/15/23 1758   End: 07/15/23 1800   Admin Instructions:   \"If multiple N/V medications ordered, use in the following order: Ondansetron, Prochlorperazine, Promethazine. Use PO unless patient refuses or patient unable to swallow.\"         1800-Given              pantoprazole (PROTONIX) injection 80 mg  Dose: 80 mg  Freq: Once Route: IV  Indications of Use: Gastrointestinal (GI) Bleed  Start: 07/12/23 0605   End: 07/12/23 0606   Admin Instructions:   Dilute with 10 mL of 0.9% NaCl and give IV push over 2 minutes.    0606-Given                 piperacillin-tazobactam (ZOSYN) 3.375 g in iso-osmotic dextrose 50 ml (premix)  Dose: 3.375 g  Freq: Once Route: IV  Start: 07/13/23 2030   End: 07/13/23 5060   Admin Instructions:   Refrigerate     2304-New Bag     "   2340-Stopped                sodium chloride 0.9 % bolus 1,000 mL  Dose: 1,000 mL  Freq: Once Route: IV  Start: 07/12/23 0722   End: 07/12/23 0800    0730-New Bag                 sodium chloride 0.9 % bolus 500 mL  Dose: 500 mL  Freq: Once Route: IV  Start: 07/14/23 1445   End: 07/14/23 1821      1721-New Bag               sodium chloride 0.9 % infusion  Freq: Continuous PRN Route: IV  Start: 07/15/23 1745   End: 07/15/23 1745       1745-New Bag              sodium chloride 1,000 mL mixture  Freq: As Needed  Start: 07/15/23 1808   End: 07/15/23 1808       1808-Given [C]              technetium labeled red blood cells (ULTRATAG) injection 1 dose  Dose: 1 dose  Freq: Once in Imaging Route: IV  Start: 07/15/23 1300   End: 07/15/23 1216   Order specific questions:   Millicuries: 36         1216-Given [C]              technetium labeled red blood cells (ULTRATAG) injection 1 dose  Dose: 1 dose  Freq: Once in Imaging Route: IV  Start: 07/13/23 1930   End: 07/13/23 1838   Order specific questions:   Millicuries: 24.5       1838-Given                technetium labeled red blood cells (ULTRATAG) injection 1 dose  Dose: 1 dose  Freq: Once in Imaging Route: IV  Start: 07/13/23 1415   End: 07/13/23 1330   Order specific questions:   Millicuries: 29.7       1330-Given               Discontinued Medications  Medications 07/12/23 07/13/23 07/14/23 07/15/23 07/16/23 07/17/23 07/18/23       famotidine (PEPCID) tablet 20 mg  Dose: 20 mg  Freq: Once Route: PO  Start: 07/15/23 0815   End: 07/16/23 1733       (0812)-Not Given              pantoprazole (PROTONIX) 40 mg in 100 mL NS (VTB)  Rate: 20 mL/hr Dose: 8 mg/hr  Freq: Continuous Route: IV  Indications of Use: Gastrointestinal (GI) Bleed  Start: 07/12/23 1118   End: 07/17/23 1438    (1118)-Not Given       1315-New Bag       1955-New Bag       2155-Currently Infusing        0034-New Bag       0410-Currently Infusing       0641-Hold [C]       1011-New Bag       1616-New Bag        2226-New Bag        0336-New Bag       0538-Currently Infusing       0910-New Bag       1406-New Bag       2039-New Bag        0149-New Bag       0635-New Bag       1157-New Bag       1556-New Bag       2215-Restarted        0232-New Bag       0725-New Bag       1143-New Bag       1635-New Bag       2219-New Bag        0336-New Bag       1006-New Bag       1516-Stopped            Pharmacy to Dose Zosyn  Freq: Continuous PRN Route: XX  PRN Reason: Consult  Indications of Use: SEPSIS  Start: 07/13/23 1306   End: 07/14/23 1132              piperacillin-tazobactam (ZOSYN) 3.375 g in iso-osmotic dextrose 50 ml (premix)  Dose: 3.375 g  Freq: Every 8 Hours Route: IV  Indications of Use: SEPSIS  Start: 07/14/23 0600   End: 07/16/23 0106   Admin Instructions:   Refrigerate      0538-New Bag       1515-New Bag       2304-New Bag        0620-New Bag       1556-New Bag        0112-New Bag [C]             piperacillin-tazobactam (ZOSYN) 3.375 g in iso-osmotic dextrose 50 ml (premix)  Dose: 3.375 g  Freq: Every 8 Hours Route: IV  Indications of Use: SEPSIS  Start: 07/13/23 2015   End: 07/13/23 1930   Admin Instructions:   Refrigerate              piperacillin-tazobactam (ZOSYN) 3.375 g in iso-osmotic dextrose 50 ml (premix)  Dose: 3.375 g  Freq: Once Route: IV  Start: 07/13/23 1415   End: 07/13/23 1930   Admin Instructions:   Refrigerate     2037-Canceled Entry                polyethylene glycol (MIRALAX) powder 0.5 bottle  Dose: 0.5 bottle  Freq: 2 Times Daily Route: PO  Start: 07/14/23 2000   End: 07/15/23 1959   Admin Instructions:   1600: Mix One half bottle in non carbonated clear liquids and drink 8 ounces every 15 min until gone  2000:Mix the remaining half bottle in noncarbonated clear liquids and drink 8 ounces every 15 min until gone  Dissolve entire contents with 64oz of liquid. Give 1/2 of the diluted soln at 5pm the day before colonoscopy. Give remaining soln on day of colonoscopy. Complete at least 4hrs before  procedure      2039-Given        (1603)-Not Given              sodium chloride 0.9 % flush 10 mL  Dose: 10 mL  Freq: Every 12 Hours Scheduled Route: IV  Start: 07/12/23 1118   End: 07/16/23 1951    1315-Given       2000-Given        0921-Given       2121-Given        0833-Given       2040-Given        (0845)-Not Given       2235-Given        0806-Given             sodium chloride 0.9 % infusion  Rate: 100 mL/hr Dose: 100 mL/hr  Freq: Continuous Route: IV  Start: 07/13/23 1115   End: 07/14/23 0814     1103-New Bag       2226-New Bag        0433-Currently Infusing       0637-Currently Infusing                          Blood Administration Record (From admission, onward)      Transfusions already released       Ordered     Start    07/18/23 0703  Transfuse RBC Infuse Each Unit Over: 3.5H  Transfusion        Released Time Blood Unit Number Status   07/18/23 0802   23  [id number]  [id number] Transfusing       07/18/23 0702            Completed transfusions       Ordered     Start    07/16/23 1727  Transfuse RBC Infuse Each Unit Over: 3.5H  Transfusion        Released Time Blood Unit Number Status   07/16/23 1749   23  [id number]  [id number] Completed 07/16/23 2040       07/16/23 1727    07/15/23 1643  Transfuse RBC, 2 Units Infuse Each Unit Over: 3.5H  Transfusion      Released Time Blood Unit Number Status   07/15/23 2141   23  [id number]  [id number] Completed 07/16/23 0052   07/16/23 0200   23  [id number]  [id number] Completed 07/16/23 0501       07/15/23 1643    07/15/23 0724  Transfuse RBC Infuse Each Unit Over: 3.5H  Transfusion        Released Time Blood Unit Number Status   07/15/23 0737   23  [id number]  [id number] Completed 07/15/23 1115       07/15/23 0724    07/15/23 0108  Transfuse RBC Infuse Each Unit Over: 3.5H  Transfusion        Released Time Blood Unit Number Status   07/15/23 0133   23  [id number]  [id number] Completed 07/15/23 0418       07/15/23 0108    07/14/23 1757  Transfuse RBC Infuse Each  Unit Over: 3.5H  Transfusion        Released Time Blood Unit Number Status   07/14/23 2011   23  [id number]  [id number] Completed 07/14/23 2301       07/14/23 1757    07/14/23 0746  Transfuse RBC Infuse Each Unit Over: 3.5H  Transfusion        Released Time Blood Unit Number Status   07/14/23 1137   23  [id number]  [id number] Completed 07/14/23 1439       07/14/23 0739    07/13/23 2305  Transfuse RBC Infuse Each Unit Over: 3.5H  Transfusion        Released Time Blood Unit Number Status   07/14/23 0023   23  [id number]  [id number] Completed 07/14/23 0403       07/13/23 2302    07/13/23 0345  Transfuse RBC Infuse Each Unit Over: 3.5H  Transfusion        Released Time Blood Unit Number Status   07/13/23 0608   23  [id number]  [id number] Completed 07/13/23 0924       07/13/23 0345    07/12/23 0642  Transfuse RBC, 2 Units Infuse Each Unit Over: 3.5H  Transfusion      Released Time Blood Unit Number Status   07/12/23 0710   23  [id number]  [id number] Completed 07/12/23 1743   07/12/23 1436   23  [id number]  [id number] Completed 07/12/23 1803       07/12/23 0642            Canceled transfusions       Ordered     Start    07/13/23 2305  Transfuse RBC, 1 Units Infuse Each Unit Over: 3.5H  Transfusion,   Status:  Canceled         07/13/23 2302                         Physician Progress Notes (all)        Wojciech Beal MD at 07/18/23 1004          [facility], [place], operated by [facility]  Inpatient Progress Note    Reason for Followup:  Anemia, obscure GI bleeding    Subjective     Interval History:   Again with some drop in Hb overnight.  PRBCs transfusing.      Current Facility-Administered Medications:     [DISCONTINUED] acetaminophen (TYLENOL) tablet 650 mg, 650 mg, Oral, Once **OR** acetaminophen (TYLENOL) 160 MG/5ML solution 650 mg, 650 mg, Oral, Once **OR** acetaminophen (TYLENOL) suppository 650 mg, 650 mg, Rectal, Once, Ermelinda Dixon MD    [DISCONTINUED] acetaminophen (TYLENOL) tablet 650 mg, 650 mg,  Oral, Once **OR** acetaminophen (TYLENOL) 160 MG/5ML solution 650 mg, 650 mg, Oral, Once **OR** acetaminophen (TYLENOL) suppository 650 mg, 650 mg, Rectal, Once, Harshal Casillas MD    Objective     Vital Signs  Temp:  [97.7 °F (36.5 °C)-99 °F (37.2 °C)] 97.7 °F (36.5 °C)  Heart Rate:  [] 77  Resp:  [18] 18  BP: (115-143)/(68-82) 115/82  Body mass index is 26.15 kg/m².    Intake/Output Summary (Last 24 hours) at 7/18/2023 1005  Last data filed at 7/18/2023 0841  Gross per 24 hour   Intake 240 ml   Output 3050 ml   Net -2810 ml     I/O this shift:  In: 240 [P.O.:240]  Out: -      Physical Exam:   General: patient awake, alert and cooperative   Abdomen: soft, nontender, nondistended; normal bowel sounds     Results Review:     I reviewed the patient's new clinical results.    Results from last 7 days   Lab Units 07/18/23  0614 07/17/23  2357 07/17/23  1547 07/17/23  0807 07/17/23  0543 07/16/23  0814 07/16/23  0111   WBC 10*3/mm3 8.98  --   --   --  10.02  --  17.39*   HEMOGLOBIN g/dL 6.9* 7.6* 7.7*   < > 7.4*   < > 6.7*   HEMATOCRIT % 20.6* 22.6* 22.9*   < > 21.7*   < > 19.7*   PLATELETS 10*3/mm3 198  --   --   --  138*  --  130*    < > = values in this interval not displayed.     Results from last 7 days   Lab Units 07/18/23  0614 07/17/23  0543 07/16/23  0111   SODIUM mmol/L 144 139 136   POTASSIUM mmol/L 3.5 3.5 3.8   CHLORIDE mmol/L 113* 112* 110*   CO2 mmol/L 26.0 22.8 19.3*   BUN mg/dL 4* 3* 7   CREATININE mg/dL 1.13 1.23 1.26   CALCIUM mg/dL 7.8* 7.3* 6.9*   BILIRUBIN mg/dL <0.2 <0.2 <0.2   ALK PHOS U/L 48 44 33*   ALT (SGPT) U/L 14 15 8   AST (SGOT) U/L 16 17 7   GLUCOSE mg/dL 117* 90 127*     Results from last 7 days   Lab Units 07/12/23  0612   INR  1.07     Lab Results   Lab Value Date/Time    LIPASE 11 (L) 07/12/2023 0612    LIPASE 26 06/23/2023 1201       Radiology:  [unfilled]      Assessment & Plan   Assessment:    Blood loss anemia  Obscure GI bleeding, s/p IR embolization of ileal  branch of SMA    All problems new to me today    Plan:   Pt continues to require intermittent PRBCs, suggestive continued slow GI bleed  Will plan for repeat tagged RBC scan today    I discussed the patient's findings and my recommendations with patient.          Wojciech Beal M.D.  [facility]  [address]  [place] [address]  Office: [phone]               Electronically signed by Wojciech Beal MD at 07/18/23 1007       Xiomy Mckeon APRN at 07/17/23 1431       Attestation signed by Harshal Casillas MD at 07/17/23 1440    I have reviewed this documentation and agree.  Brief Attending Progress Note   I have seen and examined the patient, performing an independent face-to-face diagnostic evaluation with plan of care reviewed and developed with the advanced practice clinician (APC).   Subjective: Feels well.  No further bleeding.  Old blood is resolving stool.  Tolerating diet so far and agrees with advancing diet.  Attending Physical Exam:    Constitutional:Awake, alert  HENT: NCAT, mucous membranes moist, neck supple  Respiratory: No cough or wheezing, respiratory effort normal, nonlabored breathing   Cardiovascular: Pulse rate is normal, normal radial pulses  Gastrointestinal:  soft, nontender, nondistended  Musculoskeletal: Normal musculature for age, no lower extremity edema, BMI 26  Psychiatric: Appropriate affect, cooperative, conversational  Neurologic: No slurred speech or facial droop, follows commands  Skin: No rashes or jaundice, warm  Brief Assessment/Plan:  I performed greater than 50% of medical decision making  Case discussed with Kenia Vuong with gastroenterology.  Per our conversation I will stop PPI drip and transition to once daily oral PPI.  Diet has been advanced today.  Discontinue Zosyn has of possible bacterial enteritis is completed.  I strongly feel patient could benefit from a pill camera to rule out any  concerning pathology in the small bowel that led to the bleed.  GI is working on arranging this outpatient in the very near future.  If labs stable tomorrow and patient tolerating diet patient can discharge tomorrow.  Plan discussed with patient who is in agreement.  For additional information see note and plan below as developed with APC Xiomy Mckeon which I have reviewed    Harshal Casillas MD  23                           Name: Radha Noriega V ADMIT: 2023   : 1980  PCP: Hellen Skelton APRN    MRN: [id number] LOS: 3 days   AGE/SEX: 43 y.o. male  ROOM: [facility]     Subjective   Subjective   Feeling much better. Tolerating reg diet. Stools more brown. Denies pain, dizziness/lightheadedness      Objective   Objective   Vital Signs  Temp:  [97.9 °F (36.6 °C)-98.4 °F (36.9 °C)] 98.2 °F (36.8 °C)  Heart Rate:  [75-89] 89  Resp:  [16-18] 18  BP: (106-119)/(64-72) 118/72  SpO2:  [98 %-100 %] 99 %  on   ;   Device (Oxygen Therapy): room air  Body mass index is 26.15 kg/m².  Physical Exam  Vitals and nursing note reviewed.   Constitutional:       General: He is not in acute distress.  HENT:      Head: Normocephalic.      Mouth/Throat:      Mouth: Mucous membranes are moist.   Eyes:      Conjunctiva/sclera: Conjunctivae normal.   Cardiovascular:      Rate and Rhythm: Normal rate.   Pulmonary:      Effort: Pulmonary effort is normal. No respiratory distress.   Abdominal:      Palpations: Abdomen is soft.   Musculoskeletal:      Cervical back: Neck supple.      Right lower leg: No edema.      Left lower leg: No edema.   Skin:     General: Skin is warm and dry.   Neurological:      Mental Status: He is alert and oriented to person, place, and time.   Psychiatric:         Mood and Affect: Mood normal.         Behavior: Behavior normal.     Results Review     I reviewed the patient's new clinical results.  Results from last 7 days   Lab Units 23  0807 23  0543 23  2348  07/16/23  1635 07/16/23  0814 07/16/23  0111 07/15/23  2050 07/15/23  1603   WBC 10*3/mm3  --  10.02  --   --   --  17.39* 15.20* 13.20*   HEMOGLOBIN g/dL 7.4* 7.4* 8.0* 6.2*   < > 6.7* 4.9* 5.9*   PLATELETS 10*3/mm3  --  138*  --   --   --  130* 145 132*    < > = values in this interval not displayed.     Results from last 7 days   Lab Units 07/17/23  0543 07/16/23  0111 07/15/23  1603 07/14/23  0608   SODIUM mmol/L 139 136 135* 133*   POTASSIUM mmol/L 3.5 3.8 3.6 3.8   CHLORIDE mmol/L 112* 110* 110* 108*   CO2 mmol/L 22.8 19.3* 19.0* 18.9*   BUN mg/dL 3* 7 9 19   CREATININE mg/dL 1.23 1.26 1.30* 1.15   GLUCOSE mg/dL 90 127* 105* 98   EGFR mL/min/1.73 75.2 73.0 70.3 81.5     Results from last 7 days   Lab Units 07/17/23  0543 07/16/23  0111 07/15/23  1603 07/13/23  1146   ALBUMIN g/dL 1.8* 2.1* 1.9* 2.9*   BILIRUBIN mg/dL <0.2 <0.2 0.2 0.3   ALK PHOS U/L 44 33* 32* 50   AST (SGOT) U/L 17 7 9 9   ALT (SGPT) U/L 15 8 5 11     Results from last 7 days   Lab Units 07/17/23  0543 07/16/23  0111 07/15/23  1603 07/14/23  0608 07/13/23  1146   CALCIUM mg/dL 7.3* 6.9* 6.8* 7.5* 8.3*   ALBUMIN g/dL 1.8* 2.1* 1.9*  --  2.9*     Results from last 7 days   Lab Units 07/13/23  2156 07/12/23  0612   PROCALCITONIN ng/mL  --  0.15   LACTATE mmol/L 1.2  --      No results found for: HGBA1C, POCGLU    IR Embolization    Result Date: 7/15/2023  Superior mesenteric angiogram with embolization as described. There was an area of active contrast extravasation into the small bowel of the left lower quadrant arising from one of the ileal branch arteries of the SMA. This was superselectively catheterized and coil embolization of the actively bleeding branch vessel was performed as described above. Technically successful result with no further active extravasation demonstrated upon completion of the procedure.  This report was finalized on 7/15/2023 8:36 PM by Dr. Moise Walker M.D.     I have personally reviewed all  medications:    Infusions  pantoprazole, 8 mg/hr, Last Rate: 8 mg/hr (07/17/23 1006)    Diet  Diet: Regular/House Diet, Gastrointestinal Diets; Low Irritant; Texture: Regular Texture (IDDSI 7); Fluid Consistency: Thin (IDDSI 0)    I have personally reviewed:  [x]  Laboratory   [x]  Microbiology   [x]  Radiology   [x]  EKG/Telemetry  [x]  Cardiology/Vascular   []  Pathology    []  Records      Assessment/Plan     Active Hospital Problems    Diagnosis  POA    **ABLA (acute blood loss anemia) [D62]  Yes    Metabolic acidosis [E87.20]  Yes    Enteritis of possible infectious origin [A09]  Yes    Sepsis [A41.9]  Yes    Blood per rectum [K62.5]  Unknown    Iron deficiency anemia [D50.9]  Yes    Current every day smoker [F17.200]  Yes    Gastrointestinal hemorrhage with melena [K92.1]  Yes    Acute blood loss anemia [D62]  Unknown      Resolved Hospital Problems   No resolved problems to display.       43 y.o. male admitted with ABLA (acute blood loss anemia).    Brief Hospital Course to date:  Radha Noriega V is a 43 y.o. male presents the hospital with sepsis and acute blood loss anemia with possible enteritis noted on imaging.  Ultimately tagged red blood scan was able to determine small bowel bleeding localized to the mid small bowel and patient went under interventional radiology coiling of a small branch of the SMA.     Discussion/plan for today:  Hgb 7.4 on last 2 draws. Received PRBC yesterday.  Blood transfusion given.  Continue to trend hemoglobin. Hemodynamically stable. Tolerating regular diet. Stools reported as brown, not black today. GI following. Continue PPI. Zosyn stop date in place. WBC has normalized.  Continue to trend and monitor for bleeding.       Blood pressure now improved.  Tachycardia related to anemia/bleeding resolving.     PICC line placed due to difficulty with IV access and multiple lost IVs.     Treated with Zosyn for enteritis.   Patient initially meeting sepsis criteria.  Possible  gastrointestinal bacterial infection/enteritis but etiology somewhat unclear.  Probiotics added.     Supportive care and symptomatic treatment.     Careful monitoring.           SCDs for DVT prophylaxis.  Full code.  Discussed with patient and family.  Anticipate discharge home in 1-2 days. Or when cleared by consultants.      CHETNA Sparks  [facility]  07/17/23  14:31 EDT        Electronically signed by Harshal Casillas MD at 07/17/23 1440       Kenia Vuong PA-C at 07/17/23 1411          [facility]  Inpatient Progress Note    Reason for Follow-up: Anemia    Subjective     Interval History:   Dark stools overnight.  Brown stool with specks of black this morning-visualized by me.  Denies abdominal pain, nausea, vomiting.  Tolerating p.o. intake well.    Hemoglobin trend: 6.2 on 7/16, received 1 unit PRBC-completed at 2000, was 8.0 at 2330 and 7.4 0800 this morning.    Current Facility-Administered Medications:     [DISCONTINUED] acetaminophen (TYLENOL) tablet 650 mg, 650 mg, Oral, Once **OR** acetaminophen (TYLENOL) 160 MG/5ML solution 650 mg, 650 mg, Oral, Once **OR** acetaminophen (TYLENOL) suppository 650 mg, 650 mg, Rectal, Once, Ermelinda Dixon MD    [DISCONTINUED] acetaminophen (TYLENOL) tablet 650 mg, 650 mg, Oral, Once **OR** acetaminophen (TYLENOL) 160 MG/5ML solution 650 mg, 650 mg, Oral, Once **OR** acetaminophen (TYLENOL) suppository 650 mg, 650 mg, Rectal, Once, Harshal Casillas MD    sennosides-docusate (PERICOLACE) 8.6-50 MG per tablet 2 tablet, 2 tablet, Oral, BID, 2 tablet at 07/15/23 2234 **AND** polyethylene glycol (MIRALAX) packet 17 g, 17 g, Oral, Daily PRN **AND** bisacodyl (DULCOLAX) EC tablet 5 mg, 5 mg, Oral, Daily PRN **AND** bisacodyl (DULCOLAX) suppository 10 mg, 10 mg, Rectal, Daily PRN, Ermelinda Dixon MD    folic acid (FOLVITE) tablet 1 mg, 1 mg, Oral, Daily, Ermelinda Dixon MD, 1 mg at 07/17/23  0800    heparin injection 30 Units, 0.3 mL, Other, Once in imaging, Ermelinda Dixon MD    lactobacillus acidophilus (RISAQUAD) capsule 1 capsule, 1 capsule, Oral, BID, Harshal Casillas MD, 1 capsule at 07/17/23 0800    morphine injection 2 mg, 2 mg, Intravenous, Q4H PRN, Ermelinda Dixon MD, 2 mg at 07/16/23 0117    ondansetron (ZOFRAN) injection 4 mg, 4 mg, Intravenous, Once, Ermelinda Dixon MD    ondansetron (ZOFRAN) injection 4 mg, 4 mg, Intravenous, Q6H PRN, Ermelinda Dixon MD, 4 mg at 07/15/23 2333    pantoprazole (PROTONIX) 40 mg in 100 mL NS (VTB), 8 mg/hr, Intravenous, Continuous, Ermelinda Dixon MD, Last Rate: 20 mL/hr at 07/17/23 1006, 8 mg/hr at 07/17/23 1006    piperacillin-tazobactam (ZOSYN) 3.375 g in iso-osmotic dextrose 50 ml (premix), 3.375 g, Intravenous, Q8H, Shima Evangelista, APRN, Last Rate: 0 mL/hr at 07/16/23 2219, 3.375 g at 07/17/23 1326    [COMPLETED] Insert Peripheral IV, , , Once **AND** sodium chloride 0.9 % flush 10 mL, 10 mL, Intravenous, PRN, Ermelinda Dixon MD    sodium chloride 0.9 % flush 10 mL, 10 mL, Intravenous, PRN, Ermelinda Dixon MD    sodium chloride 0.9 % flush 10 mL, 10 mL, Intravenous, Q12H, Ermelinda Dixon MD, 10 mL at 07/17/23 0801    sodium chloride 0.9 % flush 10 mL, 10 mL, Intravenous, Q12H, Ermelinda Dixon MD, 10 mL at 07/17/23 0801    sodium chloride 0.9 % flush 10 mL, 10 mL, Intravenous, PRN, Ermelinda Dixon MD    sodium chloride 0.9 % flush 20 mL, 20 mL, Intravenous, PRN, Ermelinda Dixon MD    sodium chloride 0.9 % infusion 40 mL, 40 mL, Intravenous, PRN, Ermelinda Dixon MD    sodium chloride 0.9 % infusion 40 mL, 40 mL, Intravenous, PRN, Ermelinda Dixon MD    thiamine (VITAMIN B-1) tablet 100 mg, 100 mg, Oral, Daily, Ermelinda Dixon MD, 100 mg at 07/17/23 0800  Review of Systems:    The following systems were reviewed and negative;  respiratory and cardiovascular    Objective     Vital Signs  Temp:  [97.9 °F (36.6 °C)-98.4 °F (36.9 °C)]  98.2 °F (36.8 °C)  Heart Rate:  [75-89] 89  Resp:  [16-18] 18  BP: (106-119)/(64-72) 118/72  Body mass index is 26.15 kg/m².    Intake/Output Summary (Last 24 hours) at 7/17/2023 1411  Last data filed at 7/16/2023 2109  Gross per 24 hour   Intake 312.5 ml   Output 225 ml   Net 87.5 ml     No intake/output data recorded.     Physical Exam:    General: patient awake, alert and cooperative   Eyes: Normal lids and lashes, no scleral icterus   Skin: warm and dry, not jaundiced   Pulm: regular and unlabored   Abdomen: soft, nontender, nondistended; normal bowel sounds   Psychiatric: Normal mood and behavior; memory intact     Results Review:     I reviewed the patient's new clinical results.    Results from last 7 days   Lab Units 07/17/23  0807 07/17/23  0543 07/16/23  2348 07/16/23  0814 07/16/23  0111 07/15/23  2050   WBC 10*3/mm3  --  10.02  --   --  17.39* 15.20*   HEMOGLOBIN g/dL 7.4* 7.4* 8.0*   < > 6.7* 4.9*   HEMATOCRIT % 22.1* 21.7* 24.7*   < > 19.7* 14.9*   PLATELETS 10*3/mm3  --  138*  --   --  130* 145    < > = values in this interval not displayed.     Results from last 7 days   Lab Units 07/17/23  0543 07/16/23  0111 07/15/23  1603   SODIUM mmol/L 139 136 135*   POTASSIUM mmol/L 3.5 3.8 3.6   CHLORIDE mmol/L 112* 110* 110*   CO2 mmol/L 22.8 19.3* 19.0*   BUN mg/dL 3* 7 9   CREATININE mg/dL 1.23 1.26 1.30*   CALCIUM mg/dL 7.3* 6.9* 6.8*   BILIRUBIN mg/dL <0.2 <0.2 0.2   ALK PHOS U/L 44 33* 32*   ALT (SGPT) U/L 15 8 5   AST (SGOT) U/L 17 7 9   GLUCOSE mg/dL 90 127* 105*     Results from last 7 days   Lab Units 07/12/23  0612   INR  1.07     Lab Results   Lab Value Date/Time    LIPASE 11 (L) 07/12/2023 0612    LIPASE 26 06/23/2023 1201       Radiology:  IR Embolization   Final Result   Superior mesenteric angiogram with embolization as described. There was   an area of active contrast extravasation into the small bowel of the   left lower quadrant arising from one of the ileal branch arteries of the   SMA.  This was superselectively catheterized and coil embolization of the   actively bleeding branch vessel was performed as described above.   Technically successful result with no further active extravasation   demonstrated upon completion of the procedure.       This report was finalized on 7/15/2023 8:36 PM by DG Branch GI Blood Loss   Final Result       Intestinal bleeding was observed during the exam, localized to the mid   small bowel.       Discussed by telephone with patient's nurse, Brigette, at time of   interpretation, 1414, 07/15/2023.           This report was finalized on 7/15/2023 2:19 PM by Dr. Brice Velasco M.D.          CT Angiogram Abdomen Pelvis   Final Result   1. No evidence of active contrast extravasation into the GI tract to   suggest active GI bleeding at this time   2. Previously demonstrated thickening of the duodenum and proximal   jejunum has resolved   3. Appendectomy           Radiation dose reduction techniques were utilized, including automated   exposure control and exposure modulation based on body size.       This report was finalized on 7/14/2023 1:07 PM by Dr. Moise Walker M.D.          NM GI Blood Loss   Final Result       Unsuccessful bleeding scan evaluation, as described. Follow-up/further   evaluation can be obtained as indications persist.           This report was finalized on 7/13/2023 7:19 PM by Dr. Brice Velasco M.D.          CT Abdomen Pelvis With Contrast   Final Result   Mild gastritis is suspected. Diffuse thickening involving   the duodenum and the proximal jejunal loops favored to be secondary to   peristalsis.       Radiation dose reduction techniques were utilized, including automated   exposure control and exposure modulation based on body size.       This report was finalized on 7/13/2023 1:35 PM by Dr. Len Herndon M.D.          XR Chest 1 View   Final Result   No acute findings       This report was finalized on  7/12/2023 7:30 AM by Dr. Moise Walker M.D.              Assessment & Plan     Active Hospital Problems    Diagnosis     **ABLA (acute blood loss anemia)     Metabolic acidosis     Enteritis of possible infectious origin     Sepsis     Blood per rectum     Iron deficiency anemia     Current every day smoker     Gastrointestinal hemorrhage with melena     Acute blood loss anemia        Assessment:  Acute blood loss anemia:      Plan:  IR intervention with coiling to small bowel vessel in the ileum on 7/15  Hemoglobin dropped on 7/16 and received 1 unit PRBC  Continue to monitor H&H and transfuse per primary hospital team.  Continue to monitor for evidence of overt GI bleed.  Stool in bedside commode does appear to have some specks of black in it but mostly brown.  Do not see bright red blood  We will await hemoglobin trend and monitor for further evidence of overt GI bleed.  Hopefully drop in hemoglobin yesterday was residual from ileal blood vessel coiled the night prior.  Will need video capsule endoscopy (VCE) ASAP as outpatient given repeated/persistent bleeding and unknown source of bleed noted in ileum.  Diet as tolerated.  Discussed with Dr. Kern and Dr. Casillas.  Hopeful to discharge tomorrow if stable hemoglobin with ASAP VCE.  My staff is working on insurance approval and scheduling.    I discussed the patients findings and my recommendations with patient and family.    Dragon dictation used throughout this note.            Kenia Vuong PA-C  [facility]  [address] [place] [address]  Office: [phone]                Electronically signed by Kenia Vuong PA-C at 07/17/23 6463       Mickey Miller MD at 07/16/23 1226          [facility]  Inpatient Progress Note    Reason for Follow Up: Anemia    Subjective     Interval History:   Hemoglobin 7.1 hematocrit 20.5.  Appears stable post interventional radiology  embolization of the small bowel source of bleeding.  Patient reports some residual blood in stools but no evidence of active bleeding.    Current Facility-Administered Medications:     acetaminophen (TYLENOL) tablet 650 mg, 650 mg, Oral, Once **OR** acetaminophen (TYLENOL) 160 MG/5ML solution 650 mg, 650 mg, Oral, Once **OR** acetaminophen (TYLENOL) suppository 650 mg, 650 mg, Rectal, Once, Ermelinda Dixon MD    acetaminophen (TYLENOL) tablet 650 mg, 650 mg, Oral, Once **OR** acetaminophen (TYLENOL) 160 MG/5ML solution 650 mg, 650 mg, Oral, Once **OR** acetaminophen (TYLENOL) suppository 650 mg, 650 mg, Rectal, Once, Harshal Casillas MD    sennosides-docusate (PERICOLACE) 8.6-50 MG per tablet 2 tablet, 2 tablet, Oral, BID, 2 tablet at 07/15/23 2234 **AND** polyethylene glycol (MIRALAX) packet 17 g, 17 g, Oral, Daily PRN **AND** bisacodyl (DULCOLAX) EC tablet 5 mg, 5 mg, Oral, Daily PRN **AND** bisacodyl (DULCOLAX) suppository 10 mg, 10 mg, Rectal, Daily PRN, Ermelinda Dixon MD    famotidine (PEPCID) tablet 20 mg, 20 mg, Oral, Once, Ermelinda Dixon MD    folic acid (FOLVITE) tablet 1 mg, 1 mg, Oral, Daily, Ermelinda Dixon MD, 1 mg at 07/16/23 0807    heparin injection 30 Units, 0.3 mL, Other, Once in imaging, Ermelinda Dixon MD    lactobacillus acidophilus (RISAQUAD) capsule 1 capsule, 1 capsule, Oral, BID, Harshal Casillas MD    morphine injection 2 mg, 2 mg, Intravenous, Q4H PRN, Ermelinda Dixon MD, 2 mg at 07/16/23 0117    ondansetron (ZOFRAN) injection 4 mg, 4 mg, Intravenous, Once, Ermelinda Dixon MD    ondansetron (ZOFRAN) injection 4 mg, 4 mg, Intravenous, Q6H PRN, Ermelinda Dixon MD, 4 mg at 07/15/23 2333    pantoprazole (PROTONIX) 40 mg in 100 mL NS (VTB), 8 mg/hr, Intravenous, Continuous, Ermelinda Dixon MD, Last Rate: 20 mL/hr at 07/16/23 1143, 8 mg/hr at 07/16/23 1143    piperacillin-tazobactam (ZOSYN) 3.375 g in iso-osmotic dextrose 50 ml (premix), 3.375 g, Intravenous, Q8H,  Shima Evangelista, APRN, 3.375 g at 07/16/23 0503    [COMPLETED] Insert Peripheral IV, , , Once **AND** sodium chloride 0.9 % flush 10 mL, 10 mL, Intravenous, PRN, Ermelinda Dixon MD    sodium chloride 0.9 % flush 10 mL, 10 mL, Intravenous, Q12H, Ermelinda Dixon MD, 10 mL at 07/16/23 0806    sodium chloride 0.9 % flush 10 mL, 10 mL, Intravenous, PRN, Ermelinda Dixon MD    sodium chloride 0.9 % flush 10 mL, 10 mL, Intravenous, Q12H, Ermelinda Dixon MD, 10 mL at 07/16/23 0807    sodium chloride 0.9 % flush 10 mL, 10 mL, Intravenous, Q12H, Ermelinda Dixon MD, 10 mL at 07/16/23 0806    sodium chloride 0.9 % flush 10 mL, 10 mL, Intravenous, Zack PAYAN Sarah A, MD    sodium chloride 0.9 % flush 20 mL, 20 mL, Intravenous, PRN, Ermelinda Dixon MD    sodium chloride 0.9 % infusion 40 mL, 40 mL, Intravenous, PRN, Ermelinda Dixon MD    sodium chloride 0.9 % infusion 40 mL, 40 mL, Intravenous, PRZack SANDRA Sarah A, MD    thiamine (VITAMIN B-1) tablet 100 mg, 100 mg, Oral, Daily, Ermelinda Dixon MD, 100 mg at 07/16/23 0807  Review of Systems:    All systems were reviewed and negative except for:  Gastrointestinal: positive for  See HPI    Objective     Vital Signs  Temp:  [97.4 °F (36.3 °C)-98.8 °F (37.1 °C)] 98.5 °F (36.9 °C)  Heart Rate:  [] 85  Resp:  [11-20] 18  BP: (111-160)/(59-93) 126/65  Body mass index is 26.15 kg/m².    Intake/Output Summary (Last 24 hours) at 7/16/2023 1227  Last data filed at 7/16/2023 1200  Gross per 24 hour   Intake 1177.5 ml   Output 200 ml   Net 977.5 ml     I/O this shift:  In: -   Out: 200 [Urine:200]     Physical Exam:   General: patient awake, alert and cooperative   Eyes: Normal lids and lashes, no scleral icterus   Neck: supple, normal ROM   Skin: warm and dry, not jaundiced   Cardiovascular: regular rhythm and rate, no murmurs auscultated   Pulm: clear to auscultation bilaterally, regular and unlabored   Abdomen: soft, nontender, nondistended; normal bowel  sounds   Extremities: no rash or edema   Psychiatric: Normal mood and behavior; memory intact     Results Review:     I reviewed the patient's new clinical results.    Results from last 7 days   Lab Units 07/16/23  0814 07/16/23  0111 07/15/23  2050 07/15/23  1603   WBC 10*3/mm3  --  17.39* 15.20* 13.20*   HEMOGLOBIN g/dL 7.1* 6.7* 4.9* 5.9*   HEMATOCRIT % 20.5* 19.7* 14.9* 17.5*   PLATELETS 10*3/mm3  --  130* 145 132*     Results from last 7 days   Lab Units 07/16/23  0111 07/15/23  1603 07/14/23  0608 07/13/23  1146   SODIUM mmol/L 136 135* 133* 138   POTASSIUM mmol/L 3.8 3.6 3.8 4.3   CHLORIDE mmol/L 110* 110* 108* 109*   CO2 mmol/L 19.3* 19.0* 18.9* 17.9*   BUN mg/dL 7 9 19 28*   CREATININE mg/dL 1.26 1.30* 1.15 1.32*   CALCIUM mg/dL 6.9* 6.8* 7.5* 8.3*   BILIRUBIN mg/dL <0.2 0.2  --  0.3   ALK PHOS U/L 33* 32*  --  50   ALT (SGPT) U/L 8 5  --  11   AST (SGOT) U/L 7 9  --  9   GLUCOSE mg/dL 127* 105* 98 105*     Results from last 7 days   Lab Units 07/12/23  0612   INR  1.07     Lab Results   Lab Value Date/Time    LIPASE 11 (L) 07/12/2023 0612    LIPASE 26 06/23/2023 1201       Radiology:  IR Embolization   Final Result   Superior mesenteric angiogram with embolization as described. There was   an area of active contrast extravasation into the small bowel of the   left lower quadrant arising from one of the ileal branch arteries of the   SMA. This was superselectively catheterized and coil embolization of the   actively bleeding branch vessel was performed as described above.   Technically successful result with no further active extravasation   demonstrated upon completion of the procedure.       This report was finalized on 7/15/2023 8:36 PM by Dr. Moise Walker M.D.          NM GI Blood Loss   Final Result       Intestinal bleeding was observed during the exam, localized to the mid   small bowel.       Discussed by telephone with patient's nurse, Brigette, at time of   interpretation, 1414, 07/15/2023.            This report was finalized on 7/15/2023 2:19 PM by Dr. Brice Velasco M.D.          CT Angiogram Abdomen Pelvis   Final Result   1. No evidence of active contrast extravasation into the GI tract to   suggest active GI bleeding at this time   2. Previously demonstrated thickening of the duodenum and proximal   jejunum has resolved   3. Appendectomy           Radiation dose reduction techniques were utilized, including automated   exposure control and exposure modulation based on body size.       This report was finalized on 7/14/2023 1:07 PM by Dr. Moise Walker M.D.          NM GI Blood Loss   Final Result       Unsuccessful bleeding scan evaluation, as described. Follow-up/further   evaluation can be obtained as indications persist.           This report was finalized on 7/13/2023 7:19 PM by Dr. Brice Velasco M.D.          CT Abdomen Pelvis With Contrast   Final Result   Mild gastritis is suspected. Diffuse thickening involving   the duodenum and the proximal jejunal loops favored to be secondary to   peristalsis.       Radiation dose reduction techniques were utilized, including automated   exposure control and exposure modulation based on body size.       This report was finalized on 7/13/2023 1:35 PM by Dr. Len Herndon M.D.          XR Chest 1 View   Final Result   No acute findings       This report was finalized on 7/12/2023 7:30 AM by Dr. Moise Walker M.D.              Assessment & Plan     Active Hospital Problems    Diagnosis     **ABLA (acute blood loss anemia)     Metabolic acidosis     Enteritis of possible infectious origin     Sepsis     Blood per rectum     Iron deficiency anemia     Current every day smoker     Gastrointestinal hemorrhage with melena     Acute blood loss anemia        Assessment:  Blood loss anemia: Tagged RBC scan revealed small bowel source in the ileum, status post interventional radiology treatment with coiling and eventual  hemostasis.      Plan:  Continue to monitor H&H, will defer transfusion to hospital service.  I discussed the patients findings and my recommendations with patient.    Mickey Miller MD                Electronically signed by Mickey Miller MD at 23 1234       Harshal Casillas MD at 23 1209              [facility] Medicine Services  PROGRESS NOTE    Patient Name: Radha Noriega V  : 1980  MRN: [id number]    Date of Admission: 2023  Primary Care Physician: Hellen Skelton APRN    Subjective   Subjective     CC:  Follow-up GI bleed    Subjective  Patient feels he is doing well today.  He is very pleased they were able to coil his bleed in interventional radiology.  No further new blood in stools.  Just some residual old blood.      Review of Systems  No current fevers or chills  No current shortness of breath or cough  No current chest pain or palpitations      Objective   Objective     Vital Signs:   Temp:  [97.4 °F (36.3 °C)-98.8 °F (37.1 °C)] 98.5 °F (36.9 °C)  Heart Rate:  [] 85  Resp:  [11-20] 18  BP: (111-160)/(59-93) 126/65        Physical Exam:  Constitutional:Awake, alert  HENT: NCAT, mucous membranes moist, neck supple  Respiratory: No cough or wheezing, respiratory effort normal, nonlabored breathing   Cardiovascular: Pulse rate is normal, normal radial pulses  Gastrointestinal:  soft, nontender, nondistended  Musculoskeletal: Normal musculature for age, no lower extremity edema, BMI 26  Psychiatric: Appropriate affect, cooperative, conversational  Neurologic: No slurred speech or facial droop, follows commands  Skin: No rashes or jaundice, warm      Results Reviewed:  Results from last 7 days   Lab Units 23  0814 23  0111 07/15/23  2050 07/15/23  1603 23  1854 23  0612   WBC 10*3/mm3  --  17.39* 15.20* 13.20*   < >  --    HEMOGLOBIN g/dL 7.1* 6.7* 4.9* 5.9*   < >  --    HEMATOCRIT % 20.5* 19.7* 14.9* 17.5*   < >  --     PLATELETS 10*3/mm3  --  130* 145 132*   < >  --    INR   --   --   --   --   --  1.07   PROCALCITONIN ng/mL  --   --   --   --   --  0.15    < > = values in this interval not displayed.       Results from last 7 days   Lab Units 07/16/23  0111 07/15/23  1603 07/14/23  0608 07/13/23  1146   SODIUM mmol/L 136 135* 133* 138   POTASSIUM mmol/L 3.8 3.6 3.8 4.3   CHLORIDE mmol/L 110* 110* 108* 109*   CO2 mmol/L 19.3* 19.0* 18.9* 17.9*   BUN mg/dL 7 9 19 28*   CREATININE mg/dL 1.26 1.30* 1.15 1.32*   GLUCOSE mg/dL 127* 105* 98 105*   CALCIUM mg/dL 6.9* 6.8* 7.5* 8.3*   ALT (SGPT) U/L 8 5  --  11   AST (SGOT) U/L 7 9  --  9       Estimated Creatinine Clearance: 84.3 mL/min (by C-G formula based on SCr of 1.26 mg/dL).    Microbiology Results Abnormal       Procedure Component Value - Date/Time    Blood Culture - Blood, Hand, Left [[id number]]  (Normal) Collected: 07/13/23 2204    Lab Status: Preliminary result Specimen: Blood from Hand, Left Updated: 07/15/23 2231     Blood Culture No growth at 2 days    Narrative:      Less than seven (7) mL's of blood was collected.  Insufficient quantity may yield false negative results.    Blood Culture - Blood, Arm, Right [[id number]]  (Normal) Collected: 07/13/23 2157    Lab Status: Preliminary result Specimen: Blood from Arm, Right Updated: 07/15/23 2231     Blood Culture No growth at 2 days    Gastrointestinal Panel, PCR - Stool, Per Rectum [[id number]]  (Normal) Collected: 07/12/23 1555    Lab Status: Final result Specimen: Stool from Per Rectum Updated: 07/12/23 1722     Campylobacter Not Detected     Plesiomonas shigelloides Not Detected     Salmonella Not Detected     Vibrio Not Detected     Vibrio cholerae Not Detected     Yersinia enterocolitica Not Detected     Enteroaggregative E. coli (EAEC) Not Detected     Enteropathogenic E. coli (EPEC) Not Detected     Enterotoxigenic E. coli (ETEC) lt/st Not Detected     Shiga-like toxin-producing E. coli (STEC) stx1/stx2 Not Detected      Shigella/Enteroinvasive E. coli (EIEC) Not Detected     Cryptosporidium Not Detected     Cyclospora cayetanensis Not Detected     Entamoeba histolytica Not Detected     Giardia lamblia Not Detected     Adenovirus F40/41 Not Detected     Astrovirus Not Detected     Norovirus GI/GII Not Detected     Rotavirus A Not Detected     Sapovirus (I, II, IV or V) Not Detected    Narrative:      If Aeromonas, Staphylococcus aureus or Bacillus cereus are suspected, please order [id number]: Stool Culture, Aeromonas, S aureus, B Cereus.            Imaging Results (Last 24 Hours)       Procedure Component Value Units Date/Time    IR Embolization [[id number]] Collected: 07/15/23 2028     Updated: 07/15/23 2039    Narrative:      PROCEDURE:  Superior mesenteric angiogram, embolization of bleeding distal small  bowel branch artery.     INDICATION:  Acute blood loss anemia. Patient with maroon stools and requiring  frequent transfusions. Patient has had prior upper endoscopy and  colonoscopy without demonstrating source of bleeding. Tagged red blood  cell scan performed earlier today localized active bleeding to the small  bowel.        Reference air kerma: 985.21 mGy  Contrast: Approximately 129 mL of Isovue     Moderate sedation was provided under my direct supervision using 1 mg IV  Versed and 100 mcg IV Fentanyl. The patient was independently monitored  by a trained Department of Radiology RN using automated blood pressure,  EKG, and pulse oximetry. My total intra-service time was 105 minutes.      The risks, benefits, and alternatives of the procedure were discussed  with the patient, and informed consent was obtained. In the procedure  room a timeout was performed confirming correct patient and procedure.     Maximum sterile barrier technique was used including sterile cap, mask,  gloves, gown and large sterile sheet as well as pre-procedure hand  washing and cutaneous antisepsis with 2 % chlorhexidine.      TECHNIQUE/FINDINGS:  The  right common femoral artery was accessed under ultrasound guidance  with a 4 Jamaican micropuncture introducer set. This was upsized to a 5  Jamaican working sheath. A 5 Jamaican C2 catheter was then advanced through  the sheath and utilized to select the superior mesenteric artery.  Selective angiography of the superior mesenteric artery was then  performed. An area of active contrast extravasation was demonstrated in  the left lower quadrant arising from one of the branch arteries, likely  an ileal artery. Next, a coaxial fashion, a 2.8 Jamaican Progreat  microcatheter was advanced into the branch artery supplying the area of  extravasation. Selective angiography was performed. This confirmed  extravasation of contrast into the small bowel. The microcatheter was  then superselectively advanced as distal as possible into the branch  artery that demonstrated active extravasation, and coil embolization of  this vessel was then performed. Two 2 mm Azur CX microcoils were  deployed followed by a single 2 mm Azur microcoil. Postembolization  angiography was then performed using multiple obliquities. Initially,  there appeared to be some persistent active extravasation into the small  bowel following embolization. Angiography of multiple adjacent branch  vessels was then performed, however, no additional actively bleeding  vessels were clearly identified. Final selective postembolization  angiogram of the ileal branch artery proximal to level embolization was  performed. This demonstrated no further active contrast extravasation.  The microcatheter was removed. Final postembolization angiogram of the  superior mesenteric artery was then performed. This again demonstrated  no evidence of any active contrast extravasation at the location of the  embolization coils and no additional areas of active contrast  extravasation. The C2 catheter was then removed. Angiography of the  right common femoral artery was performed. The sheath  was exchanged for  an Angio-Seal vascular closure device and hemostasis was achieved. The  patient tolerated procedure well without immediate complication.          Impression:      Superior mesenteric angiogram with embolization as described. There was  an area of active contrast extravasation into the small bowel of the  left lower quadrant arising from one of the ileal branch arteries of the  SMA. This was superselectively catheterized and coil embolization of the  actively bleeding branch vessel was performed as described above.  Technically successful result with no further active extravasation  demonstrated upon completion of the procedure.     This report was finalized on 7/15/2023 8:36 PM by Dr. Moise Walker M.D.       NM GI Blood Loss [[id number]] Collected: 07/15/23 1414     Updated: 07/15/23 1422    Narrative:      NM GI BLOOD LOSS-     INDICATIONS: Gastrointestinal bleeding     TECHNIQUE: Bleeding scan     COMPARISON: Correlated with CT from 07/14/2023     FINDINGS:     Radiotracer: 36.0 mCi technetium 99m radiolabeled red blood cells,  intravenous. Projections of the abdomen and pelvis were obtained at 2  seconds interval is for 2 minutes, then 1 minute intervals for 90  minutes. Cine sequence was created.     Intestinal bleeding was observed during the exam, localized to the mid  small bowel (proximal to mid ileum).        Otherwise, expected soft tissue localization of radiotracer is noted.             Impression:         Intestinal bleeding was observed during the exam, localized to the mid  small bowel.     Discussed by telephone with patient's nurse, Brigette, at time of  interpretation, 1414, 07/15/2023.        This report was finalized on 7/15/2023 2:19 PM by Dr. Brice Velasco M.D.             Assessment & Plan   Assessment & Plan     Active Hospital Problems    Diagnosis  POA    **ABLA (acute blood loss anemia) [D62]  Yes    Metabolic acidosis [E87.20]  Yes    Enteritis of possible  infectious origin [A09]  Yes    Sepsis [A41.9]  Yes    Blood per rectum [K62.5]  Unknown    Iron deficiency anemia [D50.9]  Yes    Current every day smoker [F17.200]  Yes    Gastrointestinal hemorrhage with melena [K92.1]  Yes    Acute blood loss anemia [D62]  Unknown      Resolved Hospital Problems   No resolved problems to display.        Brief Hospital Course to date:  Radha Noriega V is a 43 y.o. male presents the hospital with sepsis and acute blood loss anemia with possible enteritis noted on imaging.  Ultimately tagged red blood scan was able to determine small bowel bleeding localized to the mid small bowel and patient went under interventional radiology coiling of a small branch of the SMA.    Discussion/plan for today:  Patient still anemic this morning.  Blood transfusion given.  Continue to trend hemoglobin.  Case discussed with gastroenterology we will continue to monitor.  Hemoglobin remains low this morning.  Transfuse 1 additional unit of blood and recheck hemoglobin.  Continue to trend and monitor for bleeding.      Blood pressure now improved.  Tachycardia related to anemia/bleeding resolving.    PICC line placed due to difficulty with IV access and multiple lost IVs.    Treated with Zosyn for enteritis.   Patient initially meeting sepsis criteria.  Possible gastrointestinal bacterial infection/enteritis but etiology somewhat unclear.  Probiotics added.    Patient seems to be responding to therapy.  Monitor closely while requiring IV pain medicine.    Tobacco cessation counseled.    Supportive care and symptomatic treatment.    Careful monitoring.      DVT Prophylaxis: Ambulatory, mechanical      Disposition: I expect the patient to be discharged home pending stability    CODE STATUS:   Code Status and Medical Interventions:   Ordered at: 07/12/23 6893     Code Status (Patient has no pulse and is not breathing):    CPR (Attempt to Resuscitate)     Medical Interventions (Patient has pulse or is  breathing):    Full Support       Harshal Casillas MD  07/16/23      Electronically signed by Harshal Casillas MD at 07/16/23 1212       Fortunato Narvaez Jr., MD at 07/16/23 1154          Chief Complaint:    GI bleed    Subjective:    The patient had successful embolization of the small bowel bleeder yesterday.  Today he feels much better.  He is able to stand without being dizzy or lightheaded.  He did have a dark bowel movement earlier this morning but the frequency of bowel movements has significantly decreased.    Objective:    Temp:  [97.4 °F (36.3 °C)-98.8 °F (37.1 °C)] 98.5 °F (36.9 °C)  Heart Rate:  [] 85  Resp:  [11-20] 18  BP: (111-160)/(59-93) 126/65    Physical Exam  Constitutional:       Appearance: He is not ill-appearing or toxic-appearing.   Neurological:      Mental Status: He is alert.   Psychiatric:         Behavior: Behavior is cooperative.       Results:    H/H is 7.1/20.5.    Assessment/Plan:    The patient had a lower GI bleed with no clinical evidence of bleeding at this time.  He will likely need another transfusion of packed red blood cells or 2 as he equilibrates after resolution of the GI bleed.  There is no indication for surgical management at this time.  I will sign off but remain available if needed.    Fortunato Narvaez Jr., M.D.     Electronically signed by Fortunato Narvaez Jr., MD at 07/16/23 1201       Ermelinda Dixon MD at 07/15/23 1116          Brief GI note  His tagged red blood cell scan indicates bleeding in the proximal to mid ileum.    Discussed the case with Dr. Narvaez with general surgery and we will plan to discuss with Dr Walker with interventional radiology for IR embolization.  If this is unsuccessful, will consider surgical intervention.    Ermelinda Dixon MD  [facility]      Electronically signed by Ermelinda Dixon MD at 07/15/23 6306       Harshal Casillas MD at 07/15/23 7066              [facility]  Services  PROGRESS NOTE    Patient Name: Radha Noriega V  : 1980  MRN: [id number]    Date of Admission: 2023  Primary Care Physician: Hellen Skelton APRN    Subjective   Subjective     CC:  Follow-up GI bleed    Subjective  Patient is frustrated with his bowel prep.  He feels better since the blood transfusions.  Still some dark stools.    Review of Systems  No current fevers or chills  No current shortness of breath or cough  No current chest pain or palpitations      Objective   Objective     Vital Signs:   Temp:  [98.4 °F (36.9 °C)-99.4 °F (37.4 °C)] 98.4 °F (36.9 °C)  Heart Rate:  [] 101  Resp:  [14-18] 14  BP: (117-144)/(66-88) 128/77        Physical Exam:  Constitutional:Awake, alert  HENT: NCAT, mucous membranes moist, neck supple  Respiratory: No cough or wheezing, respiratory effort normal, nonlabored breathing   Cardiovascular: Pulse rate is normal, normal radial pulses  Gastrointestinal:  soft, nontender, nondistended  Musculoskeletal: Normal musculature for age, no lower extremity edema, BMI 26  Psychiatric: Appropriate affect, cooperative, conversational  Neurologic: No slurred speech or facial droop, follows commands  Skin: No rashes or jaundice, warm    Assessment & Plan   Assessment & Plan     Active Hospital Problems    Diagnosis  POA    **ABLA (acute blood loss anemia) [D62]  Yes    Metabolic acidosis [E87.20]  Yes    Enteritis of possible infectious origin [A09]  Yes    Sepsis [A41.9]  Yes    Blood per rectum [K62.5]  Unknown    Iron deficiency anemia [D50.9]  Yes    Current every day smoker [F17.200]  Yes    Gastrointestinal hemorrhage with melena [K92.1]  Yes    Acute blood loss anemia [D62]  Unknown      Resolved Hospital Problems   No resolved problems to display.        Brief Hospital Course to date:  Radha Noriega V is a 43 y.o. male presents the hospital with sepsis and acute blood loss anemia with possible enteritis noted on imaging.    Discussion/plan for  today:  Hemoglobin remains low this morning.  Transfuse 1 additional unit of blood and recheck hemoglobin.  Continue to trend and monitor for bleeding.      Case was discussed with gastroenterology.  They are planning upper and lower endoscopy today.  Plan to monitor on findings.      Blood pressure now improved.  Still some intermittent tachycardia likely related to anemia.    PICC line placed due to difficulty with IV access and multiple lost IVs.    Continue Zosyn for enteritis.  Leukocytosis has improved on antibiotic coverage.  Patient initially meeting sepsis criteria.  Possible gastrointestinal bacterial infection/enteritis but etiology somewhat unclear.    Patient seems to be responding to therapy.  Monitor closely while requiring IV pain medicine.    Tobacco cessation counseled.    Supportive care and symptomatic treatment.    Careful monitoring.  Low threshold to transfer to intensive care unit if patient has any further adjustment.    DVT Prophylaxis: Ambulatory, mechanical      Disposition: I expect the patient to be discharged home pending stability    CODE STATUS:   Code Status and Medical Interventions:   Ordered at: 23 2613     Code Status (Patient has no pulse and is not breathing):    CPR (Attempt to Resuscitate)     Medical Interventions (Patient has pulse or is breathing):    Full Support       Harshal Casillas MD  07/15/23      Electronically signed by Harshal Casillas MD at 07/15/23 1029       Harshal Casillas MD at 23 1357              [facility] Medicine Services  PROGRESS NOTE    Patient Name: Radha Noriega V  : 1980  MRN: [id number]    Date of Admission: 2023  Primary Care Physician: Hellen Skelton APRN    Subjective   Subjective     CC:  Follow-up GI bleed    Subjective  Patient still having dark stools.  Feels weak.    Review of Systems  No current fevers or chills  No current shortness of breath or cough  No current chest pain or  palpitations      Objective   Objective     Vital Signs:   Temp:  [98 °F (36.7 °C)-99.1 °F (37.3 °C)] 98.4 °F (36.9 °C)  Heart Rate:  [] 94  Resp:  [16-18] 16  BP: (117-129)/(52-93) 123/75        Physical Exam:  Constitutional:Awake, alert  HENT: NCAT, mucous membranes moist, neck supple  Respiratory: No cough or wheezing, respiratory effort normal, nonlabored breathing   Cardiovascular: Pulse rate is normal, normal radial pulses  Gastrointestinal:  soft, nontender, nondistended  Musculoskeletal: Normal musculature for age, no lower extremity edema, BMI 26  Psychiatric: Appropriate affect, cooperative, conversational  Neurologic: No slurred speech or facial droop, follows commands  Skin: No rashes or jaundice, warm      Results Reviewed:  Results from last 7 days   Lab Units 07/14/23  0747 07/14/23  0608 07/13/23  2356 07/13/23  2157 07/13/23  1146 07/12/23  1854 07/12/23  0612 07/12/23  0609   WBC 10*3/mm3  --  12.54*  --   --  19.21*  --   --  20.68*   HEMOGLOBIN g/dL 5.5* 6.1* 6.1*   < > 8.7*   < >  --  7.1*   HEMATOCRIT % 16.7* 18.0* 18.1*   < > 26.8*   < >  --  21.3*   PLATELETS 10*3/mm3  --  241  --   --  275  --   --  399   INR   --   --   --   --   --   --  1.07  --    PROCALCITONIN ng/mL  --   --   --   --   --   --  0.15  --     < > = values in this interval not displayed.     Results from last 7 days   Lab Units 07/14/23  0608 07/13/23  1146 07/12/23  0612 07/08/23  0418   SODIUM mmol/L 133* 138 136 139   POTASSIUM mmol/L 3.8 4.3 3.5 4.1   CHLORIDE mmol/L 108* 109* 103 110*   CO2 mmol/L 18.9* 17.9* 18.2* 23.8   BUN mg/dL 19 28* 21* 10   CREATININE mg/dL 1.15 1.32* 1.37* 1.17   GLUCOSE mg/dL 98 105* 131* 92   CALCIUM mg/dL 7.5* 8.3* 9.1 8.4*   ALT (SGPT) U/L  --  11 12 13   AST (SGOT) U/L  --  9 13 10     Estimated Creatinine Clearance: 92.3 mL/min (by C-G formula based on SCr of 1.15 mg/dL).    Microbiology Results Abnormal       Procedure Component Value - Date/Time    Gastrointestinal Panel, PCR -  Stool, Per Rectum [[id number]]  (Normal) Collected: 07/12/23 1555    Lab Status: Final result Specimen: Stool from Per Rectum Updated: 07/12/23 1722     Campylobacter Not Detected     Plesiomonas shigelloides Not Detected     Salmonella Not Detected     Vibrio Not Detected     Vibrio cholerae Not Detected     Yersinia enterocolitica Not Detected     Enteroaggregative E. coli (EAEC) Not Detected     Enteropathogenic E. coli (EPEC) Not Detected     Enterotoxigenic E. coli (ETEC) lt/st Not Detected     Shiga-like toxin-producing E. coli (STEC) stx1/stx2 Not Detected     Shigella/Enteroinvasive E. coli (EIEC) Not Detected     Cryptosporidium Not Detected     Cyclospora cayetanensis Not Detected     Entamoeba histolytica Not Detected     Giardia lamblia Not Detected     Adenovirus F40/41 Not Detected     Astrovirus Not Detected     Norovirus GI/GII Not Detected     Rotavirus A Not Detected     Sapovirus (I, II, IV or V) Not Detected    Narrative:      If Aeromonas, Staphylococcus aureus or Bacillus cereus are suspected, please order [id number]: Stool Culture, Aeromonas, S aureus, B Cereus.            Imaging Results (Last 24 Hours)       Procedure Component Value Units Date/Time    CT Angiogram Abdomen Pelvis [[id number]] Collected: 07/14/23 1246     Updated: 07/14/23 1311    Narrative:      CT ANGIOGRAM OF THE ABDOMEN AND PELVIS     HISTORY: GI bleed     TECHNIQUE: Radiation dose reduction techniques were utilized, including  automated exposure control and exposure modulation based on body size.   CT angiogram of the abdomen and pelvis was performed following the  administration of IV contrast. Multiple coronal, sagittal, and 3-D  reconstruction images were obtained. Precontrast and venous phase  imaging performed.     COMPARISON: 07/12/2023, 07/08/2023     FINDINGS:      CTA:  There is no evidence of active contrast extravasation into the GI tract  to suggest active GI bleeding at this time. There is some high  attenuation  material within the dependent portion of the stomach that  was present on the noncontrast portion of the study and is unchanged  following the administration of contrast. This is consistent with  ingested material. The celiac artery, SMA, and inferior mesenteric  artery are all widely patent. The renal arteries are widely patent. The  abdominal aorta is nonaneurysmal. The iliac arteries are widely patent  and nonaneurysmal.     CT ABDOMEN/PELVIS:  The lung bases are clear. The liver, gallbladder, and spleen are  unremarkable. The kidneys, adrenal glands, and pancreas are  unremarkable. Previously demonstrated thickening of the duodenum and  proximal jejunum has resolved. The bladder is unremarkable. The colon is  unremarkable. Previous appendectomy. No free fluid in the pelvis. No  acute bony abnormality.       Impression:      1. No evidence of active contrast extravasation into the GI tract to  suggest active GI bleeding at this time  2. Previously demonstrated thickening of the duodenum and proximal  jejunum has resolved  3. Appendectomy        Radiation dose reduction techniques were utilized, including automated  exposure control and exposure modulation based on body size.     This report was finalized on 7/14/2023 1:07 PM by Dr. Moise Walker M.D.       NM GI Blood Loss [[id number]] Collected: 07/13/23 1911     Updated: 07/13/23 1923    Narrative:      NM GI BLOOD LOSS-     INDICATIONS: Gastrointestinal bleeding     TECHNIQUE: Bleeding scan     COMPARISON: None of same type     FINDINGS:     Radiotracer: 29.7 mCi and 24.5 mCi technetium 99m radiolabeled red blood  cells, intravenous.     Despite special care in injecting the radiotracer, both attempted doses  extravasated. As such, the bowel could not be evaluated for bleeding.          Impression:         Unsuccessful bleeding scan evaluation, as described. Follow-up/further  evaluation can be obtained as indications persist.        This report was  finalized on 7/13/2023 7:19 PM by Dr. Brice Velasco M.D.                   I have reviewed the medications:  Scheduled Meds:folic acid, 1 mg, Oral, Daily  heparin, 0.3 mL, Other, Once in imaging  ondansetron, 4 mg, Intravenous, Once  piperacillin-tazobactam, 3.375 g, Intravenous, Q8H  senna-docusate sodium, 2 tablet, Oral, BID  sodium chloride, 500 mL, Intravenous, Once  sodium chloride, 10 mL, Intravenous, Q12H  sodium chloride, 10 mL, Intravenous, Q12H  sodium chloride, 10 mL, Intravenous, Q12H  thiamine, 100 mg, Oral, Daily      Continuous Infusions:pantoprazole, 8 mg/hr, Last Rate: 8 mg/hr (07/14/23 0910)      PRN Meds:.  senna-docusate sodium **AND** polyethylene glycol **AND** bisacodyl **AND** bisacodyl    Morphine    ondansetron    [COMPLETED] Insert Peripheral IV **AND** sodium chloride    sodium chloride    sodium chloride    sodium chloride    sodium chloride    sodium chloride    Assessment & Plan   Assessment & Plan     Active Hospital Problems    Diagnosis  POA    **ABLA (acute blood loss anemia) [D62]  Yes    Metabolic acidosis [E87.20]  Yes    Enteritis of possible infectious origin [A09]  Yes    Sepsis [A41.9]  Yes    Iron deficiency anemia [D50.9]  Yes    Current every day smoker [F17.200]  Yes    Gastrointestinal hemorrhage with melena [K92.1]  Yes      Resolved Hospital Problems   No resolved problems to display.        Brief Hospital Course to date:  Radha Noriega V is a 43 y.o. male presents the hospital with sepsis and acute blood loss anemia with possible enteritis noted on imaging.    Discussion/plan for today:  Case discussed with gastroenterology.  Patient again with significant blood loss.  Patient having tachycardia and intermittent borderline low blood pressures.  Stat blood transfusion ordered for today.  Plan to recheck hemoglobin 1 hour after transfusion and very likely may need a second unit.  Plan to give IV fluid bolus.  Stat CT angiogram tested today.  Neck for any obvious  source of bleeding that could be intervened on with interventional radiology.    Patient has lost multiple IVs.  Access has been an issue.  IV PICC line ordered for today.  We will draw labs to the PICC line to expedite lab results for this patient.    Continue Zosyn.  Leukocytosis trending down.  CT images reviewed and possible enteritis noted.  Patient seems to be responding to therapy.  Monitor closely while requiring IV pain medicine.    Tobacco cessation counseled.    Supportive care and symptomatic treatment.    Careful monitoring.  Low threshold to transfer to intensive care unit if patient has any further adjustment.    35 minutes of critical care provided. This time excludes other billable procedures. Time does include preparation of documents, medical consultations, review of old records, and direct bedside care. Patient is at high risk for life-threatening deterioration due to critically low anemia, GI bleed.    DVT Prophylaxis: Ambulatory, mechanical      Disposition: I expect the patient to be discharged home pending stability    CODE STATUS:   Code Status and Medical Interventions:   Ordered at: 07/12/23 9727     Code Status (Patient has no pulse and is not breathing):    CPR (Attempt to Resuscitate)     Medical Interventions (Patient has pulse or is breathing):    Full Support       Harshal Casillas MD  07/14/23      Electronically signed by Harshal Casillas MD at 07/14/23 1409       Ira Santiago APRN at 07/14/23 1027          Gastroenterology   Inpatient Progress Note    Reason for Follow Up:  acute blood loss anemia    Subjective  Interval History:   Patient still having maroon stools, feels weak.  Tagged red blood cell scan was unable to be performed as 2 of his IVs did not tolerate contrast.  PICC line was placed today.      Current Facility-Administered Medications:     sennosides-docusate (PERICOLACE) 8.6-50 MG per tablet 2 tablet, 2 tablet, Oral, BID **AND** polyethylene  glycol (MIRALAX) packet 17 g, 17 g, Oral, Daily PRN **AND** bisacodyl (DULCOLAX) EC tablet 5 mg, 5 mg, Oral, Daily PRN **AND** bisacodyl (DULCOLAX) suppository 10 mg, 10 mg, Rectal, Daily PRN, Hasmukh Child MD    folic acid (FOLVITE) tablet 1 mg, 1 mg, Oral, Daily, Xiomy Mckeon, APRN, 1 mg at 07/14/23 0833    heparin injection 30 Units, 0.3 mL, Other, Once in imaging, Harshal Casillas MD    morphine injection 2 mg, 2 mg, Intravenous, Q4H PRN, Harshal Casillas MD, 2 mg at 07/13/23 1442    ondansetron (ZOFRAN) injection 4 mg, 4 mg, Intravenous, Once, Tamir Harris II, MD    ondansetron (ZOFRAN) injection 4 mg, 4 mg, Intravenous, Q6H PRN, Kenia Pruitt APRN, 4 mg at 07/13/23 1302    pantoprazole (PROTONIX) 40 mg in 100 mL NS (VTB), 8 mg/hr, Intravenous, Continuous, Hasmukh Child MD, Last Rate: 20 mL/hr at 07/14/23 1406, 8 mg/hr at 07/14/23 1406    piperacillin-tazobactam (ZOSYN) 3.375 g in iso-osmotic dextrose 50 ml (premix), 3.375 g, Intravenous, Q8H, Harshal Casillas MD, 3.375 g at 07/14/23 1515    polyethylene glycol (MIRALAX) powder 0.5 bottle, 0.5 bottle, Oral, BID, Ira Santiago, APRN    sodium chloride 0.9 % bolus 500 mL, 500 mL, Intravenous, Once, Harshal Casillas MD    [COMPLETED] Insert Peripheral IV, , , Once **AND** sodium chloride 0.9 % flush 10 mL, 10 mL, Intravenous, PRN, Tamir Del Rosario MD    sodium chloride 0.9 % flush 10 mL, 10 mL, Intravenous, Q12H, Hasmukh Child MD, 10 mL at 07/14/23 0833    sodium chloride 0.9 % flush 10 mL, 10 mL, Intravenous, PRN, Hasmukh Child MD    sodium chloride 0.9 % flush 10 mL, 10 mL, Intravenous, Q12H, Harshal Casillas MD, 10 mL at 07/14/23 1154    sodium chloride 0.9 % flush 10 mL, 10 mL, Intravenous, Q12H, Harshal Casillas MD, 10 mL at 07/14/23 1154    sodium chloride 0.9 % flush 10 mL, 10 mL, Intravenous, PRN, Harshal Casillas MD    sodium chloride 0.9 % flush 20 mL, 20 mL,  Intravenous, PRN, Harshal Casillas MD    sodium chloride 0.9 % infusion 40 mL, 40 mL, Intravenous, PRN, Hasmukh Child MD    sodium chloride 0.9 % infusion 40 mL, 40 mL, Intravenous, PRN, Harshal Casillas MD    thiamine (VITAMIN B-1) tablet 100 mg, 100 mg, Oral, Daily, Xiomy Mckeon, APRN, 100 mg at 07/14/23 0833  Review of Systems:               Gastroenterology positive for blood per rectum    Objective     Vital Signs  Temp:  [98 °F (36.7 °C)-99.1 °F (37.3 °C)] 98.7 °F (37.1 °C)  Heart Rate:  [] 82  Resp:  [16-18] 16  BP: (117-129)/(52-80) 119/78  Body mass index is 26.94 kg/m².                  Physical Exam:              General: patient awake, alert and cooperative, appears to not feel well but not hypotensive, no acute distress              Eyes: no scleral icterus              Skin: warm and dry, not jaundiced              Abdomen: soft, normal bowel sounds, nontender, nondistended              Psychiatric: Appropriate affect and behavior                Results Review:                I reviewed the patient's new clinical results.    Results from last 7 days   Lab Units 07/14/23  0747 07/14/23  0608 07/13/23  2356 07/13/23  2157 07/13/23  1146 07/12/23  1854 07/12/23  0609   WBC 10*3/mm3  --  12.54*  --   --  19.21*  --  20.68*   HEMOGLOBIN g/dL 5.5* 6.1* 6.1*   < > 8.7*   < > 7.1*   HEMATOCRIT % 16.7* 18.0* 18.1*   < > 26.8*   < > 21.3*   PLATELETS 10*3/mm3  --  241  --   --  275  --  399    < > = values in this interval not displayed.     Results from last 7 days   Lab Units 07/14/23  0608 07/13/23  1146 07/12/23  0612 07/08/23  0418   SODIUM mmol/L 133* 138 136 139   POTASSIUM mmol/L 3.8 4.3 3.5 4.1   CHLORIDE mmol/L 108* 109* 103 110*   CO2 mmol/L 18.9* 17.9* 18.2* 23.8   BUN mg/dL 19 28* 21* 10   CREATININE mg/dL 1.15 1.32* 1.37* 1.17   CALCIUM mg/dL 7.5* 8.3* 9.1 8.4*   BILIRUBIN mg/dL  --  0.3 0.3 <0.2   ALK PHOS U/L  --  50 70 51   ALT (SGPT) U/L  --  11 12 13   AST (SGOT)  U/L  --  9 13 10   GLUCOSE mg/dL 98 105* 131* 92     Results from last 7 days   Lab Units 07/12/23  0612   INR  1.07     Lab Results   Lab Value Date/Time    LIPASE 11 (L) 07/12/2023 0612    LIPASE 26 06/23/2023 1201       Radiology:  CT Angiogram Abdomen Pelvis   Final Result   1. No evidence of active contrast extravasation into the GI tract to   suggest active GI bleeding at this time   2. Previously demonstrated thickening of the duodenum and proximal   jejunum has resolved   3. Appendectomy           Radiation dose reduction techniques were utilized, including automated   exposure control and exposure modulation based on body size.       This report was finalized on 7/14/2023 1:07 PM by Dr. Moise Walker M.D.          NM GI Blood Loss   Final Result       Unsuccessful bleeding scan evaluation, as described. Follow-up/further   evaluation can be obtained as indications persist.           This report was finalized on 7/13/2023 7:19 PM by Dr. Brice Velasco M.D.          CT Abdomen Pelvis With Contrast   Final Result   Mild gastritis is suspected. Diffuse thickening involving   the duodenum and the proximal jejunal loops favored to be secondary to   peristalsis.       Radiation dose reduction techniques were utilized, including automated   exposure control and exposure modulation based on body size.       This report was finalized on 7/13/2023 1:35 PM by Dr. Len Herndon M.D.          XR Chest 1 View   Final Result   No acute findings       This report was finalized on 7/12/2023 7:30 AM by Dr. Moise Walker M.D.              Assessment & Plan     Active Hospital Problems    Diagnosis     **ABLA (acute blood loss anemia)     Metabolic acidosis     Enteritis of possible infectious origin     Sepsis     Blood per rectum     Iron deficiency anemia     Current every day smoker     Gastrointestinal hemorrhage with melena     Acute blood loss anemia        Assessment:  Blood loss anemia  Lon  stools  Possible enteritis on imaging  Leukocytosis, trending down  Iron deficiency anemia        Plan:  Continue pantoprazole drip  I spoke with interventional radiologist Dr. Walker, patient with maroon blood per rectum, assistant declining hemoglobin despite recent transfusions, he recommended CTA instead of tagged red blood cell scan and this was performed, no cause for GI bleed noted  Patient has had recent work-up with EGD, colonoscopy, CT angiogram, enteroscopy without definitive source of bleeding, he was scheduled for small bowel capsule endoscopy the day he was remitted to the hospital.  Discussed with Dr. Salvador, orders placed for EGD and colonoscopy.  Continue to monitor H&H and transfuse per primary  Based on clinical course, patient will need eventual capsule endoscopy as an outpatient.    I discussed the patients findings and my recommendations with patient, nursing staff, and primary care team.           Ira BASILIO  [facility] [place]  [address] [place] [address]      Electronically signed by Ira Santiago APRN at 07/14/23 1720       Ira Santiago APRN at 07/13/23 1710          Gastroenterology   Inpatient Progress Note    Reason for Follow Up: Anemia    Subjective  Interval History:   Patient received 2 units PRBC, hemoglobin has improved, currently 8.7.  Patient with melanic stools this morning.  Patient on clear liquid diet.              Gastroenterology positive for melanic stools    Objective     Vital Signs  Temp:  [97.5 °F (36.4 °C)-98.8 °F (37.1 °C)] 98 °F (36.7 °C)  Heart Rate:  [] 147  Resp:  [16-18] 18  BP: (103-134)/(72-93) 126/93  Body mass index is 26.94 kg/m².                  Physical Exam:              General: patient awake, alert and cooperative              Eyes: no scleral icterus              Skin: warm and dry, not jaundiced              Abdomen: soft, normal bowel sounds, guarding              Psychiatric:  Flat affect                Results Review:                I reviewed the patient's new clinical results.    Radiology:  CT Abdomen Pelvis With Contrast   Final Result   Mild gastritis is suspected. Diffuse thickening involving   the duodenum and the proximal jejunal loops favored to be secondary to   peristalsis.       Radiation dose reduction techniques were utilized, including automated   exposure control and exposure modulation based on body size.       This report was finalized on 2023 1:35 PM by Dr. Len Herndon M.D.          XR Chest 1 View   Final Result   No acute findings       This report was finalized on 2023 7:30 AM by Dr. Moise Walker M.D.          NM GI Blood Loss    (Results Pending)       Assessment & Plan     Active Hospital Problems    Diagnosis     **ABLA (acute blood loss anemia)     Sepsis     Iron deficiency anemia     Current every day smoker        Assessment:  Obscure GI bleed with previous work-up including EGD, colonoscopy, CT abdomen angiograph, enteroscopy without definitive source of bleeding  CT abdomen pelvis with mild gastritis, thickening of the duodenum and proximal jejunal loops favored to be peristalsis  Leukocytosis      Plan:  Tagged red blood cell scan ordered, pending  Continue to monitor H&H and transfuse per primary  Patient will need eventual capsule endoscopy outpatient      I discussed the patients findings and my recommendations with patient and nursing staff.           Ira BASILIO  [facility] [place]  [address] [place] [address]      Electronically signed by Ira Santiago APRN at 23 1714       Xiomy Mckeon APRN at 23 1254              Name: Radha Noriega V ADMIT: 2023   : 1980  PCP: Hellen Skelton APRN    MRN: [id number] LOS: 0 days   AGE/SEX: 43 y.o. male  ROOM: [facility]     Subjective   Subjective   C/O abd pain, nausea and feeling dehydrated. Urinated  before visit. No vomiting. Afebrile.       Objective   Objective   Vital Signs  Temp:  [97.5 °F (36.4 °C)-98.8 °F (37.1 °C)] 98.1 °F (36.7 °C)  Heart Rate:  [] 112  Resp:  [16-18] 16  BP: (103-134)/(72-93) 128/86  SpO2:  [100 %] 100 %  on   ;   Device (Oxygen Therapy): room air  Body mass index is 26.94 kg/m².  Physical Exam  Vitals and nursing note reviewed.   Constitutional:       Appearance: He is ill-appearing.   HENT:      Head: Normocephalic.      Mouth/Throat:      Mouth: Mucous membranes are moist.   Eyes:      Conjunctiva/sclera: Conjunctivae normal.   Cardiovascular:      Rate and Rhythm: Regular rhythm. Tachycardia present.   Pulmonary:      Effort: Pulmonary effort is normal. No respiratory distress.      Breath sounds: No wheezing or rales.   Abdominal:      Palpations: Abdomen is soft.      Tenderness: There is guarding.   Musculoskeletal:      Cervical back: Neck supple.      Right lower leg: No edema.      Left lower leg: No edema.   Skin:     General: Skin is warm and dry.   Neurological:      Mental Status: He is alert and oriented to person, place, and time.   Psychiatric:         Mood and Affect: Affect is flat.       XR Chest 1 View    Result Date: 7/12/2023    Assessment/Plan     Active Hospital Problems    Diagnosis  POA    **ABLA (acute blood loss anemia) [D62]  Yes    Sepsis [A41.9]  Yes    Iron deficiency anemia [D50.9]  Yes    Current every day smoker [F17.200]  Yes      Resolved Hospital Problems   No resolved problems to display.       43 y.o. male admitted with ABLA (acute blood loss anemia).    Acute blood loss anemia/MARCE  GI bleed  Extensive negative work-up includes EGD, colonoscopy, CT abdomen angiography, enteroscopy without definite source of bleeding.  Continue pantoprazole GGT.  Serial H&H; up to 8.7 s/p PRBC  GI assistance appreciated. Plan tagged RBC today  N.p.o. diet, add IVF's  Holding po iron for now    Leukocytosis/Sepsis  Etiology is unclear; WBC remains 19.    Procalcitonin is 1.5. Check lactate and blood cultures as pt meets criteria for sepsis (leukocytosis, tachycardia). GI PCR neg. Cover w/IV zosyn and follow cultures. CT A/P mild gastritis, thickening duodenum & prox jejunal loops favored to be peristalsis. UA, CXR unremarkable     Hyperglycemia  Carbohydrate consistent diet when appropriate for p.o. intake      SCDs for DVT prophylaxis.  Full code.  Discussed with patient, family, and nursing staff.  Anticipate discharge  TBD  .      CHETNA Sparks  [facility]  23  13:07 EDT        Electronically signed by Xiomy Mckeon APRN at 23 1308          Consult Notes (all)        Fortunato Narvaez Jr., MD at 07/15/23 1514        Consult Orders    1. Inpatient General Surgery Consult [[id number]] ordered by Ermelinda Dixon MD                 [facility]   Consult Note    Patient Name: Radha Noriega V  : 1980  MRN: [id number]  Primary Care Physician:  Hellen Skelton APRN  Referring Physician: No ref. provider found  Date of admission: 2023    Inpatient General Surgery Consult  Consult performed by: Fortunato Narvaez Jr., MD  Consult ordered by: Ermelinda Dixon MD      Subjective   Subjective     Reason for Consult/ Chief Complaint: GI bleed    History of Present Illness  Radha Noriega V is a very pleasant 43 y.o. male has a history of an obscure GI bleed and was admitted on 2023 with melena.  He had a previous evaluation for a GI bleed that included an EGD on 2023 that showed esophagitis and gastritis but no source of bleeding, a colonoscopy on 2023 that showed benign polyps but no source of bleeding, a CT angiogram of the abdomen and pelvis on 2023 that was normal, and a small bowel enteroscopy on 2023 that was normal.  He had an EGD performed this morning that was normal.  He was then sent down for a bleeding scan and appears to have a small bowel source of bleeding.    Review of  Systems   Constitutional:  Negative for fatigue and fever.   Respiratory:  Negative for chest tightness and shortness of breath.    Cardiovascular:  Negative for chest pain and palpitations.   Gastrointestinal:  Positive for blood in stool. Negative for abdominal pain, constipation, diarrhea, nausea and vomiting.        Family History: family history includes Diabetes in his brother, father, maternal grandfather, maternal grandmother, paternal grandfather, paternal grandmother, and sister; Heart attack (age of onset: 73) in his maternal aunt; Hypertension in his sister. Otherwise pertinent FHx was reviewed and not pertinent to current issue.    Social History:  reports that he has been smoking cigarettes. He has a 20.00 pack-year smoking history. He has never used smokeless tobacco. He reports that he does not currently use alcohol. He reports current drug use. Drug: Marijuana.    Home Medications:   ferrous sulfate, folic acid, pantoprazole, polycarbophil, and thiamine    Allergies:  No Known Allergies    Objective    Objective     Vitals:  Temp:  [98.4 °F (36.9 °C)-99.4 °F (37.4 °C)] 98.9 °F (37.2 °C)  Heart Rate:  [] 128  Resp:  [14-18] 16  BP: (101-144)/(61-88) 137/82    Physical Exam  Constitutional:       Appearance: Normal appearance. He is well-developed. He is not toxic-appearing.   Eyes:      General: No scleral icterus.  Pulmonary:      Effort: Pulmonary effort is normal. No respiratory distress.   Abdominal:      Palpations: Abdomen is soft.      Tenderness: There is no abdominal tenderness.   Skin:     General: Skin is warm and dry.   Neurological:      Mental Status: He is alert and oriented to person, place, and time.   Psychiatric:         Behavior: Behavior normal.         Thought Content: Thought content normal.         Judgment: Judgment normal.         Assessment & Plan   Assessment / Plan     Brief Patient Summary with assessment and plan:  Radha Noriega V is a 43 y.o. male who has  persistent GI bleeding that appears to be related to a small bowel source.    1.  GI bleed: The patient has a persistent GI bleed that appears to be from a small bowel source based on the GI bleeding scan performed today.  This does not allow adequate localization for surgical intervention as the bleeding is unlikely to be easy to find.  This was discussed with Dr. Dixon.  She will contact interventional radiology to see if an embolization can be performed to stop the bleeding.  If the embolization is not successful, he will likely need a combined procedure including an exploratory laparotomy along with an enteroscopy at which time I can assist the endoscopist to evaluate the entire small bowel until the bleeding source is found and a resection of that side can be performed.  This was discussed with the patient.      Fortunato Narvaez Jr., MD      Electronically signed by Fortunato Narvaez Jr., MD at 07/15/23 1076       Isrrael Gibson MD at 07/12/23 1430        Consult Orders    1. Inpatient Gastroenterology Consult [[id number]] ordered by Hasmukh Child MD at 07/12/23 1123                 [facility]  Initial Inpatient Consult Note    Referring Provider: Dr. BERTO Child    Reason for Consultation: Anemia, rectal bleeding, needs capsule endoscopy.  Was supposed to happen today but presented to the hospital.    Subjective     History of present illness:    43 y.o. male with a h/o alcoholism (though claims to ETOH x 1 mo) and with GI bleeding of obscure etiology who was admitted 7/12/2023 with melena that started yesterday.  He denies nonsteroidal anti-inflammatory drug use.  He has had some abdominal cramps with constipation alternating with diarrhea.  No nausea or vomiting.  No fever or chills.  His weight is stable.  He denies nonsteroidal anti-inflammatory drug use.  He was admitted 7/6/2023 through 7/10/2023 with black or bloody stool, iron deficiency anemia, alcohol abuse, and he is a smoker.  His  "work-up as outlined below.       On 7/9/2023 I did a small bowel enteroscopy that was normal.       On 6/25/2023 he had a colonoscopy by Dr. BERTO Lee that showed nodular terminal ileal mucosa, 2 small rectal polyps were removed, and internal hemorrhoids.  Pathology from the terminal ileal mucosa was benign and the rectal polyps were hyperplastic.        On 6/24/2023 Dr. EM Lee did an EGD showed grade a distal esophagitis and gastritis.        On 7/8/2023 the patient had a CT angiogram of the abdomen and pelvis that was normal.        The patient was supposed to have a capsule endoscopy in our office today but was admitted today instead.  This morning the patient's creatinine was 1.37 with a BUN of 21.  Also in the emergency room his hemoglobin was 7.1, hematocrit of 21.3, platelet count 399,000, white count of 20.68, his MCV was 88.  He had a CT of the abdomen and pelvis earlier today that showed \"mild gastritis is suspected.  Diffuse thickening involving the duodenum and the proximal jejunal loops favored to be secondary to peristalsis.\"      Allergies:  No Known Allergies  Review of Systems  The following systems were reviewed and negative;  respiratory, cardiovascular, musculoskeletal, and neurological     Objective     Vital Signs  Temp:  [97.1 °F (36.2 °C)-98.9 °F (37.2 °C)] 97.7 °F (36.5 °C)  Heart Rate:  [] 86  Resp:  [16-20] 16  BP: (109-128)/(61-84) 120/67  Physical Exam:  General Appearance:    Alert, cooperative, in no acute distress   Head:    Normocephalic, without obvious abnormality, atraumatic   Eyes:            Lids and lashes normal, conjunctivae and sclerae normal, no   icterus   Throat:   No oral lesions, no thrush, oral mucosa moist   Neck:   No adenopathy, supple, trachea midline, no thyromegaly, no   carotid bruit, no JVD   Lungs:     Clear to auscultation,respirations regular, even and                   unlabored    Heart:    Regular rhythm and normal rate, normal S1 and S2, no         "    murmur, no gallop, no rub, no click   Chest Wall:    No abnormalities observed   Abdomen:     Normal bowel sounds, no masses, no organomegaly, soft        nontender, nondistended, no guarding, no rebound                 tenderness   Rectal:     Deferred   Extremities:   no edema, no cyanosis, no redness   Skin:   No bleeding, bruising or rash   Lymph nodes:   No palpable adenopathy   Psychiatric:  Judgement and insight: normal   Orientation to person place and time: normal   Mood and affect: normal   Results Review:   I reviewed the patient's new clinical results.    Results from last 7 days   Lab Units 07/12/23  0609 07/10/23  0806 07/10/23  0011 07/08/23  1619 07/08/23  0418   WBC 10*3/mm3 20.68* 10.78  --   --  7.64   HEMOGLOBIN g/dL 7.1* 8.4* 7.5*   < > 7.4*   HEMATOCRIT % 21.3* 25.0* 22.4*   < > 22.5*   PLATELETS 10*3/mm3 399 372  --   --  300    < > = values in this interval not displayed.           Assessment & Plan   Assessment:   GI bleeding of obscure etiology.      Plan:   I would get a tagged red cell bleeding scan see if that gives us any idea of where the bleeding is coming from.  I would check serial H&H's and transfuse as necessary.  He will eventually need to have a capsule endoscopy done as an outpatient.  I will check anemia labs.    I discussed the patient's findings and my recommendations with patient.         Isrrael Gibson M.D.  [facility]  [address] [place] [address]  Office: [phone]       Electronically signed by Isrrael Gibson MD at 07/12/23 6582

## 2023-07-18 NOTE — H&P (VIEW-ONLY)
Name: Radha Noriega V ADMIT: 2023   : 1980  PCP: Hellen Skelton APRN    MRN: [id number] LOS: 4 days   AGE/SEX: 43 y.o. male  [facility]     Subjective   Subjective   Hgb down to 6.9 this a.m. GI ordered repeat tagged RBC scan today w/results showing positive bleeding. Pt feels well. Denies pain, palpitations, soa, dizziness       Objective   Objective   Vital Signs  Temp:  [97.7 °F (36.5 °C)-99 °F (37.2 °C)] 98.4 °F (36.9 °C)  Heart Rate:  [70-89] 70  Resp:  [18] 18  BP: (115-124)/(68-82) 118/72  SpO2:  [100 %] 100 %  on   ;   Device (Oxygen Therapy): room air  Body mass index is 26.15 kg/m².  Physical Exam  Vitals and nursing note reviewed.   Constitutional:       General: He is not in acute distress.  HENT:      Head: Normocephalic.      Mouth/Throat:      Mouth: Mucous membranes are moist.   Eyes:      Conjunctiva/sclera: Conjunctivae normal.   Cardiovascular:      Rate and Rhythm: Normal rate and regular rhythm.   Pulmonary:      Effort: Pulmonary effort is normal. No respiratory distress.      Breath sounds: No wheezing or rales.   Abdominal:      General: Bowel sounds are normal.      Palpations: Abdomen is soft.   Musculoskeletal:      Cervical back: Neck supple.      Right lower leg: No edema.      Left lower leg: No edema.   Skin:     General: Skin is warm and dry.   Neurological:      Mental Status: He is alert and oriented to person, place, and time.   Psychiatric:         Mood and Affect: Mood normal.         Behavior: Behavior normal.     Results Review     I reviewed the patient's new clinical results.  Results from last 7 days   Lab Units 23  0614 23  2357 23  1547 23  0807 23  0543 23  0814 23  0111 07/15/23  2050   WBC 10*3/mm3 8.98  --   --   --  10.02  --  17.39* 15.20*   HEMOGLOBIN g/dL 6.9* 7.6* 7.7* 7.4* 7.4*   < > 6.7* 4.9*   PLATELETS 10*3/mm3 198  --   --   --  138*  --  130* 145    < > = values in this interval not displayed.      Results from last 7 days   Lab Units 07/18/23  0614 07/17/23  0543 07/16/23  0111 07/15/23  1603   SODIUM mmol/L 144 139 136 135*   POTASSIUM mmol/L 3.5 3.5 3.8 3.6   CHLORIDE mmol/L 113* 112* 110* 110*   CO2 mmol/L 26.0 22.8 19.3* 19.0*   BUN mg/dL 4* 3* 7 9   CREATININE mg/dL 1.13 1.23 1.26 1.30*   GLUCOSE mg/dL 117* 90 127* 105*   EGFR mL/min/1.73 83.2 75.2 73.0 70.3     Results from last 7 days   Lab Units 07/18/23  0614 07/17/23  0543 07/16/23  0111 07/15/23  1603   ALBUMIN g/dL 2.1* 1.8* 2.1* 1.9*   BILIRUBIN mg/dL <0.2 <0.2 <0.2 0.2   ALK PHOS U/L 48 44 33* 32*   AST (SGOT) U/L 16 17 7 9   ALT (SGPT) U/L 14 15 8 5     Results from last 7 days   Lab Units 07/18/23  0614 07/17/23  0543 07/16/23  0111 07/15/23  1603   CALCIUM mg/dL 7.8* 7.3* 6.9* 6.8*   ALBUMIN g/dL 2.1* 1.8* 2.1* 1.9*     Results from last 7 days   Lab Units 07/13/23  2156 07/12/23  0612   PROCALCITONIN ng/mL  --  0.15   LACTATE mmol/L 1.2  --      No results found for: HGBA1C, POCGLU    NM GI Blood Loss    Result Date: 7/18/2023  Positive GI bleed exam localizing to the small bowel at about the level of the aortic bifurcation. This roughly approximates the area of bleeding observed 2 days ago. The nurse caring for the patient was notified of the findings by the nuclear medicine technologist at around 1:30 PM.  This report was finalized on 7/18/2023 2:08 PM by Dr. Imtiaz Claire M.D.     I have personally reviewed all medications:  Scheduled Medications  acetaminophen, 650 mg, Oral, Once   Or  acetaminophen, 650 mg, Rectal, Once  acetaminophen, 650 mg, Oral, Once   Or  acetaminophen, 650 mg, Rectal, Once  folic acid, 1 mg, Oral, Daily  heparin, 0.3 mL, Other, Once in imaging  heparin, 0.3 mL, Other, Once in imaging  lactobacillus acidophilus, 1 capsule, Oral, BID  ondansetron, 4 mg, Intravenous, Once  pantoprazole, 40 mg, Oral, Q AM  piperacillin-tazobactam, 3.375 g, Intravenous, Q8H  senna-docusate sodium, 2 tablet, Oral, BID  sodium  chloride, 10 mL, Intravenous, Q12H  sodium chloride, 10 mL, Intravenous, Q12H  thiamine, 100 mg, Oral, Daily    Infusions   Diet  Diet: Regular/House Diet, Gastrointestinal Diets; Low Irritant; Texture: Regular Texture (IDDSI 7); Fluid Consistency: Thin (IDDSI 0)    I have personally reviewed:  [x]  Laboratory   [x]  Microbiology   [x]  Radiology   [x]  EKG/Telemetry  [x]  Cardiology/Vascular   []  Pathology    []  Records       Assessment/Plan     Active Hospital Problems    Diagnosis  POA    **ABLA (acute blood loss anemia) [D62]  Yes    Metabolic acidosis [E87.20]  Yes    Enteritis of possible infectious origin [A09]  Yes    Sepsis [A41.9]  Yes    Blood per rectum [K62.5]  Unknown    Iron deficiency anemia [D50.9]  Yes    Current every day smoker [F17.200]  Yes    Gastrointestinal hemorrhage with melena [K92.1]  Yes    Acute blood loss anemia [D62]  Unknown      Resolved Hospital Problems   No resolved problems to display.       43 y.o. male admitted with ABLA (acute blood loss anemia).    Brief Hospital Course to date:  Radha Noriega V is a 43 y.o. male presents the hospital with sepsis and acute blood loss anemia with possible enteritis noted on imaging.  Ultimately tagged red blood scan was able to determine small bowel bleeding localized to the mid small bowel and patient underwent interventional radiology coiling of a small branch of the SMA.     Discussion/plan for today:  Hgb back down to 6.9 this a.m. (7.4-7.7 on serial lab draws yesterday). Given 1 unit PRBC. Underwent RBC tagged scan that was positive for bleeding localizing to small bowel about level of aortic bifurcation, roughly approximates the area of bleeding seen 2 days ago. GI aware, notifying IR for intervention.  Continue to trend hemoglobin. Hemodynamically stable. Tolerating regular diet.  Continue PPI now po. Zosyn stop date in place. WBC has normalized     Blood pressure now improved.  Tachycardia improved; related to anemia/bleeding       PICC line placed due to difficulty with IV access and multiple lost IVs.     Treated with Zosyn for enteritis.   Patient initially meeting sepsis criteria.  Possible gastrointestinal bacterial infection/enteritis but etiology somewhat unclear.  Probiotics added.     Supportive care and symptomatic treatment.     Careful monitoring.           SCDs for DVT prophylaxis.  Full code.  Discussed with patient and Dr. Casillas .  Anticipate discharge home when cleared by consultants..      CHETNA Sparks  [facility]  07/18/23  15:01 EDT

## 2023-07-18 NOTE — NURSING NOTE
This RN spoke with IR manager Audrey Blackmon and received verbal affirmation to call and speak with 6S charge nurse to send patient back to room following embolization once vitals were stable, patient is awake and oriented and groin checks wnl x 2. This RN called and spoke with 6S charge nurse Sri and she stated if patient is at baseline and post procedure groin checks have begun and wnl, patient is okay to return to his room. This information relayed to Jaleesa Sky IR RN.

## 2023-07-18 NOTE — POST-PROCEDURE NOTE
POST PROCEDURE NOTE    Procedure: VISCERAL ANGIOGRAPHY AND EMBOLIZATION    Pre-Procedure Diagnosis: recurrent GI bleed    Post-procedure Diagnosis: same    Findings: successful superselective coil embolization of abnormal bleeding distal ileal SMA branch    Complications: none    Blood loss: 30 ml    Specimen Removed: n/a    Disposition:   Transfer back to inpatient room

## 2023-07-18 NOTE — NURSING NOTE
Call placed to on call provider in regards to morning hgb of 6.9. No signs or symptoms of bleeding noted, VSS. Call back from CHETNA Solis. Orders given to administer 1 units PRBC. Will continue to monitor.

## 2023-07-18 NOTE — PROGRESS NOTES
[facility]  Inpatient Progress Note    Reason for Followup:  Anemia, obscure GI bleeding    Subjective     Interval History:   Again with some drop in Hb overnight.  PRBCs transfusing.      Current Facility-Administered Medications:     [DISCONTINUED] acetaminophen (TYLENOL) tablet 650 mg, 650 mg, Oral, Once **OR** acetaminophen (TYLENOL) 160 MG/5ML solution 650 mg, 650 mg, Oral, Once **OR** acetaminophen (TYLENOL) suppository 650 mg, 650 mg, Rectal, Once, Ermelinda Dixon MD    [DISCONTINUED] acetaminophen (TYLENOL) tablet 650 mg, 650 mg, Oral, Once **OR** acetaminophen (TYLENOL) 160 MG/5ML solution 650 mg, 650 mg, Oral, Once **OR** acetaminophen (TYLENOL) suppository 650 mg, 650 mg, Rectal, Once, Harshal Casillas MD    sennosides-docusate (PERICOLACE) 8.6-50 MG per tablet 2 tablet, 2 tablet, Oral, BID, 2 tablet at 07/18/23 0841 **AND** polyethylene glycol (MIRALAX) packet 17 g, 17 g, Oral, Daily PRN **AND** bisacodyl (DULCOLAX) EC tablet 5 mg, 5 mg, Oral, Daily PRN **AND** bisacodyl (DULCOLAX) suppository 10 mg, 10 mg, Rectal, Daily PRN, Ermelinda Dixon MD    folic acid (FOLVITE) tablet 1 mg, 1 mg, Oral, Daily, Ermelinda Dixon MD, 1 mg at 07/18/23 0841    heparin injection 30 Units, 0.3 mL, Other, Once in imaging, Ermelinda Dixon MD    lactobacillus acidophilus (RISAQUAD) capsule 1 capsule, 1 capsule, Oral, BID, Harshal Casillas MD, 1 capsule at 07/18/23 0841    morphine injection 2 mg, 2 mg, Intravenous, Q4H PRN, Ermelinda Dixon MD, 2 mg at 07/16/23 0117    ondansetron (ZOFRAN) injection 4 mg, 4 mg, Intravenous, Once, Ermelinda Dixon MD    ondansetron (ZOFRAN) injection 4 mg, 4 mg, Intravenous, Q6H PRN, Ermelinda Dixon MD, 4 mg at 07/15/23 2333    pantoprazole (PROTONIX) EC tablet 40 mg, 40 mg, Oral, Q AM, Harshal Casillas MD, 40 mg at 07/18/23 0610    piperacillin-tazobactam (ZOSYN) 3.375 g in iso-osmotic dextrose 50 ml (premix), 3.375 g, Intravenous, Q8H,  Shima Evangelista, APRN, Last Rate: 0 mL/hr at 07/16/23 2219, 3.375 g at 07/18/23 0611    [COMPLETED] Insert Peripheral IV, , , Once **AND** sodium chloride 0.9 % flush 10 mL, 10 mL, Intravenous, PRN, Ermelinda Dixon MD    sodium chloride 0.9 % flush 10 mL, 10 mL, Intravenous, PRN, Ermelinda Dixon MD    sodium chloride 0.9 % flush 10 mL, 10 mL, Intravenous, Q12H, Ermelinda Dixon MD, 10 mL at 07/18/23 0841    sodium chloride 0.9 % flush 10 mL, 10 mL, Intravenous, Q12H, Ermelinda Dixon MD, 10 mL at 07/18/23 0841    sodium chloride 0.9 % flush 10 mL, 10 mL, Intravenous, PRN, Ermelinda Dixon MD    sodium chloride 0.9 % flush 20 mL, 20 mL, Intravenous, PRNZack Sarah A, MD    sodium chloride 0.9 % infusion 40 mL, 40 mL, Intravenous, PRN, Ermelinda Dixon MD    sodium chloride 0.9 % infusion 40 mL, 40 mL, Intravenous, PRN, Ermelinda Dixon MD    thiamine (VITAMIN B-1) tablet 100 mg, 100 mg, Oral, Daily, Ermelinda Dixon MD, 100 mg at 07/18/23 0841    Objective     Vital Signs  Temp:  [97.7 °F (36.5 °C)-99 °F (37.2 °C)] 97.7 °F (36.5 °C)  Heart Rate:  [] 77  Resp:  [18] 18  BP: (115-143)/(68-82) 115/82  Body mass index is 26.15 kg/m².    Intake/Output Summary (Last 24 hours) at 7/18/2023 1005  Last data filed at 7/18/2023 0841  Gross per 24 hour   Intake 240 ml   Output 3050 ml   Net -2810 ml     I/O this shift:  In: 240 [P.O.:240]  Out: -      Physical Exam:   General: patient awake, alert and cooperative   Abdomen: soft, nontender, nondistended; normal bowel sounds     Results Review:     I reviewed the patient's new clinical results.    Results from last 7 days   Lab Units 07/18/23  0614 07/17/23  2357 07/17/23  1547 07/17/23  0807 07/17/23  0543 07/16/23  0814 07/16/23  0111   WBC 10*3/mm3 8.98  --   --   --  10.02  --  17.39*   HEMOGLOBIN g/dL 6.9* 7.6* 7.7*   < > 7.4*   < > 6.7*   HEMATOCRIT % 20.6* 22.6* 22.9*   < > 21.7*   < > 19.7*   PLATELETS 10*3/mm3 198  --   --   --  138*  --  130*    < > =  values in this interval not displayed.     Results from last 7 days   Lab Units 07/18/23  0614 07/17/23  0543 07/16/23  0111   SODIUM mmol/L 144 139 136   POTASSIUM mmol/L 3.5 3.5 3.8   CHLORIDE mmol/L 113* 112* 110*   CO2 mmol/L 26.0 22.8 19.3*   BUN mg/dL 4* 3* 7   CREATININE mg/dL 1.13 1.23 1.26   CALCIUM mg/dL 7.8* 7.3* 6.9*   BILIRUBIN mg/dL <0.2 <0.2 <0.2   ALK PHOS U/L 48 44 33*   ALT (SGPT) U/L 14 15 8   AST (SGOT) U/L 16 17 7   GLUCOSE mg/dL 117* 90 127*     Results from last 7 days   Lab Units 07/12/23  0612   INR  1.07     Lab Results   Lab Value Date/Time    LIPASE 11 (L) 07/12/2023 0612    LIPASE 26 06/23/2023 1201       Radiology:  [unfilled]      Assessment & Plan   Assessment:    Blood loss anemia  Obscure GI bleeding, s/p IR embolization of ileal branch of SMA    All problems new to me today    Plan:   Pt continues to require intermittent PRBCs, suggestive continued slow GI bleed  Will plan for repeat tagged RBC scan today    I discussed the patient's findings and my recommendations with patient.          Wojciech Beal M.D.  [facility]  [address] [place] [address]  Office: [phone]

## 2023-07-19 LAB
BASOPHILS # BLD AUTO: 0.03 10*3/MM3 (ref 0–0.2)
BASOPHILS NFR BLD AUTO: 0.4 % (ref 0–1.5)
BH BB BLOOD EXPIRATION DATE: NORMAL
BH BB BLOOD TYPE BARCODE: 7300
BH BB DISPENSE STATUS: NORMAL
BH BB PRODUCT CODE: NORMAL
BH BB UNIT NUMBER: NORMAL
CROSSMATCH INTERPRETATION: NORMAL
D-LACTATE SERPL-SCNC: 0.8 MMOL/L (ref 0.5–2)
DEPRECATED RDW RBC AUTO: 48.3 FL (ref 37–54)
EOSINOPHIL # BLD AUTO: 0.28 10*3/MM3 (ref 0–0.4)
EOSINOPHIL NFR BLD AUTO: 3.4 % (ref 0.3–6.2)
ERYTHROCYTE [DISTWIDTH] IN BLOOD BY AUTOMATED COUNT: 15.5 % (ref 12.3–15.4)
HCT VFR BLD AUTO: 21.1 % (ref 37.5–51)
HCT VFR BLD AUTO: 21.3 % (ref 37.5–51)
HCT VFR BLD AUTO: 21.8 % (ref 37.5–51)
HCT VFR BLD AUTO: 23.3 % (ref 37.5–51)
HGB BLD-MCNC: 7 G/DL (ref 13–17.7)
HGB BLD-MCNC: 7.2 G/DL (ref 13–17.7)
HGB BLD-MCNC: 7.3 G/DL (ref 13–17.7)
HGB BLD-MCNC: 7.7 G/DL (ref 13–17.7)
IMM GRANULOCYTES # BLD AUTO: 0.2 10*3/MM3 (ref 0–0.05)
IMM GRANULOCYTES NFR BLD AUTO: 2.4 % (ref 0–0.5)
INR PPP: 1 (ref 0.8–1.2)
LYMPHOCYTES # BLD AUTO: 1.68 10*3/MM3 (ref 0.7–3.1)
LYMPHOCYTES NFR BLD AUTO: 20.5 % (ref 19.6–45.3)
MCH RBC QN AUTO: 29.2 PG (ref 26.6–33)
MCHC RBC AUTO-ENTMCNC: 33.5 G/DL (ref 31.5–35.7)
MCV RBC AUTO: 87.2 FL (ref 79–97)
MONOCYTES # BLD AUTO: 0.75 10*3/MM3 (ref 0.1–0.9)
MONOCYTES NFR BLD AUTO: 9.1 % (ref 5–12)
NEUTROPHILS NFR BLD AUTO: 5.26 10*3/MM3 (ref 1.7–7)
NEUTROPHILS NFR BLD AUTO: 64.2 % (ref 42.7–76)
NRBC BLD AUTO-RTO: 0.4 /100 WBC (ref 0–0.2)
PLATELET # BLD AUTO: 211 10*3/MM3 (ref 140–450)
PMV BLD AUTO: 9.7 FL (ref 6–12)
PROTHROMBIN TIME: 12 SECONDS (ref 12.8–15.2)
RBC # BLD AUTO: 2.5 10*6/MM3 (ref 4.14–5.8)
UNIT  ABO: NORMAL
UNIT  RH: NORMAL
WBC NRBC COR # BLD: 8.2 10*3/MM3 (ref 3.4–10.8)

## 2023-07-19 PROCEDURE — 83605 ASSAY OF LACTIC ACID: CPT | Performed by: INTERNAL MEDICINE

## 2023-07-19 PROCEDURE — 99232 SBSQ HOSP IP/OBS MODERATE 35: CPT | Performed by: INTERNAL MEDICINE

## 2023-07-19 PROCEDURE — 99231 SBSQ HOSP IP/OBS SF/LOW 25: CPT | Performed by: SURGERY

## 2023-07-19 PROCEDURE — 85025 COMPLETE CBC W/AUTO DIFF WBC: CPT | Performed by: INTERNAL MEDICINE

## 2023-07-19 PROCEDURE — 85014 HEMATOCRIT: CPT | Performed by: INTERNAL MEDICINE

## 2023-07-19 PROCEDURE — 85018 HEMOGLOBIN: CPT | Performed by: INTERNAL MEDICINE

## 2023-07-19 RX ADMIN — PANTOPRAZOLE SODIUM 40 MG: 40 TABLET, DELAYED RELEASE ORAL at 05:55

## 2023-07-19 RX ADMIN — Medication 1 CAPSULE: at 08:45

## 2023-07-19 RX ADMIN — Medication 1 CAPSULE: at 21:47

## 2023-07-19 RX ADMIN — Medication 10 ML: at 08:45

## 2023-07-19 RX ADMIN — Medication 10 ML: at 21:48

## 2023-07-19 RX ADMIN — Medication 10 ML: at 21:47

## 2023-07-19 RX ADMIN — FOLIC ACID 1 MG: 1 TABLET ORAL at 08:45

## 2023-07-19 RX ADMIN — Medication 100 MG: at 08:45

## 2023-07-19 NOTE — PLAN OF CARE
Goal Outcome Evaluation:           Progress: improving  Outcome Evaluation: Patient alert and oriented. VSS on RA. Continued H&H checks, last check 7.3. x3 dark tarry bowel movements overnight. Left brachial double lumen PICC line CDI. IV ABX Zosyn completed. Tolerating regular diet, no complaints of nausea or abdominal pain. Up ad ignacio. Using urinal approprately adequate output. Will continue to monitor for remainder of shift. Patient s/p IR visceralangiography and embolization with mynx closure to right groin CDI, no signs or symptoms of bleeding overnight.

## 2023-07-19 NOTE — PROGRESS NOTES
Name: Radha Noriega V ADMIT: 2023   : 1980  PCP: Hellen Skelton APRN    MRN: [id number] LOS: 5 days   AGE/SEX: 43 y.o. male  ROOM: [facility]     Subjective   Subjective   Feels well. No complaints       Objective   Objective   Vital Signs  Temp:  [97.2 °F (36.2 °C)-98.2 °F (36.8 °C)] 97.9 °F (36.6 °C)  Heart Rate:  [70-90] 84  Resp:  [10-23] 18  BP: (106-130)/() 119/76  SpO2:  [92 %-100 %] 100 %  on  Flow (L/min):  [2] 2;   Device (Oxygen Therapy): room air  Body mass index is 26.15 kg/m².  Physical Exam  Vitals and nursing note reviewed.   Constitutional:       General: He is not in acute distress.  HENT:      Head: Normocephalic.      Mouth/Throat:      Mouth: Mucous membranes are moist.   Eyes:      Conjunctiva/sclera: Conjunctivae normal.   Cardiovascular:      Rate and Rhythm: Normal rate and regular rhythm.   Pulmonary:      Effort: Pulmonary effort is normal. No respiratory distress.      Breath sounds: No wheezing or rales.   Abdominal:      General: Bowel sounds are normal.      Palpations: Abdomen is soft.   Musculoskeletal:      Cervical back: Neck supple.      Right lower leg: No edema.      Left lower leg: No edema.   Skin:     General: Skin is warm and dry.   Neurological:      Mental Status: He is alert and oriented to person, place, and time.   Psychiatric:         Mood and Affect: Mood normal.         Behavior: Behavior normal.     Results Review     I reviewed the patient's new clinical results.  Results from last 7 days   Lab Units 23  0854 23  0556 23  0032 23  1457 23  0614 23  0807 23  0543 23  0814 23  0111   WBC 10*3/mm3  --  8.20  --   --  8.98  --  10.02  --  17.39*   HEMOGLOBIN g/dL 7.0* 7.3* 7.2* 7.8* 6.9*   < > 7.4*   < > 6.7*   PLATELETS 10*3/mm3  --  211  --   --  198  --  138*  --  130*    < > = values in this interval not displayed.     Results from last 7 days   Lab Units 23  0614 23  0543  07/16/23  0111 07/15/23  1603   SODIUM mmol/L 144 139 136 135*   POTASSIUM mmol/L 3.5 3.5 3.8 3.6   CHLORIDE mmol/L 113* 112* 110* 110*   CO2 mmol/L 26.0 22.8 19.3* 19.0*   BUN mg/dL 4* 3* 7 9   CREATININE mg/dL 1.13 1.23 1.26 1.30*   GLUCOSE mg/dL 117* 90 127* 105*   EGFR mL/min/1.73 83.2 75.2 73.0 70.3     Results from last 7 days   Lab Units 07/18/23  0614 07/17/23  0543 07/16/23  0111 07/15/23  1603   ALBUMIN g/dL 2.1* 1.8* 2.1* 1.9*   BILIRUBIN mg/dL <0.2 <0.2 <0.2 0.2   ALK PHOS U/L 48 44 33* 32*   AST (SGOT) U/L 16 17 7 9   ALT (SGPT) U/L 14 15 8 5     Results from last 7 days   Lab Units 07/18/23  0614 07/17/23  0543 07/16/23  0111 07/15/23  1603   CALCIUM mg/dL 7.8* 7.3* 6.9* 6.8*   ALBUMIN g/dL 2.1* 1.8* 2.1* 1.9*     Results from last 7 days   Lab Units 07/19/23  0556 07/18/23  1553 07/13/23  2156   LACTATE mmol/L 0.8 1.0 1.2     No results found for: HGBA1C, POCGLU    NM GI Blood Loss    Result Date: 7/18/2023  Positive GI bleed exam localizing to the small bowel at about the level of the aortic bifurcation. This roughly approximates the area of bleeding observed 2 days ago. The nurse caring for the patient was notified of the findings by the nuclear medicine technologist at around 1:30 PM.  This report was finalized on 7/18/2023 2:08 PM by Dr. Imtiaz Claire M.D.     I have personally reviewed all medications:  Scheduled Medications  acetaminophen, 650 mg, Oral, Once   Or  acetaminophen, 650 mg, Rectal, Once  acetaminophen, 650 mg, Oral, Once   Or  acetaminophen, 650 mg, Rectal, Once  folic acid, 1 mg, Oral, Daily  heparin, 0.3 mL, Other, Once in imaging  heparin, 0.3 mL, Other, Once in imaging  lactobacillus acidophilus, 1 capsule, Oral, BID  ondansetron, 4 mg, Intravenous, Once  pantoprazole, 40 mg, Oral, Q AM  senna-docusate sodium, 2 tablet, Oral, BID  sodium chloride, 10 mL, Intravenous, Q12H  sodium chloride, 10 mL, Intravenous, Q12H  thiamine, 100 mg, Oral, Daily    Infusions   Diet  Diet:  Regular/House Diet, Gastrointestinal Diets; Low Irritant; Texture: Regular Texture (IDDSI 7); Fluid Consistency: Thin (IDDSI 0)    I have personally reviewed:  [x]  Laboratory   [x]  Microbiology   [x]  Radiology   [x]  EKG/Telemetry  [x]  Cardiology/Vascular   []  Pathology    []  Records       Assessment/Plan     Active Hospital Problems    Diagnosis  POA    **ABLA (acute blood loss anemia) [D62]  Yes    Metabolic acidosis [E87.20]  Yes    Enteritis of possible infectious origin [A09]  Yes    Sepsis [A41.9]  Yes    Blood per rectum [K62.5]  Unknown    Iron deficiency anemia [D50.9]  Yes    Current every day smoker [F17.200]  Yes    Gastrointestinal hemorrhage with melena [K92.1]  Yes    Acute blood loss anemia [D62]  Unknown      Resolved Hospital Problems   No resolved problems to display.       43 y.o. male admitted with ABLA (acute blood loss anemia).    Brief Hospital Course to date:  Radha Noriega V is a 43 y.o. male presents the hospital with sepsis and acute blood loss anemia with possible enteritis noted on imaging.  Ultimately tagged red blood scan was able to determine small bowel bleeding localized to the mid small bowel and patient underwent interventional radiology coiling of a small branch of the SMA.     Discussion/plan for today:  Hgb back down to 6.9 on 7/18.  Given 1 unit PRBC. Underwent repeat RBC tagged scan that was positive for bleeding localizing to small bowel about level of aortic bifurcation, roughly approximates the area of bleeding seen prior. S/P IR guided embolization of ileal branch of SMA. GI & Surgery following. Hgb on last draw 7.0, continue to trend. Hemodynamically stable. Tolerating regular diet.  Continue PPI now po. Zosyn completed. WBC has normalized     Blood pressure now improved.  Tachycardia improved; related to anemia/bleeding      PICC line placed due to difficulty with IV access and multiple lost IVs.     Treated with Zosyn for enteritis.   Patient initially meeting  sepsis criteria.  Possible gastrointestinal bacterial infection/enteritis but etiology somewhat unclear.  Probiotics added.     Supportive care and symptomatic treatment.       SCDs for DVT prophylaxis.  Full code.  Discussed with patient and family.  Anticipate discharge home when cleared by consultants..      CHETNA Sparks  [facility]  07/19/23  14:46 EDT

## 2023-07-19 NOTE — PAYOR COMM NOTE
"Radha Noriega (43 y.o. Male)        PLEASE SEE ATTACHED FOR CONTINUED STAY REVIEW.     REF#[id number]    PLEASE CALL   OR Fax     THANK YOU    HERBERTH WHALEY LPN CCP   Date of Birth   1980    Social Security Number       Address   [address] [address]    Home Phone   [phone]    MRN   [id number]       Mandaen   None    Marital Status                               Admission Date   7/12/23    Admission Type   Emergency    Admitting Provider   Harshal Casillas MD    Attending Provider   Harshal Casillas MD    Department, Room/Bed   [address], S601/1       Discharge Date       Discharge Disposition       Discharge Destination                                 Attending Provider: Harshal Casilals MD    Allergies: No Known Allergies    Isolation: None   Infection: None   Code Status: CPR    Ht: 172.7 cm (68\")   Wt: 78 kg (172 lb)    Admission Cmt: None   Principal Problem: ABLA (acute blood loss anemia) [D62]                   Active Insurance as of 7/12/2023       Primary Coverage       Payor Plan Insurance Group Employer/Plan Group    [employer] BLUE [employer]OSS [employer] BLUE CROSS BLUE SHIELD PPO [id number]       Payor Plan Address Payor Plan Phone Number Payor Plan Fax Number Effective Dates    [phone]  3/17/2022 - None Entered    [facility] [address]         Subscriber Name Subscriber Birth Date Member ID       RADHA NORIEGA 1980 [id number]               Secondary Coverage       Payor Plan Insurance Group Employer/Plan Group    [facility] BY LUISA [facility] BY LUISA [id number]       Payor Plan Address Payor Plan Phone Number Payor Plan Fax Number Effective Dates    [address] [address]   1/1/2021 - None Entered    [facility] [address]         Subscriber Name Subscriber Birth Date Member ID       RADHA NORIEGA 1980 [id number]                     Emergency Contacts       "  (Rel.) Home Phone Work Phone Mobile Phone    NED NICHOLS (Spouse) [phone] -- [phone]    Zainab Elaine (Mother) -- -- [phone]              Oxygen Therapy (last day)       Date/Time SpO2 Device (Oxygen Therapy) Flow (L/min) Oxygen Concentration (%) ETCO2 (mmHg)    07/19/23 1200 -- room air -- -- --    07/19/23 1150 100 room air -- -- --    07/19/23 0845 -- room air -- -- --    07/19/23 0644 92 room air -- -- --    07/18/23 2217 100 room air -- -- --    07/18/23 2100 100 room air -- -- --    07/18/23 2019 -- room air -- -- --    07/18/23 2007 100 room air -- -- --    07/18/23 1956 -- room air -- -- --    07/18/23 1945 100 room air -- -- --    07/18/23 1930 100 room air -- -- --    07/18/23 1915 100 room air -- -- --    07/18/23 1900 100 room air -- -- --    07/18/23 1855 100 room air -- -- --    07/18/23 1845 100 nasal cannula 2 -- --    07/18/23 1840 100 nasal cannula 2 -- --    07/18/23 1835 100 nasal cannula 2 -- --    07/18/23 1830 100 nasal cannula 2 -- --    07/18/23 1825 100 nasal cannula 2 -- --    07/18/23 1820 100 nasal cannula 2 -- --    07/18/23 1815 100 nasal cannula 2 -- --    07/18/23 1810 100 nasal cannula 2 -- --    07/18/23 1805 100 nasal cannula 2 -- --    07/18/23 1800 100 nasal cannula 2 -- --    07/18/23 1755 100 nasal cannula 2 -- --    07/18/23 1750 100 nasal cannula 2 -- --    07/18/23 1745 100 nasal cannula 2 -- --    07/18/23 1740 100 nasal cannula 2 -- --    07/18/23 1735 100 nasal cannula 2 -- --    07/18/23 1730 100 nasal cannula 2 -- --    07/18/23 1725 100 nasal cannula 2 -- --    07/18/23 1720 100 nasal cannula 2 -- --    07/18/23 1715 100 nasal cannula 2 -- --    07/18/23 1710 100 nasal cannula 2 -- --    07/18/23 1705 100 nasal cannula 2 -- --    07/18/23 1700 100 nasal cannula 2 -- --    07/18/23 1655 100 nasal cannula 2 -- --    07/18/23 1650 100 nasal cannula 2 -- --    07/18/23 1645 100 nasal cannula 2 -- --    07/18/23 1640 100 nasal cannula 2 --  --    07/18/23 1635 100 nasal cannula 2 -- --    07/18/23 1630 100 nasal cannula 2 -- --    07/18/23 1625 100 nasal cannula 2 -- --    07/18/23 1603 97 room air -- -- --    07/18/23 1100 100 -- -- -- --    07/18/23 0849 100 room air -- -- --    07/18/23 0810 100 room air -- -- --          Intake & Output (last day)         07/18 0701 07/19 0700 07/19 0701 07/20 0700    P.O. 702     I.V. (mL/kg) 48 (0.6)     Blood 346.3     Total Intake(mL/kg) 1096.3 (14.1)     Urine (mL/kg/hr) 500 (0.3) 600 (0.9)    Stool 0     Total Output 500 600    Net +596.3 -600          Stool Unmeasured Occurrence 3 x           Lines, Drains & Airways       Active LDAs       Name Placement date Placement time Site Days    PICC Double Lumen 07/14/23 Left Brachial 07/14/23  1144  Brachial  5                  Medication Administration Report for Radha Noriega as of 07/19/23 1559     Legend:    Given Hold Not Given Due Canceled Entry Other Actions    Time Time (Time) Time Time-Action         Discontinued     Completed     Future     MAR Hold     Linked             Medications 07/19/23      acetaminophen (TYLENOL) tablet 650 mg  Dose: 650 mg  Freq: Once Route: PO  Start: 07/15/23 1730   End: 07/16/23 1734   Admin Instructions:   Give Prior to First Transfusion  Do not exceed 4 grams of acetaminophen in a 24 hr period. Max dose of 2gm for AST/ALT greater than 120 units/L    If given for fever, use fever parameter: fever greater than 100.4 °F.    If given for pain, use the following pain scale:   Mild Pain = Pain Score of 1-3, CPOT 1-2  Moderate Pain = Pain Score of 4-6, CPOT 3-4  Severe Pain = Pain Score of 7-10, CPOT 5-8       Or  acetaminophen (TYLENOL) 160 MG/5ML solution 650 mg  Dose: 650 mg  Freq: Once Route: PO  Start: 07/15/23 1730   Admin Instructions:   Give Prior to First Transfusion  Do not exceed 4 grams of acetaminophen in a 24 hr period. Max dose of 2gm for AST/ALT greater than 120 units/L    If given for fever, use fever  parameter: fever greater than 100.4 °F.    If given for pain, use the following pain scale:   Mild Pain = Pain Score of 1-3, CPOT 1-2  Moderate Pain = Pain Score of 4-6, CPOT 3-4  Severe Pain = Pain Score of 7-10, CPOT 5-8       Or  acetaminophen (TYLENOL) suppository 650 mg  Dose: 650 mg  Freq: Once Route: RE  Start: 07/15/23 1730   Admin Instructions:   Give Prior to First Transfusion  Do not exceed 4 grams of acetaminophen in a 24 hr period. Max dose of 2gm for AST/ALT greater than 120 units/L    If given for fever, use fever parameter: fever greater than 100.4 °F.    If given for pain, use the following pain scale:   Mild Pain = Pain Score of 1-3, CPOT 1-2  Moderate Pain = Pain Score of 4-6, CPOT 3-4  Severe Pain = Pain Score of 7-10, CPOT 5-8        acetaminophen (TYLENOL) tablet 650 mg  Dose: 650 mg  Freq: Once Route: PO  Start: 07/15/23 0815   End: 07/16/23 1734   Admin Instructions:   Give Prior to First Transfusion  Do not exceed 4 grams of acetaminophen in a 24 hr period. Max dose of 2gm for AST/ALT greater than 120 units/L    If given for fever, use fever parameter: fever greater than 100.4 °F.    If given for pain, use the following pain scale:   Mild Pain = Pain Score of 1-3, CPOT 1-2  Moderate Pain = Pain Score of 4-6, CPOT 3-4  Severe Pain = Pain Score of 7-10, CPOT 5-8       Or  acetaminophen (TYLENOL) 160 MG/5ML solution 650 mg  Dose: 650 mg  Freq: Once Route: PO  Start: 07/15/23 0815   Admin Instructions:   Give Prior to First Transfusion  Do not exceed 4 grams of acetaminophen in a 24 hr period. Max dose of 2gm for AST/ALT greater than 120 units/L    If given for fever, use fever parameter: fever greater than 100.4 °F.    If given for pain, use the following pain scale:   Mild Pain = Pain Score of 1-3, CPOT 1-2  Moderate Pain = Pain Score of 4-6, CPOT 3-4  Severe Pain = Pain Score of 7-10, CPOT 5-8       Or  acetaminophen (TYLENOL) suppository 650 mg  Dose: 650 mg  Freq: Once Route: RE  Start:  07/15/23 0815   Admin Instructions:   Give Prior to First Transfusion  Do not exceed 4 grams of acetaminophen in a 24 hr period. Max dose of 2gm for AST/ALT greater than 120 units/L    If given for fever, use fever parameter: fever greater than 100.4 °F.    If given for pain, use the following pain scale:   Mild Pain = Pain Score of 1-3, CPOT 1-2  Moderate Pain = Pain Score of 4-6, CPOT 3-4  Severe Pain = Pain Score of 7-10, CPOT 5-8        sennosides-docusate (PERICOLACE) 8.6-50 MG per tablet 2 tablet  Dose: 2 tablet  Freq: 2 Times Daily Route: PO  Start: 07/12/23 1118   Admin Instructions:   HOLD MEDICATION IF PATIENT HAS HAD BOWEL MOVEMENT. Start bowel management regimen if patient has not had a bowel movement after 12 hours.    (0846)-Not Given     2100                  And  polyethylene glycol (MIRALAX) packet 17 g  Dose: 17 g  Freq: Daily PRN Route: PO  PRN Reason: Constipation  PRN Comment: Use if senna-docusate is ineffective  Start: 07/12/23 1059   Admin Instructions:   Use if no bowel movement after 12 hours. Mix in 6-8 ounces of water.  Use 4-8 ounces of water, tea, or juice for each 17 gram dose.       And  bisacodyl (DULCOLAX) EC tablet 5 mg  Dose: 5 mg  Freq: Daily PRN Route: PO  PRN Reason: Constipation  PRN Comment: Use if polyethylene glycol is ineffective  Start: 07/12/23 1059   Admin Instructions:   Use if no bowel movement after 12 hours.  Swallow whole. Do not crush, split, or chew tablet.       And  bisacodyl (DULCOLAX) suppository 10 mg  Dose: 10 mg  Freq: Daily PRN Route: RE  PRN Reason: Constipation  PRN Comment: Use if bisacodyl oral is ineffective  Start: 07/12/23 1059   Admin Instructions:   Use if no bowel movement after 12 hours.  Hold for diarrhea        folic acid (FOLVITE) tablet 1 mg  Dose: 1 mg  Freq: Daily Route: PO  Start: 07/13/23 1400   End: 07/28/23 0859    0845-Given                    heparin injection 30 Units  Dose: 0.3 mL  Freq: Once in Imaging Route: OTHER  Start:  "07/18/23 1330   Admin Instructions:   Use for heparinized syringe for Ultra Tag RBC kit reconstitution for GI Bleed, MUGA, and Hemangioma exams.        heparin injection 30 Units  Dose: 0.3 mL  Freq: Once in Imaging Route: OTHER  Start: 07/13/23 1415   Admin Instructions:   Use for heparinized syringe for Ultra Tag RBC kit reconstitution for GI Bleed, MUGA, and Hemangioma exams.        lactobacillus acidophilus (RISAQUAD) capsule 1 capsule  Dose: 1 capsule  Freq: 2 Times Daily Route: PO  Start: 07/16/23 1300    0845-Given     2100                   morphine injection 2 mg  Dose: 2 mg  Freq: Every 4 Hours PRN Route: IV  PRN Reason: Severe Pain  Start: 07/13/23 0939   End: 07/20/23 0938   Admin Instructions:   Based on patient request - if ordered for moderate or severe pain, provider allows for administration of a medication prescribed for a lower pain scale.  If given for pain, use the following pain scale:  Mild Pain = Pain Score of 1-3, CPOT 1-2  Moderate Pain = Pain Score of 4-6, CPOT 3-4  Severe Pain = Pain Score of 7-10, CPOT 5-8        ondansetron (ZOFRAN) injection 4 mg  Dose: 4 mg  Freq: Every 6 Hours PRN Route: IV  PRN Reasons: Nausea,Vomiting  Start: 07/13/23 0548   Admin Instructions:   \"If multiple N/V medications ordered, use in the following order: Ondansetron, Prochlorperazine, Promethazine. Use PO unless patient refuses or patient unable to swallow.\"          ondansetron (ZOFRAN) injection 4 mg  Dose: 4 mg  Freq: Once Route: IV  Start: 07/12/23 0722   Admin Instructions:   \"If multiple N/V medications ordered, use in the following order: Ondansetron, Prochlorperazine, Promethazine. Use PO unless patient refuses or patient unable to swallow.\"          pantoprazole (PROTONIX) EC tablet 40 mg  Dose: 40 mg  Freq: Every Early Morning Route: PO  Start: 07/18/23 0600   Admin Instructions:   Swallow whole; do not crush, split, or chew.    0555-Given                    sodium chloride 0.9 % flush 10 mL  Dose: " 10 mL  Freq: As Needed Route: IV  PRN Reason: Line Care  PRN Comment: After Medication Administration  Start: 07/14/23 1142        sodium chloride 0.9 % flush 10 mL  Dose: 10 mL  Freq: Every 12 Hours Scheduled Route: IV  Start: 07/14/23 1230   Admin Instructions:   Lumen #2    0845-Given     2100                   sodium chloride 0.9 % flush 10 mL  Dose: 10 mL  Freq: Every 12 Hours Scheduled Route: IV  Start: 07/14/23 1230   Admin Instructions:   Lumen #1    0845-Given     2100                   sodium chloride 0.9 % flush 10 mL  Dose: 10 mL  Freq: As Needed Route: IV  PRN Reason: Line Care  Start: 07/12/23 1059        sodium chloride 0.9 % flush 10 mL  Dose: 10 mL  Freq: As Needed Route: IV  PRN Reason: Line Care  Start: 07/12/23 0536        sodium chloride 0.9 % flush 20 mL  Dose: 20 mL  Freq: As Needed Route: IV  PRN Reason: Line Care  PRN Comment: After Blood Draws or Blood Product Administration  Start: 07/14/23 1142        sodium chloride 0.9 % infusion 40 mL  Dose: 40 mL  Freq: As Needed Route: IV  PRN Reason: Line Care  Start: 07/14/23 1142   Admin Instructions:   Following administration of an IV intermittent medication, flush line with 40mL NS at 100mL/hr.        sodium chloride 0.9 % infusion 40 mL  Dose: 40 mL  Freq: As Needed Route: IV  PRN Reason: Line Care  Start: 07/12/23 1059   Admin Instructions:   Following administration of an IV intermittent medication, flush line with 40mL NS at 100mL/hr.        thiamine (VITAMIN B-1) tablet 100 mg  Dose: 100 mg  Freq: Daily Route: PO  Start: 07/13/23 1400    0845-Given                   Completed Medications  Medications 07/19/23       acetaminophen (TYLENOL) tablet 650 mg  Dose: 650 mg  Freq: Once Route: PO  Start: 07/16/23 1815   End: 07/16/23 1735   Admin Instructions:   Based on patient request - if ordered for moderate or severe pain, provider allows for administration of a medication prescribed for a lower pain scale.  Do not exceed 4 grams of  acetaminophen in a 24 hr period. Max dose of 2gm for AST/ALT greater than 120 units/L    If given for fever, use fever parameter: fever greater than 100.4 °F.    If given for pain, use the following pain scale:   Mild Pain = Pain Score of 1-3, CPOT 1-2  Moderate Pain = Pain Score of 4-6, CPOT 3-4  Severe Pain = Pain Score of 7-10, CPOT 5-8        acetaminophen (TYLENOL) tablet 650 mg  Dose: 650 mg  Freq: Once Route: PO  Start: 07/14/23 0845   End: 07/14/23 0910   Admin Instructions:   Based on patient request - if ordered for moderate or severe pain, provider allows for administration of a medication prescribed for a lower pain scale.  Do not exceed 4 grams of acetaminophen in a 24 hr period. Max dose of 2gm for AST/ALT greater than 120 units/L    If given for fever, use fever parameter: fever greater than 100.4 °F.    If given for pain, use the following pain scale:   Mild Pain = Pain Score of 1-3, CPOT 1-2  Moderate Pain = Pain Score of 4-6, CPOT 3-4  Severe Pain = Pain Score of 7-10, CPOT 5-8        famotidine (PEPCID) tablet 20 mg  Dose: 20 mg  Freq: Once Route: PO  Start: 07/16/23 1815   End: 07/16/23 1735        famotidine (PEPCID) tablet 20 mg  Dose: 20 mg  Freq: Once Route: PO  Start: 07/14/23 0845   End: 07/14/23 0910        fentaNYL citrate (PF) (SUBLIMAZE) injection  Freq: As Needed Route: IV  Start: 07/18/23 1637   End: 07/18/23 1829        fentaNYL citrate (PF) (SUBLIMAZE) injection  Freq: As Needed Route: IV  Start: 07/15/23 1747   End: 07/15/23 1914        glucagon (GLUCAGEN) injection  Freq: As Needed  Start: 07/18/23 1649   End: 07/18/23 1823        heparin injection 30 Units  Dose: 0.3 mL  Freq: Once in Imaging Route: OTHER  Start: 07/15/23 1214   End: 07/15/23 1215   Admin Instructions:   Use for heparinized syringe for Ultra Tag RBC kit reconstitution for GI Bleed, MUGA, and Hemangioma exams.        iopamidol (ISOVUE-250) 51 % injection 200 mL  Dose: 200 mL  Freq: Once in Imaging Route:  "IA  Start: 07/18/23 2015   End: 07/18/23 1920        iopamidol (ISOVUE-250) 51 % injection 200 mL  Dose: 200 mL  Freq: Once in Imaging Route: IA  Start: 07/15/23 2130   End: 07/15/23 2034        iopamidol (ISOVUE-300) 61 % injection 100 mL  Dose: 100 mL  Freq: Once in Imaging Route: IV  Start: 07/12/23 0802   End: 07/12/23 0746        iopamidol (ISOVUE-370) 76 % injection 100 mL  Dose: 100 mL  Freq: Once in Imaging Route: IV  Start: 07/14/23 1315   End: 07/14/23 1227        lidocaine PF 1% (XYLOCAINE) injection  Freq: As Needed  Start: 07/18/23 1640   End: 07/18/23 1640        lidocaine PF 1% (XYLOCAINE) injection  Freq: As Needed  Start: 07/15/23 1938   End: 07/15/23 1938        midazolam (VERSED) injection  Freq: As Needed Route: IV  Start: 07/18/23 1637   End: 07/18/23 1829        midazolam (VERSED) injection  Freq: As Needed Route: IV  Start: 07/15/23 1747   End: 07/15/23 1747        morphine injection 2 mg  Dose: 2 mg  Freq: Once Route: IV  Start: 07/13/23 0645   End: 07/13/23 0556   Admin Instructions:   If given for pain, use the following pain scale:  Mild Pain = Pain Score of 1-3, CPOT 1-2  Moderate Pain = Pain Score of 4-6, CPOT 3-4  Severe Pain = Pain Score of 7-10, CPOT 5-8        nitroglycerin 100 mcg/mL in D5W syringe  Freq: As Needed Route: IV  Start: 07/18/23 1741   End: 07/18/23 1741        ondansetron (ZOFRAN) injection 4 mg  Dose: 4 mg  Freq: Once Route: IV  Start: 07/15/23 1758   End: 07/15/23 1800   Admin Instructions:   \"If multiple N/V medications ordered, use in the following order: Ondansetron, Prochlorperazine, Promethazine. Use PO unless patient refuses or patient unable to swallow.\"          pantoprazole (PROTONIX) injection 80 mg  Dose: 80 mg  Freq: Once Route: IV  Indications of Use: Gastrointestinal (GI) Bleed  Start: 07/12/23 0605   End: 07/12/23 0606   Admin Instructions:   Dilute with 10 mL of 0.9% NaCl and give IV push over 2 minutes.        piperacillin-tazobactam (ZOSYN) 3.375 g " in iso-osmotic dextrose 50 ml (premix)  Dose: 3.375 g  Freq: Every 8 Hours Route: IV  Indications of Use: SEPSIS  Start: 07/16/23 0600   End: 07/18/23 2155   Admin Instructions:   Refrigerate        piperacillin-tazobactam (ZOSYN) 3.375 g in iso-osmotic dextrose 50 ml (premix)  Dose: 3.375 g  Freq: Once Route: IV  Start: 07/13/23 2030   End: 07/13/23 2340   Admin Instructions:   Refrigerate        sodium chloride 0.9 % bolus 1,000 mL  Dose: 1,000 mL  Freq: Once Route: IV  Start: 07/12/23 0722   End: 07/12/23 0800        sodium chloride 0.9 % bolus 500 mL  Dose: 500 mL  Freq: Once Route: IV  Start: 07/14/23 1445   End: 07/14/23 1821        sodium chloride 0.9 % infusion  Freq: Continuous PRN Route: IV  Start: 07/18/23 1626   End: 07/18/23 1626        sodium chloride 0.9 % infusion  Freq: Continuous PRN Route: IV  Start: 07/15/23 1745   End: 07/15/23 1745        sodium chloride 1,000 mL mixture  Freq: As Needed  Start: 07/18/23 1653   End: 07/18/23 1654        sodium chloride 1,000 mL mixture  Freq: As Needed  Start: 07/15/23 1808   End: 07/15/23 1808        technetium labeled red blood cells (ULTRATAG) injection 1 dose  Dose: 1 dose  Freq: Once in Imaging Route: IV  Start: 07/18/23 1230   End: 07/18/23 1231   Order specific questions:   Millicuries: 31.2          technetium labeled red blood cells (ULTRATAG) injection 1 dose  Dose: 1 dose  Freq: Once in Imaging Route: IV  Start: 07/15/23 1300   End: 07/15/23 1216   Order specific questions:   Millicuries: 36          technetium labeled red blood cells (ULTRATAG) injection 1 dose  Dose: 1 dose  Freq: Once in Imaging Route: IV  Start: 07/13/23 1930   End: 07/13/23 1838   Order specific questions:   Millicuries: 24.5          technetium labeled red blood cells (ULTRATAG) injection 1 dose  Dose: 1 dose  Freq: Once in Imaging Route: IV  Start: 07/13/23 1415   End: 07/13/23 1330   Order specific questions:   Millicuries: 29.7         Discontinued Medications  Medications  07/19/23       famotidine (PEPCID) tablet 20 mg  Dose: 20 mg  Freq: Once Route: PO  Start: 07/15/23 0815   End: 07/16/23 1733        pantoprazole (PROTONIX) 40 mg in 100 mL NS (VTB)  Rate: 20 mL/hr Dose: 8 mg/hr  Freq: Continuous Route: IV  Indications of Use: Gastrointestinal (GI) Bleed  Start: 07/12/23 1118   End: 07/17/23 1438        Pharmacy to Dose Zosyn  Freq: Continuous PRN Route: XX  PRN Reason: Consult  Indications of Use: SEPSIS  Start: 07/13/23 1306   End: 07/14/23 1132        piperacillin-tazobactam (ZOSYN) 3.375 g in iso-osmotic dextrose 50 ml (premix)  Dose: 3.375 g  Freq: Every 8 Hours Route: IV  Indications of Use: SEPSIS  Start: 07/14/23 0600   End: 07/16/23 0106   Admin Instructions:   Refrigerate        piperacillin-tazobactam (ZOSYN) 3.375 g in iso-osmotic dextrose 50 ml (premix)  Dose: 3.375 g  Freq: Every 8 Hours Route: IV  Indications of Use: SEPSIS  Start: 07/13/23 2015   End: 07/13/23 1930   Admin Instructions:   Refrigerate        piperacillin-tazobactam (ZOSYN) 3.375 g in iso-osmotic dextrose 50 ml (premix)  Dose: 3.375 g  Freq: Once Route: IV  Start: 07/13/23 1415   End: 07/13/23 1930   Admin Instructions:   Refrigerate        polyethylene glycol (MIRALAX) powder 0.5 bottle  Dose: 0.5 bottle  Freq: 2 Times Daily Route: PO  Start: 07/14/23 2000   End: 07/15/23 1959   Admin Instructions:   1600: Mix One half bottle in non carbonated clear liquids and drink 8 ounces every 15 min until gone  2000:Mix the remaining half bottle in noncarbonated clear liquids and drink 8 ounces every 15 min until gone  Dissolve entire contents with 64oz of liquid. Give 1/2 of the diluted soln at 5pm the day before colonoscopy. Give remaining soln on day of colonoscopy. Complete at least 4hrs before procedure        sodium chloride 0.9 % flush 10 mL  Dose: 10 mL  Freq: Every 12 Hours Scheduled Route: IV  Start: 07/12/23 1118   End: 07/16/23 1951        sodium chloride 0.9 % infusion  Rate: 100 mL/hr Dose: 100  mL/hr  Freq: Continuous Route: IV  Start: 23 1115   End: 23 0814                   Operative/Procedure Notes (last 24 hours)  Notes from 23 1559 through 23 1559   No notes of this type exist for this encounter.          Physician Progress Notes (last 24 hours)        Xiomy Mckeon APRN at 23 1446              Name: Radha Noriega V ADMIT: 2023   : 1980  PCP: Hellen Skelton APRN    MRN: [id number] LOS: 5 days   AGE/SEX: 43 y.o. male  ROOM[facility]     Subjective   Subjective   Feels well. No complaints      Objective   Objective   Vital Signs  Temp:  [97.2 °F (36.2 °C)-98.2 °F (36.8 °C)] 97.9 °F (36.6 °C)  Heart Rate:  [70-90] 84  Resp:  [10-23] 18  BP: (106-130)/() 119/76  SpO2:  [92 %-100 %] 100 %  on  Flow (L/min):  [2] 2;   Device (Oxygen Therapy): room air  Body mass index is 26.15 kg/m².  Physical Exam  Vitals and nursing note reviewed.   Constitutional:       General: He is not in acute distress.  HENT:      Head: Normocephalic.      Mouth/Throat:      Mouth: Mucous membranes are moist.   Eyes:      Conjunctiva/sclera: Conjunctivae normal.   Cardiovascular:      Rate and Rhythm: Normal rate and regular rhythm.   Pulmonary:      Effort: Pulmonary effort is normal. No respiratory distress.      Breath sounds: No wheezing or rales.   Abdominal:      General: Bowel sounds are normal.      Palpations: Abdomen is soft.   Musculoskeletal:      Cervical back: Neck supple.      Right lower leg: No edema.      Left lower leg: No edema.   Skin:     General: Skin is warm and dry.   Neurological:      Mental Status: He is alert and oriented to person, place, and time.   Psychiatric:         Mood and Affect: Mood normal.         Behavior: Behavior normal.     Results Review     I reviewed the patient's new clinical results.  Results from last 7 days   Lab Units 23  0854 23  0556 23  0032 23  1457 23  0614 23  0807 23  0543  07/16/23  0814 07/16/23  0111   WBC 10*3/mm3  --  8.20  --   --  8.98  --  10.02  --  17.39*   HEMOGLOBIN g/dL 7.0* 7.3* 7.2* 7.8* 6.9*   < > 7.4*   < > 6.7*   PLATELETS 10*3/mm3  --  211  --   --  198  --  138*  --  130*    < > = values in this interval not displayed.     Results from last 7 days   Lab Units 07/18/23  0614 07/17/23  0543 07/16/23  0111 07/15/23  1603   SODIUM mmol/L 144 139 136 135*   POTASSIUM mmol/L 3.5 3.5 3.8 3.6   CHLORIDE mmol/L 113* 112* 110* 110*   CO2 mmol/L 26.0 22.8 19.3* 19.0*   BUN mg/dL 4* 3* 7 9   CREATININE mg/dL 1.13 1.23 1.26 1.30*   GLUCOSE mg/dL 117* 90 127* 105*   EGFR mL/min/1.73 83.2 75.2 73.0 70.3     Results from last 7 days   Lab Units 07/18/23  0614 07/17/23  0543 07/16/23  0111 07/15/23  1603   ALBUMIN g/dL 2.1* 1.8* 2.1* 1.9*   BILIRUBIN mg/dL <0.2 <0.2 <0.2 0.2   ALK PHOS U/L 48 44 33* 32*   AST (SGOT) U/L 16 17 7 9   ALT (SGPT) U/L 14 15 8 5     Results from last 7 days   Lab Units 07/18/23  0614 07/17/23  0543 07/16/23  0111 07/15/23  1603   CALCIUM mg/dL 7.8* 7.3* 6.9* 6.8*   ALBUMIN g/dL 2.1* 1.8* 2.1* 1.9*     Results from last 7 days   Lab Units 07/19/23  0556 07/18/23  1553 07/13/23  2156   LACTATE mmol/L 0.8 1.0 1.2     No results found for: HGBA1C, POCGLU    NM GI Blood Loss    Result Date: 7/18/2023  Positive GI bleed exam localizing to the small bowel at about the level of the aortic bifurcation. This roughly approximates the area of bleeding observed 2 days ago. The nurse caring for the patient was notified of the findings by the nuclear medicine technologist at around 1:30 PM.  This report was finalized on 7/18/2023 2:08 PM by Dr. Imtiaz Claire M.D.     I have personally reviewed all medications:  Scheduled Medications  acetaminophen, 650 mg, Oral, Once   Or  acetaminophen, 650 mg, Rectal, Once  acetaminophen, 650 mg, Oral, Once   Or  acetaminophen, 650 mg, Rectal, Once  folic acid, 1 mg, Oral, Daily  heparin, 0.3 mL, Other, Once in imaging  heparin, 0.3  mL, Other, Once in imaging  lactobacillus acidophilus, 1 capsule, Oral, BID  ondansetron, 4 mg, Intravenous, Once  pantoprazole, 40 mg, Oral, Q AM  senna-docusate sodium, 2 tablet, Oral, BID  sodium chloride, 10 mL, Intravenous, Q12H  sodium chloride, 10 mL, Intravenous, Q12H  thiamine, 100 mg, Oral, Daily    Infusions   Diet  Diet: Regular/House Diet, Gastrointestinal Diets; Low Irritant; Texture: Regular Texture (IDDSI 7); Fluid Consistency: Thin (IDDSI 0)    I have personally reviewed:  [x]  Laboratory   [x]  Microbiology   [x]  Radiology   [x]  EKG/Telemetry  [x]  Cardiology/Vascular   []  Pathology    []  Records      Assessment/Plan     Active Hospital Problems    Diagnosis  POA    **ABLA (acute blood loss anemia) [D62]  Yes    Metabolic acidosis [E87.20]  Yes    Enteritis of possible infectious origin [A09]  Yes    Sepsis [A41.9]  Yes    Blood per rectum [K62.5]  Unknown    Iron deficiency anemia [D50.9]  Yes    Current every day smoker [F17.200]  Yes    Gastrointestinal hemorrhage with melena [K92.1]  Yes    Acute blood loss anemia [D62]  Unknown      Resolved Hospital Problems   No resolved problems to display.       43 y.o. male admitted with ABLA (acute blood loss anemia).    Brief Hospital Course to date:  Radha Noriega V is a 43 y.o. male presents the hospital with sepsis and acute blood loss anemia with possible enteritis noted on imaging.  Ultimately tagged red blood scan was able to determine small bowel bleeding localized to the mid small bowel and patient underwent interventional radiology coiling of a small branch of the SMA.     Discussion/plan for today:  Hgb back down to 6.9 on 7/18.  Given 1 unit PRBC. Underwent repeat RBC tagged scan that was positive for bleeding localizing to small bowel about level of aortic bifurcation, roughly approximates the area of bleeding seen prior. S/P IR guided embolization of ileal branch of SMA. GI & Surgery following. Hgb on last draw 7.0, continue to trend.  Hemodynamically stable. Tolerating regular diet.  Continue PPI now po. Zosyn completed. WBC has normalized     Blood pressure now improved.  Tachycardia improved; related to anemia/bleeding      PICC line placed due to difficulty with IV access and multiple lost IVs.     Treated with Zosyn for enteritis.   Patient initially meeting sepsis criteria.  Possible gastrointestinal bacterial infection/enteritis but etiology somewhat unclear.  Probiotics added.     Supportive care and symptomatic treatment.       SCDs for DVT prophylaxis.  Full code.  Discussed with patient and family.  Anticipate discharge home when cleared by consultants..      CHETNA Sparks  [facility]  07/19/23  14:46 EDT        Electronically signed by Xiomy Mckeon APRN at 07/19/23 0340       Fortunato Narvaez Jr., MD at 07/19/23 9847          Chief Complaint:    GI bleed    Subjective:    The patient developed recurrent GI bleeding yesterday and required repeat embolization.  It was successful.  He is now feeling well with no abdominal pain.    Objective:    Temp:  [97.2 °F (36.2 °C)-98.4 °F (36.9 °C)] 98.2 °F (36.8 °C)  Heart Rate:  [70-90] 73  Resp:  [10-23] 18  BP: (106-130)/() 119/101    Physical Exam  Constitutional:       Appearance: He is not ill-appearing or toxic-appearing.   Abdominal:      Palpations: Abdomen is soft.      Tenderness: There is no abdominal tenderness.   Neurological:      Mental Status: He is alert.   Psychiatric:         Behavior: Behavior is cooperative.       Results:    WBC is 8.20.  H/H is stable at 7.3/21.8.    Assessment/Plan:    The patient has GI bleeding from a small bowel source.  He has been embolized twice and has no evidence of bleeding at this time.  He also has a completely benign abdominal exam with no evidence of ischemia related to the embolization.    Fortunato Narvaez Jr., M.D.     Electronically signed by Fortunato Narvaez Jr., MD at 07/19/23 8364        Wojciech Beal MD at 07/19/23 0703          [facility]  Inpatient Progress Note    Reason for Followup:  Recurrent Gi bleeding    Subjective     Interval History:   S/P repeat IR embolization last evening    Current Facility-Administered Medications:     [DISCONTINUED] acetaminophen (TYLENOL) tablet 650 mg, 650 mg, Oral, Once **OR** acetaminophen (TYLENOL) 160 MG/5ML solution 650 mg, 650 mg, Oral, Once **OR** acetaminophen (TYLENOL) suppository 650 mg, 650 mg, Rectal, Once, Ermelinda Dixon MD    [DISCONTINUED] acetaminophen (TYLENOL) tablet 650 mg, 650 mg, Oral, Once **OR** acetaminophen (TYLENOL) 160 MG/5ML solution 650 mg, 650 mg, Oral, Once **OR** acetaminophen (TYLENOL) suppository 650 mg, 650 mg, Rectal, Once, Harshal Casillas MD    sennosides-docusate (PERICOLACE) 8.6-50 MG per tablet 2 tablet, 2 tablet, Oral, BID, 2 tablet at 07/18/23 0841 **AND** polyethylene glycol (MIRALAX) packet 17 g, 17 g, Oral, Daily PRN **AND** bisacodyl (DULCOLAX) EC tablet 5 mg, 5 mg, Oral, Daily PRN **AND** bisacodyl (DULCOLAX) suppository 10 mg, 10 mg, Rectal, Daily PRN, Ermelinda Dixon MD    folic acid (FOLVITE) tablet 1 mg, 1 mg, Oral, Daily, Ermelinda Dixon MD, 1 mg at 07/18/23 0841    heparin injection 30 Units, 0.3 mL, Other, Once in imaging, Ermelinda Dixon MD    heparin injection 30 Units, 0.3 mL, Other, Once in imaging, Harshal Casillas MD    lactobacillus acidophilus (RISAQUAD) capsule 1 capsule, 1 capsule, Oral, BID, Harshal Casillas MD, 1 capsule at 07/18/23 2019    morphine injection 2 mg, 2 mg, Intravenous, Q4H PRN, Ermelinda Dixon MD, 2 mg at 07/16/23 0117    ondansetron (ZOFRAN) injection 4 mg, 4 mg, Intravenous, Once, Ermelinda Dixon MD    ondansetron (ZOFRAN) injection 4 mg, 4 mg, Intravenous, Q6H PRN, Ermelinda Dixon MD, 4 mg at 07/15/23 2693    pantoprazole (PROTONIX) EC tablet 40 mg, 40 mg, Oral, Q AM, Harshal Casillas MD, 40 mg at  07/19/23 0555    [COMPLETED] Insert Peripheral IV, , , Once **AND** sodium chloride 0.9 % flush 10 mL, 10 mL, Intravenous, Zack PAYAN Sarah A, MD    sodium chloride 0.9 % flush 10 mL, 10 mL, Intravenous, PRN, Ermelinda Dixon MD, 10 mL at 07/18/23 1900    sodium chloride 0.9 % flush 10 mL, 10 mL, Intravenous, Q12H, Ermelinda Dixon MD, 10 mL at 07/18/23 2023    sodium chloride 0.9 % flush 10 mL, 10 mL, Intravenous, Q12H, Ermelinda Dixon MD, 10 mL at 07/18/23 2023    sodium chloride 0.9 % flush 10 mL, 10 mL, Intravenous, PRN, Ermelinda Dixon MD    sodium chloride 0.9 % flush 20 mL, 20 mL, Intravenous, Zack PAYAN Sarah A, MD    sodium chloride 0.9 % infusion 40 mL, 40 mL, Intravenous, PRNZack Sarah A, MD    sodium chloride 0.9 % infusion 40 mL, 40 mL, Intravenous, PRNZack Sarah A, MD    thiamine (VITAMIN B-1) tablet 100 mg, 100 mg, Oral, Daily, Ermelinda Dixon MD, 100 mg at 07/18/23 0841    Objective     Vital Signs  Temp:  [97.2 °F (36.2 °C)-98.4 °F (36.9 °C)] 97.9 °F (36.6 °C)  Heart Rate:  [70-90] 77  Resp:  [10-23] 18  BP: (106-130)/(63-94) 114/72  Body mass index is 26.15 kg/m².    Intake/Output Summary (Last 24 hours) at 7/19/2023 0703  Last data filed at 7/18/2023 2120  Gross per 24 hour   Intake 1096.25 ml   Output 500 ml   Net 596.25 ml     No intake/output data recorded.     Physical Exam:   General: patient awake, alert and cooperative   Abdomen: soft, nontender, nondistended; normal bowel sounds     Results Review:     I reviewed the patient's new clinical results.  I reviewed the patient's new imaging results and agree with the interpretation.    Results from last 7 days   Lab Units 07/19/23  0556 07/19/23  0032 07/18/23  1457 07/18/23  0614 07/17/23  0807 07/17/23  0543   WBC 10*3/mm3 8.20  --   --  8.98  --  10.02   HEMOGLOBIN g/dL 7.3* 7.2* 7.8* 6.9*   < > 7.4*   HEMATOCRIT % 21.8* 21.3* 23.0* 20.6*   < > 21.7*   PLATELETS 10*3/mm3 211  --   --  198  --  138*    < > = values in this  interval not displayed.     Results from last 7 days   Lab Units 07/18/23  0614 07/17/23  0543 07/16/23  0111   SODIUM mmol/L 144 139 136   POTASSIUM mmol/L 3.5 3.5 3.8   CHLORIDE mmol/L 113* 112* 110*   CO2 mmol/L 26.0 22.8 19.3*   BUN mg/dL 4* 3* 7   CREATININE mg/dL 1.13 1.23 1.26   CALCIUM mg/dL 7.8* 7.3* 6.9*   BILIRUBIN mg/dL <0.2 <0.2 <0.2   ALK PHOS U/L 48 44 33*   ALT (SGPT) U/L 14 15 8   AST (SGOT) U/L 16 17 7   GLUCOSE mg/dL 117* 90 127*     Results from last 7 days   Lab Units 07/18/23  1648   INR  1.0     Lab Results   Lab Value Date/Time    LIPASE 11 (L) 07/12/2023 0612    LIPASE 26 06/23/2023 1201       Radiology:  [unfilled]      Assessment & Plan   Assessment:    Obscure GI bleeding  Blood loss anemia  S/P IR guided embolization of ileal branch of SMA x 2    Plan:   Hb generally stable o/n, now s/p repeat IR guided embolization  Continue to monitor H/H    I discussed the patient's findings and my recommendations with patient.          Wojciech Beal M.D.  [facility]  [address] [place] [address]  Office: [phone]               Electronically signed by Wojciech Beal MD at 07/19/23 0912          Terence Pinto MD   Physician  Specialty: Interventional Radiology     Post-Procedure Note     Signed     Date of Service: 07/18/23 1954  Creation Time: 07/18/23 1954     Signed         POST PROCEDURE NOTE     Procedure: VISCERAL ANGIOGRAPHY AND EMBOLIZATION     Pre-Procedure Diagnosis: recurrent GI bleed     Post-procedure Diagnosis: same     Findings: successful superselective coil embolization of abnormal bleeding distal ileal SMA branch     Complications: none     Blood loss: 30 ml     Specimen Removed: n/a     Disposition:   Transfer back to inpatient room                         Consult Notes (last 24 hours)  Notes from 07/18/23 1559 through 07/19/23 1559   No notes of this type exist for this encounter.

## 2023-07-19 NOTE — PROGRESS NOTES
Chief Complaint:    GI bleed    Subjective:    The patient developed recurrent GI bleeding yesterday and required repeat embolization.  It was successful.  He is now feeling well with no abdominal pain.    Objective:    Temp:  [97.2 °F (36.2 °C)-98.4 °F (36.9 °C)] 98.2 °F (36.8 °C)  Heart Rate:  [70-90] 73  Resp:  [10-23] 18  BP: (106-130)/() 119/101    Physical Exam  Constitutional:       Appearance: He is not ill-appearing or toxic-appearing.   Abdominal:      Palpations: Abdomen is soft.      Tenderness: There is no abdominal tenderness.   Neurological:      Mental Status: He is alert.   Psychiatric:         Behavior: Behavior is cooperative.       Results:    WBC is 8.20.  H/H is stable at 7.3/21.8.    Assessment/Plan:    The patient has GI bleeding from a small bowel source.  He has been embolized twice and has no evidence of bleeding at this time.  He also has a completely benign abdominal exam with no evidence of ischemia related to the embolization.    Fortunato Narvaez Jr., M.D.

## 2023-07-19 NOTE — PLAN OF CARE
Goal Outcome Evaluation:  Plan of Care Reviewed With: patient        Progress: no change  Outcome Evaluation: VSS. A&Ox4. No c/o pain. Continued HH checks. tolerating diet. Periphreal pulses palpable. PICC in place. No acute events this shift

## 2023-07-19 NOTE — PROGRESS NOTES
[facility]  Inpatient Progress Note    Reason for Followup:  Recurrent Gi bleeding    Subjective     Interval History:   S/P repeat IR embolization last evening    Current Facility-Administered Medications:     [DISCONTINUED] acetaminophen (TYLENOL) tablet 650 mg, 650 mg, Oral, Once **OR** acetaminophen (TYLENOL) 160 MG/5ML solution 650 mg, 650 mg, Oral, Once **OR** acetaminophen (TYLENOL) suppository 650 mg, 650 mg, Rectal, Once, Ermelinda Dixon MD    [DISCONTINUED] acetaminophen (TYLENOL) tablet 650 mg, 650 mg, Oral, Once **OR** acetaminophen (TYLENOL) 160 MG/5ML solution 650 mg, 650 mg, Oral, Once **OR** acetaminophen (TYLENOL) suppository 650 mg, 650 mg, Rectal, Once, Harshal Casillas MD    sennosides-docusate (PERICOLACE) 8.6-50 MG per tablet 2 tablet, 2 tablet, Oral, BID, 2 tablet at 07/18/23 0841 **AND** polyethylene glycol (MIRALAX) packet 17 g, 17 g, Oral, Daily PRN **AND** bisacodyl (DULCOLAX) EC tablet 5 mg, 5 mg, Oral, Daily PRN **AND** bisacodyl (DULCOLAX) suppository 10 mg, 10 mg, Rectal, Daily PRN, Ermelinda Dixon MD    folic acid (FOLVITE) tablet 1 mg, 1 mg, Oral, Daily, Ermelinda Dixon MD, 1 mg at 07/18/23 0841    heparin injection 30 Units, 0.3 mL, Other, Once in imaging, Ermelinda Dixon MD    heparin injection 30 Units, 0.3 mL, Other, Once in imaging, Harshal Casillas MD    lactobacillus acidophilus (RISAQUAD) capsule 1 capsule, 1 capsule, Oral, BID, Harshal Casillas MD, 1 capsule at 07/18/23 2019    morphine injection 2 mg, 2 mg, Intravenous, Q4H PRN, Ermelinda Dixon MD, 2 mg at 07/16/23 0117    ondansetron (ZOFRAN) injection 4 mg, 4 mg, Intravenous, Once, Ermelinda Dixon MD    ondansetron (ZOFRAN) injection 4 mg, 4 mg, Intravenous, Q6H PRN, Ermelinda Dixon MD, 4 mg at 07/15/23 5273    pantoprazole (PROTONIX) EC tablet 40 mg, 40 mg, Oral, Q AM, Harshal Casillas MD, 40 mg at 07/19/23 1313    [COMPLETED] Insert Peripheral IV, , , Once  **AND** sodium chloride 0.9 % flush 10 mL, 10 mL, Intravenous, PRN, Ermelinda Dixon MD    sodium chloride 0.9 % flush 10 mL, 10 mL, Intravenous, Zack PAYAN Sarah A, MD, 10 mL at 07/18/23 1900    sodium chloride 0.9 % flush 10 mL, 10 mL, Intravenous, Q12H, Ermelinda Dixon MD, 10 mL at 07/18/23 2023    sodium chloride 0.9 % flush 10 mL, 10 mL, Intravenous, Q12H, Ermelinda Dixon MD, 10 mL at 07/18/23 2023    sodium chloride 0.9 % flush 10 mL, 10 mL, Intravenous, Zack PAYAN Sarah A, MD    sodium chloride 0.9 % flush 20 mL, 20 mL, Intravenous, PRNZack Sarah A, MD    sodium chloride 0.9 % infusion 40 mL, 40 mL, Intravenous, Zack PAYAN Sarah A, MD    sodium chloride 0.9 % infusion 40 mL, 40 mL, Intravenous, PRNZack Sarah A, MD    thiamine (VITAMIN B-1) tablet 100 mg, 100 mg, Oral, Daily, Ermelinda Dixon MD, 100 mg at 07/18/23 0841    Objective     Vital Signs  Temp:  [97.2 °F (36.2 °C)-98.4 °F (36.9 °C)] 97.9 °F (36.6 °C)  Heart Rate:  [70-90] 77  Resp:  [10-23] 18  BP: (106-130)/(63-94) 114/72  Body mass index is 26.15 kg/m².    Intake/Output Summary (Last 24 hours) at 7/19/2023 0703  Last data filed at 7/18/2023 2120  Gross per 24 hour   Intake 1096.25 ml   Output 500 ml   Net 596.25 ml     No intake/output data recorded.     Physical Exam:   General: patient awake, alert and cooperative   Abdomen: soft, nontender, nondistended; normal bowel sounds     Results Review:     I reviewed the patient's new clinical results.  I reviewed the patient's new imaging results and agree with the interpretation.    Results from last 7 days   Lab Units 07/19/23  0556 07/19/23  0032 07/18/23  1457 07/18/23  0614 07/17/23  0807 07/17/23  0543   WBC 10*3/mm3 8.20  --   --  8.98  --  10.02   HEMOGLOBIN g/dL 7.3* 7.2* 7.8* 6.9*   < > 7.4*   HEMATOCRIT % 21.8* 21.3* 23.0* 20.6*   < > 21.7*   PLATELETS 10*3/mm3 211  --   --  198  --  138*    < > = values in this interval not displayed.     Results from last 7 days   Lab  Units 07/18/23  0614 07/17/23  0543 07/16/23  0111   SODIUM mmol/L 144 139 136   POTASSIUM mmol/L 3.5 3.5 3.8   CHLORIDE mmol/L 113* 112* 110*   CO2 mmol/L 26.0 22.8 19.3*   BUN mg/dL 4* 3* 7   CREATININE mg/dL 1.13 1.23 1.26   CALCIUM mg/dL 7.8* 7.3* 6.9*   BILIRUBIN mg/dL <0.2 <0.2 <0.2   ALK PHOS U/L 48 44 33*   ALT (SGPT) U/L 14 15 8   AST (SGOT) U/L 16 17 7   GLUCOSE mg/dL 117* 90 127*     Results from last 7 days   Lab Units 07/18/23  1648   INR  1.0     Lab Results   Lab Value Date/Time    LIPASE 11 (L) 07/12/2023 0612    LIPASE 26 06/23/2023 1201       Radiology:  [unfilled]      Assessment & Plan   Assessment:    Obscure GI bleeding  Blood loss anemia  S/P IR guided embolization of ileal branch of SMA x 2    Plan:   Hb generally stable o/n, now s/p repeat IR guided embolization  Continue to monitor H/H    I discussed the patient's findings and my recommendations with patient.          Wojciech Beal M.D.  [facility]  [address] [place] [address]  Office: [phone]

## 2023-07-20 LAB
DEPRECATED RDW RBC AUTO: 45.3 FL (ref 37–54)
ERYTHROCYTE [DISTWIDTH] IN BLOOD BY AUTOMATED COUNT: 14.9 % (ref 12.3–15.4)
HCT VFR BLD AUTO: 21.9 % (ref 37.5–51)
HCT VFR BLD AUTO: 21.9 % (ref 37.5–51)
HCT VFR BLD AUTO: 23.4 % (ref 37.5–51)
HCT VFR BLD AUTO: 24 % (ref 37.5–51)
HGB BLD-MCNC: 7.1 G/DL (ref 13–17.7)
HGB BLD-MCNC: 7.1 G/DL (ref 13–17.7)
HGB BLD-MCNC: 7.7 G/DL (ref 13–17.7)
HGB BLD-MCNC: 8 G/DL (ref 13–17.7)
MCH RBC QN AUTO: 27.8 PG (ref 26.6–33)
MCHC RBC AUTO-ENTMCNC: 32.4 G/DL (ref 31.5–35.7)
MCV RBC AUTO: 85.9 FL (ref 79–97)
PLATELET # BLD AUTO: 282 10*3/MM3 (ref 140–450)
PMV BLD AUTO: 9.5 FL (ref 6–12)
RBC # BLD AUTO: 2.55 10*6/MM3 (ref 4.14–5.8)
WBC NRBC COR # BLD: 8.56 10*3/MM3 (ref 3.4–10.8)

## 2023-07-20 PROCEDURE — 85014 HEMATOCRIT: CPT | Performed by: INTERNAL MEDICINE

## 2023-07-20 PROCEDURE — 99231 SBSQ HOSP IP/OBS SF/LOW 25: CPT | Performed by: INTERNAL MEDICINE

## 2023-07-20 PROCEDURE — 85027 COMPLETE CBC AUTOMATED: CPT | Performed by: INTERNAL MEDICINE

## 2023-07-20 PROCEDURE — 99231 SBSQ HOSP IP/OBS SF/LOW 25: CPT | Performed by: SURGERY

## 2023-07-20 PROCEDURE — 85018 HEMOGLOBIN: CPT | Performed by: INTERNAL MEDICINE

## 2023-07-20 PROCEDURE — 25010000002 MORPHINE PER 10 MG: Performed by: INTERNAL MEDICINE

## 2023-07-20 RX ADMIN — SENNOSIDES AND DOCUSATE SODIUM 2 TABLET: 50; 8.6 TABLET ORAL at 08:39

## 2023-07-20 RX ADMIN — Medication 10 ML: at 21:08

## 2023-07-20 RX ADMIN — Medication 10 ML: at 08:40

## 2023-07-20 RX ADMIN — PANTOPRAZOLE SODIUM 40 MG: 40 TABLET, DELAYED RELEASE ORAL at 06:24

## 2023-07-20 RX ADMIN — MORPHINE SULFATE 2 MG: 2 INJECTION, SOLUTION INTRAMUSCULAR; INTRAVENOUS at 00:37

## 2023-07-20 RX ADMIN — Medication 100 MG: at 08:39

## 2023-07-20 RX ADMIN — Medication 1 CAPSULE: at 21:08

## 2023-07-20 RX ADMIN — Medication 1 CAPSULE: at 08:39

## 2023-07-20 RX ADMIN — FOLIC ACID 1 MG: 1 TABLET ORAL at 08:39

## 2023-07-20 NOTE — PROGRESS NOTES
"DAILY PROGRESS NOTE  [facility]    Patient Identification:  Name: Radha Noriega V  Age: 43 y.o.  Sex: male  :  1980  MRN: [id number]         Primary Care Physician: Hellen Skelton APRN    Subjective:  Interval History:No more bleeding.    Objective:    Scheduled Meds:acetaminophen, 650 mg, Oral, Once   Or  acetaminophen, 650 mg, Rectal, Once  acetaminophen, 650 mg, Oral, Once   Or  acetaminophen, 650 mg, Rectal, Once  folic acid, 1 mg, Oral, Daily  heparin, 0.3 mL, Other, Once in imaging  heparin, 0.3 mL, Other, Once in imaging  lactobacillus acidophilus, 1 capsule, Oral, BID  ondansetron, 4 mg, Intravenous, Once  pantoprazole, 40 mg, Oral, Q AM  senna-docusate sodium, 2 tablet, Oral, BID  sodium chloride, 10 mL, Intravenous, Q12H  sodium chloride, 10 mL, Intravenous, Q12H  thiamine, 100 mg, Oral, Daily      Continuous Infusions:     Vital signs in last 24 hours:  Temp:  [98.1 °F (36.7 °C)-98.2 °F (36.8 °C)] 98.2 °F (36.8 °C)  Heart Rate:  [75-85] 75  Resp:  [17-18] 18  BP: (120-129)/(69-77) 121/77    Intake/Output:  No intake or output data in the 24 hours ending 23 1206    Exam:  /77 (BP Location: Right arm, Patient Position: Lying)   Pulse 75   Temp 98.2 °F (36.8 °C) (Oral)   Resp 18   Ht 172.7 cm (68\")   Wt 78 kg (172 lb)   SpO2 100%   BMI 26.15 kg/m²     General Appearance:    Alert, cooperative, no distress   Head:    Normocephalic, without obvious abnormality, atraumatic   Eyes:       Throat:   Lips, tongue, gums normal   Neck:   Supple, symmetrical, trachea midline, no JVD   Lungs:     Clear to auscultation bilaterally, respirations unlabored   Chest Wall:    No tenderness or deformity    Heart:    Regular rate and rhythm, S1 and S2 normal, no murmur,no  Rub or gallop   Abdomen:     Soft, nontender, bowel sounds active, no masses, no organomegaly    Extremities:   Extremities normal, atraumatic, no cyanosis or edema   Pulses:      Skin:   Skin is warm and dry,  " no rashes or palpable lesions   Neurologic:   no focal deficits noted      Lab Results (last 72 hours)       Procedure Component Value Units Date/Time    Hemoglobin & Hematocrit, Blood [[id number]]  (Abnormal) Collected: 07/20/23 0849    Specimen: Blood Updated: 07/20/23 0858     Hemoglobin 8.0 g/dL      Hematocrit 24.0 %     Hemoglobin & Hematocrit, Blood [[id number]]  (Abnormal) Collected: 07/20/23 0034    Specimen: Blood Updated: 07/20/23 0116     Hemoglobin 7.1 g/dL      Hematocrit 21.9 %     CBC (No Diff) [[id number]]  (Abnormal) Collected: 07/20/23 0034    Specimen: Blood Updated: 07/20/23 0116     WBC 8.56 10*3/mm3      RBC 2.55 10*6/mm3      Hemoglobin 7.1 g/dL      Hematocrit 21.9 %      MCV 85.9 fL      MCH 27.8 pg      MCHC 32.4 g/dL      RDW 14.9 %      RDW-SD 45.3 fl      MPV 9.5 fL      Platelets 282 10*3/mm3     Hemoglobin & Hematocrit, Blood [[id number]]  (Abnormal) Collected: 07/19/23 1554    Specimen: Blood Updated: 07/19/23 1620     Hemoglobin 7.7 g/dL      Hematocrit 23.3 %     Hemoglobin & Hematocrit, Blood [[id number]]  (Abnormal) Collected: 07/19/23 0854    Specimen: Blood Updated: 07/19/23 0929     Hemoglobin 7.0 g/dL      Hematocrit 21.1 %     POC Protime / INR [[id number]]  (Abnormal) Collected: 07/18/23 1648    Specimen: Blood Updated: 07/19/23 0637     Protime 12.0 seconds      Comment: Serial Number: [id number]:  [id number]        INR 1.0    Lactic Acid, Plasma [[id number]]  (Normal) Collected: 07/19/23 0556    Specimen: Blood, Central Line Updated: 07/19/23 0628     Lactate 0.8 mmol/L     CBC & Differential [[id number]]  (Abnormal) Collected: 07/19/23 0556    Specimen: Blood, Central Line Updated: 07/19/23 0615    Narrative:      The following orders were created for panel order CBC & Differential.  Procedure                               Abnormality         Status                     ---------                               -----------         ------                     CBC Auto  Differential[[id number]]        Abnormal            Final result                 Please view results for these tests on the individual orders.    CBC Auto Differential [[id number]]  (Abnormal) Collected: 07/19/23 0556    Specimen: Blood, Central Line Updated: 07/19/23 0615     WBC 8.20 10*3/mm3      RBC 2.50 10*6/mm3      Hemoglobin 7.3 g/dL      Hematocrit 21.8 %      MCV 87.2 fL      MCH 29.2 pg      MCHC 33.5 g/dL      RDW 15.5 %      RDW-SD 48.3 fl      MPV 9.7 fL      Platelets 211 10*3/mm3      Neutrophil % 64.2 %      Lymphocyte % 20.5 %      Monocyte % 9.1 %      Eosinophil % 3.4 %      Basophil % 0.4 %      Immature Grans % 2.4 %      Neutrophils, Absolute 5.26 10*3/mm3      Lymphocytes, Absolute 1.68 10*3/mm3      Monocytes, Absolute 0.75 10*3/mm3      Eosinophils, Absolute 0.28 10*3/mm3      Basophils, Absolute 0.03 10*3/mm3      Immature Grans, Absolute 0.20 10*3/mm3      nRBC 0.4 /100 WBC     Hemoglobin & Hematocrit, Blood [[id number]]  (Abnormal) Collected: 07/19/23 0032    Specimen: Blood, Central Line Updated: 07/19/23 0052     Hemoglobin 7.2 g/dL      Hematocrit 21.3 %     Blood Culture - Blood, Hand, Left [[id number]]  (Normal) Collected: 07/13/23 2204    Specimen: Blood from Hand, Left Updated: 07/18/23 2231     Blood Culture No growth at 5 days    Narrative:      Less than seven (7) mL's of blood was collected.  Insufficient quantity may yield false negative results.    Blood Culture - Blood, Arm, Right [[id number]]  (Normal) Collected: 07/13/23 2157    Specimen: Blood from Arm, Right Updated: 07/18/23 2231     Blood Culture No growth at 5 days    Lactic Acid, Plasma [[id number]]  (Normal) Collected: 07/18/23 1553    Specimen: Blood from Arm, Left Updated: 07/18/23 1719     Lactate 1.0 mmol/L     Hemoglobin & Hematocrit, Blood [[id number]]  (Abnormal) Collected: 07/18/23 1457    Specimen: Blood Updated: 07/18/23 1544     Hemoglobin 7.8 g/dL      Hematocrit 23.0 %     Comprehensive Metabolic Panel  [[id number]]  (Abnormal) Collected: 07/18/23 0614    Specimen: Blood, Central Line Updated: 07/18/23 0652     Glucose 117 mg/dL      BUN 4 mg/dL      Creatinine 1.13 mg/dL      Sodium 144 mmol/L      Potassium 3.5 mmol/L      Chloride 113 mmol/L      CO2 26.0 mmol/L      Calcium 7.8 mg/dL      Total Protein 3.8 g/dL      Albumin 2.1 g/dL      ALT (SGPT) 14 U/L      AST (SGOT) 16 U/L      Alkaline Phosphatase 48 U/L      Total Bilirubin <0.2 mg/dL      Globulin 1.7 gm/dL      A/G Ratio 1.2 g/dL      BUN/Creatinine Ratio 3.5     Anion Gap 5.0 mmol/L      eGFR 83.2 mL/min/1.73     Narrative:      GFR Normal >60  Chronic Kidney Disease <60  Kidney Failure <15      CBC (No Diff) [[id number]]  (Abnormal) Collected: 07/18/23 0614    Specimen: Blood, Central Line Updated: 07/18/23 0648     WBC 8.98 10*3/mm3      RBC 2.41 10*6/mm3      Hemoglobin 6.9 g/dL      Hematocrit 20.6 %      MCV 85.5 fL      MCH 28.6 pg      MCHC 33.5 g/dL      RDW 15.6 %      RDW-SD 47.6 fl      MPV 9.6 fL      Platelets 198 10*3/mm3     Hemoglobin & Hematocrit, Blood [[id number]]  (Abnormal) Collected: 07/17/23 2357    Specimen: Blood, Central Line Updated: 07/18/23 0034     Hemoglobin 7.6 g/dL      Hematocrit 22.6 %     Hemoglobin & Hematocrit, Blood [[id number]]  (Abnormal) Collected: 07/17/23 1547    Specimen: Blood Updated: 07/17/23 1603     Hemoglobin 7.7 g/dL      Hematocrit 22.9 %           Data Review:  Results from last 7 days   Lab Units 07/18/23  0614 07/17/23  0543 07/16/23  0111   SODIUM mmol/L 144 139 136   POTASSIUM mmol/L 3.5 3.5 3.8   CHLORIDE mmol/L 113* 112* 110*   CO2 mmol/L 26.0 22.8 19.3*   BUN mg/dL 4* 3* 7   CREATININE mg/dL 1.13 1.23 1.26   GLUCOSE mg/dL 117* 90 127*   CALCIUM mg/dL 7.8* 7.3* 6.9*     Results from last 7 days   Lab Units 07/20/23  0849 07/20/23  0034 07/19/23  1554 07/19/23  0854 07/19/23  0556 07/18/23  1457 07/18/23  0614   WBC 10*3/mm3  --  8.56  --   --  8.20  --  8.98   HEMOGLOBIN g/dL 8.0* 7.1*  7.1*  7.7*   < > 7.3*   < > 6.9*   HEMATOCRIT % 24.0* 21.9*  21.9* 23.3*   < > 21.8*   < > 20.6*   PLATELETS 10*3/mm3  --  282  --   --  211  --  198    < > = values in this interval not displayed.             No results found for: TROPONINT      Results from last 7 days   Lab Units 07/18/23  0614 07/17/23  0543 07/16/23  0111   ALK PHOS U/L 48 44 33*   BILIRUBIN mg/dL <0.2 <0.2 <0.2   ALT (SGPT) U/L 14 15 8   AST (SGOT) U/L 16 17 7             No results found for: POCGLU  Results from last 7 days   Lab Units 07/18/23  1648   INR  1.0       Past Medical History:   Diagnosis Date    Anemia     ETOH abuse     Tachycardia 06/23/2023    Tobacco dependence        Assessment:  Active Hospital Problems    Diagnosis  POA    **ABLA (acute blood loss anemia) [D62]  Yes    Metabolic acidosis [E87.20]  Yes    Enteritis of possible infectious origin [A09]  Yes    Sepsis [A41.9]  Yes    Blood per rectum [K62.5]  Unknown    Iron deficiency anemia [D50.9]  Yes    Current every day smoker [F17.200]  Yes    Gastrointestinal hemorrhage with melena [K92.1]  Yes    Acute blood loss anemia [D62]  Unknown      Resolved Hospital Problems   No resolved problems to display.       Plan:  Follow lab.  Plans as per GI. Transfuse as needed.    Gil Ayala MD  7/20/2023  12:06 EDT

## 2023-07-20 NOTE — PLAN OF CARE
Goal Outcome Evaluation:              Outcome Evaluation: VSS. Serial H&H's. Patient states he feels much better today, unlike days prior. No complaints this shift.

## 2023-07-20 NOTE — PROGRESS NOTES
Chief Complaint:    GI bleed    Subjective:    The patient is feeling well with no complaints of abdominal pain.  He states his stool is starting to become more normal color.    Objective:    Temp:  [98.1 °F (36.7 °C)-98.2 °F (36.8 °C)] 98.2 °F (36.8 °C)  Heart Rate:  [75-85] 75  Resp:  [17-18] 18  BP: (120-129)/(69-77) 121/77    Physical Exam  Constitutional:       Appearance: He is not ill-appearing or toxic-appearing.   Abdominal:      Palpations: Abdomen is soft.      Tenderness: There is no abdominal tenderness.   Neurological:      Mental Status: He is alert.   Psychiatric:         Behavior: Behavior is cooperative.       Results:    H/H is 8.0/24.0.    Assessment/Plan:    The patient had a GI bleed related to a small bowel source.  He has been embolized on 2 occasions with no clinical evidence of bleeding at this point.  His abdominal exam remains benign with no evidence of ischemia.    Fortunato Narvaez Jr., M.D.

## 2023-07-20 NOTE — PROGRESS NOTES
[facility]  Inpatient Progress Note    Reason for Followup:  GI bleeding    Subjective     Interval History:   No further bleeding overnight    Current Facility-Administered Medications:     [DISCONTINUED] acetaminophen (TYLENOL) tablet 650 mg, 650 mg, Oral, Once **OR** acetaminophen (TYLENOL) 160 MG/5ML solution 650 mg, 650 mg, Oral, Once **OR** acetaminophen (TYLENOL) suppository 650 mg, 650 mg, Rectal, Once, Ermelinda Dixon MD    [DISCONTINUED] acetaminophen (TYLENOL) tablet 650 mg, 650 mg, Oral, Once **OR** acetaminophen (TYLENOL) 160 MG/5ML solution 650 mg, 650 mg, Oral, Once **OR** acetaminophen (TYLENOL) suppository 650 mg, 650 mg, Rectal, Once, Harshal Casillas MD    sennosides-docusate (PERICOLACE) 8.6-50 MG per tablet 2 tablet, 2 tablet, Oral, BID, 2 tablet at 07/20/23 0839 **AND** polyethylene glycol (MIRALAX) packet 17 g, 17 g, Oral, Daily PRN **AND** bisacodyl (DULCOLAX) EC tablet 5 mg, 5 mg, Oral, Daily PRN **AND** bisacodyl (DULCOLAX) suppository 10 mg, 10 mg, Rectal, Daily PRN, Ermelinda Dixon MD    folic acid (FOLVITE) tablet 1 mg, 1 mg, Oral, Daily, Ermelinda Dixon MD, 1 mg at 07/20/23 0839    heparin injection 30 Units, 0.3 mL, Other, Once in imaging, Ermelinda Dixon MD    heparin injection 30 Units, 0.3 mL, Other, Once in imaging, Harshal Casillas MD    lactobacillus acidophilus (RISAQUAD) capsule 1 capsule, 1 capsule, Oral, BID, Harshal Casillas MD, 1 capsule at 07/20/23 0839    morphine injection 2 mg, 2 mg, Intravenous, Q4H PRN, Ermelinda Dixon MD, 2 mg at 07/20/23 0037    ondansetron (ZOFRAN) injection 4 mg, 4 mg, Intravenous, Once, Ermelinda Dixon MD    ondansetron (ZOFRAN) injection 4 mg, 4 mg, Intravenous, Q6H PRN, Ermelinda Dixon MD, 4 mg at 07/15/23 5967    pantoprazole (PROTONIX) EC tablet 40 mg, 40 mg, Oral, Q AM, Harshal Casillas MD, 40 mg at 07/20/23 0624    [COMPLETED] Insert Peripheral IV, , , Once **AND** sodium  chloride 0.9 % flush 10 mL, 10 mL, Intravenous, PRN, Ermelinda Dixon MD    sodium chloride 0.9 % flush 10 mL, 10 mL, Intravenous, PRN, Ermelinda Dixon MD, 10 mL at 07/18/23 1900    sodium chloride 0.9 % flush 10 mL, 10 mL, Intravenous, Q12H, Ermelinda Dixon MD, 10 mL at 07/20/23 0840    sodium chloride 0.9 % flush 10 mL, 10 mL, Intravenous, Q12H, Ermelinda Dixon MD, 10 mL at 07/20/23 0840    sodium chloride 0.9 % flush 10 mL, 10 mL, Intravenous, Zack PAYAN Sarah A, MD    sodium chloride 0.9 % flush 20 mL, 20 mL, Intravenous, PRNZack Sarah A, MD    sodium chloride 0.9 % infusion 40 mL, 40 mL, Intravenous, Zack PAYAN Sarah A, MD    sodium chloride 0.9 % infusion 40 mL, 40 mL, Intravenous, PRNZack Sarah A, MD    thiamine (VITAMIN B-1) tablet 100 mg, 100 mg, Oral, Daily, Ermelinda Dixon MD, 100 mg at 07/20/23 0839    Objective     Vital Signs  Temp:  [97.9 °F (36.6 °C)-98.1 °F (36.7 °C)] 98.1 °F (36.7 °C)  Heart Rate:  [84-85] 85  Resp:  [17-18] 17  BP: (119-129)/(69-76) 120/69  Body mass index is 26.15 kg/m².    Intake/Output Summary (Last 24 hours) at 7/20/2023 0849  Last data filed at 7/19/2023 0914  Gross per 24 hour   Intake --   Output 600 ml   Net -600 ml     No intake/output data recorded.     Physical Exam:   General: patient awake, alert and cooperative   Abdomen: soft, nontender, nondistended; normal bowel sounds     Results Review:     I reviewed the patient's new clinical results.    Results from last 7 days   Lab Units 07/20/23  0034 07/19/23  1554 07/19/23  0854 07/19/23  0556 07/18/23  1457 07/18/23  0614   WBC 10*3/mm3 8.56  --   --  8.20  --  8.98   HEMOGLOBIN g/dL 7.1*  7.1* 7.7* 7.0* 7.3*   < > 6.9*   HEMATOCRIT % 21.9*  21.9* 23.3* 21.1* 21.8*   < > 20.6*   PLATELETS 10*3/mm3 282  --   --  211  --  198    < > = values in this interval not displayed.     Results from last 7 days   Lab Units 07/18/23  0614 07/17/23  0543 07/16/23  0111   SODIUM mmol/L 144 139 136   POTASSIUM  mmol/L 3.5 3.5 3.8   CHLORIDE mmol/L 113* 112* 110*   CO2 mmol/L 26.0 22.8 19.3*   BUN mg/dL 4* 3* 7   CREATININE mg/dL 1.13 1.23 1.26   CALCIUM mg/dL 7.8* 7.3* 6.9*   BILIRUBIN mg/dL <0.2 <0.2 <0.2   ALK PHOS U/L 48 44 33*   ALT (SGPT) U/L 14 15 8   AST (SGOT) U/L 16 17 7   GLUCOSE mg/dL 117* 90 127*     Results from last 7 days   Lab Units 07/18/23  1648   INR  1.0     Lab Results   Lab Value Date/Time    LIPASE 11 (L) 07/12/2023 0612    LIPASE 26 06/23/2023 1201       Radiology:  [unfilled]      Assessment & Plan   Assessment:   Gi bleeding - small bowel source s/p embolization x 2  Acute blood loss anemia    Plan:   Awat AM labs, Hb generally stable over last 24hrs  Tranfuse as needed for HB <7      I discussed the patient's findings and my recommendations with patient.          Wojciech Beal M.D.  [facility]  [address] [place] [address]  Office: [phone]

## 2023-07-21 VITALS
WEIGHT: 172 LBS | RESPIRATION RATE: 14 BRPM | DIASTOLIC BLOOD PRESSURE: 77 MMHG | TEMPERATURE: 98.2 F | SYSTOLIC BLOOD PRESSURE: 132 MMHG | HEIGHT: 68 IN | BODY MASS INDEX: 26.07 KG/M2 | OXYGEN SATURATION: 100 % | HEART RATE: 81 BPM

## 2023-07-21 LAB
ANION GAP SERPL CALCULATED.3IONS-SCNC: 4 MMOL/L (ref 5–15)
BASOPHILS # BLD AUTO: 0.03 10*3/MM3 (ref 0–0.2)
BASOPHILS NFR BLD AUTO: 0.4 % (ref 0–1.5)
BUN SERPL-MCNC: 7 MG/DL (ref 6–20)
BUN/CREAT SERPL: 6.6 (ref 7–25)
CALCIUM SPEC-SCNC: 8.2 MG/DL (ref 8.6–10.5)
CHLORIDE SERPL-SCNC: 111 MMOL/L (ref 98–107)
CO2 SERPL-SCNC: 26 MMOL/L (ref 22–29)
CREAT SERPL-MCNC: 1.06 MG/DL (ref 0.76–1.27)
DEPRECATED RDW RBC AUTO: 46.5 FL (ref 37–54)
EGFRCR SERPLBLD CKD-EPI 2021: 89.9 ML/MIN/1.73
EOSINOPHIL # BLD AUTO: 0.26 10*3/MM3 (ref 0–0.4)
EOSINOPHIL NFR BLD AUTO: 3.3 % (ref 0.3–6.2)
ERYTHROCYTE [DISTWIDTH] IN BLOOD BY AUTOMATED COUNT: 14.9 % (ref 12.3–15.4)
GLUCOSE SERPL-MCNC: 86 MG/DL (ref 65–99)
HCT VFR BLD AUTO: 23.2 % (ref 37.5–51)
HCT VFR BLD AUTO: 23.2 % (ref 37.5–51)
HCT VFR BLD AUTO: 24.9 % (ref 37.5–51)
HGB BLD-MCNC: 7.5 G/DL (ref 13–17.7)
HGB BLD-MCNC: 7.5 G/DL (ref 13–17.7)
HGB BLD-MCNC: 8.2 G/DL (ref 13–17.7)
IMM GRANULOCYTES # BLD AUTO: 0.07 10*3/MM3 (ref 0–0.05)
IMM GRANULOCYTES NFR BLD AUTO: 0.9 % (ref 0–0.5)
LYMPHOCYTES # BLD AUTO: 1.86 10*3/MM3 (ref 0.7–3.1)
LYMPHOCYTES NFR BLD AUTO: 23.8 % (ref 19.6–45.3)
MCH RBC QN AUTO: 28.1 PG (ref 26.6–33)
MCHC RBC AUTO-ENTMCNC: 32.3 G/DL (ref 31.5–35.7)
MCV RBC AUTO: 86.9 FL (ref 79–97)
MONOCYTES # BLD AUTO: 0.74 10*3/MM3 (ref 0.1–0.9)
MONOCYTES NFR BLD AUTO: 9.5 % (ref 5–12)
NEUTROPHILS NFR BLD AUTO: 4.86 10*3/MM3 (ref 1.7–7)
NEUTROPHILS NFR BLD AUTO: 62.1 % (ref 42.7–76)
NRBC BLD AUTO-RTO: 0.3 /100 WBC (ref 0–0.2)
PLATELET # BLD AUTO: 299 10*3/MM3 (ref 140–450)
PMV BLD AUTO: 9.6 FL (ref 6–12)
POTASSIUM SERPL-SCNC: 3.8 MMOL/L (ref 3.5–5.2)
RBC # BLD AUTO: 2.67 10*6/MM3 (ref 4.14–5.8)
SODIUM SERPL-SCNC: 141 MMOL/L (ref 136–145)
WBC NRBC COR # BLD: 7.82 10*3/MM3 (ref 3.4–10.8)

## 2023-07-21 PROCEDURE — 85014 HEMATOCRIT: CPT | Performed by: INTERNAL MEDICINE

## 2023-07-21 PROCEDURE — 85018 HEMOGLOBIN: CPT | Performed by: INTERNAL MEDICINE

## 2023-07-21 PROCEDURE — 80048 BASIC METABOLIC PNL TOTAL CA: CPT | Performed by: HOSPITALIST

## 2023-07-21 PROCEDURE — 85025 COMPLETE CBC W/AUTO DIFF WBC: CPT | Performed by: HOSPITALIST

## 2023-07-21 PROCEDURE — 99232 SBSQ HOSP IP/OBS MODERATE 35: CPT | Performed by: INTERNAL MEDICINE

## 2023-07-21 RX ADMIN — Medication 1 CAPSULE: at 08:11

## 2023-07-21 RX ADMIN — Medication 10 ML: at 08:12

## 2023-07-21 RX ADMIN — Medication 10 ML: at 08:11

## 2023-07-21 RX ADMIN — PANTOPRAZOLE SODIUM 40 MG: 40 TABLET, DELAYED RELEASE ORAL at 06:17

## 2023-07-21 RX ADMIN — FOLIC ACID 1 MG: 1 TABLET ORAL at 08:11

## 2023-07-21 RX ADMIN — Medication 100 MG: at 08:11

## 2023-07-21 NOTE — PROGRESS NOTES
[facility], [place]  Inpatient Progress Note    Reason for Followup:  Anemia, GI bleeding    Subjective     Interval History:   No new issues, no further bleeding reported    Current Facility-Administered Medications:     [DISCONTINUED] acetaminophen (TYLENOL) tablet 650 mg, 650 mg, Oral, Once **OR** acetaminophen (TYLENOL) 160 MG/5ML solution 650 mg, 650 mg, Oral, Once **OR** acetaminophen (TYLENOL) suppository 650 mg, 650 mg, Rectal, Once, Ermelinda Dixon MD    [DISCONTINUED] acetaminophen (TYLENOL) tablet 650 mg, 650 mg, Oral, Once **OR** acetaminophen (TYLENOL) 160 MG/5ML solution 650 mg, 650 mg, Oral, Once **OR** acetaminophen (TYLENOL) suppository 650 mg, 650 mg, Rectal, Once, Harshal Casillas MD    sennosides-docusate (PERICOLACE) 8.6-50 MG per tablet 2 tablet, 2 tablet, Oral, BID, 2 tablet at 07/20/23 0839 **AND** polyethylene glycol (MIRALAX) packet 17 g, 17 g, Oral, Daily PRN **AND** bisacodyl (DULCOLAX) EC tablet 5 mg, 5 mg, Oral, Daily PRN **AND** bisacodyl (DULCOLAX) suppository 10 mg, 10 mg, Rectal, Daily PRN, Ermelinda Dixon MD    folic acid (FOLVITE) tablet 1 mg, 1 mg, Oral, Daily, Ermelinda Dixon MD, 1 mg at 07/20/23 0839    heparin injection 30 Units, 0.3 mL, Other, Once in imaging, Ermelinda Dixon MD    heparin injection 30 Units, 0.3 mL, Other, Once in imaging, Harshal Casillas MD    lactobacillus acidophilus (RISAQUAD) capsule 1 capsule, 1 capsule, Oral, BID, Harshal Casillas MD, 1 capsule at 07/20/23 2108    ondansetron (ZOFRAN) injection 4 mg, 4 mg, Intravenous, Once, Ermelinda Dixon MD    ondansetron (ZOFRAN) injection 4 mg, 4 mg, Intravenous, Q6H PRN, Ermelinda Dixon MD, 4 mg at 07/15/23 8557    pantoprazole (PROTONIX) EC tablet 40 mg, 40 mg, Oral, Q AM, Harshal Casillas MD, 40 mg at 07/21/23 0617    [COMPLETED] Insert Peripheral IV, , , Once **AND** sodium chloride 0.9 % flush 10 mL, 10 mL, Intravenous, PRN, Ermelinda Dixon MD    sodium  chloride 0.9 % flush 10 mL, 10 mL, Intravenous, PRN, Ermelinda Dixon MD, 10 mL at 07/18/23 1900    sodium chloride 0.9 % flush 10 mL, 10 mL, Intravenous, Q12H, Ermelinda Dixon MD, 10 mL at 07/20/23 2108    sodium chloride 0.9 % flush 10 mL, 10 mL, Intravenous, Q12H, Ermelinda Dixon MD, 10 mL at 07/20/23 2108    sodium chloride 0.9 % flush 10 mL, 10 mL, Intravenous, PRN, Ermelinda Dixon MD    sodium chloride 0.9 % flush 20 mL, 20 mL, Intravenous, PRNZack Sarah A, MD    sodium chloride 0.9 % infusion 40 mL, 40 mL, Intravenous, PRN, Ermelinda Dixon MD    sodium chloride 0.9 % infusion 40 mL, 40 mL, Intravenous, PRN, Ermelinda Dixon MD    thiamine (VITAMIN B-1) tablet 100 mg, 100 mg, Oral, Daily, Ermelinda Dixon MD, 100 mg at 07/20/23 0839    Objective     Vital Signs  Temp:  [98.4 °F (36.9 °C)-98.8 °F (37.1 °C)] 98.4 °F (36.9 °C)  Heart Rate:  [] 67  Resp:  [18] 18  BP: (122-129)/(63-79) 122/77  Body mass index is 26.15 kg/m².  No intake or output data in the 24 hours ending 07/21/23 0802  No intake/output data recorded.     Physical Exam:   General: patient awake, alert and cooperative   Abdomen: soft, nontender, nondistended; normal bowel sounds     Results Review:     I reviewed the patient's new clinical results.    Results from last 7 days   Lab Units 07/21/23  0055 07/20/23  1750 07/20/23  0849 07/20/23  0034 07/19/23  0854 07/19/23  0556   WBC 10*3/mm3 7.82  --   --  8.56  --  8.20   HEMOGLOBIN g/dL 7.5*  7.5* 7.7* 8.0* 7.1*  7.1*   < > 7.3*   HEMATOCRIT % 23.2*  23.2* 23.4* 24.0* 21.9*  21.9*   < > 21.8*   PLATELETS 10*3/mm3 299  --   --  282  --  211    < > = values in this interval not displayed.     Results from last 7 days   Lab Units 07/21/23  0055 07/18/23  0614 07/17/23  0543 07/16/23  0111   SODIUM mmol/L 141 144 139 136   POTASSIUM mmol/L 3.8 3.5 3.5 3.8   CHLORIDE mmol/L 111* 113* 112* 110*   CO2 mmol/L 26.0 26.0 22.8 19.3*   BUN mg/dL 7 4* 3* 7   CREATININE mg/dL 1.06 1.13 1.23  1.26   CALCIUM mg/dL 8.2* 7.8* 7.3* 6.9*   BILIRUBIN mg/dL  --  <0.2 <0.2 <0.2   ALK PHOS U/L  --  48 44 33*   ALT (SGPT) U/L  --  14 15 8   AST (SGOT) U/L  --  16 17 7   GLUCOSE mg/dL 86 117* 90 127*     Results from last 7 days   Lab Units 07/18/23  1648   INR  1.0     Lab Results   Lab Value Date/Time    LIPASE 11 (L) 07/12/2023 0612    LIPASE 26 06/23/2023 1201       Radiology:  [unfilled]      Assessment & Plan   Assessment:   Gi bleeding - small bowel source s/p embolization x 2  Acute blood loss anemia      Plan:   Hb generally stable.  No further overt bleeding.   Anticipate should be ok for DC today, will need close monitoring of Hb by PCP  Will arrange office f/u with Dr Gibson in 4-6 weeks to discuss capsule endoscopy    I discussed the patient's findings and my recommendations with patient.          Wojciech Beal M.D.  [facility]  [address] [place] [address]  Office: [phone]

## 2023-07-21 NOTE — DISCHARGE SUMMARY
PHYSICIAN DISCHARGE SUMMARY                                                                        [facility]    Patient Identification:  Name: Radha Noriega V  Age: 43 y.o.  Sex: male  :  1980  MRN: [id number]  Primary Care Physician: Hellen Skelton APRN    Admit date: 2023  Discharge date and time:2023  Discharged Condition: good    Discharge Diagnoses:  Active Hospital Problems    Diagnosis  POA    **ABLA (acute blood loss anemia) [D62]  Yes    Metabolic acidosis [E87.20]  Yes    Enteritis of possible infectious origin [A09]  Yes    Sepsis [A41.9]  Yes    Blood per rectum [K62.5]  Unknown    Iron deficiency anemia [D50.9]  Yes    Current every day smoker [F17.200]  Yes    Gastrointestinal hemorrhage with melena [K92.1]  Yes    Acute blood loss anemia [D62]  Unknown      Resolved Hospital Problems   No resolved problems to display.          PMHX:   Past Medical History:   Diagnosis Date    Anemia     ETOH abuse     Tachycardia 2023    Tobacco dependence      PSHX:   Past Surgical History:   Procedure Laterality Date    APPENDECTOMY      COLONOSCOPY N/A 2023    Procedure: COLONOSCOPY TO CECUM AND TERMINAL ILEUM WITH BIOPSIES AND COLD BIOPSY POLYPECTOMIES;  Surgeon: Imtiaz Lee MD;  Location: [facility] ENDOSCOPY;  Service: Gastroenterology;  Laterality: N/A;  PRE- MELENA  POST- COLON POLYPS, HEMORRHOIDS    ENDOSCOPY Left 2023    Procedure: ESOPHAGOGASTRODUODENOSCOPY;  Surgeon: Imtiaz Lee MD;  Location: [facility] ENDOSCOPY;  Service: Gastroenterology;  Laterality: Left;  small hiatal hernia, esophagitis    ENDOSCOPY N/A 7/15/2023    Procedure: ESOPHAGOGASTRODUODENOSCOPY;  Surgeon: Ermelinda Dixon MD;  Location: [facility] ENDOSCOPY;  Service: Gastroenterology;  Laterality: N/A;  pre-anemia  post-hiatal hernia    ENTEROSCOPY SMALL BOWEL N/A 2023    Procedure: ENTEROSCOPY SMALL BOWEL;  Surgeon:  Isrrael Gibson MD;  Location: [facility] ENDOSCOPY;  Service: Gastroenterology;  Laterality: N/A;  PRE- GI BLEED  POST- NORMAL       Hospital Course: Radha Noriega V  is a 43-year-old male with alcohol dependence, tobacco use as well as multiple previous admissions for GI bleed.  During a recent admission he was admitted for the same issue.  EGD and colonoscopy were negative for source of bleeding.  An outpatient video endoscopy was planned, but the patient had return to the hospital with hematochezia before could be performed.  During the most recent admission prior to this, his hemoglobin was 6.0 and he was admitted and given pRBC and iron transfusions.  GI saw the patient in consultation at that point time.  2 CT abdomen and pelvis were performed but no bleeding source was not identified.  Question anoscopy was negative as well.  He was subsequently discharged  He was actually supposed to have of video capsule study today however he started experiencing abdominal pain.  He had reported bright red blood in his stool and lightheadedness and palpitations.  His hemoglobin was noted to be 7.1, WBC 21,000, creatinine 1.37.  Urinalysis i was negative for UTI.  Chest x-ray was unremarkable.  CT of the abdomen pelvis showed mild gastritis as well as thickening of the duodenum and proximal jejunal loops, favored to be secondary to peristalsis. PRBC transfusion was initiated.  He was admitted for further evaluation and management.  The patient was admitted to the hospital and seen by GI medicine and general surgery.  Patient was found to have small bowel bleed and had embolization x2.  His hemoglobin remained stable and he looked to be well enough to go home after being in the hospital for a few days.  GI is planning on having capsule endoscopy done as outpatient.  He will follow-up with his primary care in 1 week and also follow-up with GI medicine.    Consults:     Consults       Date and Time Order Name Status Description     7/15/2023 10:08 AM Inpatient General Surgery Consult Completed     7/12/2023 11:23 AM Inpatient Gastroenterology Consult Completed     7/12/2023  9:42 AM [facility] (on-call MD unless specified) Details      7/7/2023  1:29 AM Gastroenterology (on-call MD unless specified) Completed     6/24/2023  4:01 AM Inpatient Pulmonology Consult Completed     6/23/2023  7:27 PM Inpatient Gastroenterology Consult Completed           Results from last 7 days   Lab Units 07/21/23  0812 07/21/23  0055   WBC 10*3/mm3  --  7.82   HEMOGLOBIN g/dL 8.2* 7.5*  7.5*   HEMATOCRIT % 24.9* 23.2*  23.2*   PLATELETS 10*3/mm3  --  299     Results from last 7 days   Lab Units 07/21/23  0055   SODIUM mmol/L 141   POTASSIUM mmol/L 3.8   CHLORIDE mmol/L 111*   CO2 mmol/L 26.0   BUN mg/dL 7   CREATININE mg/dL 1.06   GLUCOSE mg/dL 86   CALCIUM mg/dL 8.2*     Significant Diagnostic Studies:   WBC   Date Value Ref Range Status   07/21/2023 7.82 3.40 - 10.80 10*3/mm3 Final     Hemoglobin   Date Value Ref Range Status   07/21/2023 8.2 (L) 13.0 - 17.7 g/dL Final     Hematocrit   Date Value Ref Range Status   07/21/2023 24.9 (L) 37.5 - 51.0 % Final     Platelets   Date Value Ref Range Status   07/21/2023 299 140 - 450 10*3/mm3 Final     Sodium   Date Value Ref Range Status   07/21/2023 141 136 - 145 mmol/L Final     Potassium   Date Value Ref Range Status   07/21/2023 3.8 3.5 - 5.2 mmol/L Final     Chloride   Date Value Ref Range Status   07/21/2023 111 (H) 98 - 107 mmol/L Final     CO2   Date Value Ref Range Status   07/21/2023 26.0 22.0 - 29.0 mmol/L Final     BUN   Date Value Ref Range Status   07/21/2023 7 6 - 20 mg/dL Final     Creatinine   Date Value Ref Range Status   07/21/2023 1.06 0.76 - 1.27 mg/dL Final     Glucose   Date Value Ref Range Status   07/21/2023 86 65 - 99 mg/dL Final     Calcium   Date Value Ref Range Status   07/21/2023 8.2 (L) 8.6 - 10.5 mg/dL Final     No results found for: AST, ALT, ALKPHOS  INR   Date Value Ref Range Status    07/18/2023 1.0 0.8 - 1.2 Final     No results found for: COLORU, CLARITYU, SPECGRAV, PHUR, PROTEINUR, GLUCOSEU, KETONESU, BLOODU, NITRITE, LEUKOCYTESUR, BILIRUBINUR, UROBILINOGEN, RBCUA, WBCUA, BACTERIA, UACOMMENT  No results found for: TROPONINT, TROPONINI, BNP  No components found for: HGBA1C;2  No components found for: TSH;2  Imaging Results (All)       Procedure Component Value Units Date/Time    IR Embolization [[id number]] Collected: 07/19/23 1744     Updated: 07/20/23 0729    Narrative:      VISCERAL ANGIOGRAPHY AND EMBOLIZATION     HISTORY: abnormal tagged scan, recurrent GIB     COMPARISON: 07/18/2023 nuclear scan, 07/15/2023 angiography     PROCEDURE AND FINDINGS: After consent was obtained and documented the  patient was brought to the angiosuite and placed on the table in supine  position. The bilateral groins were prepped and draped in usual sterile  fashion. A nurse was continuously present to administer moderate  sedation under physician supervision for 120 minutes utilizing a total  of 100 mcg fentanyl and 1 mg Versed. Glucagon was given intravenously  for peristalsis control. 100 mcg nitroglycerin were administered  intraarterially to treat/prevent vasospasm. After local anesthesia with  1% lidocaine, the right common femoral artery was accessed using a  21-gauge micropuncture needle through which an 018 wire was advanced.  The needle was exchanged out over wire for a 4 [place]ese dilator through  which an 035 wire was placed after confirmation of correct position by  contrast injection. After insertion of a 5 [place]ese vascular sheath, a 5  [place]ese C2 catheter advanced into the abdominal aorta and the superior  mesenteric artery selected.   Angiography from here and further superselective angiograms demonstrated  an abnormal/injured distal branch (vas rectum) of an ileal artery of the  SMA with associated (apparent intraluminal) extravasation also later  demonstrated. This branch was superselectively  occluded using a 2 mm x 2  cm Azur hydrocoil and a 2 mm x 2 cm Azur CX coil via a coaxially  introduced 2.4 Italian Progreat microcatheter. On postembolization  imaging no extravasation or reperfusion of the embolized branch was  demonstrated.  A Mynx device was utilized for hemostasis on catheter and sheath  removal. Manual pressure was held and a sterile dressing applied. There  were no immediate complications. All elements of maximal sterile  technique were followed. Fluoro time 17.4 min, 120?ml isovue 250, 521  mGy Ka,r (in varying obliquities), 23 exposures.          Impression:         Successful superselective coil embolization of injured ileal artery  branch as described above.     This report was finalized on 7/20/2023 7:26 AM by Dr. Terence Pinto M.D.       NM GI Blood Loss [[id number]] Collected: 07/18/23 1357     Updated: 07/18/23 1411    Narrative:      NUCLEAR MEDICINE GI BLEED SCAN     HISTORY: Recurrent GI bleeding. Embolization of area of small bowel GI  bleed on 07/15/2023.     TECHNIQUE: Nuclear medicine GI bleed scan was performed using 31.2 mCi  of technetium UltraTag to label the patient's own red blood cells which  were reinjected before imaging. This is correlated with images from  embolization procedure and GI bleed nuclear medicine exams 07/15/2023  and CT angiogram 07/14/2023.     FINDINGS: Early in the exam, abnormal activity appears with a swirling  configuration over the midabdomen at the level of the aortic bifurcation  typical for extravasated activity in loops of small bowel. This  abnormality involves roughly similar portion of the abdomen compared  with the GI bleed exam from 2 days ago. Normal vascular and solid organ  activity is observed.       Impression:      Positive GI bleed exam localizing to the small bowel at  about the level of the aortic bifurcation. This roughly approximates the  area of bleeding observed 2 days ago. The nurse caring for the patient  was notified of  the findings by the nuclear medicine technologist at  around 1:30 PM.     This report was finalized on 7/18/2023 2:08 PM by Dr. Imtiaz Claire M.D.       IR Embolization [[id number]] Collected: 07/15/23 2028     Updated: 07/15/23 2039    Narrative:      PROCEDURE:  Superior mesenteric angiogram, embolization of bleeding distal small  bowel branch artery.     INDICATION:  Acute blood loss anemia. Patient with maroon stools and requiring  frequent transfusions. Patient has had prior upper endoscopy and  colonoscopy without demonstrating source of bleeding. Tagged red blood  cell scan performed earlier today localized active bleeding to the small  bowel.        Reference air kerma: 985.21 mGy  Contrast: Approximately 129 mL of Isovue     Moderate sedation was provided under my direct supervision using 1 mg IV  Versed and 100 mcg IV Fentanyl. The patient was independently monitored  by a trained Department of Radiology RN using automated blood pressure,  EKG, and pulse oximetry. My total intra-service time was 105 minutes.      The risks, benefits, and alternatives of the procedure were discussed  with the patient, and informed consent was obtained. In the procedure  room a timeout was performed confirming correct patient and procedure.     Maximum sterile barrier technique was used including sterile cap, mask,  gloves, gown and large sterile sheet as well as pre-procedure hand  washing and cutaneous antisepsis with 2 % chlorhexidine.      TECHNIQUE/FINDINGS:  The right common femoral artery was accessed under ultrasound guidance  with a 4 Guyanese micropuncture introducer set. This was upsized to a 5  Guyanese working sheath. A 5 Guyanese C2 catheter was then advanced through  the sheath and utilized to select the superior mesenteric artery.  Selective angiography of the superior mesenteric artery was then  performed. An area of active contrast extravasation was demonstrated in  the left lower quadrant arising from one of  the branch arteries, likely  an ileal artery. Next, a coaxial fashion, a 2.8 Czech Progreat  microcatheter was advanced into the branch artery supplying the area of  extravasation. Selective angiography was performed. This confirmed  extravasation of contrast into the small bowel. The microcatheter was  then superselectively advanced as distal as possible into the branch  artery that demonstrated active extravasation, and coil embolization of  this vessel was then performed. Two 2 mm Azur CX microcoils were  deployed followed by a single 2 mm Azur microcoil. Postembolization  angiography was then performed using multiple obliquities. Initially,  there appeared to be some persistent active extravasation into the small  bowel following embolization. Angiography of multiple adjacent branch  vessels was then performed, however, no additional actively bleeding  vessels were clearly identified. Final selective postembolization  angiogram of the ileal branch artery proximal to level embolization was  performed. This demonstrated no further active contrast extravasation.  The microcatheter was removed. Final postembolization angiogram of the  superior mesenteric artery was then performed. This again demonstrated  no evidence of any active contrast extravasation at the location of the  embolization coils and no additional areas of active contrast  extravasation. The C2 catheter was then removed. Angiography of the  right common femoral artery was performed. The sheath was exchanged for  an Angio-Seal vascular closure device and hemostasis was achieved. The  patient tolerated procedure well without immediate complication.          Impression:      Superior mesenteric angiogram with embolization as described. There was  an area of active contrast extravasation into the small bowel of the  left lower quadrant arising from one of the ileal branch arteries of the  SMA. This was superselectively catheterized and coil embolization  of the  actively bleeding branch vessel was performed as described above.  Technically successful result with no further active extravasation  demonstrated upon completion of the procedure.     This report was finalized on 7/15/2023 8:36 PM by Dr. Moise Walker M.D.       NM GI Blood Loss [[id number]] Collected: 07/15/23 1414     Updated: 07/15/23 1422    Narrative:      NM GI BLOOD LOSS-     INDICATIONS: Gastrointestinal bleeding     TECHNIQUE: Bleeding scan     COMPARISON: Correlated with CT from 07/14/2023     FINDINGS:     Radiotracer: 36.0 mCi technetium 99m radiolabeled red blood cells,  intravenous. Projections of the abdomen and pelvis were obtained at 2  seconds interval is for 2 minutes, then 1 minute intervals for 90  minutes. Cine sequence was created.     Intestinal bleeding was observed during the exam, localized to the mid  small bowel (proximal to mid ileum).        Otherwise, expected soft tissue localization of radiotracer is noted.             Impression:         Intestinal bleeding was observed during the exam, localized to the mid  small bowel.     Discussed by telephone with patient's nurse, Brigette, at time of  interpretation, 1414, 07/15/2023.        This report was finalized on 7/15/2023 2:19 PM by Dr. Brice Velasco M.D.       CT Angiogram Abdomen Pelvis [[id number]] Collected: 07/14/23 1246     Updated: 07/14/23 1311    Narrative:      CT ANGIOGRAM OF THE ABDOMEN AND PELVIS     HISTORY: GI bleed     TECHNIQUE: Radiation dose reduction techniques were utilized, including  automated exposure control and exposure modulation based on body size.   CT angiogram of the abdomen and pelvis was performed following the  administration of IV contrast. Multiple coronal, sagittal, and 3-D  reconstruction images were obtained. Precontrast and venous phase  imaging performed.     COMPARISON: 07/12/2023, 07/08/2023     FINDINGS:      CTA:  There is no evidence of active contrast extravasation into the  GI tract  to suggest active GI bleeding at this time. There is some high  attenuation material within the dependent portion of the stomach that  was present on the noncontrast portion of the study and is unchanged  following the administration of contrast. This is consistent with  ingested material. The celiac artery, SMA, and inferior mesenteric  artery are all widely patent. The renal arteries are widely patent. The  abdominal aorta is nonaneurysmal. The iliac arteries are widely patent  and nonaneurysmal.     CT ABDOMEN/PELVIS:  The lung bases are clear. The liver, gallbladder, and spleen are  unremarkable. The kidneys, adrenal glands, and pancreas are  unremarkable. Previously demonstrated thickening of the duodenum and  proximal jejunum has resolved. The bladder is unremarkable. The colon is  unremarkable. Previous appendectomy. No free fluid in the pelvis. No  acute bony abnormality.       Impression:      1. No evidence of active contrast extravasation into the GI tract to  suggest active GI bleeding at this time  2. Previously demonstrated thickening of the duodenum and proximal  jejunum has resolved  3. Appendectomy        Radiation dose reduction techniques were utilized, including automated  exposure control and exposure modulation based on body size.     This report was finalized on 7/14/2023 1:07 PM by Dr. Moise Walker M.D.       NM GI Blood Loss [[id number]] Collected: 07/13/23 1911     Updated: 07/13/23 1923    Narrative:      NM GI BLOOD LOSS-     INDICATIONS: Gastrointestinal bleeding     TECHNIQUE: Bleeding scan     COMPARISON: None of same type     FINDINGS:     Radiotracer: 29.7 mCi and 24.5 mCi technetium 99m radiolabeled red blood  cells, intravenous.     Despite special care in injecting the radiotracer, both attempted doses  extravasated. As such, the bowel could not be evaluated for bleeding.          Impression:         Unsuccessful bleeding scan evaluation, as described.  Follow-up/further  evaluation can be obtained as indications persist.        This report was finalized on 7/13/2023 7:19 PM by Dr. Brice Velasco M.D.       CT Abdomen Pelvis With Contrast [[id number]] Collected: 07/12/23 0846     Updated: 07/13/23 1338    Narrative:      CT ABDOMEN AND PELVIS WITH CONTRAST     HISTORY: 43-year-old male with lower abdominal pain and nausea and blood  in stool.     TECHNIQUE: Axial CT images of the abdomen and pelvis were obtained  following administration of intravenous contrast. The patient was not  given oral contrast Coronal and sagittal reformats were obtained.     COMPARISON: 07/08/2023     FINDINGS: Mild wall thickening of the duodenum and the proximal jejunal  loops likely related to peristalsis. Mild diffuse gastric wall  thickening in a pattern consistent with gastritis. No evidence of bowel  obstruction.     The liver demonstrates normal attenuation. The gallbladder is normal.  The spleen is normal. The pancreas is normal. Bilateral adrenal glands  are normal. No renal calculi or hydronephrosis. The urinary bladder is  partially distended and normal.       Impression:      Mild gastritis is suspected. Diffuse thickening involving  the duodenum and the proximal jejunal loops favored to be secondary to  peristalsis.     Radiation dose reduction techniques were utilized, including automated  exposure control and exposure modulation based on body size.     This report was finalized on 7/13/2023 1:35 PM by Dr. Len Herndon M.D.       XR Chest 1 View [[id number]] Collected: 07/12/23 0729     Updated: 07/12/23 0733    Narrative:      AP CHEST     HISTORY: Leukocytosis     COMPARISON: None available     FINDINGS: Mildly elevated left hemidiaphragm. No evidence of airspace  infiltrate. Heart size normal. No pneumothorax. Visualized osseous  structures are unremarkable.       Impression:      No acute findings     This report was finalized on 7/12/2023 7:30 AM by Dr. Moise SPRING  DG Walker             Lab Results (last 7 days)       Procedure Component Value Units Date/Time    Hemoglobin & Hematocrit, Blood [[id number]]  (Abnormal) Collected: 07/21/23 0812    Specimen: Blood Updated: 07/21/23 0831     Hemoglobin 8.2 g/dL      Hematocrit 24.9 %     Basic Metabolic Panel [[id number]]  (Abnormal) Collected: 07/21/23 0055    Specimen: Blood Updated: 07/21/23 0215     Glucose 86 mg/dL      BUN 7 mg/dL      Creatinine 1.06 mg/dL      Sodium 141 mmol/L      Potassium 3.8 mmol/L      Chloride 111 mmol/L      CO2 26.0 mmol/L      Calcium 8.2 mg/dL      BUN/Creatinine Ratio 6.6     Anion Gap 4.0 mmol/L      eGFR 89.9 mL/min/1.73     Narrative:      GFR Normal >60  Chronic Kidney Disease <60  Kidney Failure <15      Hemoglobin & Hematocrit, Blood [[id number]]  (Abnormal) Collected: 07/21/23 0055    Specimen: Blood Updated: 07/21/23 0110     Hemoglobin 7.5 g/dL      Hematocrit 23.2 %     CBC & Differential [[id number]]  (Abnormal) Collected: 07/21/23 0055    Specimen: Blood Updated: 07/21/23 0110    Narrative:      The following orders were created for panel order CBC & Differential.  Procedure                               Abnormality         Status                     ---------                               -----------         ------                     CBC Auto Differential[[id number]]        Abnormal            Final result                 Please view results for these tests on the individual orders.    CBC Auto Differential [[id number]]  (Abnormal) Collected: 07/21/23 0055    Specimen: Blood Updated: 07/21/23 0110     WBC 7.82 10*3/mm3      RBC 2.67 10*6/mm3      Hemoglobin 7.5 g/dL      Hematocrit 23.2 %      MCV 86.9 fL      MCH 28.1 pg      MCHC 32.3 g/dL      RDW 14.9 %      RDW-SD 46.5 fl      MPV 9.6 fL      Platelets 299 10*3/mm3      Neutrophil % 62.1 %      Lymphocyte % 23.8 %      Monocyte % 9.5 %      Eosinophil % 3.3 %      Basophil % 0.4 %      Immature Grans % 0.9 %      Neutrophils,  Absolute 4.86 10*3/mm3      Lymphocytes, Absolute 1.86 10*3/mm3      Monocytes, Absolute 0.74 10*3/mm3      Eosinophils, Absolute 0.26 10*3/mm3      Basophils, Absolute 0.03 10*3/mm3      Immature Grans, Absolute 0.07 10*3/mm3      nRBC 0.3 /100 WBC     Hemoglobin & Hematocrit, Blood [[id number]]  (Abnormal) Collected: 07/20/23 1750    Specimen: Blood Updated: 07/20/23 1805     Hemoglobin 7.7 g/dL      Hematocrit 23.4 %     Hemoglobin & Hematocrit, Blood [[id number]]  (Abnormal) Collected: 07/20/23 0849    Specimen: Blood Updated: 07/20/23 0858     Hemoglobin 8.0 g/dL      Hematocrit 24.0 %     Hemoglobin & Hematocrit, Blood [[id number]]  (Abnormal) Collected: 07/20/23 0034    Specimen: Blood Updated: 07/20/23 0116     Hemoglobin 7.1 g/dL      Hematocrit 21.9 %     CBC (No Diff) [[id number]]  (Abnormal) Collected: 07/20/23 0034    Specimen: Blood Updated: 07/20/23 0116     WBC 8.56 10*3/mm3      RBC 2.55 10*6/mm3      Hemoglobin 7.1 g/dL      Hematocrit 21.9 %      MCV 85.9 fL      MCH 27.8 pg      MCHC 32.4 g/dL      RDW 14.9 %      RDW-SD 45.3 fl      MPV 9.5 fL      Platelets 282 10*3/mm3     Hemoglobin & Hematocrit, Blood [[id number]]  (Abnormal) Collected: 07/19/23 1554    Specimen: Blood Updated: 07/19/23 1620     Hemoglobin 7.7 g/dL      Hematocrit 23.3 %     Hemoglobin & Hematocrit, Blood [[id number]]  (Abnormal) Collected: 07/19/23 0854    Specimen: Blood Updated: 07/19/23 0929     Hemoglobin 7.0 g/dL      Hematocrit 21.1 %     POC Protime / INR [[id number]]  (Abnormal) Collected: 07/18/23 1648    Specimen: Blood Updated: 07/19/23 0637     Protime 12.0 seconds      Comment: Serial Number: [id number]:  [id number]        INR 1.0    Lactic Acid, Plasma [[id number]]  (Normal) Collected: 07/19/23 0556    Specimen: Blood, Central Line Updated: 07/19/23 0628     Lactate 0.8 mmol/L     CBC & Differential [[id number]]  (Abnormal) Collected: 07/19/23 0556    Specimen: Blood, Central Line Updated: 07/19/23 0615    Narrative:       The following orders were created for panel order CBC & Differential.  Procedure                               Abnormality         Status                     ---------                               -----------         ------                     CBC Auto Differential[[id number]]        Abnormal            Final result                 Please view results for these tests on the individual orders.    CBC Auto Differential [[id number]]  (Abnormal) Collected: 07/19/23 0556    Specimen: Blood, Central Line Updated: 07/19/23 0615     WBC 8.20 10*3/mm3      RBC 2.50 10*6/mm3      Hemoglobin 7.3 g/dL      Hematocrit 21.8 %      MCV 87.2 fL      MCH 29.2 pg      MCHC 33.5 g/dL      RDW 15.5 %      RDW-SD 48.3 fl      MPV 9.7 fL      Platelets 211 10*3/mm3      Neutrophil % 64.2 %      Lymphocyte % 20.5 %      Monocyte % 9.1 %      Eosinophil % 3.4 %      Basophil % 0.4 %      Immature Grans % 2.4 %      Neutrophils, Absolute 5.26 10*3/mm3      Lymphocytes, Absolute 1.68 10*3/mm3      Monocytes, Absolute 0.75 10*3/mm3      Eosinophils, Absolute 0.28 10*3/mm3      Basophils, Absolute 0.03 10*3/mm3      Immature Grans, Absolute 0.20 10*3/mm3      nRBC 0.4 /100 WBC     Hemoglobin & Hematocrit, Blood [[id number]]  (Abnormal) Collected: 07/19/23 0032    Specimen: Blood, Central Line Updated: 07/19/23 0052     Hemoglobin 7.2 g/dL      Hematocrit 21.3 %     Blood Culture - Blood, Hand, Left [[id number]]  (Normal) Collected: 07/13/23 2204    Specimen: Blood from Hand, Left Updated: 07/18/23 2231     Blood Culture No growth at 5 days    Narrative:      Less than seven (7) mL's of blood was collected.  Insufficient quantity may yield false negative results.    Blood Culture - Blood, Arm, Right [[id number]]  (Normal) Collected: 07/13/23 2157    Specimen: Blood from Arm, Right Updated: 07/18/23 2231     Blood Culture No growth at 5 days    Lactic Acid, Plasma [[id number]]  (Normal) Collected: 07/18/23 1553    Specimen: Blood from Arm, Left  Updated: 07/18/23 1719     Lactate 1.0 mmol/L     Hemoglobin & Hematocrit, Blood [[id number]]  (Abnormal) Collected: 07/18/23 1457    Specimen: Blood Updated: 07/18/23 1544     Hemoglobin 7.8 g/dL      Hematocrit 23.0 %     Comprehensive Metabolic Panel [[id number]]  (Abnormal) Collected: 07/18/23 0614    Specimen: Blood, Central Line Updated: 07/18/23 0652     Glucose 117 mg/dL      BUN 4 mg/dL      Creatinine 1.13 mg/dL      Sodium 144 mmol/L      Potassium 3.5 mmol/L      Chloride 113 mmol/L      CO2 26.0 mmol/L      Calcium 7.8 mg/dL      Total Protein 3.8 g/dL      Albumin 2.1 g/dL      ALT (SGPT) 14 U/L      AST (SGOT) 16 U/L      Alkaline Phosphatase 48 U/L      Total Bilirubin <0.2 mg/dL      Globulin 1.7 gm/dL      A/G Ratio 1.2 g/dL      BUN/Creatinine Ratio 3.5     Anion Gap 5.0 mmol/L      eGFR 83.2 mL/min/1.73     Narrative:      GFR Normal >60  Chronic Kidney Disease <60  Kidney Failure <15      CBC (No Diff) [[id number]]  (Abnormal) Collected: 07/18/23 0614    Specimen: Blood, Central Line Updated: 07/18/23 0648     WBC 8.98 10*3/mm3      RBC 2.41 10*6/mm3      Hemoglobin 6.9 g/dL      Hematocrit 20.6 %      MCV 85.5 fL      MCH 28.6 pg      MCHC 33.5 g/dL      RDW 15.6 %      RDW-SD 47.6 fl      MPV 9.6 fL      Platelets 198 10*3/mm3     Hemoglobin & Hematocrit, Blood [[id number]]  (Abnormal) Collected: 07/17/23 2357    Specimen: Blood, Central Line Updated: 07/18/23 0034     Hemoglobin 7.6 g/dL      Hematocrit 22.6 %     Hemoglobin & Hematocrit, Blood [[id number]]  (Abnormal) Collected: 07/17/23 1547    Specimen: Blood Updated: 07/17/23 1603     Hemoglobin 7.7 g/dL      Hematocrit 22.9 %     Hemoglobin & Hematocrit, Blood [[id number]]  (Abnormal) Collected: 07/17/23 0807    Specimen: Blood Updated: 07/17/23 0818     Hemoglobin 7.4 g/dL      Hematocrit 22.1 %     Comprehensive Metabolic Panel [[id number]]  (Abnormal) Collected: 07/17/23 0543    Specimen: Blood, Central Line Updated: 07/17/23 0680      Glucose 90 mg/dL      BUN 3 mg/dL      Creatinine 1.23 mg/dL      Sodium 139 mmol/L      Potassium 3.5 mmol/L      Chloride 112 mmol/L      CO2 22.8 mmol/L      Calcium 7.3 mg/dL      Total Protein 3.5 g/dL      Albumin 1.8 g/dL      ALT (SGPT) 15 U/L      AST (SGOT) 17 U/L      Alkaline Phosphatase 44 U/L      Total Bilirubin <0.2 mg/dL      Globulin 1.7 gm/dL      A/G Ratio 1.1 g/dL      BUN/Creatinine Ratio 2.4     Anion Gap 4.2 mmol/L      eGFR 75.2 mL/min/1.73     Narrative:      GFR Normal >60  Chronic Kidney Disease <60  Kidney Failure <15      CBC (No Diff) [[id number]]  (Abnormal) Collected: 07/17/23 0543    Specimen: Blood, Central Line Updated: 07/17/23 0618     WBC 10.02 10*3/mm3      RBC 2.52 10*6/mm3      Hemoglobin 7.4 g/dL      Hematocrit 21.7 %      MCV 86.1 fL      MCH 29.4 pg      MCHC 34.1 g/dL      RDW 15.6 %      RDW-SD 47.0 fl      MPV 10.5 fL      Platelets 138 10*3/mm3     Hemoglobin & Hematocrit, Blood [[id number]]  (Abnormal) Collected: 07/16/23 2348    Specimen: Blood, Central Line Updated: 07/17/23 0003     Hemoglobin 8.0 g/dL      Hematocrit 24.7 %     Folate RBC [[id number]]  (Abnormal) Collected: 07/14/23 0944    Specimen: Blood Updated: 07/16/23 1907     Folate, Hemolysate 338.0 ng/mL      Hematocrit 16.0 %      Comment: **Verified by repeat analysis**        RBC Folate 2113 ng/mL     Narrative:      Performed at:  [address]  [id number]  : Olu Lynn PhD, Phone:  [phone]  Specimen Comment: Called/faxed to Tessie Castro on 07/15/2023 at 01:59 ET for  Specimen Comment: test(s) Hematocrit    Hemoglobin & Hematocrit, Blood [[id number]]  (Abnormal) Collected: 07/16/23 1635    Specimen: Blood Updated: 07/16/23 1719     Hemoglobin 6.2 g/dL      Hematocrit 18.8 %     Hemoglobin & Hematocrit, Blood [[id number]]  (Abnormal) Collected: 07/16/23 0814    Specimen: Blood Updated: 07/16/23 0851     Hemoglobin 7.1 g/dL      Hematocrit 20.5 %      Comprehensive Metabolic Panel [[id number]]  (Abnormal) Collected: 07/16/23 0111    Specimen: Blood Updated: 07/16/23 0145     Glucose 127 mg/dL      BUN 7 mg/dL      Creatinine 1.26 mg/dL      Sodium 136 mmol/L      Potassium 3.8 mmol/L      Chloride 110 mmol/L      CO2 19.3 mmol/L      Calcium 6.9 mg/dL      Total Protein 3.2 g/dL      Albumin 2.1 g/dL      ALT (SGPT) 8 U/L      AST (SGOT) 7 U/L      Alkaline Phosphatase 33 U/L      Total Bilirubin <0.2 mg/dL      Globulin 1.1 gm/dL      A/G Ratio 1.9 g/dL      BUN/Creatinine Ratio 5.6     Anion Gap 6.7 mmol/L      eGFR 73.0 mL/min/1.73     Narrative:      GFR Normal >60  Chronic Kidney Disease <60  Kidney Failure <15      CBC (No Diff) [[id number]]  (Abnormal) Collected: 07/16/23 0111    Specimen: Blood Updated: 07/16/23 0138     WBC 17.39 10*3/mm3      RBC 2.25 10*6/mm3      Hemoglobin 6.7 g/dL      Hematocrit 19.7 %      MCV 87.6 fL      MCH 29.8 pg      MCHC 34.0 g/dL      RDW 14.9 %      RDW-SD 46.3 fl      MPV 10.4 fL      Platelets 130 10*3/mm3     CBC (No Diff) [[id number]]  (Abnormal) Collected: 07/15/23 2050    Specimen: Blood from Arm, Left Updated: 07/15/23 2119     WBC 15.20 10*3/mm3      RBC 1.62 10*6/mm3      Hemoglobin 4.9 g/dL      Hematocrit 14.9 %      MCV 92.0 fL      MCH 30.2 pg      MCHC 32.9 g/dL      RDW 15.2 %      RDW-SD 49.0 fl      MPV 10.4 fL      Platelets 145 10*3/mm3     Comprehensive Metabolic Panel [[id number]]  (Abnormal) Collected: 07/15/23 1603    Specimen: Blood Updated: 07/15/23 1639     Glucose 105 mg/dL      BUN 9 mg/dL      Creatinine 1.30 mg/dL      Sodium 135 mmol/L      Potassium 3.6 mmol/L      Chloride 110 mmol/L      CO2 19.0 mmol/L      Calcium 6.8 mg/dL      Total Protein 3.1 g/dL      Albumin 1.9 g/dL      ALT (SGPT) 5 U/L      AST (SGOT) 9 U/L      Alkaline Phosphatase 32 U/L      Total Bilirubin 0.2 mg/dL      Globulin 1.2 gm/dL      A/G Ratio 1.6 g/dL      BUN/Creatinine Ratio 6.9     Anion Gap 6.0 mmol/L       eGFR 70.3 mL/min/1.73     Narrative:      GFR Normal >60  Chronic Kidney Disease <60  Kidney Failure <15      CBC & Differential [[id number]]  (Abnormal) Collected: 07/15/23 1603    Specimen: Blood Updated: 07/15/23 1628    Narrative:      The following orders were created for panel order CBC & Differential.  Procedure                               Abnormality         Status                     ---------                               -----------         ------                     CBC Auto Differential[[id number]]        Abnormal            Final result                 Please view results for these tests on the individual orders.    CBC Auto Differential [[id number]]  (Abnormal) Collected: 07/15/23 1603    Specimen: Blood Updated: 07/15/23 1628     WBC 13.20 10*3/mm3      RBC 1.93 10*6/mm3      Hemoglobin 5.9 g/dL      Hematocrit 17.5 %      MCV 90.7 fL      MCH 30.6 pg      MCHC 33.7 g/dL      RDW 14.2 %      RDW-SD 45.9 fl      MPV 10.5 fL      Platelets 132 10*3/mm3      Neutrophil % 64.3 %      Lymphocyte % 19.0 %      Monocyte % 11.7 %      Eosinophil % 1.1 %      Basophil % 0.2 %      Neutrophils, Absolute 8.48 10*3/mm3      Lymphocytes, Absolute 2.51 10*3/mm3      Monocytes, Absolute 1.55 10*3/mm3      Eosinophils, Absolute 0.15 10*3/mm3      Basophils, Absolute 0.02 10*3/mm3     Hemoglobin & Hematocrit, Blood [[id number]]  (Abnormal) Collected: 07/15/23 0625    Specimen: Blood Updated: 07/15/23 0659     Hemoglobin 6.2 g/dL      Hematocrit 18.4 %     Hemoglobin & Hematocrit, Blood [[id number]]  (Abnormal) Collected: 07/15/23 0010    Specimen: Blood Updated: 07/15/23 0102     Hemoglobin 6.1 g/dL      Hematocrit 18.8 %     Hemoglobin & Hematocrit, Blood [[id number]]  (Abnormal) Collected: 07/14/23 1650    Specimen: Blood Updated: 07/14/23 1735     Hemoglobin 5.5 g/dL      Hematocrit 16.5 %           /77 (BP Location: Right arm, Patient Position: Lying)   Pulse 81   Temp 98.2 °F (36.8 °C) (Oral)   Resp 14    "Ht 172.7 cm (68\")   Wt 78 kg (172 lb)   SpO2 100%   BMI 26.15 kg/m²     Discharge Exam:  General Appearance:    Alert, cooperative, no distress                          Head:    Normocephalic, without obvious abnormality, atraumatic                          Eyes:                            Throat:   Lips, tongue, gums normal                          Neck:   Supple, symmetrical, trachea midline, no JVD                        Lungs:     Clear to auscultation bilaterally, respirations unlabored                Chest Wall:    No tenderness or deformity                        Heart:    Regular rate and rhythm, S1 and S2 normal, no murmur,no  Rub  or gallop                  Abdomen:     Soft, non-tender, bowel sounds active, no masses, no organomegaly                  Extremities:   Extremities normal, atraumatic, no cyanosis or edema                             Skin:   Skin is warm and dry,  no rashes or palpable lesions                  Neurologic:   no focal deficits noted     Disposition:  Home    Activity as tolerated    Diet as tolerated  Diet Order   Procedures    Diet: Regular/House Diet, Gastrointestinal Diets; Low Irritant; Texture: Regular Texture (IDDSI 7); Fluid Consistency: Thin (IDDSI 0)       Patient Instructions:      Discharge Medications        Continue These Medications        Instructions Start Date   ferrous sulfate 325 (65 FE) MG tablet  Commonly known as: FerrouSul   325 mg, Oral, Daily With Breakfast      folic acid 1 MG tablet  Commonly known as: FOLVITE   1 mg, Oral, Daily      pantoprazole 40 MG EC tablet  Commonly known as: PROTONIX   40 mg, Oral, Every Early Morning      polycarbophil 625 MG tablet tablet   625 mg, Oral, Daily      thiamine 100 MG tablet  Commonly known as: VITAMIN B1   100 mg, Oral, Daily             Future Appointments   Date Time Provider Department Center   7/28/2023 10:00 AM Hellen Skelton APRN [facility]      Follow-up Information       Isrrael Gibson MD " Follow up in 4 week(s).    Specialty: Gastroenterology  Contact information:  [address]  [address]  [facility] [id number] [phone]               Hellen Skelton APRN Follow up in 1 week(s).    Specialties: Nurse Practitioner, Internal Medicine  Contact information:  [address]  [address]  [facility] [id number] [phone]                           Discharge Order (From admission, onward)       Start     Ordered    07/21/23 1214  Discharge patient  Once        Expected Discharge Date: 07/21/23    Discharge Disposition: Home or Self Care    Physician of Record for Attribution - Please select from Treatment Team: ZEFERINO AYALA [[id number]]    Review needed by CMO to determine Physician of Record: No       Question Answer Comment   Physician of Record for Attribution - Please select from Treatment Team ZEFERINO AYALA    Review needed by CMO to determine Physician of Record No        07/21/23 1214                    Total time spent discharging patient including evaluation,post hospitalization follow up,  medication and post hospitalization instructions and education total time exceeds 30 minutes.    Signed:  Zeferino Ayala MD  7/21/2023  12:15 EDT

## 2023-07-21 NOTE — PAYOR COMM NOTE
"Radha Noriega (43 y.o. Male)        PLEASE SEE ATTACHED DC SUMMARY    REF#[id number]    THANK YOU    HERBERTH WHALEY LPN CCP   Date of Birth   1980    Social Security Number       Address   [address] [facility] [facility] [address] [address]    Home Phone   [phone]    MRN   [id number]       Baptism   None    Marital Status                               Admission Date   7/12/23    Admission Type   Emergency    Admitting Provider   Harshal Casillas MD    Attending Provider       Department, Room/Bed   [address], S601/1       Discharge Date   7/21/2023    Discharge Disposition   Home or Self Care    Discharge Destination                                 Attending Provider: (none)   Allergies: No Known Allergies    Isolation: None   Infection: None   Code Status: CPR    Ht: 172.7 cm (68\")   Wt: 78 kg (172 lb)    Admission Cmt: None   Principal Problem: ABLA (acute blood loss anemia) [D62]                   Active Insurance as of 7/12/2023       Primary Coverage       Payor Plan Insurance Group Employer/Plan Group    ANTH BLUE CROSS ANTHEM BLUE CROSS BLUE SHIELD PPO [id number]       Payor Plan Address Payor Plan Phone Number Payor Plan Fax Number Effective Dates    [address] [phone]  3/17/2022 - None Entered    [facility] [address]         Subscriber Name Subscriber Birth Date Member ID       RADHA NORIEGA 1980 [id number]               Secondary Coverage       Payor Plan Insurance Group Employer/Plan Group    [facility] [id number]       Payor Plan Address Payor Plan Phone Number Payor Plan Fax Number Effective Dates    [address] [address]   1/1/2021 - None Entered    [facility] [phone]         Subscriber Name Subscriber Birth Date Member ID       RADHA NORIEGA 1980 [id number]                     Emergency Contacts        (Rel.) Home Phone Work Phone Mobile Phone    NED NORIEGA (Spouse) " [phone] -- [phone]    Zainab Elaine (Mother) -- -- [phone]                 Discharge Summary        Gil Ayala MD at 23 1215                                                                             PHYSICIAN DISCHARGE SUMMARY                                                                        [facility]    Patient Identification:  Name: Radha Noriega V  Age: 43 y.o.  Sex: male  :  1980  MRN: [id number]  Primary Care Physician: Hellen Skelton APRN    Admit date: 2023  Discharge date and time:2023  Discharged Condition: good    Discharge Diagnoses:  Active Hospital Problems    Diagnosis  POA    **ABLA (acute blood loss anemia) [D62]  Yes    Metabolic acidosis [E87.20]  Yes    Enteritis of possible infectious origin [A09]  Yes    Sepsis [A41.9]  Yes    Blood per rectum [K62.5]  Unknown    Iron deficiency anemia [D50.9]  Yes    Current every day smoker [F17.200]  Yes    Gastrointestinal hemorrhage with melena [K92.1]  Yes    Acute blood loss anemia [D62]  Unknown      Resolved Hospital Problems   No resolved problems to display.          PMHX:   Past Medical History:   Diagnosis Date    Anemia     ETOH abuse     Tachycardia 2023    Tobacco dependence      PSHX:   Past Surgical History:   Procedure Laterality Date    APPENDECTOMY      COLONOSCOPY N/A 2023    Procedure: COLONOSCOPY TO CECUM AND TERMINAL ILEUM WITH BIOPSIES AND COLD BIOPSY POLYPECTOMIES;  Surgeon: Imtiaz Lee MD;  Location: [facility] ENDOSCOPY;  Service: Gastroenterology;  Laterality: N/A;  PRE- MELENA  POST- COLON POLYPS, HEMORRHOIDS    ENDOSCOPY Left 2023    Procedure: ESOPHAGOGASTRODUODENOSCOPY;  Surgeon: Imtiaz Lee MD;  Location: [facility] ENDOSCOPY;  Service: Gastroenterology;  Laterality: Left;  small hiatal hernia, esophagitis    ENDOSCOPY N/A 7/15/2023    Procedure: ESOPHAGOGASTRODUODENOSCOPY;  Surgeon: Ermelinda Dixon MD;  Location: [facility] ENDOSCOPY;  Service:  Gastroenterology;  Laterality: N/A;  pre-anemia  post-hiatal hernia    ENTEROSCOPY SMALL BOWEL N/A 7/9/2023    Procedure: ENTEROSCOPY SMALL BOWEL;  Surgeon: Isrrael Gibson MD;  [facility] END[facility]COPY;  Service: Gastroenterology;  Laterality: N/A;  PRE- GI BLEED  POST- NORMAL       Hospital Course: Radha Noriega V  is a 43-year-old male with alcohol dependence, tobacco use as well as multiple previous admissions for GI bleed.  During a recent admission he was admitted for the same issue.  EGD and colonoscopy were negative for source of bleeding.  An outpatient video endoscopy was planned, but the patient had return to the hospital with hematochezia before could be performed.  During the most recent admission prior to this, his hemoglobin was 6.0 and he was admitted and given pRBC and iron transfusions.  GI saw the patient in consultation at that point time.  2 CT abdomen and pelvis were performed but no bleeding source was not identified.  Question anoscopy was negative as well.  He was subsequently discharged  He was actually supposed to have of video capsule study today however he started experiencing abdominal pain.  He had reported bright red blood in his stool and lightheadedness and palpitations.  His hemoglobin was noted to be 7.1, WBC 21,000, creatinine 1.37.  Urinalysis i was negative for UTI.  Chest x-ray was unremarkable.  CT of the abdomen pelvis showed mild gastritis as well as thickening of the duodenum and proximal jejunal loops, favored to be secondary to peristalsis. PRBC transfusion was initiated.  He was admitted for further evaluation and management.  The patient was admitted to the hospital and seen by GI medicine and general surgery.  Patient was found to have small bowel bleed and had embolization x2.  His hemoglobin remained stable and he looked to be well enough to go home after being in the hospital for a few days.  GI is planning on having capsule endoscopy done as outpatient.  He will  follow-up with his primary care in 1 week and also follow-up with GI medicine.    Consults:     Consults       Date and Time Order Name Status Description    7/15/2023 10:08 AM Inpatient General Surgery Consult Completed     7/12/2023 11:23 AM Inpatient Gastroenterology Consult Completed     7/12/2023  9:42 AM [facility] (on-call MD unless specified) Details      7/7/2023  1:29 AM Gastroenterology (on-call MD unless specified) Completed     6/24/2023  4:01 AM Inpatient Pulmonology Consult Completed     6/23/2023  7:27 PM Inpatient Gastroenterology Consult Completed           Results from last 7 days   Lab Units 07/21/23  0812 07/21/23  0055   WBC 10*3/mm3  --  7.82   HEMOGLOBIN g/dL 8.2* 7.5*  7.5*   HEMATOCRIT % 24.9* 23.2*  23.2*   PLATELETS 10*3/mm3  --  299     Results from last 7 days   Lab Units 07/21/23  0055   SODIUM mmol/L 141   POTASSIUM mmol/L 3.8   CHLORIDE mmol/L 111*   CO2 mmol/L 26.0   BUN mg/dL 7   CREATININE mg/dL 1.06   GLUCOSE mg/dL 86   CALCIUM mg/dL 8.2*     Significant Diagnostic Studies:   WBC   Date Value Ref Range Status   07/21/2023 7.82 3.40 - 10.80 10*3/mm3 Final     Hemoglobin   Date Value Ref Range Status   07/21/2023 8.2 (L) 13.0 - 17.7 g/dL Final     Hematocrit   Date Value Ref Range Status   07/21/2023 24.9 (L) 37.5 - 51.0 % Final     Platelets   Date Value Ref Range Status   07/21/2023 299 140 - 450 10*3/mm3 Final     Sodium   Date Value Ref Range Status   07/21/2023 141 136 - 145 mmol/L Final     Potassium   Date Value Ref Range Status   07/21/2023 3.8 3.5 - 5.2 mmol/L Final     Chloride   Date Value Ref Range Status   07/21/2023 111 (H) 98 - 107 mmol/L Final     CO2   Date Value Ref Range Status   07/21/2023 26.0 22.0 - 29.0 mmol/L Final     BUN   Date Value Ref Range Status   07/21/2023 7 6 - 20 mg/dL Final     Creatinine   Date Value Ref Range Status   07/21/2023 1.06 0.76 - 1.27 mg/dL Final     Glucose   Date Value Ref Range Status   07/21/2023 86 65 - 99 mg/dL Final     Calcium    Date Value Ref Range Status   07/21/2023 8.2 (L) 8.6 - 10.5 mg/dL Final     No results found for: AST, ALT, ALKPHOS  INR   Date Value Ref Range Status   07/18/2023 1.0 0.8 - 1.2 Final     No results found for: COLORU, CLARITYU, SPECGRAV, PHUR, PROTEINUR, GLUCOSEU, KETONESU, BLOODU, NITRITE, LEUKOCYTESUR, BILIRUBINUR, UROBILINOGEN, RBCUA, WBCUA, BACTERIA, UACOMMENT  No results found for: TROPONINT, TROPONINI, BNP  No components found for: HGBA1C;2  No components found for: TSH;2  Imaging Results (All)       Procedure Component Value Units Date/Time    IR Embolization [[id number]] Collected: 07/19/23 1744     Updated: 07/20/23 0729    Narrative:      VISCERAL ANGIOGRAPHY AND EMBOLIZATION     HISTORY: abnormal tagged scan, recurrent GIB     COMPARISON: 07/18/2023 nuclear scan, 07/15/2023 angiography     PROCEDURE AND FINDINGS: After consent was obtained and documented the  patient was brought to the angiosuite and placed on the table in supine  position. The bilateral groins were prepped and draped in usual sterile  fashion. A nurse was continuously present to administer moderate  sedation under physician supervision for 120 minutes utilizing a total  of 100 mcg fentanyl and 1 mg Versed. Glucagon was given intravenously  for peristalsis control. 100 mcg nitroglycerin were administered  intraarterially to treat/prevent vasospasm. After local anesthesia with  1% lidocaine, the right common femoral artery was accessed using a  21-gauge micropuncture needle through which an 018 wire was advanced.  The needle was exchanged out over wire for a 4 Zimbabwean dilator through  which an 035 wire was placed after confirmation of correct position by  contrast injection. After insertion of a 5 Zimbabwean vascular sheath, a 5  Zimbabwean C2 catheter advanced into the abdominal aorta and the superior  mesenteric artery selected.   Angiography from here and further superselective angiograms demonstrated  an abnormal/injured distal branch (vas  rectum) of an ileal artery of the  SMA with associated (apparent intraluminal) extravasation also later  demonstrated. This branch was superselectively occluded using a 2 mm x 2  cm Azur hydrocoil and a 2 mm x 2 cm Azur CX coil via a coaxially  introduced 2.4 Hungarian Progreat microcatheter. On postembolization  imaging no extravasation or reperfusion of the embolized branch was  demonstrated.  A Mynx device was utilized for hemostasis on catheter and sheath  removal. Manual pressure was held and a sterile dressing applied. There  were no immediate complications. All elements of maximal sterile  technique were followed. Fluoro time 17.4 min, 120?ml isovue 250, 521  mGy Ka,r (in varying obliquities), 23 exposures.          Impression:         Successful superselective coil embolization of injured ileal artery  branch as described above.     This report was finalized on 7/20/2023 7:26 AM by Dr. Terence Pinto M.D.       NM GI Blood Loss [[id number]] Collected: 07/18/23 1357     Updated: 07/18/23 1411    Narrative:      NUCLEAR MEDICINE GI BLEED SCAN     HISTORY: Recurrent GI bleeding. Embolization of area of small bowel GI  bleed on 07/15/2023.     TECHNIQUE: Nuclear medicine GI bleed scan was performed using 31.2 mCi  of technetium UltraTag to label the patient's own red blood cells which  were reinjected before imaging. This is correlated with images from  embolization procedure and GI bleed nuclear medicine exams 07/15/2023  and CT angiogram 07/14/2023.     FINDINGS: Early in the exam, abnormal activity appears with a swirling  configuration over the midabdomen at the level of the aortic bifurcation  typical for extravasated activity in loops of small bowel. This  abnormality involves roughly similar portion of the abdomen compared  with the GI bleed exam from 2 days ago. Normal vascular and solid organ  activity is observed.       Impression:      Positive GI bleed exam localizing to the small bowel at  about the  level of the aortic bifurcation. This roughly approximates the  area of bleeding observed 2 days ago. The nurse caring for the patient  was notified of the findings by the nuclear medicine technologist at  around 1:30 PM.     This report was finalized on 7/18/2023 2:08 PM by Dr. Imtiaz Claire M.D.       IR Embolization [[id number]] Collected: 07/15/23 2028     Updated: 07/15/23 2039    Narrative:      PROCEDURE:  Superior mesenteric angiogram, embolization of bleeding distal small  bowel branch artery.     INDICATION:  Acute blood loss anemia. Patient with maroon stools and requiring  frequent transfusions. Patient has had prior upper endoscopy and  colonoscopy without demonstrating source of bleeding. Tagged red blood  cell scan performed earlier today localized active bleeding to the small  bowel.        Reference air kerma: 985.21 mGy  Contrast: Approximately 129 mL of Isovue     Moderate sedation was provided under my direct supervision using 1 mg IV  Versed and 100 mcg IV Fentanyl. The patient was independently monitored  by a trained Department of Radiology RN using automated blood pressure,  EKG, and pulse oximetry. My total intra-service time was 105 minutes.      The risks, benefits, and alternatives of the procedure were discussed  with the patient, and informed consent was obtained. In the procedure  room a timeout was performed confirming correct patient and procedure.     Maximum sterile barrier technique was used including sterile cap, mask,  gloves, gown and large sterile sheet as well as pre-procedure hand  washing and cutaneous antisepsis with 2 % chlorhexidine.      TECHNIQUE/FINDINGS:  The right common femoral artery was accessed under ultrasound guidance  with a 4 Icelandic micropuncture introducer set. This was upsized to a 5  Icelandic working sheath. A 5 Icelandic C2 catheter was then advanced through  the sheath and utilized to select the superior mesenteric artery.  Selective angiography of the  superior mesenteric artery was then  performed. An area of active contrast extravasation was demonstrated in  the left lower quadrant arising from one of the branch arteries, likely  an ileal artery. Next, a coaxial fashion, a 2.8 Telugu Progreat  microcatheter was advanced into the branch artery supplying the area of  extravasation. Selective angiography was performed. This confirmed  extravasation of contrast into the small bowel. The microcatheter was  then superselectively advanced as distal as possible into the branch  artery that demonstrated active extravasation, and coil embolization of  this vessel was then performed. Two 2 mm Azur CX microcoils were  deployed followed by a single 2 mm Azur microcoil. Postembolization  angiography was then performed using multiple obliquities. Initially,  there appeared to be some persistent active extravasation into the small  bowel following embolization. Angiography of multiple adjacent branch  vessels was then performed, however, no additional actively bleeding  vessels were clearly identified. Final selective postembolization  angiogram of the ileal branch artery proximal to level embolization was  performed. This demonstrated no further active contrast extravasation.  The microcatheter was removed. Final postembolization angiogram of the  superior mesenteric artery was then performed. This again demonstrated  no evidence of any active contrast extravasation at the location of the  embolization coils and no additional areas of active contrast  extravasation. The C2 catheter was then removed. Angiography of the  right common femoral artery was performed. The sheath was exchanged for  an Angio-Seal vascular closure device and hemostasis was achieved. The  patient tolerated procedure well without immediate complication.          Impression:      Superior mesenteric angiogram with embolization as described. There was  an area of active contrast extravasation into the small  bowel of the  left lower quadrant arising from one of the ileal branch arteries of the  SMA. This was superselectively catheterized and coil embolization of the  actively bleeding branch vessel was performed as described above.  Technically successful result with no further active extravasation  demonstrated upon completion of the procedure.     This report was finalized on 7/15/2023 8:36 PM by Dr. Moise Walker M.D.       NM GI Blood Loss [[id number]] Collected: 07/15/23 1414     Updated: 07/15/23 1422    Narrative:      NM GI BLOOD LOSS-     INDICATIONS: Gastrointestinal bleeding     TECHNIQUE: Bleeding scan     COMPARISON: Correlated with CT from 07/14/2023     FINDINGS:     Radiotracer: 36.0 mCi technetium 99m radiolabeled red blood cells,  intravenous. Projections of the abdomen and pelvis were obtained at 2  seconds interval is for 2 minutes, then 1 minute intervals for 90  minutes. Cine sequence was created.     Intestinal bleeding was observed during the exam, localized to the mid  small bowel (proximal to mid ileum).        Otherwise, expected soft tissue localization of radiotracer is noted.             Impression:         Intestinal bleeding was observed during the exam, localized to the mid  small bowel.     Discussed by telephone with patient's nurse, Brigette, at time of  interpretation, 1414, 07/15/2023.        This report was finalized on 7/15/2023 2:19 PM by Dr. Brice Velasco M.D.       CT Angiogram Abdomen Pelvis [[id number]] Collected: 07/14/23 1246     Updated: 07/14/23 1311    Narrative:      CT ANGIOGRAM OF THE ABDOMEN AND PELVIS     HISTORY: GI bleed     TECHNIQUE: Radiation dose reduction techniques were utilized, including  automated exposure control and exposure modulation based on body size.   CT angiogram of the abdomen and pelvis was performed following the  administration of IV contrast. Multiple coronal, sagittal, and 3-D  reconstruction images were obtained. Precontrast and venous  phase  imaging performed.     COMPARISON: 07/12/2023, 07/08/2023     FINDINGS:      CTA:  There is no evidence of active contrast extravasation into the GI tract  to suggest active GI bleeding at this time. There is some high  attenuation material within the dependent portion of the stomach that  was present on the noncontrast portion of the study and is unchanged  following the administration of contrast. This is consistent with  ingested material. The celiac artery, SMA, and inferior mesenteric  artery are all widely patent. The renal arteries are widely patent. The  abdominal aorta is nonaneurysmal. The iliac arteries are widely patent  and nonaneurysmal.     CT ABDOMEN/PELVIS:  The lung bases are clear. The liver, gallbladder, and spleen are  unremarkable. The kidneys, adrenal glands, and pancreas are  unremarkable. Previously demonstrated thickening of the duodenum and  proximal jejunum has resolved. The bladder is unremarkable. The colon is  unremarkable. Previous appendectomy. No free fluid in the pelvis. No  acute bony abnormality.       Impression:      1. No evidence of active contrast extravasation into the GI tract to  suggest active GI bleeding at this time  2. Previously demonstrated thickening of the duodenum and proximal  jejunum has resolved  3. Appendectomy        Radiation dose reduction techniques were utilized, including automated  exposure control and exposure modulation based on body size.     This report was finalized on 7/14/2023 1:07 PM by Dr. Moise Walker M.D.       NM GI Blood Loss [[id number]] Collected: 07/13/23 1911     Updated: 07/13/23 1923    Narrative:      NM GI BLOOD LOSS-     INDICATIONS: Gastrointestinal bleeding     TECHNIQUE: Bleeding scan     COMPARISON: None of same type     FINDINGS:     Radiotracer: 29.7 mCi and 24.5 mCi technetium 99m radiolabeled red blood  cells, intravenous.     Despite special care in injecting the radiotracer, both attempted doses  extravasated.  As such, the bowel could not be evaluated for bleeding.          Impression:         Unsuccessful bleeding scan evaluation, as described. Follow-up/further  evaluation can be obtained as indications persist.        This report was finalized on 7/13/2023 7:19 PM by Dr. Brice Velasco M.D.       CT Abdomen Pelvis With Contrast [[id number]] Collected: 07/12/23 0846     Updated: 07/13/23 1338    Narrative:      CT ABDOMEN AND PELVIS WITH CONTRAST     HISTORY: 43-year-old male with lower abdominal pain and nausea and blood  in stool.     TECHNIQUE: Axial CT images of the abdomen and pelvis were obtained  following administration of intravenous contrast. The patient was not  given oral contrast Coronal and sagittal reformats were obtained.     COMPARISON: 07/08/2023     FINDINGS: Mild wall thickening of the duodenum and the proximal jejunal  loops likely related to peristalsis. Mild diffuse gastric wall  thickening in a pattern consistent with gastritis. No evidence of bowel  obstruction.     The liver demonstrates normal attenuation. The gallbladder is normal.  The spleen is normal. The pancreas is normal. Bilateral adrenal glands  are normal. No renal calculi or hydronephrosis. The urinary bladder is  partially distended and normal.       Impression:      Mild gastritis is suspected. Diffuse thickening involving  the duodenum and the proximal jejunal loops favored to be secondary to  peristalsis.     Radiation dose reduction techniques were utilized, including automated  exposure control and exposure modulation based on body size.     This report was finalized on 7/13/2023 1:35 PM by Dr. Len Herndon M.D.       XR Chest 1 View [[id number]] Collected: 07/12/23 0729     Updated: 07/12/23 0733    Narrative:      AP CHEST     HISTORY: Leukocytosis     COMPARISON: None available     FINDINGS: Mildly elevated left hemidiaphragm. No evidence of airspace  infiltrate. Heart size normal. No pneumothorax. Visualized  osseous  structures are unremarkable.       Impression:      No acute findings     This report was finalized on 7/12/2023 7:30 AM by Dr. Moise Walker M.D.             Lab Results (last 7 days)       Procedure Component Value Units Date/Time    Hemoglobin & Hematocrit, Blood [[id number]]  (Abnormal) Collected: 07/21/23 0812    Specimen: Blood Updated: 07/21/23 0831     Hemoglobin 8.2 g/dL      Hematocrit 24.9 %     Basic Metabolic Panel [[id number]]  (Abnormal) Collected: 07/21/23 0055    Specimen: Blood Updated: 07/21/23 0215     Glucose 86 mg/dL      BUN 7 mg/dL      Creatinine 1.06 mg/dL      Sodium 141 mmol/L      Potassium 3.8 mmol/L      Chloride 111 mmol/L      CO2 26.0 mmol/L      Calcium 8.2 mg/dL      BUN/Creatinine Ratio 6.6     Anion Gap 4.0 mmol/L      eGFR 89.9 mL/min/1.73     Narrative:      GFR Normal >60  Chronic Kidney Disease <60  Kidney Failure <15      Hemoglobin & Hematocrit, Blood [[id number]]  (Abnormal) Collected: 07/21/23 0055    Specimen: Blood Updated: 07/21/23 0110     Hemoglobin 7.5 g/dL      Hematocrit 23.2 %     CBC & Differential [[id number]]  (Abnormal) Collected: 07/21/23 0055    Specimen: Blood Updated: 07/21/23 0110    Narrative:      The following orders were created for panel order CBC & Differential.  Procedure                               Abnormality         Status                     ---------                               -----------         ------                     CBC Auto Differential[[id number]]        Abnormal            Final result                 Please view results for these tests on the individual orders.    CBC Auto Differential [[id number]]  (Abnormal) Collected: 07/21/23 0055    Specimen: Blood Updated: 07/21/23 0110     WBC 7.82 10*3/mm3      RBC 2.67 10*6/mm3      Hemoglobin 7.5 g/dL      Hematocrit 23.2 %      MCV 86.9 fL      MCH 28.1 pg      MCHC 32.3 g/dL      RDW 14.9 %      RDW-SD 46.5 fl      MPV 9.6 fL      Platelets 299 10*3/mm3      Neutrophil %  62.1 %      Lymphocyte % 23.8 %      Monocyte % 9.5 %      Eosinophil % 3.3 %      Basophil % 0.4 %      Immature Grans % 0.9 %      Neutrophils, Absolute 4.86 10*3/mm3      Lymphocytes, Absolute 1.86 10*3/mm3      Monocytes, Absolute 0.74 10*3/mm3      Eosinophils, Absolute 0.26 10*3/mm3      Basophils, Absolute 0.03 10*3/mm3      Immature Grans, Absolute 0.07 10*3/mm3      nRBC 0.3 /100 WBC     Hemoglobin & Hematocrit, Blood [[id number]]  (Abnormal) Collected: 07/20/23 1750    Specimen: Blood Updated: 07/20/23 1805     Hemoglobin 7.7 g/dL      Hematocrit 23.4 %     Hemoglobin & Hematocrit, Blood [[id number]]  (Abnormal) Collected: 07/20/23 0849    Specimen: Blood Updated: 07/20/23 0858     Hemoglobin 8.0 g/dL      Hematocrit 24.0 %     Hemoglobin & Hematocrit, Blood [[id number]]  (Abnormal) Collected: 07/20/23 0034    Specimen: Blood Updated: 07/20/23 0116     Hemoglobin 7.1 g/dL      Hematocrit 21.9 %     CBC (No Diff) [[id number]]  (Abnormal) Collected: 07/20/23 0034    Specimen: Blood Updated: 07/20/23 0116     WBC 8.56 10*3/mm3      RBC 2.55 10*6/mm3      Hemoglobin 7.1 g/dL      Hematocrit 21.9 %      MCV 85.9 fL      MCH 27.8 pg      MCHC 32.4 g/dL      RDW 14.9 %      RDW-SD 45.3 fl      MPV 9.5 fL      Platelets 282 10*3/mm3     Hemoglobin & Hematocrit, Blood [[id number]]  (Abnormal) Collected: 07/19/23 1554    Specimen: Blood Updated: 07/19/23 1620     Hemoglobin 7.7 g/dL      Hematocrit 23.3 %     Hemoglobin & Hematocrit, Blood [[id number]]  (Abnormal) Collected: 07/19/23 0854    Specimen: Blood Updated: 07/19/23 0929     Hemoglobin 7.0 g/dL      Hematocrit 21.1 %     POC Protime / INR [[id number]]  (Abnormal) Collected: 07/18/23 1648    Specimen: Blood Updated: 07/19/23 0637     Protime 12.0 seconds      Comment: Serial Number: [id number]:  [id number]        INR 1.0    Lactic Acid, Plasma [[id number]]  (Normal) Collected: 07/19/23 0556    Specimen: Blood, Central Line Updated: 07/19/23 0628     Lactate 0.8  mmol/L     CBC & Differential [[id number]]  (Abnormal) Collected: 07/19/23 0556    Specimen: Blood, Central Line Updated: 07/19/23 0615    Narrative:      The following orders were created for panel order CBC & Differential.  Procedure                               Abnormality         Status                     ---------                               -----------         ------                     CBC Auto Differential[[id number]]        Abnormal            Final result                 Please view results for these tests on the individual orders.    CBC Auto Differential [[id number]]  (Abnormal) Collected: 07/19/23 0556    Specimen: Blood, Central Line Updated: 07/19/23 0615     WBC 8.20 10*3/mm3      RBC 2.50 10*6/mm3      Hemoglobin 7.3 g/dL      Hematocrit 21.8 %      MCV 87.2 fL      MCH 29.2 pg      MCHC 33.5 g/dL      RDW 15.5 %      RDW-SD 48.3 fl      MPV 9.7 fL      Platelets 211 10*3/mm3      Neutrophil % 64.2 %      Lymphocyte % 20.5 %      Monocyte % 9.1 %      Eosinophil % 3.4 %      Basophil % 0.4 %      Immature Grans % 2.4 %      Neutrophils, Absolute 5.26 10*3/mm3      Lymphocytes, Absolute 1.68 10*3/mm3      Monocytes, Absolute 0.75 10*3/mm3      Eosinophils, Absolute 0.28 10*3/mm3      Basophils, Absolute 0.03 10*3/mm3      Immature Grans, Absolute 0.20 10*3/mm3      nRBC 0.4 /100 WBC     Hemoglobin & Hematocrit, Blood [[id number]]  (Abnormal) Collected: 07/19/23 0032    Specimen: Blood, Central Line Updated: 07/19/23 0052     Hemoglobin 7.2 g/dL      Hematocrit 21.3 %     Blood Culture - Blood, Hand, Left [[id number]]  (Normal) Collected: 07/13/23 2204    Specimen: Blood from Hand, Left Updated: 07/18/23 2231     Blood Culture No growth at 5 days    Narrative:      Less than seven (7) mL's of blood was collected.  Insufficient quantity may yield false negative results.    Blood Culture - Blood, Arm, Right [[id number]]  (Normal) Collected: 07/13/23 2157    Specimen: Blood from Arm, Right Updated:  07/18/23 2231     Blood Culture No growth at 5 days    Lactic Acid, Plasma [[id number]]  (Normal) Collected: 07/18/23 1553    Specimen: Blood from Arm, Left Updated: 07/18/23 1719     Lactate 1.0 mmol/L     Hemoglobin & Hematocrit, Blood [[id number]]  (Abnormal) Collected: 07/18/23 1457    Specimen: Blood Updated: 07/18/23 1544     Hemoglobin 7.8 g/dL      Hematocrit 23.0 %     Comprehensive Metabolic Panel [[id number]]  (Abnormal) Collected: 07/18/23 0614    Specimen: Blood, Central Line Updated: 07/18/23 0652     Glucose 117 mg/dL      BUN 4 mg/dL      Creatinine 1.13 mg/dL      Sodium 144 mmol/L      Potassium 3.5 mmol/L      Chloride 113 mmol/L      CO2 26.0 mmol/L      Calcium 7.8 mg/dL      Total Protein 3.8 g/dL      Albumin 2.1 g/dL      ALT (SGPT) 14 U/L      AST (SGOT) 16 U/L      Alkaline Phosphatase 48 U/L      Total Bilirubin <0.2 mg/dL      Globulin 1.7 gm/dL      A/G Ratio 1.2 g/dL      BUN/Creatinine Ratio 3.5     Anion Gap 5.0 mmol/L      eGFR 83.2 mL/min/1.73     Narrative:      GFR Normal >60  Chronic Kidney Disease <60  Kidney Failure <15      CBC (No Diff) [[id number]]  (Abnormal) Collected: 07/18/23 0614    Specimen: Blood, Central Line Updated: 07/18/23 0648     WBC 8.98 10*3/mm3      RBC 2.41 10*6/mm3      Hemoglobin 6.9 g/dL      Hematocrit 20.6 %      MCV 85.5 fL      MCH 28.6 pg      MCHC 33.5 g/dL      RDW 15.6 %      RDW-SD 47.6 fl      MPV 9.6 fL      Platelets 198 10*3/mm3     Hemoglobin & Hematocrit, Blood [[id number]]  (Abnormal) Collected: 07/17/23 2357    Specimen: Blood, Central Line Updated: 07/18/23 0034     Hemoglobin 7.6 g/dL      Hematocrit 22.6 %     Hemoglobin & Hematocrit, Blood [[id number]]  (Abnormal) Collected: 07/17/23 1547    Specimen: Blood Updated: 07/17/23 1603     Hemoglobin 7.7 g/dL      Hematocrit 22.9 %     Hemoglobin & Hematocrit, Blood [[id number]]  (Abnormal) Collected: 07/17/23 0807    Specimen: Blood Updated: 07/17/23 0818     Hemoglobin 7.4 g/dL      Hematocrit  22.1 %     Comprehensive Metabolic Panel [[id number]]  (Abnormal) Collected: 07/17/23 0543    Specimen: Blood, Central Line Updated: 07/17/23 0639     Glucose 90 mg/dL      BUN 3 mg/dL      Creatinine 1.23 mg/dL      Sodium 139 mmol/L      Potassium 3.5 mmol/L      Chloride 112 mmol/L      CO2 22.8 mmol/L      Calcium 7.3 mg/dL      Total Protein 3.5 g/dL      Albumin 1.8 g/dL      ALT (SGPT) 15 U/L      AST (SGOT) 17 U/L      Alkaline Phosphatase 44 U/L      Total Bilirubin <0.2 mg/dL      Globulin 1.7 gm/dL      A/G Ratio 1.1 g/dL      BUN/Creatinine Ratio 2.4     Anion Gap 4.2 mmol/L      eGFR 75.2 mL/min/1.73     Narrative:      GFR Normal >60  Chronic Kidney Disease <60  Kidney Failure <15      CBC (No Diff) [[id number]]  (Abnormal) Collected: 07/17/23 0543    Specimen: Blood, Central Line Updated: 07/17/23 0618     WBC 10.02 10*3/mm3      RBC 2.52 10*6/mm3      Hemoglobin 7.4 g/dL      Hematocrit 21.7 %      MCV 86.1 fL      MCH 29.4 pg      MCHC 34.1 g/dL      RDW 15.6 %      RDW-SD 47.0 fl      MPV 10.5 fL      Platelets 138 10*3/mm3     Hemoglobin & Hematocrit, Blood [[id number]]  (Abnormal) Collected: 07/16/23 2348    Specimen: Blood, Central Line Updated: 07/17/23 0003     Hemoglobin 8.0 g/dL      Hematocrit 24.7 %     Folate RBC [[id number]]  (Abnormal) Collected: 07/14/23 0944    Specimen: Blood Updated: 07/16/23 1907     Folate, Hemolysate 338.0 ng/mL      Hematocrit 16.0 %      Comment: **Verified by repeat analysis**        RBC Folate 2113 ng/mL     Narrative:      Performed at:  [address]  [id number]  : Olu Lynn PhD, Phone:  [phone]  Specimen Comment: Called/faxed to Tessie Castro on 07/15/2023 at 01:59 ET for  Specimen Comment: test(s) Hematocrit    Hemoglobin & Hematocrit, Blood [[id number]]  (Abnormal) Collected: 07/16/23 1635    Specimen: Blood Updated: 07/16/23 1719     Hemoglobin 6.2 g/dL      Hematocrit 18.8 %     Hemoglobin &  Hematocrit, Blood [[id number]]  (Abnormal) Collected: 07/16/23 0814    Specimen: Blood Updated: 07/16/23 0851     Hemoglobin 7.1 g/dL      Hematocrit 20.5 %     Comprehensive Metabolic Panel [[id number]]  (Abnormal) Collected: 07/16/23 0111    Specimen: Blood Updated: 07/16/23 0145     Glucose 127 mg/dL      BUN 7 mg/dL      Creatinine 1.26 mg/dL      Sodium 136 mmol/L      Potassium 3.8 mmol/L      Chloride 110 mmol/L      CO2 19.3 mmol/L      Calcium 6.9 mg/dL      Total Protein 3.2 g/dL      Albumin 2.1 g/dL      ALT (SGPT) 8 U/L      AST (SGOT) 7 U/L      Alkaline Phosphatase 33 U/L      Total Bilirubin <0.2 mg/dL      Globulin 1.1 gm/dL      A/G Ratio 1.9 g/dL      BUN/Creatinine Ratio 5.6     Anion Gap 6.7 mmol/L      eGFR 73.0 mL/min/1.73     Narrative:      GFR Normal >60  Chronic Kidney Disease <60  Kidney Failure <15      CBC (No Diff) [[id number]]  (Abnormal) Collected: 07/16/23 0111    Specimen: Blood Updated: 07/16/23 0138     WBC 17.39 10*3/mm3      RBC 2.25 10*6/mm3      Hemoglobin 6.7 g/dL      Hematocrit 19.7 %      MCV 87.6 fL      MCH 29.8 pg      MCHC 34.0 g/dL      RDW 14.9 %      RDW-SD 46.3 fl      MPV 10.4 fL      Platelets 130 10*3/mm3     CBC (No Diff) [[id number]]  (Abnormal) Collected: 07/15/23 2050    Specimen: Blood from Arm, Left Updated: 07/15/23 2119     WBC 15.20 10*3/mm3      RBC 1.62 10*6/mm3      Hemoglobin 4.9 g/dL      Hematocrit 14.9 %      MCV 92.0 fL      MCH 30.2 pg      MCHC 32.9 g/dL      RDW 15.2 %      RDW-SD 49.0 fl      MPV 10.4 fL      Platelets 145 10*3/mm3     Comprehensive Metabolic Panel [[id number]]  (Abnormal) Collected: 07/15/23 1603    Specimen: Blood Updated: 07/15/23 1639     Glucose 105 mg/dL      BUN 9 mg/dL      Creatinine 1.30 mg/dL      Sodium 135 mmol/L      Potassium 3.6 mmol/L      Chloride 110 mmol/L      CO2 19.0 mmol/L      Calcium 6.8 mg/dL      Total Protein 3.1 g/dL      Albumin 1.9 g/dL      ALT (SGPT) 5 U/L      AST (SGOT) 9 U/L      Alkaline  Phosphatase 32 U/L      Total Bilirubin 0.2 mg/dL      Globulin 1.2 gm/dL      A/G Ratio 1.6 g/dL      BUN/Creatinine Ratio 6.9     Anion Gap 6.0 mmol/L      eGFR 70.3 mL/min/1.73     Narrative:      GFR Normal >60  Chronic Kidney Disease <60  Kidney Failure <15      CBC & Differential [[id number]]  (Abnormal) Collected: 07/15/23 1603    Specimen: Blood Updated: 07/15/23 1628    Narrative:      The following orders were created for panel order CBC & Differential.  Procedure                               Abnormality         Status                     ---------                               -----------         ------                     CBC Auto Differential[[id number]]        Abnormal            Final result                 Please view results for these tests on the individual orders.    CBC Auto Differential [[id number]]  (Abnormal) Collected: 07/15/23 1603    Specimen: Blood Updated: 07/15/23 1628     WBC 13.20 10*3/mm3      RBC 1.93 10*6/mm3      Hemoglobin 5.9 g/dL      Hematocrit 17.5 %      MCV 90.7 fL      MCH 30.6 pg      MCHC 33.7 g/dL      RDW 14.2 %      RDW-SD 45.9 fl      MPV 10.5 fL      Platelets 132 10*3/mm3      Neutrophil % 64.3 %      Lymphocyte % 19.0 %      Monocyte % 11.7 %      Eosinophil % 1.1 %      Basophil % 0.2 %      Neutrophils, Absolute 8.48 10*3/mm3      Lymphocytes, Absolute 2.51 10*3/mm3      Monocytes, Absolute 1.55 10*3/mm3      Eosinophils, Absolute 0.15 10*3/mm3      Basophils, Absolute 0.02 10*3/mm3     Hemoglobin & Hematocrit, Blood [[id number]]  (Abnormal) Collected: 07/15/23 0625    Specimen: Blood Updated: 07/15/23 0659     Hemoglobin 6.2 g/dL      Hematocrit 18.4 %     Hemoglobin & Hematocrit, Blood [[id number]]  (Abnormal) Collected: 07/15/23 0010    Specimen: Blood Updated: 07/15/23 0102     Hemoglobin 6.1 g/dL      Hematocrit 18.8 %     Hemoglobin & Hematocrit, Blood [[id number]]  (Abnormal) Collected: 07/14/23 1650    Specimen: Blood Updated: 07/14/23 1735     Hemoglobin 5.5  "g/dL      Hematocrit 16.5 %           /77 (BP Location: Right arm, Patient Position: Lying)   Pulse 81   Temp 98.2 °F (36.8 °C) (Oral)   Resp 14   Ht 172.7 cm (68\")   Wt 78 kg (172 lb)   SpO2 100%   BMI 26.15 kg/m²     Discharge Exam:  General Appearance:    Alert, cooperative, no distress                          Head:    Normocephalic, without obvious abnormality, atraumatic                          Eyes:                            Throat:   Lips, tongue, gums normal                          Neck:   Supple, symmetrical, trachea midline, no JVD                        Lungs:     Clear to auscultation bilaterally, respirations unlabored                Chest Wall:    No tenderness or deformity                        Heart:    Regular rate and rhythm, S1 and S2 normal, no murmur,no  Rub  or gallop                  Abdomen:     Soft, non-tender, bowel sounds active, no masses, no organomegaly                  Extremities:   Extremities normal, atraumatic, no cyanosis or edema                             Skin:   Skin is warm and dry,  no rashes or palpable lesions                  Neurologic:   no focal deficits noted     Disposition:  Home    Activity as tolerated    Diet as tolerated  Diet Order   Procedures    Diet: Regular/House Diet, Gastrointestinal Diets; Low Irritant; Texture: Regular Texture (IDDSI 7); Fluid Consistency: Thin (IDDSI 0)       Patient Instructions:      Discharge Medications        Continue These Medications        Instructions Start Date   ferrous sulfate 325 (65 FE) MG tablet  Commonly known as: FerrouSul   325 mg, Oral, Daily With Breakfast      folic acid 1 MG tablet  Commonly known as: FOLVITE   1 mg, Oral, Daily      pantoprazole 40 MG EC tablet  Commonly known as: PROTONIX   40 mg, Oral, Every Early Morning      polycarbophil 625 MG tablet tablet   625 mg, Oral, Daily      thiamine 100 MG tablet  Commonly known as: VITAMIN B1   100 mg, Oral, Daily             Future " Appointments   Date Time Provider Department Center   7/28/2023 10:00 AM Hellen Skelton APRN [facility]      Follow-up Information       Isrrael Gibson MD Follow up in 4 week(s).    Specialty: Gastroenterology  Contact information:  [address]  [address]  [facility] [address]  [phone]               Hellen Skelton APRN Follow up in 1 week(s).    Specialties: Nurse Practitioner, Internal Medicine  Contact information:  [address]  [address]  [facility] [id number] [phone]                           Discharge Order (From admission, onward)       Start     Ordered    07/21/23 1214  Discharge patient  Once        Expected Discharge Date: 07/21/23    Discharge Disposition: Home or Self Care    Physician of Record for Attribution - Please select from Treatment Team: ZEFERINO AYALA [[id number]]    Review needed by CMO to determine Physician of Record: No       Question Answer Comment   Physician of Record for Attribution - Please select from Treatment Team ZEFERINO AYALA    Review needed by CMO to determine Physician of Record No        07/21/23 1214                    Total time spent discharging patient including evaluation,post hospitalization follow up,  medication and post hospitalization instructions and education total time exceeds 30 minutes.    Signed:  Zeferino Ayala MD  7/21/2023  12:15 EDT     Electronically signed by Zeferino Ayala MD at 07/21/23 1219       Discharge Order (From admission, onward)       Start     Ordered    07/21/23 1214  Discharge patient  Once        Expected Discharge Date: 07/21/23    Discharge Disposition: Home or Self Care    Physician of Record for Attribution - Please select from Treatment Team: ZEFERINO AYALA [[id number]]    Review needed by CMO to determine Physician of Record: No       Question Answer Comment   Physician of Record for Attribution - Please select from Treatment Team ZEFERINO AYALA    Review needed by CMO to determine Physician of Record No        07/21/23  1216

## 2023-07-23 NOTE — PAYOR COMM NOTE
"Radha Noriega (43 y.o. Male)      PLEASE SEE ATTACHED DC SUMMARY    REF#[id number]    THANK YOU    HERBERTH WHALEY LPN CCP   Date of Birth   1980    Social Security Number       Address   [address] [facility] [address]    Home Phone   [phone]    MRN   [id number]       Christian   None    Marital Status                               Admission Date   7/12/23    Admission Type   Emergency    Admitting Provider   Harshal Casillas MD    Attending Provider       Department, Room/Bed   [address], S601/1       Discharge Date   7/21/2023    Discharge Disposition   Home or Self Care    Discharge Destination                                 Attending Provider: (none)   Allergies: No Known Allergies    Isolation: None   Infection: None   Code Status: Prior    Ht: 172.7 cm (68\")   Wt: 78 kg (172 lb)    Admission Cmt: None   Principal Problem: ABLA (acute blood loss anemia) [D62]                   Active Insurance as of 7/12/2023       Primary Coverage       Payor Plan Insurance Group Employer/Plan Group    ANTH BLUE CROSS ANTH BLUE CROSS BLUE SHIELD PPO [id number]       Payor Plan Address Payor Plan Phone Number Payor Plan Fax Number Effective Dates    [address] [phone]  3/17/2022 - None Entered    [facility] [id number]         Subscriber Name Subscriber Birth Date Member ID       RADHA NORIEGA 1980 [id number]               Secondary Coverage       Payor Plan Insurance Group Employer/Plan Group    [facility] BY LUISA [facility] BY LUISA [id number]       Payor Plan Address Payor Plan Phone Number Payor Plan Fax Number Effective Dates    [address] [address]   1/1/2021 - None Entered    [facility] [id number]         Subscriber Name Subscriber Birth Date Member ID       RADHA NORIEGA 1980 [id number]                     Emergency Contacts        (Rel.) Home Phone Work Phone Mobile Phone    NED NORIEGA (Spouse) " [phone] -- [phone]    Zainab Elaine (Mother) -- -- [phone]                 Discharge Summary        Gil Ayala MD at 23 1215                                                                             PHYSICIAN DISCHARGE SUMMARY                                                                        [facility]    Patient Identification:  Name: Radha Noriega V  Age: 43 y.o.  Sex: male  :  1980  MRN: [id number]  Primary Care Physician: Hellen Skelton APRN    Admit date: 2023  Discharge date and time:2023  Discharged Condition: good    Discharge Diagnoses:  Active Hospital Problems    Diagnosis  POA    **ABLA (acute blood loss anemia) [D62]  Yes    Metabolic acidosis [E87.20]  Yes    Enteritis of possible infectious origin [A09]  Yes    Sepsis [A41.9]  Yes    Blood per rectum [K62.5]  Unknown    Iron deficiency anemia [D50.9]  Yes    Current every day smoker [F17.200]  Yes    Gastrointestinal hemorrhage with melena [K92.1]  Yes    Acute blood loss anemia [D62]  Unknown      Resolved Hospital Problems   No resolved problems to display.          PMHX:   Past Medical History:   Diagnosis Date    Anemia     ETOH abuse     Tachycardia 2023    Tobacco dependence      PSHX:   Past Surgical History:   Procedure Laterality Date    APPENDECTOMY      COLONOSCOPY N/A 2023    Procedure: COLONOSCOPY TO CECUM AND TERMINAL ILEUM WITH BIOPSIES AND COLD BIOPSY POLYPECTOMIES;  Surgeon: Imtiaz Lee MD;  Location: [facility] ENDOSCOPY;  Service: Gastroenterology;  Laterality: N/A;  PRE- MELENA  POST- COLON POLYPS, HEMORRHOIDS    ENDOSCOPY Left 2023    Procedure: ESOPHAGOGASTRODUODENOSCOPY;  Surgeon: Imtiaz Lee MD;  Location: [facility] ENDOSCOPY;  Service: Gastroenterology;  Laterality: Left;  small hiatal hernia, esophagitis    ENDOSCOPY N/A 7/15/2023    Procedure: ESOPHAGOGASTRODUODENOSCOPY;  Surgeon: Ermelinda Dixon MD;  Location: [facility] ENDOSCOPY;  Service:  Gastroenterology;  Laterality: N/A;  pre-anemia  post-hiatal hernia    ENTEROSCOPY SMALL BOWEL N/A 7/9/2023    Procedure: ENTEROSCOPY SMALL BOWEL;  Surgeon: Isrrael Gibson MD;  Location: [facility] ENDOSCOPY;  Service: Gastroenterology;  Laterality: N/A;  PRE- GI BLEED  POST- NORMAL       Hospital Course: Radha Noriega V  is a 43-year-old male with alcohol dependence, tobacco use as well as multiple previous admissions for GI bleed.  During a recent admission he was admitted for the same issue.  EGD and colonoscopy were negative for source of bleeding.  An outpatient video endoscopy was planned, but the patient had return to the hospital with hematochezia before could be performed.  During the most recent admission prior to this, his hemoglobin was 6.0 and he was admitted and given pRBC and iron transfusions.  GI saw the patient in consultation at that point time.  2 CT abdomen and pelvis were performed but no bleeding source was not identified.  Question anoscopy was negative as well.  He was subsequently discharged  He was actually supposed to have of video capsule study today however he started experiencing abdominal pain.  He had reported bright red blood in his stool and lightheadedness and palpitations.  His hemoglobin was noted to be 7.1, WBC 21,000, creatinine 1.37.  Urinalysis i was negative for UTI.  Chest x-ray was unremarkable.  CT of the abdomen pelvis showed mild gastritis as well as thickening of the duodenum and proximal jejunal loops, favored to be secondary to peristalsis. PRBC transfusion was initiated.  He was admitted for further evaluation and management.  The patient was admitted to the hospital and seen by GI medicine and general surgery.  Patient was found to have small bowel bleed and had embolization x2.  His hemoglobin remained stable and he looked to be well enough to go home after being in the hospital for a few days.  GI is planning on having capsule endoscopy done as outpatient.  He will  follow-up with his primary care in 1 week and also follow-up with GI medicine.    Consults:     Consults       Date and Time Order Name Status Description    7/15/2023 10:08 AM Inpatient General Surgery Consult Completed     7/12/2023 11:23 AM Inpatient Gastroenterology Consult Completed     7/12/2023  9:42 AM [facility] (on-call MD unless specified) Details      7/7/2023  1:29 AM Gastroenterology (on-call MD unless specified) Completed     6/24/2023  4:01 AM Inpatient Pulmonology Consult Completed     6/23/2023  7:27 PM Inpatient Gastroenterology Consult Completed           Results from last 7 days   Lab Units 07/21/23  0812 07/21/23  0055   WBC 10*3/mm3  --  7.82   HEMOGLOBIN g/dL 8.2* 7.5*  7.5*   HEMATOCRIT % 24.9* 23.2*  23.2*   PLATELETS 10*3/mm3  --  299     Results from last 7 days   Lab Units 07/21/23  0055   SODIUM mmol/L 141   POTASSIUM mmol/L 3.8   CHLORIDE mmol/L 111*   CO2 mmol/L 26.0   BUN mg/dL 7   CREATININE mg/dL 1.06   GLUCOSE mg/dL 86   CALCIUM mg/dL 8.2*     Significant Diagnostic Studies:   WBC   Date Value Ref Range Status   07/21/2023 7.82 3.40 - 10.80 10*3/mm3 Final     Hemoglobin   Date Value Ref Range Status   07/21/2023 8.2 (L) 13.0 - 17.7 g/dL Final     Hematocrit   Date Value Ref Range Status   07/21/2023 24.9 (L) 37.5 - 51.0 % Final     Platelets   Date Value Ref Range Status   07/21/2023 299 140 - 450 10*3/mm3 Final     Sodium   Date Value Ref Range Status   07/21/2023 141 136 - 145 mmol/L Final     Potassium   Date Value Ref Range Status   07/21/2023 3.8 3.5 - 5.2 mmol/L Final     Chloride   Date Value Ref Range Status   07/21/2023 111 (H) 98 - 107 mmol/L Final     CO2   Date Value Ref Range Status   07/21/2023 26.0 22.0 - 29.0 mmol/L Final     BUN   Date Value Ref Range Status   07/21/2023 7 6 - 20 mg/dL Final     Creatinine   Date Value Ref Range Status   07/21/2023 1.06 0.76 - 1.27 mg/dL Final     Glucose   Date Value Ref Range Status   07/21/2023 86 65 - 99 mg/dL Final     Calcium    Date Value Ref Range Status   07/21/2023 8.2 (L) 8.6 - 10.5 mg/dL Final     No results found for: AST, ALT, ALKPHOS  INR   Date Value Ref Range Status   07/18/2023 1.0 0.8 - 1.2 Final     No results found for: COLORU, CLARITYU, SPECGRAV, PHUR, PROTEINUR, GLUCOSEU, KETONESU, BLOODU, NITRITE, LEUKOCYTESUR, BILIRUBINUR, UROBILINOGEN, RBCUA, WBCUA, BACTERIA, UACOMMENT  No results found for: TROPONINT, TROPONINI, BNP  No components found for: HGBA1C;2  No components found for: TSH;2  Imaging Results (All)       Procedure Component Value Units Date/Time    IR Embolization [[id number]] Collected: 07/19/23 1744     Updated: 07/20/23 0729    Narrative:      VISCERAL ANGIOGRAPHY AND EMBOLIZATION     HISTORY: abnormal tagged scan, recurrent GIB     COMPARISON: 07/18/2023 nuclear scan, 07/15/2023 angiography     PROCEDURE AND FINDINGS: After consent was obtained and documented the  patient was brought to the angiosuite and placed on the table in supine  position. The bilateral groins were prepped and draped in usual sterile  fashion. A nurse was continuously present to administer moderate  sedation under physician supervision for 120 minutes utilizing a total  of 100 mcg fentanyl and 1 mg Versed. Glucagon was given intravenously  for peristalsis control. 100 mcg nitroglycerin were administered  intraarterially to treat/prevent vasospasm. After local anesthesia with  1% lidocaine, the right common femoral artery was accessed using a  21-gauge micropuncture needle through which an 018 wire was advanced.  The needle was exchanged out over wire for a 4 Jordanian dilator through  which an 035 wire was placed after confirmation of correct position by  contrast injection. After insertion of a 5 Jordanian vascular sheath, a 5  Jordanian C2 catheter advanced into the abdominal aorta and the superior  mesenteric artery selected.   Angiography from here and further superselective angiograms demonstrated  an abnormal/injured distal branch (vas  rectum) of an ileal artery of the  SMA with associated (apparent intraluminal) extravasation also later  demonstrated. This branch was superselectively occluded using a 2 mm x 2  cm Azur hydrocoil and a 2 mm x 2 cm Azur CX coil via a coaxially  introduced 2.4 Amharic Progreat microcatheter. On postembolization  imaging no extravasation or reperfusion of the embolized branch was  demonstrated.  A Mynx device was utilized for hemostasis on catheter and sheath  removal. Manual pressure was held and a sterile dressing applied. There  were no immediate complications. All elements of maximal sterile  technique were followed. Fluoro time 17.4 min, 120?ml isovue 250, 521  mGy Ka,r (in varying obliquities), 23 exposures.          Impression:         Successful superselective coil embolization of injured ileal artery  branch as described above.     This report was finalized on 7/20/2023 7:26 AM by Dr. Terence Pinto M.D.       NM GI Blood Loss [[id number]] Collected: 07/18/23 1357     Updated: 07/18/23 1411    Narrative:      NUCLEAR MEDICINE GI BLEED SCAN     HISTORY: Recurrent GI bleeding. Embolization of area of small bowel GI  bleed on 07/15/2023.     TECHNIQUE: Nuclear medicine GI bleed scan was performed using 31.2 mCi  of technetium UltraTag to label the patient's own red blood cells which  were reinjected before imaging. This is correlated with images from  embolization procedure and GI bleed nuclear medicine exams 07/15/2023  and CT angiogram 07/14/2023.     FINDINGS: Early in the exam, abnormal activity appears with a swirling  configuration over the midabdomen at the level of the aortic bifurcation  typical for extravasated activity in loops of small bowel. This  abnormality involves roughly similar portion of the abdomen compared  with the GI bleed exam from 2 days ago. Normal vascular and solid organ  activity is observed.       Impression:      Positive GI bleed exam localizing to the small bowel at  about the  level of the aortic bifurcation. This roughly approximates the  area of bleeding observed 2 days ago. The nurse caring for the patient  was notified of the findings by the nuclear medicine technologist at  around 1:30 PM.     This report was finalized on 7/18/2023 2:08 PM by Dr. Imtiaz Claire M.D.       IR Embolization [[id number]] Collected: 07/15/23 2028     Updated: 07/15/23 2039    Narrative:      PROCEDURE:  Superior mesenteric angiogram, embolization of bleeding distal small  bowel branch artery.     INDICATION:  Acute blood loss anemia. Patient with maroon stools and requiring  frequent transfusions. Patient has had prior upper endoscopy and  colonoscopy without demonstrating source of bleeding. Tagged red blood  cell scan performed earlier today localized active bleeding to the small  bowel.        Reference air kerma: 985.21 mGy  Contrast: Approximately 129 mL of Isovue     Moderate sedation was provided under my direct supervision using 1 mg IV  Versed and 100 mcg IV Fentanyl. The patient was independently monitored  by a trained Department of Radiology RN using automated blood pressure,  EKG, and pulse oximetry. My total intra-service time was 105 minutes.      The risks, benefits, and alternatives of the procedure were discussed  with the patient, and informed consent was obtained. In the procedure  room a timeout was performed confirming correct patient and procedure.     Maximum sterile barrier technique was used including sterile cap, mask,  gloves, gown and large sterile sheet as well as pre-procedure hand  washing and cutaneous antisepsis with 2 % chlorhexidine.      TECHNIQUE/FINDINGS:  The right common femoral artery was accessed under ultrasound guidance  with a 4 S[place]omean micropuncture introducer set. This was upsized to a 5  S[place]an working sheath. A 5 S[place]an C2 catheter was then advanced through  the sheath and utilized to select the superior mesenteric artery.  Selective angiography of the  superior mesenteric artery was then  performed. An area of active contrast extravasation was demonstrated in  the left lower quadrant arising from one of the branch arteries, likely  an ileal artery. Next, a coaxial fashion, a 2.8 Faroese Progreat  microcatheter was advanced into the branch artery supplying the area of  extravasation. Selective angiography was performed. This confirmed  extravasation of contrast into the small bowel. The microcatheter was  then superselectively advanced as distal as possible into the branch  artery that demonstrated active extravasation, and coil embolization of  this vessel was then performed. Two 2 mm Azur CX microcoils were  deployed followed by a single 2 mm Azur microcoil. Postembolization  angiography was then performed using multiple obliquities. Initially,  there appeared to be some persistent active extravasation into the small  bowel following embolization. Angiography of multiple adjacent branch  vessels was then performed, however, no additional actively bleeding  vessels were clearly identified. Final selective postembolization  angiogram of the ileal branch artery proximal to level embolization was  performed. This demonstrated no further active contrast extravasation.  The microcatheter was removed. Final postembolization angiogram of the  superior mesenteric artery was then performed. This again demonstrated  no evidence of any active contrast extravasation at the location of the  embolization coils and no additional areas of active contrast  extravasation. The C2 catheter was then removed. Angiography of the  right common femoral artery was performed. The sheath was exchanged for  an Angio-Seal vascular closure device and hemostasis was achieved. The  patient tolerated procedure well without immediate complication.          Impression:      Superior mesenteric angiogram with embolization as described. There was  an area of active contrast extravasation into the small  bowel of the  left lower quadrant arising from one of the ileal branch arteries of the  SMA. This was superselectively catheterized and coil embolization of the  actively bleeding branch vessel was performed as described above.  Technically successful result with no further active extravasation  demonstrated upon completion of the procedure.     This report was finalized on 7/15/2023 8:36 PM by Dr. Moise Walker M.D.       NM GI Blood Loss [[id number]] Collected: 07/15/23 1414     Updated: 07/15/23 1422    Narrative:      NM GI BLOOD LOSS-     INDICATIONS: Gastrointestinal bleeding     TECHNIQUE: Bleeding scan     COMPARISON: Correlated with CT from 07/14/2023     FINDINGS:     Radiotracer: 36.0 mCi technetium 99m radiolabeled red blood cells,  intravenous. Projections of the abdomen and pelvis were obtained at 2  seconds interval is for 2 minutes, then 1 minute intervals for 90  minutes. Cine sequence was created.     Intestinal bleeding was observed during the exam, localized to the mid  small bowel (proximal to mid ileum).        Otherwise, expected soft tissue localization of radiotracer is noted.             Impression:         Intestinal bleeding was observed during the exam, localized to the mid  small bowel.     Discussed by telephone with patient's nurse, Brigette, at time of  interpretation, 1414, 07/15/2023.        This report was finalized on 7/15/2023 2:19 PM by Dr. Brice Velasco M.D.       CT Angiogram Abdomen Pelvis [[id number]] Collected: 07/14/23 1246     Updated: 07/14/23 1311    Narrative:      CT ANGIOGRAM OF THE ABDOMEN AND PELVIS     HISTORY: GI bleed     TECHNIQUE: Radiation dose reduction techniques were utilized, including  automated exposure control and exposure modulation based on body size.   CT angiogram of the abdomen and pelvis was performed following the  administration of IV contrast. Multiple coronal, sagittal, and 3-D  reconstruction images were obtained. Precontrast and venous  phase  imaging performed.     COMPARISON: 07/12/2023, 07/08/2023     FINDINGS:      CTA:  There is no evidence of active contrast extravasation into the GI tract  to suggest active GI bleeding at this time. There is some high  attenuation material within the dependent portion of the stomach that  was present on the noncontrast portion of the study and is unchanged  following the administration of contrast. This is consistent with  ingested material. The celiac artery, SMA, and inferior mesenteric  artery are all widely patent. The renal arteries are widely patent. The  abdominal aorta is nonaneurysmal. The iliac arteries are widely patent  and nonaneurysmal.     CT ABDOMEN/PELVIS:  The lung bases are clear. The liver, gallbladder, and spleen are  unremarkable. The kidneys, adrenal glands, and pancreas are  unremarkable. Previously demonstrated thickening of the duodenum and  proximal jejunum has resolved. The bladder is unremarkable. The colon is  unremarkable. Previous appendectomy. No free fluid in the pelvis. No  acute bony abnormality.       Impression:      1. No evidence of active contrast extravasation into the GI tract to  suggest active GI bleeding at this time  2. Previously demonstrated thickening of the duodenum and proximal  jejunum has resolved  3. Appendectomy        Radiation dose reduction techniques were utilized, including automated  exposure control and exposure modulation based on body size.     This report was finalized on 7/14/2023 1:07 PM by Dr. Moise Walker M.D.       NM GI Blood Loss [[id number]] Collected: 07/13/23 1911     Updated: 07/13/23 1923    Narrative:      NM GI BLOOD LOSS-     INDICATIONS: Gastrointestinal bleeding     TECHNIQUE: Bleeding scan     COMPARISON: None of same type     FINDINGS:     Radiotracer: 29.7 mCi and 24.5 mCi technetium 99m radiolabeled red blood  cells, intravenous.     Despite special care in injecting the radiotracer, both attempted doses  extravasated.  As such, the bowel could not be evaluated for bleeding.          Impression:         Unsuccessful bleeding scan evaluation, as described. Follow-up/further  evaluation can be obtained as indications persist.        This report was finalized on 7/13/2023 7:19 PM by Dr. Brice Velasco M.D.       CT Abdomen Pelvis With Contrast [[id number]] Collected: 07/12/23 0846     Updated: 07/13/23 1338    Narrative:      CT ABDOMEN AND PELVIS WITH CONTRAST     HISTORY: 43-year-old male with lower abdominal pain and nausea and blood  in stool.     TECHNIQUE: Axial CT images of the abdomen and pelvis were obtained  following administration of intravenous contrast. The patient was not  given oral contrast Coronal and sagittal reformats were obtained.     COMPARISON: 07/08/2023     FINDINGS: Mild wall thickening of the duodenum and the proximal jejunal  loops likely related to peristalsis. Mild diffuse gastric wall  thickening in a pattern consistent with gastritis. No evidence of bowel  obstruction.     The liver demonstrates normal attenuation. The gallbladder is normal.  The spleen is normal. The pancreas is normal. Bilateral adrenal glands  are normal. No renal calculi or hydronephrosis. The urinary bladder is  partially distended and normal.       Impression:      Mild gastritis is suspected. Diffuse thickening involving  the duodenum and the proximal jejunal loops favored to be secondary to  peristalsis.     Radiation dose reduction techniques were utilized, including automated  exposure control and exposure modulation based on body size.     This report was finalized on 7/13/2023 1:35 PM by Dr. Len Herndon M.D.       XR Chest 1 View [[id number]] Collected: 07/12/23 0729     Updated: 07/12/23 0733    Narrative:      AP CHEST     HISTORY: Leukocytosis     COMPARISON: None available     FINDINGS: Mildly elevated left hemidiaphragm. No evidence of airspace  infiltrate. Heart size normal. No pneumothorax. Visualized  osseous  structures are unremarkable.       Impression:      No acute findings     This report was finalized on 7/12/2023 7:30 AM by Dr. Moise Walker M.D.             Lab Results (last 7 days)       Procedure Component Value Units Date/Time    Hemoglobin & Hematocrit, Blood [[id number]]  (Abnormal) Collected: 07/21/23 0812    Specimen: Blood Updated: 07/21/23 0831     Hemoglobin 8.2 g/dL      Hematocrit 24.9 %     Basic Metabolic Panel [[id number]]  (Abnormal) Collected: 07/21/23 0055    Specimen: Blood Updated: 07/21/23 0215     Glucose 86 mg/dL      BUN 7 mg/dL      Creatinine 1.06 mg/dL      Sodium 141 mmol/L      Potassium 3.8 mmol/L      Chloride 111 mmol/L      CO2 26.0 mmol/L      Calcium 8.2 mg/dL      BUN/Creatinine Ratio 6.6     Anion Gap 4.0 mmol/L      eGFR 89.9 mL/min/1.73     Narrative:      GFR Normal >60  Chronic Kidney Disease <60  Kidney Failure <15      Hemoglobin & Hematocrit, Blood [[id number]]  (Abnormal) Collected: 07/21/23 0055    Specimen: Blood Updated: 07/21/23 0110     Hemoglobin 7.5 g/dL      Hematocrit 23.2 %     CBC & Differential [[id number]]  (Abnormal) Collected: 07/21/23 0055    Specimen: Blood Updated: 07/21/23 0110    Narrative:      The following orders were created for panel order CBC & Differential.  Procedure                               Abnormality         Status                     ---------                               -----------         ------                     CBC Auto Differential[[id number]]        Abnormal            Final result                 Please view results for these tests on the individual orders.    CBC Auto Differential [[id number]]  (Abnormal) Collected: 07/21/23 0055    Specimen: Blood Updated: 07/21/23 0110     WBC 7.82 10*3/mm3      RBC 2.67 10*6/mm3      Hemoglobin 7.5 g/dL      Hematocrit 23.2 %      MCV 86.9 fL      MCH 28.1 pg      MCHC 32.3 g/dL      RDW 14.9 %      RDW-SD 46.5 fl      MPV 9.6 fL      Platelets 299 10*3/mm3      Neutrophil %  62.1 %      Lymphocyte % 23.8 %      Monocyte % 9.5 %      Eosinophil % 3.3 %      Basophil % 0.4 %      Immature Grans % 0.9 %      Neutrophils, Absolute 4.86 10*3/mm3      Lymphocytes, Absolute 1.86 10*3/mm3      Monocytes, Absolute 0.74 10*3/mm3      Eosinophils, Absolute 0.26 10*3/mm3      Basophils, Absolute 0.03 10*3/mm3      Immature Grans, Absolute 0.07 10*3/mm3      nRBC 0.3 /100 WBC     Hemoglobin & Hematocrit, Blood [[id number]]  (Abnormal) Collected: 07/20/23 1750    Specimen: Blood Updated: 07/20/23 1805     Hemoglobin 7.7 g/dL      Hematocrit 23.4 %     Hemoglobin & Hematocrit, Blood [[id number]]  (Abnormal) Collected: 07/20/23 0849    Specimen: Blood Updated: 07/20/23 0858     Hemoglobin 8.0 g/dL      Hematocrit 24.0 %     Hemoglobin & Hematocrit, Blood [[id number]]  (Abnormal) Collected: 07/20/23 0034    Specimen: Blood Updated: 07/20/23 0116     Hemoglobin 7.1 g/dL      Hematocrit 21.9 %     CBC (No Diff) [[id number]]  (Abnormal) Collected: 07/20/23 0034    Specimen: Blood Updated: 07/20/23 0116     WBC 8.56 10*3/mm3      RBC 2.55 10*6/mm3      Hemoglobin 7.1 g/dL      Hematocrit 21.9 %      MCV 85.9 fL      MCH 27.8 pg      MCHC 32.4 g/dL      RDW 14.9 %      RDW-SD 45.3 fl      MPV 9.5 fL      Platelets 282 10*3/mm3     Hemoglobin & Hematocrit, Blood [[id number]]  (Abnormal) Collected: 07/19/23 1554    Specimen: Blood Updated: 07/19/23 1620     Hemoglobin 7.7 g/dL      Hematocrit 23.3 %     Hemoglobin & Hematocrit, Blood [[id number]]  (Abnormal) Collected: 07/19/23 0854    Specimen: Blood Updated: 07/19/23 0929     Hemoglobin 7.0 g/dL      Hematocrit 21.1 %     POC Protime / INR [[id number]]  (Abnormal) Collected: 07/18/23 1648    Specimen: Blood Updated: 07/19/23 0637     Protime 12.0 seconds      Comment: Serial Number: [id number]:  [id number]        INR 1.0    Lactic Acid, Plasma [[id number]]  (Normal) Collected: 07/19/23 0556    Specimen: Blood, Central Line Updated: 07/19/23 0628     Lactate 0.8  mmol/L     CBC & Differential [[id number]]  (Abnormal) Collected: 07/19/23 0556    Specimen: Blood, Central Line Updated: 07/19/23 0615    Narrative:      The following orders were created for panel order CBC & Differential.  Procedure                               Abnormality         Status                     ---------                               -----------         ------                     CBC Auto Differential[[id number]]        Abnormal            Final result                 Please view results for these tests on the individual orders.    CBC Auto Differential [[id number]]  (Abnormal) Collected: 07/19/23 0556    Specimen: Blood, Central Line Updated: 07/19/23 0615     WBC 8.20 10*3/mm3      RBC 2.50 10*6/mm3      Hemoglobin 7.3 g/dL      Hematocrit 21.8 %      MCV 87.2 fL      MCH 29.2 pg      MCHC 33.5 g/dL      RDW 15.5 %      RDW-SD 48.3 fl      MPV 9.7 fL      Platelets 211 10*3/mm3      Neutrophil % 64.2 %      Lymphocyte % 20.5 %      Monocyte % 9.1 %      Eosinophil % 3.4 %      Basophil % 0.4 %      Immature Grans % 2.4 %      Neutrophils, Absolute 5.26 10*3/mm3      Lymphocytes, Absolute 1.68 10*3/mm3      Monocytes, Absolute 0.75 10*3/mm3      Eosinophils, Absolute 0.28 10*3/mm3      Basophils, Absolute 0.03 10*3/mm3      Immature Grans, Absolute 0.20 10*3/mm3      nRBC 0.4 /100 WBC     Hemoglobin & Hematocrit, Blood [[id number]]  (Abnormal) Collected: 07/19/23 0032    Specimen: Blood, Central Line Updated: 07/19/23 0052     Hemoglobin 7.2 g/dL      Hematocrit 21.3 %     Blood Culture - Blood, Hand, Left [[id number]]  (Normal) Collected: 07/13/23 2204    Specimen: Blood from Hand, Left Updated: 07/18/23 2231     Blood Culture No growth at 5 days    Narrative:      Less than seven (7) mL's of blood was collected.  Insufficient quantity may yield false negative results.    Blood Culture - Blood, Arm, Right [[id number]]  (Normal) Collected: 07/13/23 2157    Specimen: Blood from Arm, Right Updated:  07/18/23 2231     Blood Culture No growth at 5 days    Lactic Acid, Plasma [[id number]]  (Normal) Collected: 07/18/23 1553    Specimen: Blood from Arm, Left Updated: 07/18/23 1719     Lactate 1.0 mmol/L     Hemoglobin & Hematocrit, Blood [[id number]]  (Abnormal) Collected: 07/18/23 1457    Specimen: Blood Updated: 07/18/23 1544     Hemoglobin 7.8 g/dL      Hematocrit 23.0 %     Comprehensive Metabolic Panel [[id number]]  (Abnormal) Collected: 07/18/23 0614    Specimen: Blood, Central Line Updated: 07/18/23 0652     Glucose 117 mg/dL      BUN 4 mg/dL      Creatinine 1.13 mg/dL      Sodium 144 mmol/L      Potassium 3.5 mmol/L      Chloride 113 mmol/L      CO2 26.0 mmol/L      Calcium 7.8 mg/dL      Total Protein 3.8 g/dL      Albumin 2.1 g/dL      ALT (SGPT) 14 U/L      AST (SGOT) 16 U/L      Alkaline Phosphatase 48 U/L      Total Bilirubin <0.2 mg/dL      Globulin 1.7 gm/dL      A/G Ratio 1.2 g/dL      BUN/Creatinine Ratio 3.5     Anion Gap 5.0 mmol/L      eGFR 83.2 mL/min/1.73     Narrative:      GFR Normal >60  Chronic Kidney Disease <60  Kidney Failure <15      CBC (No Diff) [[id number]]  (Abnormal) Collected: 07/18/23 0614    Specimen: Blood, Central Line Updated: 07/18/23 0648     WBC 8.98 10*3/mm3      RBC 2.41 10*6/mm3      Hemoglobin 6.9 g/dL      Hematocrit 20.6 %      MCV 85.5 fL      MCH 28.6 pg      MCHC 33.5 g/dL      RDW 15.6 %      RDW-SD 47.6 fl      MPV 9.6 fL      Platelets 198 10*3/mm3     Hemoglobin & Hematocrit, Blood [[id number]]  (Abnormal) Collected: 07/17/23 2357    Specimen: Blood, Central Line Updated: 07/18/23 0034     Hemoglobin 7.6 g/dL      Hematocrit 22.6 %     Hemoglobin & Hematocrit, Blood [[id number]]  (Abnormal) Collected: 07/17/23 1547    Specimen: Blood Updated: 07/17/23 1603     Hemoglobin 7.7 g/dL      Hematocrit 22.9 %     Hemoglobin & Hematocrit, Blood [[id number]]  (Abnormal) Collected: 07/17/23 0807    Specimen: Blood Updated: 07/17/23 0818     Hemoglobin 7.4 g/dL      Hematocrit  22.1 %     Comprehensive Metabolic Panel [[id number]]  (Abnormal) Collected: 07/17/23 0543    Specimen: Blood, Central Line Updated: 07/17/23 0639     Glucose 90 mg/dL      BUN 3 mg/dL      Creatinine 1.23 mg/dL      Sodium 139 mmol/L      Potassium 3.5 mmol/L      Chloride 112 mmol/L      CO2 22.8 mmol/L      Calcium 7.3 mg/dL      Total Protein 3.5 g/dL      Albumin 1.8 g/dL      ALT (SGPT) 15 U/L      AST (SGOT) 17 U/L      Alkaline Phosphatase 44 U/L      Total Bilirubin <0.2 mg/dL      Globulin 1.7 gm/dL      A/G Ratio 1.1 g/dL      BUN/Creatinine Ratio 2.4     Anion Gap 4.2 mmol/L      eGFR 75.2 mL/min/1.73     Narrative:      GFR Normal >60  Chronic Kidney Disease <60  Kidney Failure <15      CBC (No Diff) [[id number]]  (Abnormal) Collected: 07/17/23 0543    Specimen: Blood, Central Line Updated: 07/17/23 0618     WBC 10.02 10*3/mm3      RBC 2.52 10*6/mm3      Hemoglobin 7.4 g/dL      Hematocrit 21.7 %      MCV 86.1 fL      MCH 29.4 pg      MCHC 34.1 g/dL      RDW 15.6 %      RDW-SD 47.0 fl      MPV 10.5 fL      Platelets 138 10*3/mm3     Hemoglobin & Hematocrit, Blood [[id number]]  (Abnormal) Collected: 07/16/23 2348    Specimen: Blood, Central Line Updated: 07/17/23 0003     Hemoglobin 8.0 g/dL      Hematocrit 24.7 %     Folate RBC [[id number]]  (Abnormal) Collected: 07/14/23 0944    Specimen: Blood Updated: 07/16/23 1907     Folate, Hemolysate 338.0 ng/mL      Hematocrit 16.0 %      Comment: **Verified by repeat analysis**        RBC Folate 2113 ng/mL     Narrative:      Performed at:  [address]  [id number]  : Olu Lynn PhD, Phone:  [phone]  Specimen Comment: Called/faxed to Tessie Castro on 07/15/2023 at 01:59 ET for  Specimen Comment: test(s) Hematocrit    Hemoglobin & Hematocrit, Blood [[id number]]  (Abnormal) Collected: 07/16/23 1635    Specimen: Blood Updated: 07/16/23 1719     Hemoglobin 6.2 g/dL      Hematocrit 18.8 %     Hemoglobin &  Hematocrit, Blood [[id number]]  (Abnormal) Collected: 07/16/23 0814    Specimen: Blood Updated: 07/16/23 0851     Hemoglobin 7.1 g/dL      Hematocrit 20.5 %     Comprehensive Metabolic Panel [[id number]]  (Abnormal) Collected: 07/16/23 0111    Specimen: Blood Updated: 07/16/23 0145     Glucose 127 mg/dL      BUN 7 mg/dL      Creatinine 1.26 mg/dL      Sodium 136 mmol/L      Potassium 3.8 mmol/L      Chloride 110 mmol/L      CO2 19.3 mmol/L      Calcium 6.9 mg/dL      Total Protein 3.2 g/dL      Albumin 2.1 g/dL      ALT (SGPT) 8 U/L      AST (SGOT) 7 U/L      Alkaline Phosphatase 33 U/L      Total Bilirubin <0.2 mg/dL      Globulin 1.1 gm/dL      A/G Ratio 1.9 g/dL      BUN/Creatinine Ratio 5.6     Anion Gap 6.7 mmol/L      eGFR 73.0 mL/min/1.73     Narrative:      GFR Normal >60  Chronic Kidney Disease <60  Kidney Failure <15      CBC (No Diff) [[id number]]  (Abnormal) Collected: 07/16/23 0111    Specimen: Blood Updated: 07/16/23 0138     WBC 17.39 10*3/mm3      RBC 2.25 10*6/mm3      Hemoglobin 6.7 g/dL      Hematocrit 19.7 %      MCV 87.6 fL      MCH 29.8 pg      MCHC 34.0 g/dL      RDW 14.9 %      RDW-SD 46.3 fl      MPV 10.4 fL      Platelets 130 10*3/mm3     CBC (No Diff) [[id number]]  (Abnormal) Collected: 07/15/23 2050    Specimen: Blood from Arm, Left Updated: 07/15/23 2119     WBC 15.20 10*3/mm3      RBC 1.62 10*6/mm3      Hemoglobin 4.9 g/dL      Hematocrit 14.9 %      MCV 92.0 fL      MCH 30.2 pg      MCHC 32.9 g/dL      RDW 15.2 %      RDW-SD 49.0 fl      MPV 10.4 fL      Platelets 145 10*3/mm3     Comprehensive Metabolic Panel [[id number]]  (Abnormal) Collected: 07/15/23 1603    Specimen: Blood Updated: 07/15/23 1639     Glucose 105 mg/dL      BUN 9 mg/dL      Creatinine 1.30 mg/dL      Sodium 135 mmol/L      Potassium 3.6 mmol/L      Chloride 110 mmol/L      CO2 19.0 mmol/L      Calcium 6.8 mg/dL      Total Protein 3.1 g/dL      Albumin 1.9 g/dL      ALT (SGPT) 5 U/L      AST (SGOT) 9 U/L      Alkaline  Phosphatase 32 U/L      Total Bilirubin 0.2 mg/dL      Globulin 1.2 gm/dL      A/G Ratio 1.6 g/dL      BUN/Creatinine Ratio 6.9     Anion Gap 6.0 mmol/L      eGFR 70.3 mL/min/1.73     Narrative:      GFR Normal >60  Chronic Kidney Disease <60  Kidney Failure <15      CBC & Differential [[id number]]  (Abnormal) Collected: 07/15/23 1603    Specimen: Blood Updated: 07/15/23 1628    Narrative:      The following orders were created for panel order CBC & Differential.  Procedure                               Abnormality         Status                     ---------                               -----------         ------                     CBC Auto Differential[[id number]]        Abnormal            Final result                 Please view results for these tests on the individual orders.    CBC Auto Differential [[id number]]  (Abnormal) Collected: 07/15/23 1603    Specimen: Blood Updated: 07/15/23 1628     WBC 13.20 10*3/mm3      RBC 1.93 10*6/mm3      Hemoglobin 5.9 g/dL      Hematocrit 17.5 %      MCV 90.7 fL      MCH 30.6 pg      MCHC 33.7 g/dL      RDW 14.2 %      RDW-SD 45.9 fl      MPV 10.5 fL      Platelets 132 10*3/mm3      Neutrophil % 64.3 %      Lymphocyte % 19.0 %      Monocyte % 11.7 %      Eosinophil % 1.1 %      Basophil % 0.2 %      Neutrophils, Absolute 8.48 10*3/mm3      Lymphocytes, Absolute 2.51 10*3/mm3      Monocytes, Absolute 1.55 10*3/mm3      Eosinophils, Absolute 0.15 10*3/mm3      Basophils, Absolute 0.02 10*3/mm3     Hemoglobin & Hematocrit, Blood [[id number]]  (Abnormal) Collected: 07/15/23 0625    Specimen: Blood Updated: 07/15/23 0659     Hemoglobin 6.2 g/dL      Hematocrit 18.4 %     Hemoglobin & Hematocrit, Blood [[id number]]  (Abnormal) Collected: 07/15/23 0010    Specimen: Blood Updated: 07/15/23 0102     Hemoglobin 6.1 g/dL      Hematocrit 18.8 %     Hemoglobin & Hematocrit, Blood [[id number]]  (Abnormal) Collected: 07/14/23 1650    Specimen: Blood Updated: 07/14/23 1735     Hemoglobin 5.5  "g/dL      Hematocrit 16.5 %           /77 (BP Location: Right arm, Patient Position: Lying)   Pulse 81   Temp 98.2 °F (36.8 °C) (Oral)   Resp 14   Ht 172.7 cm (68\")   Wt 78 kg (172 lb)   SpO2 100%   BMI 26.15 kg/m²     Discharge Exam:  General Appearance:    Alert, cooperative, no distress                          Head:    Normocephalic, without obvious abnormality, atraumatic                          Eyes:                            Throat:   Lips, tongue, gums normal                          Neck:   Supple, symmetrical, trachea midline, no JVD                        Lungs:     Clear to auscultation bilaterally, respirations unlabored                Chest Wall:    No tenderness or deformity                        Heart:    Regular rate and rhythm, S1 and S2 normal, no murmur,no  Rub  or gallop                  Abdomen:     Soft, non-tender, bowel sounds active, no masses, no organomegaly                  Extremities:   Extremities normal, atraumatic, no cyanosis or edema                             Skin:   Skin is warm and dry,  no rashes or palpable lesions                  Neurologic:   no focal deficits noted     Disposition:  Home    Activity as tolerated    Diet as tolerated  Diet Order   Procedures    Diet: Regular/House Diet, Gastrointestinal Diets; Low Irritant; Texture: Regular Texture (IDDSI 7); Fluid Consistency: Thin (IDDSI 0)       Patient Instructions:      Discharge Medications        Continue These Medications        Instructions Start Date   ferrous sulfate 325 (65 FE) MG tablet  Commonly known as: FerrouSul   325 mg, Oral, Daily With Breakfast      folic acid 1 MG tablet  Commonly known as: FOLVITE   1 mg, Oral, Daily      pantoprazole 40 MG EC tablet  Commonly known as: PROTONIX   40 mg, Oral, Every Early Morning      polycarbophil 625 MG tablet tablet   625 mg, Oral, Daily      thiamine 100 MG tablet  Commonly known as: VITAMIN B1   100 mg, Oral, Daily             Future " Appointments   Date Time Provider Department Center   7/28/2023 10:00 AM Hellen Skelton APRN [facility] EAPT[facility]      Follow-up Information       Isrrael Gibson MD Follow up in 4 week(s).    Specialty: Gastroenterology  Contact information:  [address]  [address]  [facility] [address]  [phone]               Hellen Skelton APRN Follow up in 1 week(s).    Specialties: Nurse Practitioner, Internal Medicine  Contact information:  [address]  [address]  [facility] [id number] [phone]                           Discharge Order (From admission, onward)       Start     Ordered    07/21/23 1214  Discharge patient  Once        Expected Discharge Date: 07/21/23    Discharge Disposition: Home or Self Care    Physician of Record for Attribution - Please select from Treatment Team: ZEFERINO AYALA [[id number]]    Review needed by CMO to determine Physician of Record: No       Question Answer Comment   Physician of Record for Attribution - Please select from Treatment Team ZEFERINO AYALA    Review needed by CMO to determine Physician of Record No        07/21/23 1214                    Total time spent discharging patient including evaluation,post hospitalization follow up,  medication and post hospitalization instructions and education total time exceeds 30 minutes.    Signed:  Zeferino Ayala MD  7/21/2023  12:15 EDT     Electronically signed by Zeferino Ayala MD at 07/21/23 1219       Discharge Order (From admission, onward)       Start     Ordered    07/21/23 1214  Discharge patient  Once        Expected Discharge Date: 07/21/23    Discharge Disposition: Home or Self Care    Physician of Record for Attribution - Please select from Treatment Team: ZEFERINO AYALA [[id number]]    Review needed by CMO to determine Physician of Record: No       Question Answer Comment   Physician of Record for Attribution - Please select from Treatment Team ZEFERINO AYALA    Review needed by CMO to determine Physician of Record No        07/21/23  1215

## 2023-07-24 ENCOUNTER — TRANSITIONAL CARE MANAGEMENT TELEPHONE ENCOUNTER (OUTPATIENT)
Dept: CALL CENTER | Facility: HOSPITAL | Age: 43
End: 2023-07-24
Payer: COMMERCIAL

## 2023-07-24 NOTE — CASE MANAGEMENT/SOCIAL WORK
Case Management Discharge Note      Final Note: DC'd home 7/21                 Transportation Services  Private: Car    Final Discharge Disposition Code: 01 - home or self-care

## 2023-07-24 NOTE — OUTREACH NOTE
Call Center TCM Note      Flowsheet Row Responses   [facility] patient discharged from? [place]   Does the patient have one of the following disease processes/diagnoses(primary or secondary)? Sepsis   TCM attempt successful? No   Unsuccessful attempts Attempt 1  [No one listed on PCP verbal release. ]            Kasey Smith RN    7/24/2023, 15:54 EDT

## 2023-07-24 NOTE — OUTREACH NOTE
Call Center TCM Note      Flowsheet Row Responses   [facility] patient discharged from? [place]   Does the patient have one of the following disease processes/diagnoses(primary or secondary)? Sepsis   TCM attempt successful? No   Unsuccessful attempts Attempt 2            Kasey Smith RN    7/24/2023, 16:48 EDT

## 2023-07-25 ENCOUNTER — TRANSITIONAL CARE MANAGEMENT TELEPHONE ENCOUNTER (OUTPATIENT)
Dept: CALL CENTER | Facility: HOSPITAL | Age: 43
End: 2023-07-25
Payer: COMMERCIAL

## 2023-07-25 NOTE — OUTREACH NOTE
"Call Center TCM Note      Flowsheet Row Responses   [facility] patient discharged from? [place]   Does the patient have one of the following disease processes/diagnoses(primary or secondary)? Sepsis   TCM attempt successful? Yes   Call start time 0914   Call end time 0917   Discharge diagnosis ABLA (acute blood loss anemia), Gastrointestinal hemorrhage with melena, sepsis   Meds reviewed with patient/caregiver? Yes   Does the patient have all medications related to this admission filled (includes all antibiotics, inhalers, nebulizers,steroids,etc.) N/A   Is the patient taking all medications as directed (includes completed medication regime)? Yes   Comments Appt with CHETNA Hernandez 7/28 @ 10:00   Does the patient have an appointment with their PCP within 7-14 days of discharge? Yes   Psychosocial issues? No   Did the patient receive a copy of their discharge instructions? Yes   Nursing interventions Reviewed instructions with patient   What is the patient's perception of their health status since discharge? Improving   Is patient/caregiver able to teach back steps to recovery at home? Rest and regain strength   Is the patient/caregiver able to teach back the hierarchy of who to call/visit for symptoms/problems? PCP, Specialist, Home health nurse, Urgent Care, ED, 911 Yes   Additional teach back comments Call was brief and states he is doing \"alright\".  Denies any further bleeding that he is aware of.  He is waiting to hear from the GI dr for an appt.   TCM call completed? Yes   Wrap up additional comments Denies questions or needs at this time.   Call end time 0917            Trisha Haider LPN    7/25/2023, 09:22 EDT        "

## 2023-07-28 ENCOUNTER — OFFICE VISIT (OUTPATIENT)
Dept: INTERNAL MEDICINE | Facility: CLINIC | Age: 43
End: 2023-07-28
Payer: COMMERCIAL

## 2023-07-28 VITALS
SYSTOLIC BLOOD PRESSURE: 104 MMHG | HEART RATE: 111 BPM | TEMPERATURE: 98.2 F | BODY MASS INDEX: 27.13 KG/M2 | DIASTOLIC BLOOD PRESSURE: 70 MMHG | OXYGEN SATURATION: 99 % | WEIGHT: 179 LBS | HEIGHT: 68 IN

## 2023-07-28 DIAGNOSIS — Z09 HOSPITAL DISCHARGE FOLLOW-UP: Primary | ICD-10-CM

## 2023-07-28 DIAGNOSIS — Z00.00 HEALTHCARE MAINTENANCE: ICD-10-CM

## 2023-07-28 DIAGNOSIS — K92.2 GASTROINTESTINAL HEMORRHAGE, UNSPECIFIED GASTROINTESTINAL HEMORRHAGE TYPE: ICD-10-CM

## 2023-07-28 DIAGNOSIS — D62 ACUTE BLOOD LOSS ANEMIA: ICD-10-CM

## 2023-07-28 RX ORDER — FERROUS SULFATE 325(65) MG
325 TABLET ORAL
Qty: 90 TABLET | Refills: 1 | Status: SHIPPED | OUTPATIENT
Start: 2023-07-28

## 2023-07-28 RX ORDER — PANTOPRAZOLE SODIUM 40 MG/1
40 TABLET, DELAYED RELEASE ORAL
Qty: 90 TABLET | Refills: 1 | Status: SHIPPED | OUTPATIENT
Start: 2023-07-28

## 2023-07-28 NOTE — PROGRESS NOTES
Subjective   Radha Noriega V is a 43 y.o. male.     Chief Complaint   Patient presents with    Anemia     Pt Is here as  a hospital follow up for bleeding, anemia.    Fatigue     PT STATES HE HAVE NO ENERGY AND FEEL WIND OFF.        History of Present Illness   He is here today for hospital follow-up.  TCM note reviewed by me today.  He presented to [facility], [place], operated by [facility] on 7/12 with hospital stay through 7/21 for acute GI bleed and hematochezia.  He had previously been admitted multiple times for similar symptoms without identifiable source of bleeding.  He previously completed EGD and colonoscopy negative for source of bleeding.  Previously his hemoglobin was down to 5.5 requiring PRBC and iron transfusions. GI consulted and CT abdomen pelvis completed without any identifiable source of bleeding. During most recent hospitalization hemoglobin was noted to be 7.1, white blood cell count elevated at 21,000 and creatinine 1.37.  Urinalysis negative for UTI.  Chest x-ray was unremarkable.  CT abdomen pelvis during recent hospitalization showed mild gastritis and thickening of the duodenum and proximal jejunal loops favored to be related to peristalsis.  PRBC transfusion initiated and he was admitted for further management.  He was consulted by GI and general surgery.  He underwent a tagged red blood cell scan. He was found to have a small bowel bleed and had an embolization x2.  Hemoglobin remained stable but low during hospital stay.  Prior to discharge hemoglobin was slightly improved at 8.2. He was discharged on 7/21 with plan for capsule endoscopy outpatient and to follow-up with primary care and GI, Dr. Gibson, in 1 week.  He presents today still with fatigue. He notes activity intolerance with light activity like climbing stairs.  He notes frequent heart racing with minimal activity.  He notes frequent fatigue and activity intolerance after 5 to 10 minutes.  He is currently off work secondary to symptoms and recurrent  hospitalizations.  He denies any rectal bleeding, BRBPR, melena, abdominal pain.  He has abstained from alcohol since initial hospitalization.  He is not currently taking ferrous sulfate.  He needs a refill on Protonix 40 mg.  He has not yet scheduled outpatient follow-up with GI.  He has FMLA paperwork with him today to complete.    The following portions of the patient's history were reviewed and updated as appropriate: allergies, current medications, past family history, past medical history, past social history, past surgical history, and problem list.    Review of Systems   Constitutional:  Positive for activity change and fatigue. Negative for chills and fever.   Respiratory:  Positive for shortness of breath. Negative for cough, chest tightness and wheezing.    Cardiovascular:  Positive for palpitations. Negative for chest pain and leg swelling.   Gastrointestinal:  Negative for abdominal pain, anal bleeding, blood in stool, constipation, diarrhea, nausea, vomiting and GERD.   Neurological:  Positive for light-headedness. Negative for dizziness, weakness and headache.     Objective   Physical Exam  Constitutional:       Appearance: He is well-developed.   Neck:      Thyroid: No thyroid mass, thyromegaly or thyroid tenderness.      Vascular: No carotid bruit.      Trachea: Trachea normal.   Cardiovascular:      Rate and Rhythm: Regular rhythm. Tachycardia present.      Chest Wall: PMI is not displaced.      Pulses:           Radial pulses are 2+ on the right side and 2+ on the left side.        Dorsalis pedis pulses are 2+ on the right side and 2+ on the left side.        Posterior tibial pulses are 2+ on the right side and 2+ on the left side.      Heart sounds: S1 normal and S2 normal.   Pulmonary:      Effort: Pulmonary effort is normal.      Breath sounds: Normal breath sounds.   Abdominal:      General: Bowel sounds are normal. There is no distension or abdominal bruit. There are no signs of injury.       Palpations: Abdomen is soft. There is no hepatomegaly or splenomegaly.      Tenderness: There is no abdominal tenderness. There is no guarding or rebound.      Hernia: No hernia is present.          Comments: Surgical incision site with edges well approximated.  No drainage, warmth or erythema.   Musculoskeletal:      Right lower leg: No edema.      Left lower leg: No edema.   Lymphadenopathy:      Head:      Right side of head: No submental, submandibular, tonsillar or occipital adenopathy.      Left side of head: No submental, submandibular, tonsillar or occipital adenopathy.      Cervical: No cervical adenopathy.   Skin:     General: Skin is warm and dry.      Capillary Refill: Capillary refill takes less than 2 seconds.      Coloration: Skin is pale.      Nails: There is no clubbing.   Neurological:      Mental Status: He is alert and oriented to person, place, and time.   Psychiatric:         Attention and Perception: Attention normal.         Mood and Affect: Mood and affect normal.         Speech: Speech normal.         Behavior: Behavior normal.         Thought Content: Thought content normal.         Cognition and Memory: Cognition normal.       Vitals:    07/28/23 0921   BP: 104/70   Pulse: 111   Temp: 98.2 °F (36.8 °C)   SpO2: 99%      Body mass index is 27.22 kg/m².    Assessment & Plan   Problems Addressed this Visit          Hematology and Neoplasia    Acute blood loss anemia    Relevant Medications    ferrous sulfate (FerrouSul) 325 (65 FE) MG tablet     Other Visit Diagnoses       Hospital discharge follow-up    -  Primary    Relevant Orders    CBC & Differential    Ferritin    Iron Profile    Comprehensive Metabolic Panel    Healthcare maintenance        Relevant Orders    CBC & Differential    Ferritin    Iron Profile    Lipid Panel With LDL / HDL Ratio    TSH Rfx On Abnormal To Free T4    Comprehensive Metabolic Panel    Gastrointestinal hemorrhage, unspecified gastrointestinal hemorrhage type               Diagnoses         Codes Comments    Hospital discharge follow-up    -  Primary ICD-10-CM: Z09  ICD-9-CM: V67.59     Healthcare maintenance     ICD-10-CM: Z00.00  ICD-9-CM: V70.0     Acute blood loss anemia     ICD-10-CM: D62  ICD-9-CM: 285.1     Gastrointestinal hemorrhage, unspecified gastrointestinal hemorrhage type     ICD-10-CM: K92.2  ICD-9-CM: 578.9           1.  Hospital discharge follow-up for gastrointestinal hemorrhage/acute blood loss anemia-he is still with significant fatigue, shortness of breath, palpitations and activity intolerance.  He is not able to safely return to work at this time.  We will recheck lab work today to ensure his hemoglobin is improving.  Recommend restarting ferrous sulfate 325 mg daily and continuing Protonix 40 mg daily.  Prescriptions refilled today.  We will complete disability paperwork and fax to his employer to return to work pending GI evaluation and improving anemia.  He is going to contact Dr. Gibson's office today to schedule follow-up with plan for outpatient capsule endoscopy.  Encouraged him to continue to abstain from alcohol.  We will follow-up pending lab results.

## 2023-07-29 LAB
ALBUMIN SERPL-MCNC: 3.8 G/DL (ref 3.5–5.2)
ALBUMIN/GLOB SERPL: 1.8 G/DL
ALP SERPL-CCNC: 72 U/L (ref 39–117)
ALT SERPL-CCNC: 35 U/L (ref 1–41)
AST SERPL-CCNC: 22 U/L (ref 1–40)
BASOPHILS # BLD AUTO: 0.05 10*3/MM3 (ref 0–0.2)
BASOPHILS NFR BLD AUTO: 0.9 % (ref 0–1.5)
BILIRUB SERPL-MCNC: <0.2 MG/DL (ref 0–1.2)
BUN SERPL-MCNC: 8 MG/DL (ref 6–20)
BUN/CREAT SERPL: 7 (ref 7–25)
CALCIUM SERPL-MCNC: 9.5 MG/DL (ref 8.6–10.5)
CHLORIDE SERPL-SCNC: 107 MMOL/L (ref 98–107)
CHOLEST SERPL-MCNC: 218 MG/DL (ref 0–200)
CO2 SERPL-SCNC: 23.3 MMOL/L (ref 22–29)
CREAT SERPL-MCNC: 1.14 MG/DL (ref 0.76–1.27)
EGFRCR SERPLBLD CKD-EPI 2021: 82.3 ML/MIN/1.73
EOSINOPHIL # BLD AUTO: 0.17 10*3/MM3 (ref 0–0.4)
EOSINOPHIL NFR BLD AUTO: 3.1 % (ref 0.3–6.2)
ERYTHROCYTE [DISTWIDTH] IN BLOOD BY AUTOMATED COUNT: 14.5 % (ref 12.3–15.4)
FERRITIN SERPL-MCNC: 122 NG/ML (ref 30–400)
GLOBULIN SER CALC-MCNC: 2.1 GM/DL
GLUCOSE SERPL-MCNC: 94 MG/DL (ref 65–99)
HCT VFR BLD AUTO: 30.9 % (ref 37.5–51)
HDLC SERPL-MCNC: 97 MG/DL (ref 40–60)
HGB BLD-MCNC: 10.1 G/DL (ref 13–17.7)
IMM GRANULOCYTES # BLD AUTO: 0.02 10*3/MM3 (ref 0–0.05)
IMM GRANULOCYTES NFR BLD AUTO: 0.4 % (ref 0–0.5)
IRON SATN MFR SERPL: 8 % (ref 20–50)
IRON SERPL-MCNC: 33 MCG/DL (ref 59–158)
LDLC SERPL CALC-MCNC: 109 MG/DL (ref 0–100)
LDLC/HDLC SERPL: 1.11 {RATIO}
LYMPHOCYTES # BLD AUTO: 1.23 10*3/MM3 (ref 0.7–3.1)
LYMPHOCYTES NFR BLD AUTO: 22.4 % (ref 19.6–45.3)
MCH RBC QN AUTO: 28.8 PG (ref 26.6–33)
MCHC RBC AUTO-ENTMCNC: 32.7 G/DL (ref 31.5–35.7)
MCV RBC AUTO: 88 FL (ref 79–97)
MONOCYTES # BLD AUTO: 0.62 10*3/MM3 (ref 0.1–0.9)
MONOCYTES NFR BLD AUTO: 11.3 % (ref 5–12)
NEUTROPHILS # BLD AUTO: 3.4 10*3/MM3 (ref 1.7–7)
NEUTROPHILS NFR BLD AUTO: 61.9 % (ref 42.7–76)
NRBC BLD AUTO-RTO: 0 /100 WBC (ref 0–0.2)
PLATELET # BLD AUTO: 532 10*3/MM3 (ref 140–450)
POTASSIUM SERPL-SCNC: 4.6 MMOL/L (ref 3.5–5.2)
PROT SERPL-MCNC: 5.9 G/DL (ref 6–8.5)
RBC # BLD AUTO: 3.51 10*6/MM3 (ref 4.14–5.8)
SODIUM SERPL-SCNC: 140 MMOL/L (ref 136–145)
TIBC SERPL-MCNC: 414 MCG/DL
TRIGL SERPL-MCNC: 69 MG/DL (ref 0–150)
TSH SERPL DL<=0.005 MIU/L-ACNC: 1.55 UIU/ML (ref 0.27–4.2)
UIBC SERPL-MCNC: 381 MCG/DL (ref 112–346)
VLDLC SERPL CALC-MCNC: 12 MG/DL (ref 5–40)
WBC # BLD AUTO: 5.49 10*3/MM3 (ref 3.4–10.8)

## 2023-07-31 ENCOUNTER — TELEPHONE (OUTPATIENT)
Dept: GASTROENTEROLOGY | Facility: CLINIC | Age: 43
End: 2023-07-31
Payer: COMMERCIAL

## 2023-07-31 DIAGNOSIS — D62 ACUTE BLOOD LOSS ANEMIA: Primary | ICD-10-CM

## 2023-07-31 NOTE — TELEPHONE ENCOUNTER
PT IS  NEEDING TO SCHEDULE A CAPSULE STUDY WITH DR DEGROOT.  HE WOULD LIKE A CALL BACK.[phone]

## 2023-08-01 ENCOUNTER — READMISSION MANAGEMENT (OUTPATIENT)
Dept: CALL CENTER | Facility: HOSPITAL | Age: 43
End: 2023-08-01
Payer: COMMERCIAL

## 2023-08-02 ENCOUNTER — TELEPHONE (OUTPATIENT)
Dept: INTERNAL MEDICINE | Facility: CLINIC | Age: 43
End: 2023-08-02
Payer: COMMERCIAL

## 2023-08-07 ENCOUNTER — TELEPHONE (OUTPATIENT)
Dept: GASTROENTEROLOGY | Facility: CLINIC | Age: 43
End: 2023-08-07
Payer: COMMERCIAL

## 2023-08-07 NOTE — TELEPHONE ENCOUNTER
PT HAD TO CANCEL HIS CAPSULE THIS MORNING 8/7.  HE NEEDS TO BE RESCHEDULED.  HE WOULD LIKE A CALL BACK.  [phone]

## 2023-08-08 NOTE — TELEPHONE ENCOUNTER
Hub staff attempted to follow warm transfer process and was unsuccessful     Caller: Radha Noriega    Relationship to patient: Self    Best call back number: [phone]     Patient is needing: RESCHEDULED FOR CAPSULE STUDY. SCHEDULING CALLING OFFICE TO RESCHEDULE FOR PT. UNABLE TO WARM TRANSFER.

## 2023-08-09 ENCOUNTER — TELEPHONE (OUTPATIENT)
Dept: GASTROENTEROLOGY | Facility: CLINIC | Age: 43
End: 2023-08-09
Payer: COMMERCIAL

## 2023-08-09 NOTE — TELEPHONE ENCOUNTER
PT HAD TO CANCEL HIS CAPSULE ON 8/6 AND HE NEEDS TO RESCHEDULE.  WILL YOU CALL HIM BACK AND SCHEDULE.

## 2023-08-10 NOTE — TELEPHONE ENCOUNTER
Caller: Radha Noriega    Relationship to patient: Self    Best call back number: [phone]    Patient is needing: PATIENT CALLED IN, HE SAID HE HAS BEEN TRYING THE LAST FEW DAYS TO RESCHEDULE HIS CAPSULE STUDY AND HASN'T RECEIVED A CALL BACK. HE IS REQUESTING A CALL BACK AS SOON AS POSSIBLE PLEASE. YOU CAN LEAVE HIM A DETAILED MESSAGE IF YOU NEED TO.

## 2023-08-17 NOTE — TELEPHONE ENCOUNTER
Hub staff attempted to follow warm transfer process and was unsuccessful     Caller: Radha Noriega    Relationship to patient: Self    Best call back number: [phone]    Patient is needing: PATIENT CALLED IN, HE SAID HE HAS BEEN TRYING THE LAST FEW DAYS TO RESCHEDULE HIS CAPSULE STUDY AND HASN'T RECEIVED A CALL BACK. HE IS REQUESTING A CALL BACK AS SOON AS POSSIBLE PLEASE.

## 2023-08-22 NOTE — TELEPHONE ENCOUNTER
Hub staff attempted to follow warm transfer process and was unsuccessful     Caller: Radha Noriega    Relationship to patient: Self    Best call back number: [phone]    Patient is needing: PATIENT HAS BEEN TRYING FOR 3 WEEKS NOW TO GET THIS RESCHEDULED AND HAS NOT HEARD ANYTHING BACK. PLEASE CALL BACK ASAP.

## 2023-08-24 ENCOUNTER — HOSPITAL ENCOUNTER (OUTPATIENT)
Facility: HOSPITAL | Age: 43
Setting detail: OBSERVATION
Discharge: HOME | End: 2023-08-25
Attending: EMERGENCY MEDICINE | Admitting: HOSPITALIST
Payer: COMMERCIAL

## 2023-08-24 ENCOUNTER — APPOINTMENT (OUTPATIENT)
Dept: GENERAL RADIOLOGY | Facility: HOSPITAL | Age: 43
End: 2023-08-24
Payer: COMMERCIAL

## 2023-08-24 ENCOUNTER — APPOINTMENT (OUTPATIENT)
Dept: CARDIOLOGY | Facility: HOSPITAL | Age: 43
End: 2023-08-24
Payer: COMMERCIAL

## 2023-08-24 DIAGNOSIS — I80.8 SUPERFICIAL THROMBOPHLEBITIS OF LEFT UPPER EXTREMITY: ICD-10-CM

## 2023-08-24 DIAGNOSIS — I82.622 ARM DVT (DEEP VENOUS THROMBOEMBOLISM), ACUTE, LEFT: Primary | ICD-10-CM

## 2023-08-24 DIAGNOSIS — D62 ACUTE BLOOD LOSS ANEMIA: ICD-10-CM

## 2023-08-24 PROBLEM — T82.9XXA COMPLICATION ASSOCIATED WITH PERIPHERALLY INSERTED CENTRAL CATHETER (PICC): Status: ACTIVE | Noted: 2023-08-24

## 2023-08-24 LAB
ALBUMIN SERPL-MCNC: 4.1 G/DL (ref 3.5–5.2)
ALBUMIN/GLOB SERPL: 1.4 G/DL
ALP SERPL-CCNC: 95 U/L (ref 39–117)
ALT SERPL W P-5'-P-CCNC: 10 U/L (ref 1–41)
ANION GAP SERPL CALCULATED.3IONS-SCNC: 11 MMOL/L (ref 5–15)
AST SERPL-CCNC: 11 U/L (ref 1–40)
BASOPHILS # BLD AUTO: 0.05 10*3/MM3 (ref 0–0.2)
BASOPHILS NFR BLD AUTO: 0.5 % (ref 0–1.5)
BH CV UPPER VENOUS LEFT AXILLARY COLOR: 1
BH CV UPPER VENOUS LEFT AXILLARY COMPRESS: NORMAL
BH CV UPPER VENOUS LEFT AXILLARY PHASIC: NORMAL
BH CV UPPER VENOUS LEFT AXILLARY SPONT: NORMAL
BH CV UPPER VENOUS LEFT AXILLARY THROMBUS: NORMAL
BH CV UPPER VENOUS LEFT BASILIC FOREARM COMPRESS: NORMAL
BH CV UPPER VENOUS LEFT BASILIC UPPER COLOR: 1
BH CV UPPER VENOUS LEFT BASILIC UPPER COMPRESS: NORMAL
BH CV UPPER VENOUS LEFT BASILIC UPPER THROMBUS: NORMAL
BH CV UPPER VENOUS LEFT BRACHIAL COLOR: 1
BH CV UPPER VENOUS LEFT BRACHIAL COMPRESS: NORMAL
BH CV UPPER VENOUS LEFT BRACHIAL THROMBUS: NORMAL
BH CV UPPER VENOUS LEFT CEPHALIC FOREARM COLOR: 1
BH CV UPPER VENOUS LEFT CEPHALIC FOREARM COMPRESS: NORMAL
BH CV UPPER VENOUS LEFT CEPHALIC FOREARM THROMBUS: NORMAL
BH CV UPPER VENOUS LEFT CEPHALIC UPPER COMPRESS: NORMAL
BH CV UPPER VENOUS LEFT INTERNAL JUGULAR COMPRESS: NORMAL
BH CV UPPER VENOUS LEFT INTERNAL JUGULAR PHASIC: NORMAL
BH CV UPPER VENOUS LEFT INTERNAL JUGULAR SPONT: NORMAL
BH CV UPPER VENOUS LEFT RADIAL COMPRESS: NORMAL
BH CV UPPER VENOUS LEFT SUBCLAVIAN COLOR: 1
BH CV UPPER VENOUS LEFT SUBCLAVIAN COMPRESS: NORMAL
BH CV UPPER VENOUS LEFT SUBCLAVIAN PHASIC: NORMAL
BH CV UPPER VENOUS LEFT SUBCLAVIAN SPONT: NORMAL
BH CV UPPER VENOUS LEFT SUBCLAVIAN THROMBUS: NORMAL
BH CV UPPER VENOUS LEFT ULNAR COMPRESS: NORMAL
BH CV UPPER VENOUS RIGHT INTERNAL JUGULAR COMPRESS: NORMAL
BH CV UPPER VENOUS RIGHT INTERNAL JUGULAR PHASIC: NORMAL
BH CV UPPER VENOUS RIGHT INTERNAL JUGULAR SPONT: NORMAL
BH CV UPPER VENOUS RIGHT SUBCLAVIAN COMPRESS: NORMAL
BH CV UPPER VENOUS RIGHT SUBCLAVIAN PHASIC: NORMAL
BH CV UPPER VENOUS RIGHT SUBCLAVIAN SPONT: NORMAL
BH CV VAS PRELIMINARY FINDINGS SCRIPTING: 1
BILIRUB SERPL-MCNC: <0.2 MG/DL (ref 0–1.2)
BUN SERPL-MCNC: 10 MG/DL (ref 6–20)
BUN/CREAT SERPL: 8.1 (ref 7–25)
CALCIUM SPEC-SCNC: 9.2 MG/DL (ref 8.6–10.5)
CHLORIDE SERPL-SCNC: 106 MMOL/L (ref 98–107)
CO2 SERPL-SCNC: 21 MMOL/L (ref 22–29)
CREAT SERPL-MCNC: 1.23 MG/DL (ref 0.76–1.27)
DEPRECATED RDW RBC AUTO: 41.3 FL (ref 37–54)
EGFRCR SERPLBLD CKD-EPI 2021: 75.2 ML/MIN/1.73
EOSINOPHIL # BLD AUTO: 0.15 10*3/MM3 (ref 0–0.4)
EOSINOPHIL NFR BLD AUTO: 1.4 % (ref 0.3–6.2)
ERYTHROCYTE [DISTWIDTH] IN BLOOD BY AUTOMATED COUNT: 13.7 % (ref 12.3–15.4)
GLOBULIN UR ELPH-MCNC: 3 GM/DL
GLUCOSE SERPL-MCNC: 121 MG/DL (ref 65–99)
HCT VFR BLD AUTO: 35.6 % (ref 37.5–51)
HGB BLD-MCNC: 12 G/DL (ref 13–17.7)
IMM GRANULOCYTES # BLD AUTO: 0.04 10*3/MM3 (ref 0–0.05)
IMM GRANULOCYTES NFR BLD AUTO: 0.4 % (ref 0–0.5)
INR PPP: 1.07 (ref 0.9–1.1)
LYMPHOCYTES # BLD AUTO: 2.02 10*3/MM3 (ref 0.7–3.1)
LYMPHOCYTES NFR BLD AUTO: 19.3 % (ref 19.6–45.3)
MCH RBC QN AUTO: 28.3 PG (ref 26.6–33)
MCHC RBC AUTO-ENTMCNC: 33.7 G/DL (ref 31.5–35.7)
MCV RBC AUTO: 84 FL (ref 79–97)
MONOCYTES # BLD AUTO: 1.16 10*3/MM3 (ref 0.1–0.9)
MONOCYTES NFR BLD AUTO: 11.1 % (ref 5–12)
NEUTROPHILS NFR BLD AUTO: 67.3 % (ref 42.7–76)
NEUTROPHILS NFR BLD AUTO: 7.07 10*3/MM3 (ref 1.7–7)
NRBC BLD AUTO-RTO: 0 /100 WBC (ref 0–0.2)
PLATELET # BLD AUTO: 295 10*3/MM3 (ref 140–450)
PMV BLD AUTO: 10.7 FL (ref 6–12)
POTASSIUM SERPL-SCNC: 3.8 MMOL/L (ref 3.5–5.2)
PROT SERPL-MCNC: 7.1 G/DL (ref 6–8.5)
PROTHROMBIN TIME: 14 SECONDS (ref 11.7–14.2)
RBC # BLD AUTO: 4.24 10*6/MM3 (ref 4.14–5.8)
SODIUM SERPL-SCNC: 138 MMOL/L (ref 136–145)
WBC NRBC COR # BLD: 10.49 10*3/MM3 (ref 3.4–10.8)

## 2023-08-24 PROCEDURE — 93971 EXTREMITY STUDY: CPT

## 2023-08-24 PROCEDURE — 96374 THER/PROPH/DIAG INJ IV PUSH: CPT

## 2023-08-24 PROCEDURE — 25010000002 ENOXAPARIN PER 10 MG: Performed by: NURSE PRACTITIONER

## 2023-08-24 PROCEDURE — G0378 HOSPITAL OBSERVATION PER HR: HCPCS

## 2023-08-24 PROCEDURE — 25010000002 MORPHINE PER 10 MG: Performed by: NURSE PRACTITIONER

## 2023-08-24 PROCEDURE — 99284 EMERGENCY DEPT VISIT MOD MDM: CPT

## 2023-08-24 PROCEDURE — 25010000002 ONDANSETRON PER 1 MG: Performed by: NURSE PRACTITIONER

## 2023-08-24 PROCEDURE — 63710000001 ONDANSETRON ODT 4 MG TABLET DISPERSIBLE: Performed by: NURSE PRACTITIONER

## 2023-08-24 PROCEDURE — 85610 PROTHROMBIN TIME: CPT | Performed by: NURSE PRACTITIONER

## 2023-08-24 PROCEDURE — 80053 COMPREHEN METABOLIC PANEL: CPT | Performed by: NURSE PRACTITIONER

## 2023-08-24 PROCEDURE — 96372 THER/PROPH/DIAG INJ SC/IM: CPT

## 2023-08-24 PROCEDURE — 71045 X-RAY EXAM CHEST 1 VIEW: CPT

## 2023-08-24 PROCEDURE — 85025 COMPLETE CBC W/AUTO DIFF WBC: CPT | Performed by: NURSE PRACTITIONER

## 2023-08-24 PROCEDURE — 96375 TX/PRO/DX INJ NEW DRUG ADDON: CPT

## 2023-08-24 RX ORDER — FERROUS SULFATE 325(65) MG
325 TABLET ORAL
Status: DISCONTINUED | OUTPATIENT
Start: 2023-08-25 | End: 2023-08-25 | Stop reason: HOSPADM

## 2023-08-24 RX ORDER — NITROGLYCERIN 0.4 MG/1
0.4 TABLET SUBLINGUAL
Status: DISCONTINUED | OUTPATIENT
Start: 2023-08-24 | End: 2023-08-25 | Stop reason: HOSPADM

## 2023-08-24 RX ORDER — NICOTINE 21 MG/24HR
1 PATCH, TRANSDERMAL 24 HOURS TRANSDERMAL
Status: DISCONTINUED | OUTPATIENT
Start: 2023-08-25 | End: 2023-08-25 | Stop reason: HOSPADM

## 2023-08-24 RX ORDER — BISACODYL 5 MG/1
5 TABLET, DELAYED RELEASE ORAL DAILY PRN
Status: DISCONTINUED | OUTPATIENT
Start: 2023-08-24 | End: 2023-08-24

## 2023-08-24 RX ORDER — ACETAMINOPHEN 325 MG/1
650 TABLET ORAL EVERY 4 HOURS PRN
Status: DISCONTINUED | OUTPATIENT
Start: 2023-08-24 | End: 2023-08-25 | Stop reason: HOSPADM

## 2023-08-24 RX ORDER — AMOXICILLIN 250 MG
2 CAPSULE ORAL 2 TIMES DAILY
Status: DISCONTINUED | OUTPATIENT
Start: 2023-08-24 | End: 2023-08-25 | Stop reason: HOSPADM

## 2023-08-24 RX ORDER — POLYETHYLENE GLYCOL 3350 17 G/17G
17 POWDER, FOR SOLUTION ORAL DAILY PRN
Status: DISCONTINUED | OUTPATIENT
Start: 2023-08-24 | End: 2023-08-24

## 2023-08-24 RX ORDER — ONDANSETRON 2 MG/ML
4 INJECTION INTRAMUSCULAR; INTRAVENOUS ONCE
Status: COMPLETED | OUTPATIENT
Start: 2023-08-24 | End: 2023-08-24

## 2023-08-24 RX ORDER — SODIUM CHLORIDE 0.9 % (FLUSH) 0.9 %
10 SYRINGE (ML) INJECTION AS NEEDED
Status: DISCONTINUED | OUTPATIENT
Start: 2023-08-24 | End: 2023-08-24

## 2023-08-24 RX ORDER — ACETAMINOPHEN 650 MG/1
650 SUPPOSITORY RECTAL EVERY 4 HOURS PRN
Status: DISCONTINUED | OUTPATIENT
Start: 2023-08-24 | End: 2023-08-24

## 2023-08-24 RX ORDER — SODIUM CHLORIDE 9 MG/ML
40 INJECTION, SOLUTION INTRAVENOUS AS NEEDED
Status: DISCONTINUED | OUTPATIENT
Start: 2023-08-24 | End: 2023-08-25 | Stop reason: HOSPADM

## 2023-08-24 RX ORDER — OXYCODONE HYDROCHLORIDE AND ACETAMINOPHEN 5; 325 MG/1; MG/1
1 TABLET ORAL EVERY 4 HOURS PRN
Status: DISCONTINUED | OUTPATIENT
Start: 2023-08-24 | End: 2023-08-24

## 2023-08-24 RX ORDER — ONDANSETRON 4 MG/1
4 TABLET, ORALLY DISINTEGRATING ORAL ONCE
Status: COMPLETED | OUTPATIENT
Start: 2023-08-24 | End: 2023-08-24

## 2023-08-24 RX ORDER — ENOXAPARIN SODIUM 100 MG/ML
1 INJECTION SUBCUTANEOUS EVERY 12 HOURS
Status: DISCONTINUED | OUTPATIENT
Start: 2023-08-24 | End: 2023-08-25

## 2023-08-24 RX ORDER — BISACODYL 10 MG
10 SUPPOSITORY, RECTAL RECTAL DAILY PRN
Status: DISCONTINUED | OUTPATIENT
Start: 2023-08-24 | End: 2023-08-24

## 2023-08-24 RX ORDER — PANTOPRAZOLE SODIUM 40 MG/1
40 TABLET, DELAYED RELEASE ORAL
Status: DISCONTINUED | OUTPATIENT
Start: 2023-08-25 | End: 2023-08-25 | Stop reason: HOSPADM

## 2023-08-24 RX ORDER — HYDROCODONE BITARTRATE AND ACETAMINOPHEN 5; 325 MG/1; MG/1
1 TABLET ORAL EVERY 6 HOURS PRN
Status: DISCONTINUED | OUTPATIENT
Start: 2023-08-24 | End: 2023-08-25 | Stop reason: HOSPADM

## 2023-08-24 RX ORDER — ACETAMINOPHEN 160 MG/5ML
650 SOLUTION ORAL EVERY 4 HOURS PRN
Status: DISCONTINUED | OUTPATIENT
Start: 2023-08-24 | End: 2023-08-24

## 2023-08-24 RX ORDER — OXYCODONE HYDROCHLORIDE AND ACETAMINOPHEN 5; 325 MG/1; MG/1
1 TABLET ORAL ONCE
Status: COMPLETED | OUTPATIENT
Start: 2023-08-24 | End: 2023-08-24

## 2023-08-24 RX ORDER — SODIUM CHLORIDE 0.9 % (FLUSH) 0.9 %
10 SYRINGE (ML) INJECTION AS NEEDED
Status: DISCONTINUED | OUTPATIENT
Start: 2023-08-24 | End: 2023-08-25 | Stop reason: HOSPADM

## 2023-08-24 RX ORDER — SODIUM CHLORIDE 0.9 % (FLUSH) 0.9 %
10 SYRINGE (ML) INJECTION EVERY 12 HOURS SCHEDULED
Status: DISCONTINUED | OUTPATIENT
Start: 2023-08-24 | End: 2023-08-24

## 2023-08-24 RX ORDER — MORPHINE SULFATE 2 MG/ML
4 INJECTION, SOLUTION INTRAMUSCULAR; INTRAVENOUS ONCE
Status: COMPLETED | OUTPATIENT
Start: 2023-08-24 | End: 2023-08-24

## 2023-08-24 RX ORDER — ONDANSETRON 2 MG/ML
4 INJECTION INTRAMUSCULAR; INTRAVENOUS EVERY 6 HOURS PRN
Status: DISCONTINUED | OUTPATIENT
Start: 2023-08-24 | End: 2023-08-25 | Stop reason: HOSPADM

## 2023-08-24 RX ADMIN — SENNOSIDES AND DOCUSATE SODIUM 2 TABLET: 50; 8.6 TABLET ORAL at 22:06

## 2023-08-24 RX ADMIN — ONDANSETRON 4 MG: 4 TABLET, ORALLY DISINTEGRATING ORAL at 20:24

## 2023-08-24 RX ADMIN — Medication 10 ML: at 22:08

## 2023-08-24 RX ADMIN — ENOXAPARIN SODIUM 80 MG: 100 INJECTION SUBCUTANEOUS at 22:06

## 2023-08-24 RX ADMIN — OXYCODONE HYDROCHLORIDE AND ACETAMINOPHEN 1 TABLET: 5; 325 TABLET ORAL at 20:24

## 2023-08-24 RX ADMIN — ONDANSETRON 4 MG: 2 INJECTION INTRAMUSCULAR; INTRAVENOUS at 17:47

## 2023-08-24 RX ADMIN — MORPHINE SULFATE 4 MG: 2 INJECTION, SOLUTION INTRAMUSCULAR; INTRAVENOUS at 17:51

## 2023-08-24 NOTE — ED TRIAGE NOTES
Pt via PV from home with c/o LUE pain and swelling; PICC removed end of July, radial pulse palpable.

## 2023-08-24 NOTE — ED PROVIDER NOTES
EMERGENCY DEPARTMENT ENCOUNTER    Room Number:  27/27  PCP: Hellen Skelton APRN  Historian: patient      HPI:  Chief Complaint: L arm swelling  A complete HPI/ROS/PMH/PSH/SH/FH are unobtainable due to: nothing  Context: Radha Noriega V is a very pleasant afebrile 43 y.o. black male who presents to the ED c/o L arm swelling    Had a PICC while in the hospital for GI bleeding, dc'd around mid july  L arm has had generalized swelling  He is having pain for the past 2-3d  No fever or chills  States he has a mild dry cough, no soa    No more rectal bleeding since d'c from the hosital      PAST MEDICAL HISTORY  Active Ambulatory Problems     Diagnosis Date Noted    Gastrointestinal hemorrhage with melena 06/23/2023    ETOH abuse 06/23/2023    Migraine 02/09/2021    Current every day smoker 06/23/2023    Tachycardia 06/23/2023    Acute blood loss anemia 06/23/2023    Family history of diabetes mellitus 06/29/2023    Iron deficiency anemia 07/08/2023    ABLA (acute blood loss anemia) 07/12/2023    Sepsis 07/13/2023    Metabolic acidosis 07/14/2023    Enteritis of possible infectious origin 07/14/2023    Blood per rectum 07/12/2023     Resolved Ambulatory Problems     Diagnosis Date Noted    GI bleed 07/07/2023     Past Medical History:   Diagnosis Date    Anemia     Tobacco dependence          PAST SURGICAL HISTORY  Past Surgical History:   Procedure Laterality Date    APPENDECTOMY      COLONOSCOPY N/A 06/25/2023    Procedure: COLONOSCOPY TO CECUM AND TERMINAL ILEUM WITH BIOPSIES AND COLD BIOPSY POLYPECTOMIES;  Surgeon: Imtiaz Lee MD;  Location: [facility] ENDOSCOPY;  Service: Gastroenterology;  Laterality: N/A;  PRE- MELENA  POST- COLON POLYPS, HEMORRHOIDS    ENDOSCOPY Left 06/24/2023    Procedure: ESOPHAGOGASTRODUODENOSCOPY;  Surgeon: Imtiaz Lee MD;  Location: [facility] ENDOSCOPY;  Service: Gastroenterology;  Laterality: Left;  small hiatal hernia, esophagitis    ENDOSCOPY N/A 7/15/2023    Procedure:  ESOPHAGOGASTRODUODENOSCOPY;  Surgeon: Ermelinda Dixon MD;  Location: [facility] ENDOSCOPY;  Service: Gastroenterology;  Laterality: N/A;  pre-anemia  post-hiatal hernia    ENTEROSCOPY SMALL BOWEL N/A 7/9/2023    Procedure: ENTEROSCOPY SMALL BOWEL;  Surgeon: Isrrael Gibson MD;  Location: [facility] ENDOSCOPY;  Service: Gastroenterology;  Laterality: N/A;  PRE- GI BLEED  POST- NORMAL         FAMILY HISTORY  Family History   Problem Relation Age of Onset    Diabetes Father     Diabetes Sister     Hypertension Sister     Diabetes Brother     Diabetes Maternal Grandmother     Diabetes Maternal Grandfather     Diabetes Paternal Grandmother     Diabetes Paternal Grandfather     Heart attack Maternal Aunt 73    Colon cancer Neg Hx     Prostate cancer Neg Hx          SOCIAL HISTORY  Social History     Socioeconomic History    Marital status:    Tobacco Use    Smoking status: Every Day     Packs/day: 1.00     Years: 20.00     Pack years: 20.00     Types: Cigarettes    Smokeless tobacco: Never   Vaping Use    Vaping Use: Never used   Substance and Sexual Activity    Alcohol use: Not Currently     Comment: one pint to one fifth of marielos a day    Drug use: Yes     Types: Marijuana     Comment: daily.    Sexual activity: Yes     Partners: Female         ALLERGIES  Patient has no known allergies.        REVIEW OF SYSTEMS  Review of Systems   Gastrointestinal:  Negative for blood in stool.   Musculoskeletal:  Positive for myalgias.          PHYSICAL EXAM  ED Triage Vitals   Temp Heart Rate Resp BP SpO2   08/24/23 1703 08/24/23 1703 08/24/23 1703 08/24/23 1708 08/24/23 1703   99.2 °F (37.3 °C) 95 18 125/78 100 %      Temp src Heart Rate Source Patient Position BP Location FiO2 (%)   08/24/23 1703 08/24/23 1703 -- -- --   Tympanic Monitor          Physical Exam      GENERAL: Alert and oriented x4, no acute distress  HENT: nares patent mucous membranes are moist and intact  EYES: no scleral icterus, no injection  CV: regular rhythm,  normal rate no murmurs or peripheral edema  RESPIRATORY: normal effort, clear to all fields bilaterally, able to speak in full sentences without hint of distress  ABDOMEN: soft and nontender diffusely, no rebound or guarding, bowel sounds WNL  MUSCULOSKELETAL: There is diffuse fullness to patient's upper and lower arm without any focal tenderness to palpation, no erythema or warmth, mild diffuse tenderness to palpation, no crepitus, strong radial and ulnar pulses noted normal range of motion to extremity  NEURO: alert, moves all extremities, follows commands  PSYCH:  calm, cooperative  SKIN: warm, dry and intact    Vital signs and nursing notes reviewed.          LAB RESULTS  Recent Results (from the past 24 hour(s))   Protime-INR    Collection Time: 08/24/23  5:41 PM    Specimen: Blood   Result Value Ref Range    Protime 14.0 11.7 - 14.2 Seconds    INR 1.07 0.90 - 1.10   Comprehensive Metabolic Panel    Collection Time: 08/24/23  5:41 PM    Specimen: Blood   Result Value Ref Range    Glucose 121 (H) 65 - 99 mg/dL    BUN 10 6 - 20 mg/dL    Creatinine 1.23 0.76 - 1.27 mg/dL    Sodium 138 136 - 145 mmol/L    Potassium 3.8 3.5 - 5.2 mmol/L    Chloride 106 98 - 107 mmol/L    CO2 21.0 (L) 22.0 - 29.0 mmol/L    Calcium 9.2 8.6 - 10.5 mg/dL    Total Protein 7.1 6.0 - 8.5 g/dL    Albumin 4.1 3.5 - 5.2 g/dL    ALT (SGPT) 10 1 - 41 U/L    AST (SGOT) 11 1 - 40 U/L    Alkaline Phosphatase 95 39 - 117 U/L    Total Bilirubin <0.2 0.0 - 1.2 mg/dL    Globulin 3.0 gm/dL    A/G Ratio 1.4 g/dL    BUN/Creatinine Ratio 8.1 7.0 - 25.0    Anion Gap 11.0 5.0 - 15.0 mmol/L    eGFR 75.2 >60.0 mL/min/1.73   CBC Auto Differential    Collection Time: 08/24/23  5:41 PM    Specimen: Blood   Result Value Ref Range    WBC 10.49 3.40 - 10.80 10*3/mm3    RBC 4.24 4.14 - 5.80 10*6/mm3    Hemoglobin 12.0 (L) 13.0 - 17.7 g/dL    Hematocrit 35.6 (L) 37.5 - 51.0 %    MCV 84.0 79.0 - 97.0 fL    MCH 28.3 26.6 - 33.0 pg    MCHC 33.7 31.5 - 35.7 g/dL    RDW  13.7 12.3 - 15.4 %    RDW-SD 41.3 37.0 - 54.0 fl    MPV 10.7 6.0 - 12.0 fL    Platelets 295 140 - 450 10*3/mm3    Neutrophil % 67.3 42.7 - 76.0 %    Lymphocyte % 19.3 (L) 19.6 - 45.3 %    Monocyte % 11.1 5.0 - 12.0 %    Eosinophil % 1.4 0.3 - 6.2 %    Basophil % 0.5 0.0 - 1.5 %    Immature Grans % 0.4 0.0 - 0.5 %    Neutrophils, Absolute 7.07 (H) 1.70 - 7.00 10*3/mm3    Lymphocytes, Absolute 2.02 0.70 - 3.10 10*3/mm3    Monocytes, Absolute 1.16 (H) 0.10 - 0.90 10*3/mm3    Eosinophils, Absolute 0.15 0.00 - 0.40 10*3/mm3    Basophils, Absolute 0.05 0.00 - 0.20 10*3/mm3    Immature Grans, Absolute 0.04 0.00 - 0.05 10*3/mm3    nRBC 0.0 0.0 - 0.2 /100 WBC   Duplex Venous Upper Extremity - Left CAR    Collection Time: 08/24/23  7:41 PM   Result Value Ref Range    Left Subclavian Color 1.0     Left Axillary Color 1.0     Left Brachial Color 1.0     Left Basilic Upper Color 1.0     Left Cephalic Forearm Color 1.0     Right Internal Jugular Spont Y     Right Internal Jugular Phasic Y     Right Internal Jugular Compress C     Right Subclavian Spont Y     Right Subclavian Phasic Y     Right Subclavian Compress C     Left Internal Jugular Spont Y     Left Internal Jugular Phasic Y     Left Internal Jugular Compress C     Left Subclavian Spont N     Left Subclavian Phasic N     Left Subclavian Compress N     Left Subclavian Thrombus A     Left Axillary Spont N     Left Axillary Phasic N     Left Axillary Compress N     Left Axillary Thrombus A     Left Brachial Compress N     Left Brachial Thrombus A     Left Radial Compress C     Left Ulnar Compress C     Left Basilic Upper Compress N     Left Basilic Upper Thrombus A     Left Basilic Forearm Compress C     Left Cephalic Upper Compress C     Left Cephalic Forearm Compress N     Left Cephalic Forearm Thrombus A     BH CV VAS PRELIMINARY FINDINGS SCRIPTING 1.0        Ordered the above labs and reviewed the results.        RADIOLOGY  XR Chest 1 View    Result Date: 8/24/2023  XR  CHEST 1 VW-  HISTORY: Male who is 43 years-old, cough  TECHNIQUE: Frontal view of the chest  COMPARISON: 7/12/2023  FINDINGS: Heart, mediastinum and pulmonary vasculature are unremarkable. No focal pulmonary consolidation, pleural effusion, or pneumothorax. No acute osseous process.      No focal pulmonary consolidation. Follow-up as clinical indications persist.  This report was finalized on 8/24/2023 5:57 PM by Dr. Brice Velasco M.D.      Duplex Venous Upper Extremity - Left CAR    Result Date: 8/24/2023    Acute left upper extremity deep vein thrombosis noted in the subclavian, axillary and brachial.   Acute left upper extremity superficial thrombophlebitis noted in the basilic (upper arm) and cephalic (forearm).   All other left sided vessels appear normal.      Ordered the above noted radiological studies. Reviewed by me in PACS.            PROCEDURES  Procedures          MEDICATIONS GIVEN IN ER  Medications - No data to display                MEDICAL DECISION MAKING, PROGRESS, and CONSULTS    All labs have been independently reviewed by me.  All radiology studies have been reviewed by me and I have also reviewed the radiology report.   EKG's independently viewed and interpreted by me.  Discussion below represents my analysis of pertinent findings related to patient's condition, differential diagnosis, treatment plan and final disposition.      Additional sources:  - Discussed/ obtained information from independent historians:  hx obtained from pt    - External (non-ED) record review: 7/12/23 hospitalization from Dr. Ayala for acute blood loss anemia,Patient was found to have small bowel bleed and had embolization x2.     - Chronic or social conditions impacting care: pt has recent gi bleed which will complicate his DVT mgmt    - Shared decision making:  discussed test results and treatment plan, agreeable to admission      Orders placed during this visit:  No orders of the defined types were placed in  this encounter.        Additional orders considered but not ordered:  I considered a CTA patient's chest but given no hypoxia, shortness of breath or chest pain I do not think it would add much to his clinical picture at this time        Differential diagnosis includes but is not limited to:    DVT, superficial thrombophlebitis, cellulitis      Independent interpretation of labs, radiology studies, and discussions with consultants:  ED Course as of 08/24/23 2022   Thu Aug 24, 2023   1945 Luz vasc + occlusive dvt from subclavian to brachial, and also superficial thrombophlebitis []   2000 Phone call with aprn on call lha.  Discussed the patient, relevant history, exam, diagnostics, ED findings/progress, and concerns. They agree to admit to dr mckee to a tele obs bed, will decide what anticoagulation they want to use and will order it given they are familiar with his case and will manage his anticoagulation, plan to consult hematology after discussing case with her attending []   2018 XR Chest 1 View  Per my independent interpretation is no pneumothorax, no focal infiltrate []      ED Course User Index  [AH] Daniela Weiner, CHETNA             I have worn appropriate PPE during this patient encounter, sanitized my hands both with entering and exiting patient's room.      DIAGNOSIS  Final diagnoses:   Arm DVT (deep venous thromboembolism), acute, left   Superficial thrombophlebitis of left upper extremity         DISPOSITION  Admitted to medicine            Latest Documented Vital Signs:  As of 17:12 EDT  BP- 125/78 HR- 95 Temp- 99.2 °F (37.3 °C) (Tympanic) O2 sat- 100%              --    Please note that portions of this were completed with a voice recognition program.       Note Disclaimer: At [facility], we believe that sharing information builds trust and better relationships. You are receiving this note because you are receiving care at [facility] or recently visited. It is possible you will see [facility]  information before a provider has talked with you about it. This kind of information can be easy to misunderstand. To help you fully understand what it means for your health, we urge you to discuss this note with your provider.             Daniela Weiner APRN  08/24/23 2024

## 2023-08-24 NOTE — ED PROVIDER NOTES
MD ATTESTATION NOTE    The RUT and I have discussed this patient's history, physical exam, and treatment plan.  I have reviewed the documentation and personally had a face to face interaction with the patient. I affirm the documentation and agree with the treatment and plan.  The attached note describes my personal findings.      I provided a substantive portion of the care of the patient.  I personally performed the physical exam in its entirety, and below are my findings.      Brief HPI: Patient presents to the ED complaining of left arm pain and swelling for the past few days.  Patient had a PICC line in his left arm which was removed several weeks ago.  Denies chest pain, shortness of breath, fever, skin redness, or numbness/tingling/weakness in his left arm.    PHYSICAL EXAM  ED Triage Vitals   Temp Heart Rate Resp BP SpO2   08/24/23 1703 08/24/23 1703 08/24/23 1703 08/24/23 1708 08/24/23 1703   99.2 °F (37.3 °C) 95 18 125/78 100 %      Temp src Heart Rate Source Patient Position BP Location FiO2 (%)   08/24/23 1703 08/24/23 1703 -- -- --   Tympanic Monitor            GENERAL: Awake, alert, oriented x3.  Well-developed, well-nourished male.  Resting comfortably no acute distress  HENT: nares patent  EYES: no scleral icterus  CV: regular rhythm, normal rate, normal left radial pulse  RESPIRATORY: normal effort, clear to auscultation bilaterally  ABDOMEN: soft, nontender  MUSCULOSKELETAL: There is swelling of the left forearm and left upper arm.  There is mild tenderness of the left arm.  NEURO: Normal strength and light touch sensation in the left upper extremity  PSYCH:  calm, cooperative  SKIN: No erythema or warmth of the skin on the left arm    Vital signs and nursing notes reviewed.        Plan: Obtain labs, chest x-ray, and duplex ultrasound of the left arm    Doppler ultrasound showed an occlusive DVT from the subclavian to the brachial vein.  Case was discussed with the hospitalist and he will be  admitted.  They do not want the patient started on anticoagulation while in the ED given his recent GI bleed.    ED Course as of 08/24/23 2045   Thu Aug 24, 2023   1945 Luz vasc + occlusive dvt from subclavian to brachial, and also superficial thrombophlebitis []   2000 Phone call with araseli on call lha.  Discussed the patient, relevant history, exam, diagnostics, ED findings/progress, and concerns. They agree to admit to dr mckee to a tele obs bed, will decide what anticoagulation they want to use and will order it given they are familiar with his case and will manage his anticoagulation, plan to consult hematology after discussing case with her attending [AH]   2018 XR Chest 1 View  Per my independent interpretation is no pneumothorax, no focal infiltrate []      ED Course User Index  [AH] Daniela Weiner APRN Holland, William D, MD  08/24/23 2046

## 2023-08-25 ENCOUNTER — READMISSION MANAGEMENT (OUTPATIENT)
Dept: CALL CENTER | Facility: HOSPITAL | Age: 43
End: 2023-08-25
Payer: COMMERCIAL

## 2023-08-25 VITALS
BODY MASS INDEX: 26.1 KG/M2 | HEART RATE: 69 BPM | RESPIRATION RATE: 18 BRPM | DIASTOLIC BLOOD PRESSURE: 86 MMHG | OXYGEN SATURATION: 100 % | TEMPERATURE: 98.2 F | HEIGHT: 71 IN | SYSTOLIC BLOOD PRESSURE: 136 MMHG | WEIGHT: 186.4 LBS

## 2023-08-25 LAB
ANION GAP SERPL CALCULATED.3IONS-SCNC: 9.7 MMOL/L (ref 5–15)
BUN SERPL-MCNC: 7 MG/DL (ref 6–20)
BUN/CREAT SERPL: 6 (ref 7–25)
CALCIUM SPEC-SCNC: 9.1 MG/DL (ref 8.6–10.5)
CHLORIDE SERPL-SCNC: 103 MMOL/L (ref 98–107)
CO2 SERPL-SCNC: 23.3 MMOL/L (ref 22–29)
CREAT SERPL-MCNC: 1.16 MG/DL (ref 0.76–1.27)
DEPRECATED RDW RBC AUTO: 40.4 FL (ref 37–54)
EGFRCR SERPLBLD CKD-EPI 2021: 80.6 ML/MIN/1.73
ERYTHROCYTE [DISTWIDTH] IN BLOOD BY AUTOMATED COUNT: 13.1 % (ref 12.3–15.4)
GLUCOSE SERPL-MCNC: 92 MG/DL (ref 65–99)
HCT VFR BLD AUTO: 37.3 % (ref 37.5–51)
HGB BLD-MCNC: 12.1 G/DL (ref 13–17.7)
MCH RBC QN AUTO: 27.4 PG (ref 26.6–33)
MCHC RBC AUTO-ENTMCNC: 32.4 G/DL (ref 31.5–35.7)
MCV RBC AUTO: 84.4 FL (ref 79–97)
PLATELET # BLD AUTO: 295 10*3/MM3 (ref 140–450)
PMV BLD AUTO: 11 FL (ref 6–12)
POTASSIUM SERPL-SCNC: 4 MMOL/L (ref 3.5–5.2)
RBC # BLD AUTO: 4.42 10*6/MM3 (ref 4.14–5.8)
SODIUM SERPL-SCNC: 136 MMOL/L (ref 136–145)
WBC NRBC COR # BLD: 9.31 10*3/MM3 (ref 3.4–10.8)

## 2023-08-25 PROCEDURE — 85027 COMPLETE CBC AUTOMATED: CPT | Performed by: NURSE PRACTITIONER

## 2023-08-25 PROCEDURE — 96372 THER/PROPH/DIAG INJ SC/IM: CPT

## 2023-08-25 PROCEDURE — G0378 HOSPITAL OBSERVATION PER HR: HCPCS

## 2023-08-25 PROCEDURE — 99213 OFFICE O/P EST LOW 20 MIN: CPT | Performed by: PHYSICIAN ASSISTANT

## 2023-08-25 PROCEDURE — 36415 COLL VENOUS BLD VENIPUNCTURE: CPT | Performed by: NURSE PRACTITIONER

## 2023-08-25 PROCEDURE — 25010000002 ENOXAPARIN PER 10 MG: Performed by: NURSE PRACTITIONER

## 2023-08-25 PROCEDURE — 80048 BASIC METABOLIC PNL TOTAL CA: CPT | Performed by: NURSE PRACTITIONER

## 2023-08-25 RX ORDER — FOLIC ACID 1 MG/1
1 TABLET ORAL DAILY
COMMUNITY
End: 2023-08-30 | Stop reason: SDUPTHER

## 2023-08-25 RX ORDER — HYDROCODONE BITARTRATE AND ACETAMINOPHEN 5; 325 MG/1; MG/1
1 TABLET ORAL EVERY 6 HOURS PRN
Qty: 12 TABLET | Refills: 0 | Status: SHIPPED | OUTPATIENT
Start: 2023-08-25 | End: 2023-08-28

## 2023-08-25 RX ORDER — CALCIUM POLYCARBOPHIL 625 MG
625 TABLET ORAL DAILY
COMMUNITY
End: 2023-08-29

## 2023-08-25 RX ADMIN — PANTOPRAZOLE SODIUM 40 MG: 40 TABLET, DELAYED RELEASE ORAL at 06:04

## 2023-08-25 RX ADMIN — HYDROCODONE BITARTRATE AND ACETAMINOPHEN 1 TABLET: 5; 325 TABLET ORAL at 09:54

## 2023-08-25 RX ADMIN — ENOXAPARIN SODIUM 80 MG: 100 INJECTION SUBCUTANEOUS at 09:55

## 2023-08-25 RX ADMIN — SENNOSIDES AND DOCUSATE SODIUM 2 TABLET: 50; 8.6 TABLET ORAL at 09:55

## 2023-08-25 RX ADMIN — Medication 100 MG: at 09:55

## 2023-08-25 RX ADMIN — HYDROCODONE BITARTRATE AND ACETAMINOPHEN 1 TABLET: 5; 325 TABLET ORAL at 04:20

## 2023-08-25 RX ADMIN — HYDROCODONE BITARTRATE AND ACETAMINOPHEN 1 TABLET: 5; 325 TABLET ORAL at 16:52

## 2023-08-25 RX ADMIN — FERROUS SULFATE TAB 325 MG (65 MG ELEMENTAL FE) 325 MG: 325 (65 FE) TAB at 09:54

## 2023-08-25 NOTE — PLAN OF CARE
Goal Outcome Evaluation:         Patient is A&Ox4. On room air. Normal sinus on tele. Up ad ignacio. C/o pain to left upper arm. Swelling and tenderness noted. Noted relief with current pain regimen. No further complaints offered. No concerns noted.

## 2023-08-25 NOTE — NURSING NOTE
Patient discharged and IV removed and pt given discharge instructions pt informed per DR. Mccormack that verónica would be called to the [facility] on [address]

## 2023-08-25 NOTE — H&P
Patient Name:  Radha Noriega V  YOB: 1980  MRN:  [id number]  Admit Date:  8/24/2023  Patient Care Team:  Hellen Skelton APRN as PCP - General (Nurse Practitioner)      Subjective   History Present Illness     Chief Complaint   Patient presents with    Arm Swelling       History of Present Illness  Mr. Bajwa is a 43 year old male current smoker with a history of ETOH abuse, Tachycardia, MARCE, and GI bleeding.  He has had 3 hospitalizations recently for recurrent GI bleeding.  His last admission on July 12, 2023 until July 21, 2023 he received multiple blood transfusions, was ultimately found to have a small bowel bleed that required to coil embolization's.  During that hospitalization he had a left upper arm PICC line placed, due to poor vascular access and the need for notable blood transfusions, and plans for capsule study.  The PICC line was removed on the day of his discharge on July 21, 2023.  He states that he has been doing well, with no concerns current bleeding.  Reports over the last several days he has had progressive left upper extremity swelling, and severe 8/10 excruciating pain with no known exacerbating or eliminating factors.  He denies chest pain, palpitations, shortness of breath, lightheadedness, dizziness, fever, chills, abdominal pain, nausea, vomiting, diarrhea, constipation,  symptoms, or lower extremity swelling.  He also denies any change in activity, or recent or distant trauma.    Initial evaluation in the emergency department blood pressure 125/78, heart rate 95, respiratory rate 18, oxygen saturation 100% on room air, small grade fever with a Tmax 99.2.   Labs are mostly unremarkable his hemoglobin is 12.0, with hematocrit of 35.6, which is up from July 28 was hemoglobin was 10.1, and hematocrit of 30.9.  X-ray is unremarkable for any acute cardiopulmonary processes.  Duplex Doppler of left upper extremity is remarkable for acute deep vein thrombosis in the  subclavian axilla to the brachial.  She also acute left upper extremity superficial thrombophlebitis in basilic and cephalic arm.   He was administered 4 mg morphine, 1 5-3 25 oxycodone, 4 mg ondansetron while in the emergency department.    Upon examination patient is alert and oriented, and appears to be in no acute distress, lungs are diminished bilateral bases, heart rate and rhythm are normal, regular, his abdomen is soft round, nondistended, nontender, with audible bowel sounds throughout, his left upper extremity has significant swelling, is warm to touch.  He does have palpable brachial, and radial pulses.  There is no lower extremity edema, and palpable pedal pulses.    Patient will be admitted to the hospitalist group for further evaluation and treatment of acute DVT other acute and or chronic conditions as needed.         Review of Systems   HENT: Negative.     Eyes: Negative.    Respiratory: Negative.     Gastrointestinal: Negative.    Endocrine: Negative.    Genitourinary: Negative.    Musculoskeletal: Negative.    Skin:  Positive for color change.        Left upper arm erythema   Neurological: Negative.    Hematological: Negative.    Psychiatric/Behavioral: Negative.     All other systems reviewed and are negative.     Personal History     Past Medical History:   Diagnosis Date    Anemia     ETOH abuse     Tachycardia 06/23/2023    Tobacco dependence      Past Surgical History:   Procedure Laterality Date    APPENDECTOMY      COLONOSCOPY N/A 06/25/2023    Procedure: COLONOSCOPY TO CECUM AND TERMINAL ILEUM WITH BIOPSIES AND COLD BIOPSY POLYPECTOMIES;  Surgeon: Imtiaz Lee MD;  Location: [facility] ENDOSCOPY;  Service: Gastroenterology;  Laterality: N/A;  PRE- MELENA  POST- COLON POLYPS, HEMORRHOIDS    ENDOSCOPY Left 06/24/2023    Procedure: ESOPHAGOGASTRODUODENOSCOPY;  Surgeon: Imtiaz Lee MD;  Location: [facility] ENDOSCOPY;  Service: Gastroenterology;  Laterality: Left;  small hiatal hernia, esophagitis     ENDOSCOPY N/A 7/15/2023    Procedure: ESOPHAGOGASTRODUODENOSCOPY;  Surgeon: Ermelinda Dixon MD;  Location:  [facility] ENDOSCOPY;  Service: Gastroenterology;  Laterality: N/A;  pre-anemia  post-hiatal hernia    ENTEROSCOPY SMALL BOWEL N/A 7/9/2023    Procedure: ENTEROSCOPY SMALL BOWEL;  Surgeon: Isrrael Gibson MD;  Location:  [facility] ENDOSCOPY;  Service: Gastroenterology;  Laterality: N/A;  PRE- GI BLEED  POST- NORMAL     Family History   Problem Relation Age of Onset    Diabetes Father     Diabetes Sister     Hypertension Sister     Diabetes Brother     Diabetes Maternal Grandmother     Diabetes Maternal Grandfather     Diabetes Paternal Grandmother     Diabetes Paternal Grandfather     Heart attack Maternal Aunt 73    Colon cancer Neg Hx     Prostate cancer Neg Hx      Social History     Tobacco Use    Smoking status: Every Day     Packs/day: 1.00     Years: 20.00     Pack years: 20.00     Types: Cigarettes    Smokeless tobacco: Never   Vaping Use    Vaping Use: Never used   Substance Use Topics    Alcohol use: Not Currently     Comment: one pint to one fifth of marielos a day    Drug use: Yes     Types: Marijuana     Comment: daily.     No current facility-administered medications on file prior to encounter.     Current Outpatient Medications on File Prior to Encounter   Medication Sig Dispense Refill    ferrous sulfate (FerrouSul) 325 (65 FE) MG tablet Take 1 tablet by mouth Daily With Breakfast. 90 tablet 1    pantoprazole (PROTONIX) 40 MG EC tablet Take 1 tablet by mouth Every Morning. 90 tablet 1    thiamine (VITAMIN B1) 100 MG tablet Take 1 tablet by mouth Daily. 30 tablet 0     No Known Allergies    Objective    Objective     Vital Signs  Temp:  [97.9 °F (36.6 °C)-99.2 °F (37.3 °C)] 97.9 °F (36.6 °C)  Heart Rate:  [73-95] 73  Resp:  [18] 18  BP: (118-138)/(75-86) 135/86  SpO2:  [94 %-100 %] 100 %  on   ;   Device (Oxygen Therapy): room air  Body mass index is 26 kg/m².    Physical Exam  Vitals and nursing note  reviewed.   Constitutional:       General: He is awake.      Appearance: Normal appearance. He is well-developed.   HENT:      Mouth/Throat:      Mouth: Mucous membranes are moist.   Neck:      Vascular: No carotid bruit.   Cardiovascular:      Rate and Rhythm: Normal rate and regular rhythm.      Pulses:           Radial pulses are 2+ on the right side and 2+ on the left side.        Dorsalis pedis pulses are 2+ on the right side and 2+ on the left side.        Posterior tibial pulses are 2+ on the right side and 2+ on the left side.      Heart sounds: Normal heart sounds.   Pulmonary:      Effort: Pulmonary effort is normal.      Breath sounds: Normal breath sounds. Examination of the right-lower field reveals decreased breath sounds. Examination of the left-lower field reveals decreased breath sounds.   Abdominal:      General: Bowel sounds are normal.      Palpations: Abdomen is soft.   Musculoskeletal:         General: Swelling present.      Right upper arm: Normal.      Left upper arm: Swelling and tenderness present.      Right forearm: Normal.      Left forearm: Swelling and tenderness present.      Cervical back: Neck supple. No tenderness.      Right lower leg: No edema.      Left lower leg: No edema.   Skin:     General: Skin is warm and dry.      Capillary Refill: Capillary refill takes less than 2 seconds.      Findings: Erythema present.   Neurological:      Mental Status: He is alert and oriented to person, place, and time.   Psychiatric:         Mood and Affect: Mood normal.         Behavior: Behavior is cooperative.       Results Review:  I reviewed the patient's new clinical results.  I reviewed the patient's new imaging results and agree with the interpretation.  I reviewed the patient's other test results and agree with the interpretation  I personally viewed and interpreted the patient's EKG/Telemetry data  Discussed with ED provider.    Lab Results (last 24 hours)       Procedure Component  Value Units Date/Time    Protime-INR [[id number]]  (Normal) Collected: 08/24/23 1741    Specimen: Blood Updated: 08/24/23 1802     Protime 14.0 Seconds      INR 1.07    CBC & Differential [[id number]]  (Abnormal) Collected: 08/24/23 1741    Specimen: Blood Updated: 08/24/23 1802    Narrative:      The following orders were created for panel order CBC & Differential.  Procedure                               Abnormality         Status                     ---------                               -----------         ------                     CBC Auto Differential[[id number]]        Abnormal            Final result                 Please view results for these tests on the individual orders.    Comprehensive Metabolic Panel [[id number]]  (Abnormal) Collected: 08/24/23 1741    Specimen: Blood Updated: 08/24/23 1811     Glucose 121 mg/dL      BUN 10 mg/dL      Creatinine 1.23 mg/dL      Sodium 138 mmol/L      Potassium 3.8 mmol/L      Chloride 106 mmol/L      CO2 21.0 mmol/L      Calcium 9.2 mg/dL      Total Protein 7.1 g/dL      Albumin 4.1 g/dL      ALT (SGPT) 10 U/L      AST (SGOT) 11 U/L      Alkaline Phosphatase 95 U/L      Total Bilirubin <0.2 mg/dL      Globulin 3.0 gm/dL      A/G Ratio 1.4 g/dL      BUN/Creatinine Ratio 8.1     Anion Gap 11.0 mmol/L      eGFR 75.2 mL/min/1.73     Narrative:      GFR Normal >60  Chronic Kidney Disease <60  Kidney Failure <15      CBC Auto Differential [[id number]]  (Abnormal) Collected: 08/24/23 1741    Specimen: Blood Updated: 08/24/23 1802     WBC 10.49 10*3/mm3      RBC 4.24 10*6/mm3      Hemoglobin 12.0 g/dL      Hematocrit 35.6 %      MCV 84.0 fL      MCH 28.3 pg      MCHC 33.7 g/dL      RDW 13.7 %      RDW-SD 41.3 fl      MPV 10.7 fL      Platelets 295 10*3/mm3      Neutrophil % 67.3 %      Lymphocyte % 19.3 %      Monocyte % 11.1 %      Eosinophil % 1.4 %      Basophil % 0.5 %      Immature Grans % 0.4 %      Neutrophils, Absolute 7.07 10*3/mm3      Lymphocytes, Absolute 2.02  10*3/mm3      Monocytes, Absolute 1.16 10*3/mm3      Eosinophils, Absolute 0.15 10*3/mm3      Basophils, Absolute 0.05 10*3/mm3      Immature Grans, Absolute 0.04 10*3/mm3      nRBC 0.0 /100 WBC             Imaging Results (Last 24 Hours)       Procedure Component Value Units Date/Time    XR Chest 1 View [[id number]] Collected: 08/24/23 1756     Updated: 08/24/23 1800    Narrative:      XR CHEST 1 VW-     HISTORY: Male who is 43 years-old, cough     TECHNIQUE: Frontal view of the chest     COMPARISON: 7/12/2023     FINDINGS: Heart, mediastinum and pulmonary vasculature are unremarkable.  No focal pulmonary consolidation, pleural effusion, or pneumothorax. No  acute osseous process.       Impression:      No focal pulmonary consolidation. Follow-up as clinical  indications persist.     This report was finalized on 8/24/2023 5:57 PM by Dr. Brice Velasco M.D.                   No orders to display        Assessment/Plan     Active Hospital Problems    Diagnosis  POA    **Arm DVT (deep venous thromboembolism), acute, left [I82.622]  Yes    Iron deficiency anemia [D50.9]  Yes    Current every day smoker [F17.200]  Yes    ETOH abuse [F10.10]  Yes      Resolved Hospital Problems   No resolved problems to display.     Arm DVT (deep venous thromboembolism), acute, left  Acute  Positive venous Doppler left upper extremity  Had recent PICC line  Pharmacy consulted to dose Lovenox  Neurovascular checks per protocol  Continue Percocet and acetaminophen for pain management    Blood loss anemia  Chronic  No Current  bleeding  Has had multiple recent episodes of GI bleeding,  Followed by Dr. Lagos with gastroenterology  S/p Coil embolectomy (ileal artery injury) small bowel July 18 2023  Has not had any GI bleeding since, today his hemoglobin is 12.0 up from 10 on July 28, hematocrit 35.6 up from 30.9  Monitor H&H and transfuse as clinically appropriate      Current everyday smoker  Chronic  Smoking cessation counseled  provided  Nicotine patch ordered    ETOH abuse  Chronic-several days since last drink, has never experienced alcohol withdrawals before  Reports he has not had any alcohol for several days maybe even a week.  For withdrawal symptoms, and add CIWA if needed      I discussed the patient's findings and my recommendations with patient and family.    VTE Prophylaxis - Pharmacy to dose Lovenox.  Code Status - Full code.       CHETNA Johansen  [facility]  08/24/23  21:08 EDT

## 2023-08-25 NOTE — CASE MANAGEMENT/SOCIAL WORK
Case Management Discharge Note      Final Note: Home, no additional CCP needs.         Selected Continued Care - Admitted Since 8/24/2023       Destination    No services have been selected for the patient.                Durable Medical Equipment    No services have been selected for the patient.                Dialysis/Infusion    No services have been selected for the patient.                Home Medical Care    No services have been selected for the patient.                Therapy    No services have been selected for the patient.                Community Resources    No services have been selected for the patient.                Community & DME    No services have been selected for the patient.                         Final Discharge Disposition Code: 01 - home or self-care

## 2023-08-25 NOTE — PROGRESS NOTES
[facility] Clinical Pharmacy Services: Pharmacy Education - Direct Oral Anticoagulant - Apixaban    Radha Noriega V has been ordered apixaban for DVT.     Counseling points included the following:  Apixaban's indication, patient's need for the medication, and dosing/frequency of this medication.  Enforced the importance of taking their medication as instructed every day and the reason why the medication is dosed that way.  Explained possible side effects of anticoagulation therapy, including increased risk of bleeding, and s/sx of bleeding. Also talked about ways to control bleeding for minor cuts and scrapes.  Emphasized the importance of going to the emergency room if any of the following occur: Falling and hitting your head; noticing bright red blood in urine or dark/tarry stools; vomiting up blood or vomit has a coffee-ground like texture; coughing up blood.  Discussed the importance of informing any physician or dentist that they have been started on a DOAC, in case they need to be taken off for a procedure.  Discussed all important drug interactions, including over-the-counter medications and supplements.  Instructed the patient not to begin or discontinue any medications without informing his/her physician/pharmacist.     I also explained to the patient that this medication may have a high copay associated with it and to let the provider know if it is unaffordable.     Patient expressed understanding and had no further questions.      Neema Palafox, [facility]  Clinical Pharmacist

## 2023-08-25 NOTE — OUTREACH NOTE
Prep Survey      Flowsheet Row Responses   [facility] patient discharged from? [place]   Is LACE score < 7 ? Yes   Eligibility [facility]   Date of Admission 08/24/23   Date of Discharge 08/25/23   Discharge Disposition Home or Self Care   Discharge diagnosis Arm DVT (deep venous thromboembolism), acute, left   Does the patient have one of the following disease processes/diagnoses(primary or secondary)? Other   Does the patient have Home health ordered? No   Is there a DME ordered? No   Prep survey completed? Yes            [place] MCGINNIS - Registered Nurse

## 2023-08-25 NOTE — CONSULTS
[facility]  Initial Inpatient Consult Note    Referring Provider: ESPERANZA     Reason for Consultation: Follow-up GI bleed    Subjective     History of present illness:      Thank you for allowing us to participate in the care of this patient.  43 y.o. male known from previous recent admissions with recent GI bleeding in the small bowel and history of alcohol abuse. Unfortunately found to have acute left upper extremity DVT and superficial thrombophlebitis at admission yesterday likely associated with previous PICC line.  He will need to be started on Lovenox.  Our service consulted to determine if any recommendations are needed given his change in status and need to start anticoagulation.  His hemoglobin today is increased from previous hospitalization to 12.1.  He has not had any further episodes of GI bleeding recently.  He denies abdominal pain, nausea, vomiting, melena hematochezia, GERD, hep C, dysphagia, SOA, chest pain.    Underwent IR intervention with coiling to small bowel vessel in the ileum on 7/15/2023 and again on 7/18/2023 while admitted.  Plans for capsule as outpatient but has not been able to stay out of the hospital long enough to have this accomplished.    Most recent work-up for GI bleed includes:  7/9/2023 small bowel enteroscopy that was normal  7/8/2023 CT angiogram that was normal  6/25/2023 colonoscopy with nodular terminal ileum mucosa, small rectal polyps-removed, internal hemorrhoids.  Pathology with benign TI mucosa and hyperplastic polyps.  6/24/2023 EGD with grade a distal esophagitis and gastritis    He does continue to drink alcohol but has never experienced alcohol withdrawals before.  He does go several days between alcoholic drinks per patient.  He does smoke cigarettes.      Past Medical History:  Past Medical History:   Diagnosis Date    Anemia     ETOH abuse     Tachycardia 06/23/2023    Tobacco dependence      Past Surgical History:  Past Surgical  History:   Procedure Laterality Date    APPENDECTOMY      COLONOSCOPY N/A 06/25/2023    Procedure: COLONOSCOPY TO CECUM AND TERMINAL ILEUM WITH BIOPSIES AND COLD BIOPSY POLYPECTOMIES;  Surgeon: Imtiaz Lee MD;  Location:  [facility];  Service: Gastroenterology;  Laterality: N/A;  PRE- MELENA  POST- COLON POLYPS, HEMORRHOIDS    ENDOSCOPY Left 06/24/2023    Procedure: ESOPHAGOGASTRODUODENOSCOPY;  Surgeon: Imtiaz Lee MD;  Location: [facility] ENDOSCOPY;  Service: Gastroenterology;  Laterality: Left;  small hiatal hernia, esophagitis    ENDOSCOPY N/A 7/15/2023    Procedure: ESOPHAGOGASTRODUODENOSCOPY;  Surgeon: Ermelinda Dixon MD;  Location: [facility] ENDOSCOPY;  Service: Gastroenterology;  Laterality: N/A;  pre-anemia  post-hiatal hernia    ENTEROSCOPY SMALL BOWEL N/A 7/9/2023    Procedure: ENTEROSCOPY SMALL BOWEL;  Surgeon: Isrrael Gibson MD;  Location: [facility] ENDOSCOPY;  Service: Gastroenterology;  Laterality: N/A;  PRE- GI BLEED  POST- NORMAL      Social History:   Social History     Tobacco Use    Smoking status: Every Day     Packs/day: 1.00     Years: 20.00     Pack years: 20.00     Types: Cigarettes    Smokeless tobacco: Never   Substance Use Topics    Alcohol use: Not Currently     Comment: one pint to one fifth of marielos a day      Family History:  Family History   Problem Relation Age of Onset    Diabetes Father     Diabetes Sister     Hypertension Sister     Diabetes Brother     Diabetes Maternal Grandmother     Diabetes Maternal Grandfather     Diabetes Paternal Grandmother     Diabetes Paternal Grandfather     Heart attack Maternal Aunt 73    Colon cancer Neg Hx     Prostate cancer Neg Hx        Home Meds:  Medications Prior to Admission   Medication Sig Dispense Refill Last Dose    ferrous sulfate (FerrouSul) 325 (65 FE) MG tablet Take 1 tablet by mouth Daily With Breakfast. 90 tablet 1 8/24/2023    pantoprazole (PROTONIX) 40 MG EC tablet Take 1 tablet by mouth Every Morning. 90 tablet 1 Past Week     thiamine (VITAMIN B1) 100 MG tablet Take 1 tablet by mouth Daily. 30 tablet 0 Past Week    folic acid (FOLVITE) 1 MG tablet Take 1 tablet by mouth Daily.       polycarbophil (calcium polycarbophil) 625 MG tablet tablet Take 1 tablet by mouth Daily.        Current Meds:   enoxaparin, 1 mg/kg, Subcutaneous, Q12H  ferrous sulfate, 325 mg, Oral, Daily With Breakfast  nicotine, 1 patch, Transdermal, Q24H  pantoprazole, 40 mg, Oral, Q AM  senna-docusate sodium, 2 tablet, Oral, BID  thiamine, 100 mg, Oral, Daily      Allergies:  No Known Allergies  Review of Systems  General ROS: negative for - chills or fever  Respiratory ROS: no cough, shortness of breath, or wheezing  Cardiovascular ROS: no chest pain or dyspnea on exertion  Gastrointestinal ROS: no abdominal pain, change in bowel habits, or black or bloody stools    Objective     Vital Signs  Temp:  [97.9 °F (36.6 °C)-99.2 °F (37.3 °C)] 98.4 °F (36.9 °C)  Heart Rate:  [68-95] 68  Resp:  [16-18] 18  BP: (118-138)/(75-86) 119/86  Physical Exam:   Constitutional:    Alert, cooperative, in no acute distress    Eyes:          conjunctivae and sclerae normal, no icterus    HENT:   Atraumatic, normal hearing, mucosa moist    Neck:   Trachea midline,    Lungs:     normal respiratory effort    Abdomen:     Soft, nondistended, nontender; normal bowel sounds , no organomegaly    Skin:   No bruising, rash, or pallor   Neurologic:  No tremors, no asterixis    Psychiatric:  normal mood and affect; A&O x3     Results Review:   I reviewed the patient's new clinical results.    Results from last 7 days   Lab Units 08/25/23  0416 08/24/23  1741   WBC 10*3/mm3 9.31 10.49   HEMOGLOBIN g/dL 12.1* 12.0*   HEMATOCRIT % 37.3* 35.6*   PLATELETS 10*3/mm3 295 295     Results from last 7 days   Lab Units 08/25/23  0416 08/24/23  1741   SODIUM mmol/L 136 138   POTASSIUM mmol/L 4.0 3.8   CHLORIDE mmol/L 103 106   CO2 mmol/L 23.3 21.0*   BUN mg/dL 7 10   CREATININE mg/dL 1.16 1.23   CALCIUM mg/dL  9.1 9.2   BILIRUBIN mg/dL  --  <0.2   ALK PHOS U/L  --  95   ALT (SGPT) U/L  --  10   AST (SGOT) U/L  --  11   GLUCOSE mg/dL 92 121*     Results from last 7 days   Lab Units 08/24/23  1741   INR  1.07     Lab Results   Lab Value Date/Time    LIPASE 11 (L) 07/12/2023 0612    LIPASE 26 06/23/2023 1201       Radiology:  XR Chest 1 View   Final Result   No focal pulmonary consolidation. Follow-up as clinical   indications persist.       This report was finalized on 8/24/2023 5:57 PM by Dr. Brice Velasco M.D.              Assessment & Plan   Active Hospital Problems    Diagnosis     **Arm DVT (deep venous thromboembolism), acute, left     Complication associated with peripherally inserted central catheter (PICC)     Iron deficiency anemia     Current every day smoker     ETOH abuse        Assessment  History of recurrent GI bleeding of the small intestine  Minimal anemia today, improved from previous hospitalization  Left upper extremity DVT, with need to start anticoagulation  Alcohol use  Tobacco use    Plan:  Patient will need to start anticoagulation for acute left upper extremity DVT following PICC line from previous hospital stay.  This will obviously put him at higher risk for recurrent GI bleeding.  He did have successful IR coil embolization of bleed in the ileum on 7/18.  He has had no bleeding since and his hemoglobin is nearly normal.  Discussed with him that I would recommend he follow through with capsule endoscopy as outpatient.  He had to reschedule the last 1 and our staff is working on getting this accomplished for him.  Recommended that he continue to monitor his stools closely for black or blood particularly after starting these blood thinners.  No further recommendations from a GI standpoint.  I will work with my office staff to get him scheduled for that capsule endoscopy.      I discussed the patients findings and my recommendations with patient.    Dragon dictation used throughout this  note.            Kenia Vuong PA-C  [facility], [place], operated by [facility]  [facility] [place] [address]  Office: [phone]

## 2023-08-25 NOTE — CONSULTS
Name: Radha Noriega V ADMIT: 2023   : 1980  PCP: Hellen Skelton APRN    MRN: [id number] LOS: 0 days   AGE/SEX: 43 y.o. male  ROOM: [address]      Patient Care Team:  Hellen Skelton APRN as PCP - General (Nurse Practitioner)  Chief Complaint   Patient presents with    Arm Swelling     CC: Left arm swelling evaluation    Subjective     Inpatient Vascular Surgery Consult  Consult performed by: Ned Boyer MD  Consult ordered by: Saud Mccormack MD  Reason for consult: DVT evaluation.      History of Present Illness  History generated from review of chart and verification of findings at bedside with patient    Mr. Bajwa is a 43 year old male current smoker with a history of ETOH abuse, Tachycardia, MARCE, and GI bleeding.  He has had 3 hospitalizations recently for recurrent GI bleeding.  His last admission on 2023 until 2023 he received multiple blood transfusions, was ultimately found to have a small bowel bleed that required to coil embolization's.  During that hospitalization he had a left upper arm PICC line placed, due to poor vascular access and the need for notable blood transfusions, and plans for capsule study.  The PICC line was removed on the day of his discharge on 2023.  He states that he has been doing well, with no concerns current bleeding.  Reports over the last several days he has had progressive left upper extremity swelling, and severe 8/10 excruciating pain with no known exacerbating or eliminating factors.  He denies chest pain, palpitations, shortness of breath, lightheadedness, dizziness, fever, chills, abdominal pain, nausea, vomiting, diarrhea, constipation,  symptoms, or lower extremity swelling.  He also denies any change in activity, or recent or distant trauma.      Review of Systems    Past Medical History:   Diagnosis Date    Anemia     ETOH abuse     Tachycardia 2023    Tobacco dependence      Past Surgical  History:   Procedure Laterality Date    APPENDECTOMY      COLONOSCOPY N/A 06/25/2023    Procedure: COLONOSCOPY TO CECUM AND TERMINAL ILEUM WITH BIOPSIES AND COLD BIOPSY POLYPECTOMIES;  Surgeon: Imtiaz Lee MD;  Location:  [facility];  Service: Gastroenterology;  Laterality: N/A;  PRE- MELENA  POST- COLON POLYPS, HEMORRHOIDS    ENDOSCOPY Left 06/24/2023    Procedure: ESOPHAGOGASTRODUODENOSCOPY;  Surgeon: Imtiaz Lee MD;  [facility];  Service: Gastroenterology;  Laterality: Left;  small hiatal hernia, esophagitis    ENDOSCOPY N/A 7/15/2023    Procedure: ESOPHAGOGASTRODUODENOSCOPY;  Surgeon: Ermelinda Dixon MD;  Location: [facility] ENDOSCOPY;  Service: Gastroenterology;  Laterality: N/A;  pre-anemia  post-hiatal hernia    ENTEROSCOPY SMALL BOWEL N/A 7/9/2023    Procedure: ENTEROSCOPY SMALL BOWEL;  Surgeon: Isrrael Gibson MD;  Location: [facility] ENDOSCOPY;  Service: Gastroenterology;  Laterality: N/A;  PRE- GI BLEED  POST- NORMAL     Family History   Problem Relation Age of Onset    Diabetes Father     Diabetes Sister     Hypertension Sister     Diabetes Brother     Diabetes Maternal Grandmother     Diabetes Maternal Grandfather     Diabetes Paternal Grandmother     Diabetes Paternal Grandfather     Heart attack Maternal Aunt 73    Colon cancer Neg Hx     Prostate cancer Neg Hx        Social History     Tobacco Use    Smoking status: Every Day     Packs/day: 1.00     Years: 20.00     Pack years: 20.00     Types: Cigarettes    Smokeless tobacco: Never   Vaping Use    Vaping Use: Never used   Substance Use Topics    Alcohol use: Not Currently     Comment: one pint to one fifth of marielos a day    Drug use: Yes     Types: Marijuana     Comment: daily.     Medications Prior to Admission   Medication Sig Dispense Refill Last Dose    ferrous sulfate (FerrouSul) 325 (65 FE) MG tablet Take 1 tablet by mouth Daily With Breakfast. 90 tablet 1 8/24/2023    pantoprazole (PROTONIX) 40 MG EC tablet Take 1 tablet  "by mouth Every Morning. 90 tablet 1 Past Week    thiamine (VITAMIN B1) 100 MG tablet Take 1 tablet by mouth Daily. 30 tablet 0 Past Week    folic acid (FOLVITE) 1 MG tablet Take 1 tablet by mouth Daily.       polycarbophil (calcium polycarbophil) 625 MG tablet tablet Take 1 tablet by mouth Daily.        enoxaparin, 1 mg/kg, Subcutaneous, Q12H  ferrous sulfate, 325 mg, Oral, Daily With Breakfast  nicotine, 1 patch, Transdermal, Q24H  pantoprazole, 40 mg, Oral, Q AM  senna-docusate sodium, 2 tablet, Oral, BID  thiamine, 100 mg, Oral, Daily           acetaminophen **OR** [DISCONTINUED] acetaminophen **OR** [DISCONTINUED] acetaminophen    HYDROcodone-acetaminophen    nitroglycerin    ondansetron    sodium chloride    sodium chloride  Patient has no known allergies.    Objective     Physical Exam:  Physical Exam  Constitutional:       Appearance: He is well-developed.   Pulmonary:      Effort: Pulmonary effort is normal. No respiratory distress.   Abdominal:      General: There is no distension.      Palpations: Abdomen is soft.   Neurological:      Mental Status: He is alert and oriented to person, place, and time.   2+ swelling of the left forearm and biceps region.  Normal motor and sensation.  2+ palpable left radial pulse.    Vital Signs and Labs:  Vital Signs Patient Vitals for the past 24 hrs:   BP Temp Temp src Pulse Resp SpO2 Height Weight   08/25/23 0740 119/86 98.4 °F (36.9 °C) Oral 68 18 99 % -- --   08/25/23 0000 119/78 98.4 °F (36.9 °C) Oral 78 16 100 % -- --   08/24/23 2104 135/86 97.9 °F (36.6 °C) Oral 73 18 100 % -- 84.6 kg (186 lb 6.4 oz)   08/24/23 2024 138/79 -- -- -- -- -- -- --   08/24/23 1831 119/75 -- -- 77 -- 97 % -- --   08/24/23 1801 130/83 -- -- 89 -- 94 % -- --   08/24/23 1731 118/76 -- -- -- -- -- -- --   08/24/23 1708 125/78 -- -- -- -- -- -- --   08/24/23 1704 -- -- -- -- -- -- 180.3 cm (71\") 83.9 kg (185 lb)   08/24/23 1703 -- 99.2 °F (37.3 °C) Tympanic 95 18 100 % -- --     I/O:  No " intake/output data recorded.    CBC    Results from last 7 days   Lab Units 08/25/23  0416 08/24/23  1741   WBC 10*3/mm3 9.31 10.49   HEMOGLOBIN g/dL 12.1* 12.0*   PLATELETS 10*3/mm3 295 295     BMP   Results from last 7 days   Lab Units 08/25/23  0416 08/24/23  1741   SODIUM mmol/L 136 138   POTASSIUM mmol/L 4.0 3.8   CHLORIDE mmol/L 103 106   CO2 mmol/L 23.3 21.0*   BUN mg/dL 7 10   CREATININE mg/dL 1.16 1.23   GLUCOSE mg/dL 92 121*     Cr Clearance Estimated Creatinine Clearance: 99.3 mL/min (by C-G formula based on SCr of 1.16 mg/dL).  Coag   Results from last 7 days   Lab Units 08/24/23  1741   INR  1.07       Active Hospital Problems    Diagnosis  POA    **Arm DVT (deep venous thromboembolism), acute, left [I82.622]  Yes    Complication associated with peripherally inserted central catheter (PICC) [T82.9XXA]  Yes    Iron deficiency anemia [D50.9]  Yes    Current every day smoker [F17.200]  Yes    ETOH abuse [F10.10]  Yes      Resolved Hospital Problems   No resolved problems to display.     Problem Points:  3:  Patient has a new problem, with no additional work-up planned (max of 1)  Total problem points:3    Data Points:  1:  I have reviewed or order clinical lab test  1:  I have reviewed or order radiology test (except heart catheterization or echo)  2:  I have personally and independently review of image, tracing, or specimen  Total data points:4 or more    Risk Points:  High:  Any illness that poses threat to life or body funciton    MDM requires 2/3 (Problem points, Data points and Risk)  MDM Prob point Data point Risk   SF 1 1 Minimal   Low 2 2 Low   Mod 3 3 Moderate   High 4 4 High     Code requires 3/3 (MDM, History and Exam)  Code MDM History Exam Time   32002 SF/Low Detailed Detailed 30   48187 Mod Comprehensive Comprehensive 50   59110 High Comprehensive Comprehensive 70     Detailed history:  4 elements HPI or status of 3 chronic problems; 2-9 system ROS  Comprehensive:  4 elements HPI or status of  3 chronic problems;  10 system ROS    Detailed Exam:  12 findings from any organ system  Comprehensive Exam:  2 findings from each of 9 systems.   [id number]    Assessment & Plan       Arm DVT (deep venous thromboembolism), acute, left    ETOH abuse    Current every day smoker    Iron deficiency anemia    Complication associated with peripherally inserted central catheter (PICC)    Interpretation Summary         Acute left upper extremity deep vein thrombosis noted in the subclavian, axillary and brachial.    Acute left upper extremity superficial thrombophlebitis noted in the basilic (upper arm) and cephalic (forearm).    All other left sided vessels appear normal.       43 y.o. male large left upper extremity DVT.  He recently had a PICC line removed from this arm and has had noticed progressive swelling of last 2 to 3 days.  Prior history of GI bleed requiring embolization by interventional radiology.  This was done 7/18/2023.  As far as his upper extremity DVT generally speaking upper extremity DVTs are low risk for massive pulmonary embolisms.  No vena cava filter exists for upper extremity/superior vena cava.  Risks of anticoagulation will need to be discussed with the GI service who is also consulted.  I am going to ask the nurses to wrap the patient's arm with a 6 inch Ace wrap from the palm to the shoulder.  This will help mitigate the swelling.  Please feel free to call with questions but will see as needed.    I discussed the patients findings and my recommendations with patient, family, and nursing staff.    Ned Boyer MD  08/25/23  12:25 EDT    Please call my office with any question: [phone]

## 2023-08-25 NOTE — DISCHARGE SUMMARY
Patient Name: Radha Noriega V  : 1980  MRN: [id number]    Date of Admission: 2023  Date of Discharge:  2023  Primary Care Physician: Hellen Skelton APRN      Chief Complaint:   Arm Swelling      Discharge Diagnoses     Active Hospital Problems    Diagnosis  POA    **Arm DVT (deep venous thromboembolism), acute, left [I82.622]  Yes    Complication associated with peripherally inserted central catheter (PICC) [T82.9XXA]  Yes    Iron deficiency anemia [D50.9]  Yes    Current every day smoker [F17.200]  Yes    ETOH abuse [F10.10]  Yes      Resolved Hospital Problems   No resolved problems to display.        Hospital Course     Mr. Noriega is a 43 y.o. male who has been admitted here 3 times prior to this admission over the last couple of months with recurrent GI bleeding which was eventually identified on CTA to be in the small bowel and he subsequently underwent coil embolization to stop the bleeding who presented to [facility] initially complaining of left arm swelling.  Please see the admitting history and physical for further details.  He was found to have a left upper extremity DVT which was associated with a midline (since removed) which had to be placed during his prior admission for IV access and was admitted to the hospital for further evaluation and treatment.      He was started on therapeutic lovenox and was seen in consultation by GI and vascular surgery. Oral anticoagulation was recommended by all and he has been cleared for discharge. I will transition him to eliquis which he will need to remain on for 3-6 months for this provoked DVT. He hasn't yet had his capsule endoscopy and GI is trying to arrange this as an outpatient.     Day of Discharge     Subjective:  No events overnight. No complaints. He has been cleared by GI and vascular for discharge    Physical Exam:  Temp:  [97.9 °F (36.6 °C)-99.2 °F (37.3 °C)] 98.2 °F (36.8 °C)  Heart Rate:  [68-95] 69  Resp:   [16-18] 18  BP: (118-138)/(75-86) 136/86  Body mass index is 26 kg/m².  Physical Exam  Constitutional:       General: He is not in acute distress.     Appearance: He is not toxic-appearing.   Cardiovascular:      Rate and Rhythm: Normal rate and regular rhythm.      Heart sounds: Normal heart sounds.   Pulmonary:      Effort: Pulmonary effort is normal.      Breath sounds: Normal breath sounds.   Abdominal:      General: Bowel sounds are normal.      Palpations: Abdomen is soft.   Musculoskeletal:         General: Swelling (left arm) present.   Neurological:      Mental Status: He is alert.   Psychiatric:         Mood and Affect: Mood normal.         Behavior: Behavior normal.       Consultants     Consult Orders (all) (From admission, onward)       Start     Ordered    08/25/23 1025  Inpatient Vascular Surgery Consult  Once        Specialty:  Vascular Surgery  Provider:  Jed Adams II, MD    08/25/23 1025    08/24/23 2318  Inpatient Gastroenterology Consult  Once        Specialty:  Gastroenterology  Provider:  Isrrael Gibson MD    08/24/23 2318    08/24/23 1947  [facility] (on-call MD unless specified) Details  Once,   Status:  Canceled        Specialty:  Hospitalist  Provider:  (Not yet assigned)    08/24/23 1946                  Procedures     Imaging Results (All)       Procedure Component Value Units Date/Time    XR Chest 1 View [[id number]] Collected: 08/24/23 1756     Updated: 08/24/23 1800    Narrative:      XR CHEST 1 VW-     HISTORY: Male who is 43 years-old, cough     TECHNIQUE: Frontal view of the chest     COMPARISON: 7/12/2023     FINDINGS: Heart, mediastinum and pulmonary vasculature are unremarkable.  No focal pulmonary consolidation, pleural effusion, or pneumothorax. No  acute osseous process.       Impression:      No focal pulmonary consolidation. Follow-up as clinical  indications persist.     This report was finalized on 8/24/2023 5:57 PM by Dr. Brice Velasco M.D.               Pertinent Labs      Results from last 7 days   Lab Units 08/25/23  0416 08/24/23  1741   WBC 10*3/mm3 9.31 10.49   HEMOGLOBIN g/dL 12.1* 12.0*   PLATELETS 10*3/mm3 295 295     Results from last 7 days   Lab Units 08/25/23  0416 08/24/23  1741   SODIUM mmol/L 136 138   POTASSIUM mmol/L 4.0 3.8   CHLORIDE mmol/L 103 106   CO2 mmol/L 23.3 21.0*   BUN mg/dL 7 10   CREATININE mg/dL 1.16 1.23   GLUCOSE mg/dL 92 121*   Estimated Creatinine Clearance: 99.3 mL/min (by C-G formula based on SCr of 1.16 mg/dL).  Results from last 7 days   Lab Units 08/24/23  1741   ALBUMIN g/dL 4.1   BILIRUBIN mg/dL <0.2   ALK PHOS U/L 95   AST (SGOT) U/L 11   ALT (SGPT) U/L 10     Results from last 7 days   Lab Units 08/25/23  0416 08/24/23  1741   CALCIUM mg/dL 9.1 9.2   ALBUMIN g/dL  --  4.1               Invalid input(s): LDLCALC        Test Results Pending at Discharge       Discharge Details        Discharge Medications        New Medications        Instructions Start Date   Eliquis 5 MG tablet tablet  Generic drug: apixaban   10 mg, Oral, Every 12 Hours Scheduled      Eliquis 5 MG tablet tablet  Generic drug: apixaban   5 mg, Oral, Every 12 Hours Scheduled   Start Date: September 1, 2023     HYDROcodone-acetaminophen 5-325 MG per tablet  Commonly known as: NORCO   1 tablet, Oral, Every 6 Hours PRN             Continue These Medications        Instructions Start Date   ferrous sulfate 325 (65 FE) MG tablet  Commonly known as: FerrouSul   325 mg, Oral, Daily With Breakfast      folic acid 1 MG tablet  Commonly known as: FOLVITE   1 mg, Oral, Daily      pantoprazole 40 MG EC tablet  Commonly known as: PROTONIX   40 mg, Oral, Every Early Morning      polycarbophil 625 MG tablet tablet   625 mg, Oral, Daily      thiamine 100 MG tablet  Commonly known as: VITAMIN B1   100 mg, Oral, Daily               No Known Allergies      Discharge Disposition:  Home or Self Care    Discharge Diet:  Diet Order   Procedures    Diet: Cardiac Diets; Healthy Heart (2-3 Na+);  Texture: Regular Texture (IDDSI 7); Fluid Consistency: Thin (IDDSI 0)       Discharge Activity:   Activity Instructions       Activity as Tolerated              CODE STATUS:    Code Status and Medical Interventions:   Ordered at: 08/24/23 2015     Code Status (Patient has no pulse and is not breathing):    CPR (Attempt to Resuscitate)     Medical Interventions (Patient has pulse or is breathing):    Full Support       Future Appointments   Date Time Provider Department Center   8/28/2023  8:30 AM [facility]     Additional Instructions for the Follow-ups that You Need to Schedule       Call MD With Problems / Concerns   As directed      Instructions: return to the hospital if you experience chest pain, shortness of breath, abdominal pain, nausea, vomiting, fevers, sweats, chills, or worsening of your symptoms    Order Comments: Instructions: return to the hospital if you experience chest pain, shortness of breath, abdominal pain, nausea, vomiting, fevers, sweats, chills, or worsening of your symptoms         Discharge Follow-up with PCP   As directed       Currently Documented PCP:    Hellen Skelton APRN    PCP Phone Number:    [phone]     Follow Up Details: 2 weeks        Discharge Follow-up with Specialty: gastroenterology, as directed   As directed      Specialty: gastroenterology, as directed               Follow-up Information       Hellen Skelton APRN .    Specialties: Nurse Practitioner, Internal Medicine  Why: 2 weeks  Contact information:  [address]  [place] [id number] [phone]                             Additional Instructions for the Follow-ups that You Need to Schedule       Call MD With Problems / Concerns   As directed      Instructions: return to the hospital if you experience chest pain, shortness of breath, abdominal pain, nausea, vomiting, fevers, sweats, chills, or worsening of your symptoms    Order Comments: Instructions: return to  the hospital if you experience chest pain, shortness of breath, abdominal pain, nausea, vomiting, fevers, sweats, chills, or worsening of your symptoms         Discharge Follow-up with PCP   As directed       Currently Documented PCP:    Hellen Skelton APRN    PCP Phone Number:    [phone]     Follow Up Details: 2 weeks        Discharge Follow-up with Specialty: gastroenterology, as directed   As directed      Specialty: gastroenterology, as directed            Time Spent on Discharge:  Greater than 30 minutes      Saud Mccormack MD  [facility]  08/25/23  15:14 EDT

## 2023-08-25 NOTE — PROGRESS NOTES
"[facility] Clinical Pharmacy Services: Enoxaparin Consult    Radha Noriega V has a pharmacy consult to dose full-dose enoxaparin per Korin BASILIO's request.     Indication: DVT/PE (active thrombosis)  Home Anticoagulation: none     Relevant clinical data and objective history reviewed:  43 y.o. male 180.3 cm (71\") 83.9 kg (185 lb)   Body mass index is 25.8 kg/m².   Results from last 7 days   Lab Units 08/24/23  1741   PLATELETS 10*3/mm3 295     Estimated Creatinine Clearance: 92.8 mL/min (by C-G formula based on SCr of 1.23 mg/dL).    Assessment/Plan    Will start patient on  80 mg (1mg/kg) subcutaneous every 12 hours, adjusted for renal function. Consult order will be discontinued but pharmacy will continue to follow.     Mirian Jerry, [facility]  Clinical Pharmacist    "

## 2023-08-25 NOTE — ED NOTES
Nursing report ED to floor  Radha Noriega V  43 y.o.  male    HPI :   Chief Complaint   Patient presents with    Arm Swelling       Admitting doctor:   Ulises Pereyra MD    Admitting diagnosis:   There were no encounter diagnoses.    Code status:   Current Code Status       Date Active Code Status Order ID Comments User Context       Prior            Allergies:   Patient has no known allergies.    Isolation:   No active isolations    Intake and Output  No intake or output data in the 24 hours ending 08/24/23 2006    Weight:       08/24/23  1704   Weight: 83.9 kg (185 lb)       Most recent vitals:   Vitals:    08/24/23 1708 08/24/23 1731 08/24/23 1801 08/24/23 1831   BP: 125/78 118/76 130/83 119/75   Pulse:   89 77   Resp:       Temp:       TempSrc:       SpO2:   94% 97%   Weight:       Height:           Active LDAs/IV Access:   Lines, Drains & Airways       Active LDAs       Name Placement date Placement time Site Days    Peripheral IV 08/24/23 1742 Anterior;Right Forearm 08/24/23 1742  Forearm  less than 1                    Labs (abnormal labs have a star):   Labs Reviewed   COMPREHENSIVE METABOLIC PANEL - Abnormal; Notable for the following components:       Result Value    Glucose 121 (*)     CO2 21.0 (*)     All other components within normal limits    Narrative:     GFR Normal >60  Chronic Kidney Disease <60  Kidney Failure <15     CBC WITH AUTO DIFFERENTIAL - Abnormal; Notable for the following components:    Hemoglobin 12.0 (*)     Hematocrit 35.6 (*)     Lymphocyte % 19.3 (*)     Neutrophils, Absolute 7.07 (*)     Monocytes, Absolute 1.16 (*)     All other components within normal limits   PROTIME-INR - Normal   CBC AND DIFFERENTIAL    Narrative:     The following orders were created for panel order CBC & Differential.  Procedure                               Abnormality         Status                     ---------                               -----------         ------                     CBC Auto  Differential[[id number]]        Abnormal            Final result                 Please view results for these tests on the individual orders.       EKG:   No orders to display       Meds given in ED:   Medications   sodium chloride 0.9 % flush 10 mL (has no administration in time range)   oxyCODONE-acetaminophen (PERCOCET) 5-325 MG per tablet 1 tablet (has no administration in time range)   ondansetron ODT (ZOFRAN-ODT) disintegrating tablet 4 mg (has no administration in time range)   morphine injection 4 mg (4 mg Intravenous Given 8/24/23 1751)   ondansetron (ZOFRAN) injection 4 mg (4 mg Intravenous Given 8/24/23 1747)       Imaging results:  XR Chest 1 View    Result Date: 8/24/2023  No focal pulmonary consolidation. Follow-up as clinical indications persist.  This report was finalized on 8/24/2023 5:57 PM by Dr. Brice Velasco M.D.       Ambulatory status:   - ad ignacio    Social issues:   Social History     Socioeconomic History    Marital status:    Tobacco Use    Smoking status: Every Day     Packs/day: 1.00     Years: 20.00     Pack years: 20.00     Types: Cigarettes    Smokeless tobacco: Never   Vaping Use    Vaping Use: Never used   Substance and Sexual Activity    Alcohol use: Not Currently     Comment: one pint to one fifth of marielos a day    Drug use: Yes     Types: Marijuana     Comment: daily.    Sexual activity: Yes     Partners: Female       NIH Stroke Scale:       Tali Lizarraga RN  08/24/23 20:06 EDT

## 2023-08-28 ENCOUNTER — TRANSITIONAL CARE MANAGEMENT TELEPHONE ENCOUNTER (OUTPATIENT)
Dept: CALL CENTER | Facility: HOSPITAL | Age: 43
End: 2023-08-28
Payer: COMMERCIAL

## 2023-08-28 ENCOUNTER — TELEPHONE (OUTPATIENT)
Dept: GASTROENTEROLOGY | Facility: CLINIC | Age: 43
End: 2023-08-28
Payer: COMMERCIAL

## 2023-08-28 NOTE — TELEPHONE ENCOUNTER
SPOKE WITH PT HE IS AWARE WE ARE WAITING ON AN EQUIPMENT UPDATE AND WILL CALL HIM WHEN IT IS FIXED

## 2023-08-28 NOTE — OUTREACH NOTE
Call Center TCM Note      Flowsheet Row Responses   [facility] patient discharged from? [facility]   Does the patient have one of the following disease processes/diagnoses(primary or secondary)? Other   TCM attempt successful? Yes   Call start time 1619   Call end time 1623   Discharge diagnosis Arm DVT (deep venous thromboembolism), acute, left   Meds reviewed with patient/caregiver? Yes   Is the patient having any side effects they believe may be caused by any medication additions or changes? No   Does the patient have all medications ordered at discharge? Yes   Is the patient taking all medications as directed (includes completed medication regime)? Yes   Comments Hosp dc fu apt on 8/29/23 with PCP   Does the patient have an appointment with their PCP within 7-14 days of discharge? Yes   Has home health visited the patient within 72 hours of discharge? N/A   Psychosocial issues? No   Did the patient receive a copy of their discharge instructions? Yes   Nursing interventions Reviewed instructions with patient   What is the patient's perception of their health status since discharge? Improving   Is the patient/caregiver able to teach back signs and symptoms related to disease process for when to call PCP? Yes   Is the patient/caregiver able to teach back signs and symptoms related to disease process for when to call 911? Yes   Is the patient/caregiver able to teach back the hierarchy of who to call/visit for symptoms/problems? PCP, Specialist, Home health nurse, Urgent Care, ED, 911 Yes   If the patient is a current smoker, are they able to teach back resources for cessation? Not a smoker   TCM call completed? Yes   Call end time 1623            Nury Mart RN    8/28/2023, 16:24 EDT

## 2023-08-28 NOTE — OUTREACH NOTE
Call Center TCM Note      Flowsheet Row Responses   [facility] patient discharged from? [facility]   Does the patient have one of the following disease processes/diagnoses(primary or secondary)? Other   TCM attempt successful? No   Unsuccessful attempts Attempt 1            Kasey Dotson MA    8/28/2023, 12:57 EDT

## 2023-08-29 ENCOUNTER — OFFICE VISIT (OUTPATIENT)
Dept: INTERNAL MEDICINE | Facility: CLINIC | Age: 43
End: 2023-08-29
Payer: COMMERCIAL

## 2023-08-29 VITALS
DIASTOLIC BLOOD PRESSURE: 62 MMHG | TEMPERATURE: 98.1 F | WEIGHT: 182.9 LBS | BODY MASS INDEX: 25.6 KG/M2 | HEART RATE: 96 BPM | HEIGHT: 71 IN | SYSTOLIC BLOOD PRESSURE: 100 MMHG | OXYGEN SATURATION: 99 %

## 2023-08-29 DIAGNOSIS — D62 ACUTE BLOOD LOSS ANEMIA: ICD-10-CM

## 2023-08-29 DIAGNOSIS — I82.622 ARM DVT (DEEP VENOUS THROMBOEMBOLISM), ACUTE, LEFT: Primary | ICD-10-CM

## 2023-08-29 DIAGNOSIS — K21.9 GASTROESOPHAGEAL REFLUX DISEASE, UNSPECIFIED WHETHER ESOPHAGITIS PRESENT: ICD-10-CM

## 2023-08-29 LAB
BASOPHILS # BLD AUTO: 0.04 10*3/MM3 (ref 0–0.2)
BASOPHILS NFR BLD AUTO: 0.6 % (ref 0–1.5)
EOSINOPHIL # BLD AUTO: 0.38 10*3/MM3 (ref 0–0.4)
EOSINOPHIL NFR BLD AUTO: 6 % (ref 0.3–6.2)
ERYTHROCYTE [DISTWIDTH] IN BLOOD BY AUTOMATED COUNT: 13.4 % (ref 12.3–15.4)
HCT VFR BLD AUTO: 41.2 % (ref 37.5–51)
HGB BLD-MCNC: 13.1 G/DL (ref 13–17.7)
IMM GRANULOCYTES # BLD AUTO: 0.02 10*3/MM3 (ref 0–0.05)
IMM GRANULOCYTES NFR BLD AUTO: 0.3 % (ref 0–0.5)
IRON SATN MFR SERPL: 5 % (ref 20–50)
IRON SERPL-MCNC: 20 MCG/DL (ref 59–158)
LYMPHOCYTES # BLD AUTO: 1.34 10*3/MM3 (ref 0.7–3.1)
LYMPHOCYTES NFR BLD AUTO: 21.3 % (ref 19.6–45.3)
MCH RBC QN AUTO: 27.1 PG (ref 26.6–33)
MCHC RBC AUTO-ENTMCNC: 31.8 G/DL (ref 31.5–35.7)
MCV RBC AUTO: 85.1 FL (ref 79–97)
MONOCYTES # BLD AUTO: 0.78 10*3/MM3 (ref 0.1–0.9)
MONOCYTES NFR BLD AUTO: 12.4 % (ref 5–12)
NEUTROPHILS # BLD AUTO: 3.73 10*3/MM3 (ref 1.7–7)
NEUTROPHILS NFR BLD AUTO: 59.4 % (ref 42.7–76)
NRBC BLD AUTO-RTO: 0 /100 WBC (ref 0–0.2)
PLATELET # BLD AUTO: 402 10*3/MM3 (ref 140–450)
RBC # BLD AUTO: 4.84 10*6/MM3 (ref 4.14–5.8)
TIBC SERPL-MCNC: 426 MCG/DL
UIBC SERPL-MCNC: 406 MCG/DL (ref 112–346)
WBC # BLD AUTO: 6.29 10*3/MM3 (ref 3.4–10.8)

## 2023-08-29 RX ORDER — PANTOPRAZOLE SODIUM 40 MG/1
40 TABLET, DELAYED RELEASE ORAL
Qty: 90 TABLET | Refills: 1 | Status: SHIPPED | OUTPATIENT
Start: 2023-08-29 | End: 2023-08-30 | Stop reason: SDUPTHER

## 2023-08-29 NOTE — PROGRESS NOTES
Transitional Care Follow Up Visit  Subjective     Radha Noriega V is a 43 y.o. male who presents for a transitional care management visit.    Within 48 business hours after discharge our office contacted him via telephone to coordinate his care and needs.      I reviewed and discussed the details of that call along with the discharge summary, hospital problems, inpatient lab results, inpatient diagnostic studies, and consultation reports with Radha.     Current outpatient and discharge medications have been reconciled for the patient.  Reviewed by: CHENTA Kraft          8/25/2023     6:06 PM   Date of TCM Phone Call   [facility]   Date of Admission 8/24/2023   Date of Discharge 8/25/2023   Discharge Disposition Home or Self Care     Risk for Readmission (LACE) Score: 3 (8/25/2023  6:00 AM)      History of Present Illness   Course During Hospital Stay:  Patient presented to [facility] on 8/24/23 initially complaining of left arm swelling.  He was found to have a left upper extremity DVT which was associated with a midline (since removed) which had to be placed during his prior admission for IV access and was admitted to the hospital for further evaluation and treatment.       He was started on therapeutic lovenox and was seen in consultation by GI and vascular surgery. Oral anticoagulation was recommended by all and he has been cleared for discharge. He was transitioned to eliquis which he will need to remain on for 3-6 months for this provoked DVT.     He hasn't yet had his capsule endoscopy and GI is trying to arrange this as an outpatient. Patient has not seen GI as an outpatient yet. Patient is requesting a refill on protonix.     The following portions of the patient's history were reviewed and updated as appropriate: allergies, current medications, past family history, past medical history, past social history, past surgical history, and problem list.    Patient is  tolerating eliquis with no signs of bleeding. Patient is using an ace wrap to help with swelling.       Review of Systems    Objective   Physical Exam  Vitals and nursing note reviewed.   Constitutional:       Appearance: Normal appearance. He is well-developed.   Cardiovascular:      Rate and Rhythm: Normal rate and regular rhythm.      Heart sounds: Normal heart sounds.   Pulmonary:      Effort: Pulmonary effort is normal.      Breath sounds: Normal breath sounds.   Musculoskeletal:      Left upper arm: Edema and tenderness present.   Skin:     General: Skin is warm and dry.   Neurological:      Mental Status: He is alert and oriented to person, place, and time.   Psychiatric:         Speech: Speech normal.         Behavior: Behavior normal.         Thought Content: Thought content normal.       Assessment & Plan   Diagnoses and all orders for this visit:    1. Arm DVT (deep venous thromboembolism), acute, left (Primary)  -     Duplex Venous Upper Extremity - Left CAR; Future    2. Gastroesophageal reflux disease, unspecified whether esophagitis present  -     pantoprazole (PROTONIX) 40 MG EC tablet; Take 1 tablet by mouth Every Morning.  Dispense: 90 tablet; Refill: 1    3. Acute blood loss anemia  -     Ambulatory Referral to Gastroenterology      DVT - continue Eliquis. Recheck venous doppler in 3 months. F/u with PCP Hellen in 3 months    GERD - refill given on protonix    Anemia - referral to GI placed. Patient needs to have a capsule endoscopy

## 2023-08-30 ENCOUNTER — TELEPHONE (OUTPATIENT)
Dept: INTERNAL MEDICINE | Facility: CLINIC | Age: 43
End: 2023-08-30

## 2023-08-30 DIAGNOSIS — K21.9 GASTROESOPHAGEAL REFLUX DISEASE, UNSPECIFIED WHETHER ESOPHAGITIS PRESENT: ICD-10-CM

## 2023-08-30 RX ORDER — LANOLIN ALCOHOL/MO/W.PET/CERES
100 CREAM (GRAM) TOPICAL DAILY
Qty: 30 TABLET | Refills: 0 | Status: SHIPPED | OUTPATIENT
Start: 2023-08-30

## 2023-08-30 RX ORDER — PANTOPRAZOLE SODIUM 40 MG/1
40 TABLET, DELAYED RELEASE ORAL
Qty: 90 TABLET | Refills: 1 | Status: SHIPPED | OUTPATIENT
Start: 2023-08-30

## 2023-08-30 RX ORDER — FOLIC ACID 1 MG/1
1 TABLET ORAL DAILY
Qty: 30 TABLET | Refills: 0 | Status: SHIPPED | OUTPATIENT
Start: 2023-08-30

## 2023-08-30 NOTE — TELEPHONE ENCOUNTER
Caller: Radha Noriega    Relationship: Self    Best call back number: [phone]    What form or medical record are you requesting: NOTE STATING THAT THE PATIENT IS CURRENTLY ON DISABILITY LEAVE    Who is requesting this form or medical record from you: FOOD STAMP OFFICE    How would you like to receive the form or medical records (pick-up, mail, fax): PICK-UP IN OFFICE    Timeframe paperwork needed: ASAP

## 2023-08-30 NOTE — TELEPHONE ENCOUNTER
Caller: Radha Noriega VALERIA    Relationship: Self    Best call back number: [phone]     Requested Prescriptions:   Requested Prescriptions     Pending Prescriptions Disp Refills    thiamine (VITAMIN B1) 100 MG tablet 30 tablet 0     Sig: Take 1 tablet by mouth Daily.    pantoprazole (PROTONIX) 40 MG EC tablet 90 tablet 1     Sig: Take 1 tablet by mouth Every Morning.    folic acid (FOLVITE) 1 MG tablet       Sig: Take 1 tablet by mouth Daily.        Pharmacy where request should be sent: [facility] [id number] - [facility] [address] [facility] [address] [facility] - [phone]  - [phone] FX     Last office visit with prescribing clinician: 7/28/2023   Last telemedicine visit with prescribing clinician: Visit date not found   Next office visit with prescribing clinician: 12/4/2023     Additional details provided by patient: PATIENT STATED HIS PREVIOUS PHARAMCY NO LONGER ACCEPTS [facility] INSURANCE, AND HE MUST HAVE HIS PRESCRIPTIONS MOVED TO [facility].    PATIENT ALSO STATED HE NEEDS HYDROCODONE-ACETAMINOPHEN REFILLED.    PATIENT STATED HE IS OUT OF ALL OF THESE MEDICATIONS    Does the patient have less than a 3 day supply:  [x] Yes  [] No    Would you like a call back once the refill request has been completed: [] Yes [x] No    Gordon Prasad   08/30/23 14:25 EDT

## 2023-09-13 ENCOUNTER — TELEPHONE (OUTPATIENT)
Dept: INTERNAL MEDICINE | Facility: CLINIC | Age: 43
End: 2023-09-13
Payer: COMMERCIAL

## 2023-09-13 NOTE — TELEPHONE ENCOUNTER
I tried calling 3 times but his phone have some kind of restrictions that I couldn't get hold of him.

## 2023-09-13 NOTE — TELEPHONE ENCOUNTER
----- Message from Diane Gaspar MA sent at 9/12/2023  3:07 PM EDT -----  Patient is requesting you reach out to his insurance for them to fax over disability papers for Hellen to fill out

## 2023-09-15 ENCOUNTER — TELEPHONE (OUTPATIENT)
Dept: INTERNAL MEDICINE | Facility: CLINIC | Age: 43
End: 2023-09-15
Payer: COMMERCIAL

## 2023-09-15 NOTE — TELEPHONE ENCOUNTER
Caller: Radha Noriega    Relationship: Self    Best call back number: [phone]    What form or medical record are you requesting: PAPERWORK REGARDING FOOD STAMPS    Who is requesting this form or medical record from you: PATIENT    How would you like to receive the form or medical records (pick-up, mail, fax): FAX  If fax, what is the fax number: [phone]    Timeframe paperwork needed: EARLIEST CONVENIENCE     Additional notes: PATIENT WANTED TO LET OFFICE KNOW THAT HE WILL BE FAXING OVER PAPERWORK REGARDING FOOD STAMPS AND STATED THAT IT NEEDED TO BE FILLED OUT AND THEN FAXED BACK TO THE ABOVE FAX NUMBER.

## 2023-09-18 ENCOUNTER — TELEPHONE (OUTPATIENT)
Dept: INTERNAL MEDICINE | Facility: CLINIC | Age: 43
End: 2023-09-18
Payer: COMMERCIAL

## 2023-09-18 NOTE — TELEPHONE ENCOUNTER
Caller: Radha Noriega    Relationship: Self    Best call back number: [phone]    What is the best time to reach you: ANYTIME    Who are you requesting to speak with (clinical staff, provider,  specific staff member): CLINICAL STAFF    Do you know the name of the person who called: PATIENT     What was the call regarding: PATIENT CALLED TO CHECK ON STATUS OF PAPERWORK FOR FOOD STAMP OFFICE, IN RELATION TO THE ENCOUNTER FROM 09/15    HE SAID HE HAD FAXED IT FRIDAY, 09/15    Is it okay if the provider responds through MyChart: NO

## 2023-09-18 NOTE — TELEPHONE ENCOUNTER
Spoke with pt's wife, we haven't got any forms from food stamp office. She said its ok and leave it.

## 2023-09-20 ENCOUNTER — OFFICE VISIT (OUTPATIENT)
Dept: INTERNAL MEDICINE | Facility: CLINIC | Age: 43
End: 2023-09-20
Payer: COMMERCIAL

## 2023-09-20 VITALS
SYSTOLIC BLOOD PRESSURE: 102 MMHG | BODY MASS INDEX: 26.11 KG/M2 | HEART RATE: 98 BPM | WEIGHT: 186.5 LBS | DIASTOLIC BLOOD PRESSURE: 66 MMHG | OXYGEN SATURATION: 100 % | TEMPERATURE: 97.7 F | HEIGHT: 71 IN

## 2023-09-20 DIAGNOSIS — I82.622 ARM DVT (DEEP VENOUS THROMBOEMBOLISM), ACUTE, LEFT: Primary | ICD-10-CM

## 2023-09-20 DIAGNOSIS — K92.1 GASTROINTESTINAL HEMORRHAGE WITH MELENA: ICD-10-CM

## 2023-09-20 PROBLEM — D62 ABLA (ACUTE BLOOD LOSS ANEMIA): Status: RESOLVED | Noted: 2023-07-12 | Resolved: 2023-09-20

## 2023-09-20 PROCEDURE — 99213 OFFICE O/P EST LOW 20 MIN: CPT | Performed by: NURSE PRACTITIONER

## 2023-09-20 NOTE — PROGRESS NOTES
Alli Noriega V is a 43 y.o. male.     Chief Complaint   Patient presents with    Anemia    DVT        History of Present Illness   He is here today for follow-up of anemia related to GI bleed and left upper extremity DVT.  He is needing clearance to return to work.  He was diagnosed with a left upper extremity DVT at the end of August present on venous duplex with acute left upper extremity DVT in the subclavian, axillary and brachial along with superficial thrombophlebitis.  He was initially started on Lovenox and transition to Eliquis 5 mg twice daily.  He was consulted by GI and vascular surgery who recommended he remain on Eliquis for 3 to 6 months for provoked DVT.  He is due for repeat ultrasound November 30th.  He is feeling ok today.  He notes improvement in left upper extremity pain and swelling. He notes improvement in SOB, fatigue, dizziness, palpitations, chest pain. He denies any BRBPR, melena or abdominal pain, nausea, vomiting or hematemesis. He notes improvement in activity intolerance.  He is able to exercise and perform ADLs without any fatigue.  He is currently taking Eliquis 5 mg twice daily oral ferrous sulfate 325 mg daily, folic acid, Protonix 40 mg daily and thiamine 100 mg daily.  He has abstained from alcohol.  He notes occasional constipation with oral iron supplement, but this is improved with as needed stool softeners.  Last CBC at the end of August showed improvement in anemia with hemoglobin up to 13.1.  He is scheduled to see GI back on 10/13.     The following portions of the patient's history were reviewed and updated as appropriate: allergies, current medications, past family history, past medical history, past social history, past surgical history, and problem list.    Review of Systems   Constitutional:  Negative for activity change, chills, fatigue and fever.   Respiratory: Negative.     Cardiovascular: Negative.    Gastrointestinal:  Positive for constipation.  Negative for abdominal distention, abdominal pain, anal bleeding, blood in stool, diarrhea, nausea, vomiting and indigestion.   Hematological:  Does not bruise/bleed easily.     Objective   Physical Exam  Constitutional:       Appearance: He is well-developed.   Neck:      Thyroid: No thyroid mass, thyromegaly or thyroid tenderness.      Vascular: No carotid bruit.      Trachea: Trachea normal.   Cardiovascular:      Rate and Rhythm: Normal rate and regular rhythm.      Chest Wall: PMI is not displaced.      Pulses:           Radial pulses are 2+ on the right side and 2+ on the left side.        Dorsalis pedis pulses are 2+ on the right side and 2+ on the left side.        Posterior tibial pulses are 2+ on the right side and 2+ on the left side.      Heart sounds: S1 normal and S2 normal.   Pulmonary:      Effort: Pulmonary effort is normal.      Breath sounds: Normal breath sounds.   Abdominal:      General: Bowel sounds are normal. There is no distension or abdominal bruit. There are no signs of injury.      Palpations: Abdomen is soft. There is no hepatomegaly or splenomegaly.      Tenderness: There is no abdominal tenderness. There is no guarding or rebound.   Musculoskeletal:      Right lower leg: No edema.      Left lower leg: No edema.   Lymphadenopathy:      Head:      Right side of head: No submental, submandibular, tonsillar or occipital adenopathy.      Left side of head: No submental, submandibular, tonsillar or occipital adenopathy.      Cervical: No cervical adenopathy.   Skin:     General: Skin is warm and dry.      Capillary Refill: Capillary refill takes less than 2 seconds.      Nails: There is no clubbing.   Neurological:      Mental Status: He is alert and oriented to person, place, and time.   Psychiatric:         Attention and Perception: Attention normal.         Mood and Affect: Mood and affect normal.         Speech: Speech normal.         Behavior: Behavior normal.         Thought Content:  Thought content normal.         Cognition and Memory: Cognition normal.       Vitals:    09/20/23 1108   BP: 102/66   Pulse: 98   Temp: 97.7 °F (36.5 °C)   SpO2: 100%      Body mass index is 26.03 kg/m².    Assessment & Plan   Problems Addressed this Visit          Coag and Thromboembolic    Arm DVT (deep venous thromboembolism), acute, left - Primary       Gastrointestinal Abdominal     Gastrointestinal hemorrhage with melena     Diagnoses         Codes Comments    Arm DVT (deep venous thromboembolism), acute, left    -  Primary ICD-10-CM: I82.622  ICD-9-CM: 453.82     Gastrointestinal hemorrhage with melena     ICD-10-CM: K92.1  ICD-9-CM: 578.1           1.  Left arm DVT-discussed with him continuing Eliquis 5 mg twice daily for at least 3 to 6 months, pending repeat left upper extremity venous duplex.  Discussed bleeding precautions.  To ER with any acute head trauma.  Follow-up in 3 months.  2.  Gastrointestinal hemorrhage with melena-he notes improvement in symptoms overall.  Patient is stable from a medical standpoint and is okay to return to work for regular duty.  Paperwork completed today with clearance to return to work.  Discussed with him following up with GI as scheduled on October 13.  Continue oral iron supplement along with Protonix 40 mg every day.  Continue to abstain from alcohol.  Continue to hydrate well with fluids and use a stool softener as needed.    Follow-up in 3 months.

## 2023-09-26 NOTE — TELEPHONE ENCOUNTER
SPOKE WITH PT RESCHEDULED CAPSULE FOR 10- SENT PT INSTRUCTIONS TO PT JERROD PER PT HE DOES USE MYCHART DISCUSSED PREP AND PT BLOOD THINNER

## 2023-10-02 ENCOUNTER — CLINICAL SUPPORT (OUTPATIENT)
Dept: GASTROENTEROLOGY | Facility: CLINIC | Age: 43
End: 2023-10-02
Payer: COMMERCIAL

## 2023-10-02 DIAGNOSIS — D62 ACUTE BLOOD LOSS ANEMIA: Primary | ICD-10-CM

## 2023-10-11 ENCOUNTER — TELEPHONE (OUTPATIENT)
Dept: GASTROENTEROLOGY | Facility: CLINIC | Age: 43
End: 2023-10-11
Payer: COMMERCIAL

## 2023-10-13 ENCOUNTER — OFFICE VISIT (OUTPATIENT)
Dept: GASTROENTEROLOGY | Facility: CLINIC | Age: 43
End: 2023-10-13
Payer: COMMERCIAL

## 2023-10-13 VITALS
WEIGHT: 189.6 LBS | HEART RATE: 73 BPM | TEMPERATURE: 98.2 F | OXYGEN SATURATION: 96 % | SYSTOLIC BLOOD PRESSURE: 106 MMHG | BODY MASS INDEX: 28.08 KG/M2 | HEIGHT: 69 IN | DIASTOLIC BLOOD PRESSURE: 71 MMHG

## 2023-10-13 DIAGNOSIS — D50.0 IRON DEFICIENCY ANEMIA DUE TO CHRONIC BLOOD LOSS: Primary | ICD-10-CM

## 2023-10-13 DIAGNOSIS — Z87.19 HISTORY OF GI BLEED: ICD-10-CM

## 2023-10-13 NOTE — PROGRESS NOTES
"Chief Complaint  Anemia    Subjective          History Of Present Illness:    Radha Noriega V is a  43 y.o. male with a history of GI bleed, alcohol abuse, DVTs on Eliquis.    Patient with most recent labs completed on 8/23/23 with iron 20, iron sat 5, Hgb 13.1 Remains on iron supplementation.     Patient is doing well following his hospitalization.  No obvious bright red blood per rectum or black stools.  He denies any abdominal pain.  No reflux, dysphagia, odynophagia, epigastric pain.  Bowel movements have been normal.  He denies diarrhea or constipation.  Appetite is good and weight is stable.    Patient had capsule placed on 10/2/2023.  Work-up in regard to this is pending    Work-up for GI bleed reviewed and includes:  IR intervention with coiling to small vessel ileum on 7/15/2023 and again on 7/18/2023.  7/9/2023 small bowel enteroscopy that was normal  7/8/2023 CT angiogram that was normal  6/25/2023 colonoscopy with nodular terminal ileum mucosa, small rectal polyps - removed, internal hemorrhoids.  Pathology with benign TI mucosa and hyperplastic polyps.  6/24/2023 EGD with grade a distal esophagitis and gastritis    Objective   Vital Signs:   /71   Pulse 73   Temp 98.2 øF (36.8 øC)   Ht 175.3 cm (69\")   Wt 86 kg (189 lb 9.6 oz)   SpO2 96%   BMI 28.00 kg/mý       Physical Exam  Vitals reviewed.   Constitutional:       General: He is not in acute distress.     Appearance: Normal appearance. He is not ill-appearing.   HENT:      Head: Normocephalic and atraumatic.      Nose: Nose normal.      Mouth/Throat:      Pharynx: Oropharynx is clear.   Eyes:      Extraocular Movements: Extraocular movements intact.      Conjunctiva/sclera: Conjunctivae normal.      Pupils: Pupils are equal, round, and reactive to light.   Pulmonary:      Effort: Pulmonary effort is normal.   Abdominal:      General: There is no distension.      Palpations: There is no mass.      Tenderness: There is no abdominal " tenderness.   Musculoskeletal:         General: No swelling. Normal range of motion.      Cervical back: Normal range of motion.   Skin:     General: Skin is warm and dry.      Findings: No bruising or rash.   Neurological:      General: No focal deficit present.      Mental Status: He is alert and oriented to person, place, and time.      Motor: No weakness.      Gait: Gait normal.   Psychiatric:         Mood and Affect: Mood normal.          Result Review :   The following data was reviewed by: Merly Urbina PA-C on 10/13/2023:  CMP          7/28/2023    10:18 8/24/2023    17:41 8/25/2023    04:16   CMP   Glucose 94  121  92    BUN 8  10  7    Creatinine 1.14  1.23  1.16    EGFR  75.2  80.6    Sodium 140  138  136    Potassium 4.6  3.8  4.0    Chloride 107  106  103    Calcium 9.5  9.2  9.1    Total Protein 5.9      Total Protein  7.1     Albumin 3.8  4.1     Globulin 2.1      Globulin  3.0     Total Bilirubin <0.2  <0.2     Alkaline Phosphatase 72  95     AST (SGOT) 22  11     ALT (SGPT) 35  10     Albumin/Globulin Ratio  1.4     BUN/Creatinine Ratio 7.0  8.1  6.0    Anion Gap  11.0  9.7      CBC          8/24/2023    17:41 8/25/2023    04:16 8/28/2023    09:40   CBC   WBC 10.49  9.31  6.29    RBC 4.24  4.42  4.84    Hemoglobin 12.0  12.1  13.1    Hematocrit 35.6  37.3  41.2    MCV 84.0  84.4  85.1    MCH 28.3  27.4  27.1    MCHC 33.7  32.4  31.8    RDW 13.7  13.1  13.4    Platelets 295  295  402            Assessment and Plan    Diagnoses and all orders for this visit:    1. Iron deficiency anemia due to chronic blood loss (Primary)    2. History of GI bleed       Video capsule endoscopy pending; will discuss findings and recommendations with the patient once the study is read  Patient overall doing well with ligation of his hemoglobin in August.  Iron studies still low.  Patient to continue iron supplementation.  I recommend he get a recheck of his hemoglobin and iron studies on his next visit with his  PCP    Follow Up   Return in about 3 months (around 1/13/2024) for Merly Ruggiero PA-C.    Dragon dictation used throughout this note.            Merly Ruggiero PA-C   [facility]  [address] [place] [address]  Office: [phone]

## 2023-10-23 ENCOUNTER — TELEPHONE (OUTPATIENT)
Dept: GASTROENTEROLOGY | Facility: CLINIC | Age: 43
End: 2023-10-23
Payer: COMMERCIAL

## 2023-10-23 NOTE — TELEPHONE ENCOUNTER
Capsule shows no active bleeding -few pictures of a mucosal abnormality which could be a small ulcer was seen.  This was not bleeding.  It was not well visualized.  Recommend avoid NSAIDs and if evidence of recurrent bleeding occurs, return to hospital

## 2023-10-25 ENCOUNTER — TELEPHONE (OUTPATIENT)
Dept: GASTROENTEROLOGY | Facility: CLINIC | Age: 43
End: 2023-10-25
Payer: COMMERCIAL

## 2023-10-25 NOTE — TELEPHONE ENCOUNTER
Received FMLA/disability paperwork. Today called and spoke with patient and in which he stated that he no longer needs the paperwork.

## 2023-10-30 ENCOUNTER — HOSPITAL ENCOUNTER (INPATIENT)
Facility: HOSPITAL | Age: 43
LOS: 2 days | Discharge: HOME | End: 2023-11-03
Attending: EMERGENCY MEDICINE | Admitting: INTERNAL MEDICINE
Payer: COMMERCIAL

## 2023-10-30 DIAGNOSIS — K92.2 GASTROINTESTINAL HEMORRHAGE, UNSPECIFIED GASTROINTESTINAL HEMORRHAGE TYPE: Primary | ICD-10-CM

## 2023-10-30 DIAGNOSIS — Z79.01 ANTICOAGULATED BY ANTICOAGULATION TREATMENT: ICD-10-CM

## 2023-10-30 DIAGNOSIS — K92.2 LOWER GI BLEED: ICD-10-CM

## 2023-10-30 LAB
ABO GROUP BLD: NORMAL
ALBUMIN SERPL-MCNC: 4.3 G/DL (ref 3.5–5.2)
ALBUMIN/GLOB SERPL: 1.7 G/DL
ALP SERPL-CCNC: 71 U/L (ref 39–117)
ALT SERPL W P-5'-P-CCNC: 13 U/L (ref 1–41)
ANION GAP SERPL CALCULATED.3IONS-SCNC: 8.9 MMOL/L (ref 5–15)
AST SERPL-CCNC: 13 U/L (ref 1–40)
BASOPHILS # BLD AUTO: 0.03 10*3/MM3 (ref 0–0.2)
BASOPHILS NFR BLD AUTO: 0.6 % (ref 0–1.5)
BILIRUB SERPL-MCNC: <0.2 MG/DL (ref 0–1.2)
BLD GP AB SCN SERPL QL: NEGATIVE
BUN SERPL-MCNC: 15 MG/DL (ref 6–20)
BUN/CREAT SERPL: 12.4 (ref 7–25)
CALCIUM SPEC-SCNC: 9.2 MG/DL (ref 8.6–10.5)
CHLORIDE SERPL-SCNC: 104 MMOL/L (ref 98–107)
CO2 SERPL-SCNC: 24.1 MMOL/L (ref 22–29)
CREAT SERPL-MCNC: 1.21 MG/DL (ref 0.76–1.27)
DEPRECATED RDW RBC AUTO: 46.2 FL (ref 37–54)
DEPRECATED RDW RBC AUTO: 46.6 FL (ref 37–54)
EGFRCR SERPLBLD CKD-EPI 2021: 76.7 ML/MIN/1.73
EOSINOPHIL # BLD AUTO: 0.12 10*3/MM3 (ref 0–0.4)
EOSINOPHIL NFR BLD AUTO: 2.3 % (ref 0.3–6.2)
ERYTHROCYTE [DISTWIDTH] IN BLOOD BY AUTOMATED COUNT: 14.9 % (ref 12.3–15.4)
ERYTHROCYTE [DISTWIDTH] IN BLOOD BY AUTOMATED COUNT: 15.5 % (ref 12.3–15.4)
GLOBULIN UR ELPH-MCNC: 2.6 GM/DL
GLUCOSE SERPL-MCNC: 151 MG/DL (ref 65–99)
HCT VFR BLD AUTO: 29.3 % (ref 37.5–51)
HCT VFR BLD AUTO: 32.2 % (ref 37.5–51)
HGB BLD-MCNC: 10.5 G/DL (ref 13–17.7)
HGB BLD-MCNC: 9.4 G/DL (ref 13–17.7)
HOLD SPECIMEN: NORMAL
HOLD SPECIMEN: NORMAL
IMM GRANULOCYTES # BLD AUTO: 0.01 10*3/MM3 (ref 0–0.05)
IMM GRANULOCYTES NFR BLD AUTO: 0.2 % (ref 0–0.5)
INR PPP: 1.01 (ref 0.9–1.1)
LYMPHOCYTES # BLD AUTO: 1.54 10*3/MM3 (ref 0.7–3.1)
LYMPHOCYTES NFR BLD AUTO: 29.7 % (ref 19.6–45.3)
MCH RBC QN AUTO: 27 PG (ref 26.6–33)
MCH RBC QN AUTO: 27.5 PG (ref 26.6–33)
MCHC RBC AUTO-ENTMCNC: 32.1 G/DL (ref 31.5–35.7)
MCHC RBC AUTO-ENTMCNC: 32.6 G/DL (ref 31.5–35.7)
MCV RBC AUTO: 82.8 FL (ref 79–97)
MCV RBC AUTO: 85.7 FL (ref 79–97)
MONOCYTES # BLD AUTO: 0.49 10*3/MM3 (ref 0.1–0.9)
MONOCYTES NFR BLD AUTO: 9.4 % (ref 5–12)
NEUTROPHILS NFR BLD AUTO: 3 10*3/MM3 (ref 1.7–7)
NEUTROPHILS NFR BLD AUTO: 57.8 % (ref 42.7–76)
NRBC BLD AUTO-RTO: 0 /100 WBC (ref 0–0.2)
PLATELET # BLD AUTO: 219 10*3/MM3 (ref 140–450)
PLATELET # BLD AUTO: 239 10*3/MM3 (ref 140–450)
PMV BLD AUTO: 10.7 FL (ref 6–12)
PMV BLD AUTO: 11.1 FL (ref 6–12)
POTASSIUM SERPL-SCNC: 3.7 MMOL/L (ref 3.5–5.2)
PROT SERPL-MCNC: 6.9 G/DL (ref 6–8.5)
PROTHROMBIN TIME: 13.4 SECONDS (ref 11.7–14.2)
RBC # BLD AUTO: 3.42 10*6/MM3 (ref 4.14–5.8)
RBC # BLD AUTO: 3.89 10*6/MM3 (ref 4.14–5.8)
RH BLD: POSITIVE
SODIUM SERPL-SCNC: 137 MMOL/L (ref 136–145)
T&S EXPIRATION DATE: NORMAL
WBC NRBC COR # BLD: 5.19 10*3/MM3 (ref 3.4–10.8)
WBC NRBC COR # BLD: 5.51 10*3/MM3 (ref 3.4–10.8)
WHOLE BLOOD HOLD COAG: NORMAL
WHOLE BLOOD HOLD SPECIMEN: NORMAL

## 2023-10-30 PROCEDURE — 85014 HEMATOCRIT: CPT | Performed by: STUDENT IN AN ORGANIZED HEALTH CARE EDUCATION/TRAINING PROGRAM

## 2023-10-30 PROCEDURE — 86920 COMPATIBILITY TEST SPIN: CPT

## 2023-10-30 PROCEDURE — 99285 EMERGENCY DEPT VISIT HI MDM: CPT

## 2023-10-30 PROCEDURE — 80053 COMPREHEN METABOLIC PANEL: CPT | Performed by: EMERGENCY MEDICINE

## 2023-10-30 PROCEDURE — 85025 COMPLETE CBC W/AUTO DIFF WBC: CPT | Performed by: EMERGENCY MEDICINE

## 2023-10-30 PROCEDURE — 86922 COMPATIBILITY TEST ANTIGLOB: CPT

## 2023-10-30 PROCEDURE — 85018 HEMOGLOBIN: CPT | Performed by: STUDENT IN AN ORGANIZED HEALTH CARE EDUCATION/TRAINING PROGRAM

## 2023-10-30 PROCEDURE — G0378 HOSPITAL OBSERVATION PER HR: HCPCS

## 2023-10-30 PROCEDURE — 25810000003 LACTATED RINGERS PER 1000 ML: Performed by: STUDENT IN AN ORGANIZED HEALTH CARE EDUCATION/TRAINING PROGRAM

## 2023-10-30 PROCEDURE — 85610 PROTHROMBIN TIME: CPT | Performed by: EMERGENCY MEDICINE

## 2023-10-30 PROCEDURE — 99214 OFFICE O/P EST MOD 30 MIN: CPT | Performed by: INTERNAL MEDICINE

## 2023-10-30 PROCEDURE — 86900 BLOOD TYPING SEROLOGIC ABO: CPT | Performed by: EMERGENCY MEDICINE

## 2023-10-30 PROCEDURE — 86850 RBC ANTIBODY SCREEN: CPT | Performed by: EMERGENCY MEDICINE

## 2023-10-30 PROCEDURE — 85027 COMPLETE CBC AUTOMATED: CPT | Performed by: INTERNAL MEDICINE

## 2023-10-30 PROCEDURE — 86901 BLOOD TYPING SEROLOGIC RH(D): CPT | Performed by: EMERGENCY MEDICINE

## 2023-10-30 RX ORDER — FOLIC ACID 1 MG/1
1 TABLET ORAL DAILY
Status: DISCONTINUED | OUTPATIENT
Start: 2023-10-30 | End: 2023-11-03 | Stop reason: HOSPADM

## 2023-10-30 RX ORDER — PANTOPRAZOLE SODIUM 40 MG/10ML
40 INJECTION, POWDER, LYOPHILIZED, FOR SOLUTION INTRAVENOUS EVERY 12 HOURS SCHEDULED
Status: DISCONTINUED | OUTPATIENT
Start: 2023-10-30 | End: 2023-11-03 | Stop reason: HOSPADM

## 2023-10-30 RX ORDER — NITROGLYCERIN 0.4 MG/1
0.4 TABLET SUBLINGUAL
Status: DISCONTINUED | OUTPATIENT
Start: 2023-10-30 | End: 2023-11-03 | Stop reason: HOSPADM

## 2023-10-30 RX ORDER — BISACODYL 5 MG/1
5 TABLET, DELAYED RELEASE ORAL DAILY PRN
Status: DISCONTINUED | OUTPATIENT
Start: 2023-10-30 | End: 2023-11-03 | Stop reason: HOSPADM

## 2023-10-30 RX ORDER — SODIUM CHLORIDE 0.9 % (FLUSH) 0.9 %
10 SYRINGE (ML) INJECTION AS NEEDED
Status: DISCONTINUED | OUTPATIENT
Start: 2023-10-30 | End: 2023-11-03 | Stop reason: HOSPADM

## 2023-10-30 RX ORDER — ONDANSETRON 2 MG/ML
4 INJECTION INTRAMUSCULAR; INTRAVENOUS EVERY 6 HOURS PRN
Status: DISCONTINUED | OUTPATIENT
Start: 2023-10-30 | End: 2023-11-03 | Stop reason: HOSPADM

## 2023-10-30 RX ORDER — SODIUM CHLORIDE 9 MG/ML
40 INJECTION, SOLUTION INTRAVENOUS AS NEEDED
Status: DISCONTINUED | OUTPATIENT
Start: 2023-10-30 | End: 2023-11-03 | Stop reason: HOSPADM

## 2023-10-30 RX ORDER — BISACODYL 10 MG
10 SUPPOSITORY, RECTAL RECTAL DAILY PRN
Status: DISCONTINUED | OUTPATIENT
Start: 2023-10-30 | End: 2023-11-03 | Stop reason: HOSPADM

## 2023-10-30 RX ORDER — ONDANSETRON 4 MG/1
4 TABLET, FILM COATED ORAL EVERY 6 HOURS PRN
Status: DISCONTINUED | OUTPATIENT
Start: 2023-10-30 | End: 2023-11-03 | Stop reason: HOSPADM

## 2023-10-30 RX ORDER — PANTOPRAZOLE SODIUM 40 MG/1
40 TABLET, DELAYED RELEASE ORAL DAILY
Status: DISCONTINUED | OUTPATIENT
Start: 2023-10-30 | End: 2023-10-30

## 2023-10-30 RX ORDER — SODIUM CHLORIDE 0.9 % (FLUSH) 0.9 %
10 SYRINGE (ML) INJECTION EVERY 12 HOURS SCHEDULED
Status: DISCONTINUED | OUTPATIENT
Start: 2023-10-30 | End: 2023-11-03 | Stop reason: HOSPADM

## 2023-10-30 RX ORDER — SODIUM CHLORIDE, SODIUM LACTATE, POTASSIUM CHLORIDE, CALCIUM CHLORIDE 600; 310; 30; 20 MG/100ML; MG/100ML; MG/100ML; MG/100ML
100 INJECTION, SOLUTION INTRAVENOUS CONTINUOUS
Status: DISCONTINUED | OUTPATIENT
Start: 2023-10-30 | End: 2023-10-31

## 2023-10-30 RX ORDER — CHOLECALCIFEROL (VITAMIN D3) 125 MCG
5 CAPSULE ORAL NIGHTLY PRN
Status: DISCONTINUED | OUTPATIENT
Start: 2023-10-30 | End: 2023-11-03 | Stop reason: HOSPADM

## 2023-10-30 RX ORDER — POLYETHYLENE GLYCOL 3350 17 G/17G
17 POWDER, FOR SOLUTION ORAL DAILY PRN
Status: DISCONTINUED | OUTPATIENT
Start: 2023-10-30 | End: 2023-11-03 | Stop reason: HOSPADM

## 2023-10-30 RX ORDER — AMOXICILLIN 250 MG
2 CAPSULE ORAL 2 TIMES DAILY
Status: DISCONTINUED | OUTPATIENT
Start: 2023-10-30 | End: 2023-11-03 | Stop reason: HOSPADM

## 2023-10-30 RX ORDER — FERROUS SULFATE 325(65) MG
325 TABLET ORAL
Status: DISCONTINUED | OUTPATIENT
Start: 2023-10-31 | End: 2023-11-03 | Stop reason: HOSPADM

## 2023-10-30 RX ADMIN — PANTOPRAZOLE SODIUM 40 MG: 40 INJECTION, POWDER, FOR SOLUTION INTRAVENOUS at 20:54

## 2023-10-30 RX ADMIN — SENNOSIDES AND DOCUSATE SODIUM 2 TABLET: 50; 8.6 TABLET ORAL at 20:54

## 2023-10-30 RX ADMIN — SODIUM CHLORIDE, POTASSIUM CHLORIDE, SODIUM LACTATE AND CALCIUM CHLORIDE 100 ML/HR: 600; 310; 30; 20 INJECTION, SOLUTION INTRAVENOUS at 14:27

## 2023-10-30 RX ADMIN — Medication 10 ML: at 14:27

## 2023-10-30 RX ADMIN — Medication 10 ML: at 20:58

## 2023-10-30 NOTE — CONSULTS
[facility]  Initial Inpatient Consult Note    Referring Provider: Елена    Reason for Consultation: GI bleeding    Subjective     History of present illness:    43 y.o. male with a history of 4 admissions this year for recurrent GI bleeding.    He is admitted again with maroon stool, blood in the stool after having black stool for a few days.  He stopped the Eliquis 2 days ago because of the bleeding    Endoscopic evaluation this year is as below:    7/9/2023 small bowel enteroscopy that was normal  7/8/2023 CT angiogram that was normal  6/25/2023 colonoscopy with nodular terminal ileum mucosa, small rectal polyps-removed, internal hemorrhoids.  Pathology with benign TI mucosa and hyperplastic polyps.  6/24/2023 EGD with grade a distal esophagitis and gastritis    In July due to lack of bleeding lesions found on multiple endoscopies above he underwent angiography with coiling of a distal branch of the SMA that was feeding the distal ileum.  At admission again 2 weeks later required recoiling of this area.  He had not had any bleeding since July 18, 2023 until the last few days    He is hemodynamically stable and currently in the observation unit      Past Medical History:  Past Medical History:   Diagnosis Date    Anemia     ETOH abuse     Tachycardia 06/23/2023    Tobacco dependence      Past Surgical History:  Past Surgical History:   Procedure Laterality Date    APPENDECTOMY      COLONOSCOPY N/A 06/25/2023    Procedure: COLONOSCOPY TO CECUM AND TERMINAL ILEUM WITH BIOPSIES AND COLD BIOPSY POLYPECTOMIES;  Surgeon: Imtiaz Lee MD;  Location: [facility] ENDOSCOPY;  Service: Gastroenterology;  Laterality: N/A;  PRE- MELENA  POST- COLON POLYPS, HEMORRHOIDS    ENDOSCOPY Left 06/24/2023    Procedure: ESOPHAGOGASTRODUODENOSCOPY;  Surgeon: Imtiaz Lee MD;  Location: [facility] ENDOSCOPY;  Service: Gastroenterology;  Laterality: Left;  small hiatal hernia, esophagitis    ENDOSCOPY N/A 07/15/2023     Procedure: ESOPHAGOGASTRODUODENOSCOPY;  Surgeon: Ermelinda Dixon MD;  Location:  [facility] ENDOSCOPY;  Service: Gastroenterology;  Laterality: N/A;  pre-anemia  post-hiatal hernia    ENTEROSCOPY SMALL BOWEL N/A 07/09/2023    Procedure: ENTEROSCOPY SMALL BOWEL;  Surgeon: Isrrael Gibson MD;  Location:  [facility] ENDOSCOPY;  Service: Gastroenterology;  Laterality: N/A;  PRE- GI BLEED  POST- NORMAL    UPPER GASTROINTESTINAL ENDOSCOPY        Social History:   Social History     Tobacco Use    Smoking status: Every Day     Packs/day: 1.00     Years: 20.00     Additional pack years: 0.00     Total pack years: 20.00     Types: Cigarettes    Smokeless tobacco: Never   Substance Use Topics    Alcohol use: Not Currently     Comment: hasnt had a drink in a month. States he is under the care of PCP to reyna.      Family History:  Family History   Problem Relation Age of Onset    Diabetes Father     Diabetes Sister     Hypertension Sister     Diabetes Brother     Diabetes Maternal Grandmother     Diabetes Maternal Grandfather     Diabetes Paternal Grandmother     Diabetes Paternal Grandfather     Heart attack Maternal Aunt 73    Colon cancer Neg Hx     Prostate cancer Neg Hx        Home Meds:  Medications Prior to Admission   Medication Sig Dispense Refill Last Dose    apixaban (ELIQUIS) 5 MG tablet tablet Take 1 tablet by mouth Every 12 (Twelve) Hours. Indications: DVT/PE (active thrombosis) (Patient taking differently: Take 1 tablet by mouth Daily. Indications: DVT/PE (active thrombosis)) 60 tablet 2     ferrous sulfate (FerrouSul) 325 (65 FE) MG tablet Take 1 tablet by mouth Daily With Breakfast. 90 tablet 1     folic acid (FOLVITE) 1 MG tablet Take 1 tablet by mouth Daily. 30 tablet 0     pantoprazole (PROTONIX) 40 MG EC tablet Take 1 tablet by mouth Every Morning. (Patient taking differently: Take 1 tablet by mouth Daily.) 90 tablet 1     thiamine (VITAMIN B1) 100 MG tablet Take 1 tablet by mouth Daily. 30 tablet 0      Current  Meds:   [START ON 10/31/2023] ferrous sulfate, 325 mg, Oral, Daily With Breakfast  folic acid, 1 mg, Oral, Daily  pantoprazole, 40 mg, Oral, Daily  senna-docusate sodium, 2 tablet, Oral, BID  sodium chloride, 10 mL, Intravenous, Q12H  thiamine, 100 mg, Oral, Daily      Allergies:  No Known Allergies  Review of Systems  There is weakness and fatigue all other systems reviewed and negative     Objective     Vital Signs  Temp:  [97.4 °F (36.3 °C)-97.6 °F (36.4 °C)] 97.4 °F (36.3 °C)  Heart Rate:  [] 79  Resp:  [16] 16  BP: (121-130)/(77-87) 126/77  Physical Exam:  General Appearance:    Alert, cooperative, in no acute distress   Head:    Normocephalic, without obvious abnormality, atraumatic   Eyes:          conjunctivae and sclerae normal, no   icterus   Throat:   no thrush, oral mucosa moist   Neck:   Supple, no adenopathy   Lungs:     Clear to auscultation bilaterally    Heart:    Regular rhythm and normal rate    Chest Wall:    No abnormalities observed   Abdomen:     Soft, nondistended, nontender; normal bowel sounds   Extremities:   no edema, no redness   Skin:   No bruising or rash   Psychiatric:  normal mood and insight     Results Review:       Results from last 7 days   Lab Units 10/30/23  1329   WBC 10*3/mm3 5.19   HEMOGLOBIN g/dL 10.5*   HEMATOCRIT % 32.2*   PLATELETS 10*3/mm3 239     Results from last 7 days   Lab Units 10/30/23  1329   SODIUM mmol/L 137   POTASSIUM mmol/L 3.7   CHLORIDE mmol/L 104   CO2 mmol/L 24.1   BUN mg/dL 15   CREATININE mg/dL 1.21   CALCIUM mg/dL 9.2   BILIRUBIN mg/dL <0.2   ALK PHOS U/L 71   ALT (SGPT) U/L 13   AST (SGOT) U/L 13   GLUCOSE mg/dL 151*     Results from last 7 days   Lab Units 10/30/23  1329   INR  1.01     Lab Results   Lab Value Date/Time    LIPASE 11 (L) 07/12/2023 0612    LIPASE 26 06/23/2023 1201       Radiology:  No orders to display       Assessment & Plan   Active Hospital Problems    Diagnosis     **GI bleed        Assessment:  Recurrent GI bleeding,  fifth admission this year  Interventional radiology coiling/embolization of a distal branch of the SMA feeding the ileum in July of this year x2  Full endoscopic evaluation as described above failed to find bleeding source back in June and July of this year  Red blood per rectum  Maroon stool  Black stool a few days ago  Eliquis last dose 2 days ago    Plan:  Serial hemoglobin  Transfusion parameters written  Order tagged red blood scan to try to localize bleeding, if it truly is distal ileum and interventional radiology consult will need to be obtained for angiography and coiling  No indication to repeat small bowel enteroscopy, EGD or colonoscopy at this time.  Unfortunately the lesion in the ileum is too far from the ileocecal valve to reach via standard c/s  We will follow      I discussed the patients findings and my recommendations with patient and nursing staff.    Wojciech Loo MD

## 2023-10-30 NOTE — ED PROVIDER NOTES
EMERGENCY DEPARTMENT ENCOUNTER    Room Number:  115/1  Date seen:  10/30/2023  PCP: Hellen Skelton APRN  Historian: Patient      HPI:  Chief Complaint: Rectal bleeding  A complete HPI/ROS/PMH/PSH/SH/FH are unobtainable due to:   Context: Radha Noriega V is a 43 y.o. male who presents to the ED c/o rectal bleeding      MEDICAL RECORD REVIEW (non ED)  I reviewed prior medical records note the patient was hospitalized in August with DVT of the upper extremity.  Patient does have history of recurrent GI bleeding which was felt to be related to small bowel and has had coil embolization in the past.  Patient currently anticoagulated on Eliquis.    PAST MEDICAL HISTORY  Active Ambulatory Problems     Diagnosis Date Noted    Gastrointestinal hemorrhage with melena 06/23/2023    ETOH abuse 06/23/2023    Migraine 02/09/2021    Current every day smoker 06/23/2023    Tachycardia 06/23/2023    Acute blood loss anemia 06/23/2023    Family history of diabetes mellitus 06/29/2023    Iron deficiency anemia 07/08/2023    Sepsis 07/13/2023    Metabolic acidosis 07/14/2023    Enteritis of possible infectious origin 07/14/2023    Blood per rectum 07/12/2023    Arm DVT (deep venous thromboembolism), acute, left 08/24/2023    Complication associated with peripherally inserted central catheter (PICC) 08/24/2023     Resolved Ambulatory Problems     Diagnosis Date Noted    GI bleed 07/07/2023    ABLA (acute blood loss anemia) 07/12/2023     Past Medical History:   Diagnosis Date    Anemia     Tobacco dependence          PAST SURGICAL HISTORY  Past Surgical History:   Procedure Laterality Date    APPENDECTOMY      COLONOSCOPY N/A 06/25/2023    Procedure: COLONOSCOPY TO CECUM AND TERMINAL ILEUM WITH BIOPSIES AND COLD BIOPSY POLYPECTOMIES;  Surgeon: Imtiaz Lee MD;  Location: [facility] ENDOSCOPY;  Service: Gastroenterology;  Laterality: N/A;  PRE- MELENA  POST- COLON POLYPS, HEMORRHOIDS    ENDOSCOPY Left 06/24/2023    Procedure:  ESOPHAGOGASTRODUODENOSCOPY;  Surgeon: Imtiaz Lee MD;  Location:  [facility] ENDOSCOPY;  Service: Gastroenterology;  Laterality: Left;  small hiatal hernia, esophagitis    ENDOSCOPY N/A 07/15/2023    Procedure: ESOPHAGOGASTRODUODENOSCOPY;  Surgeon: Ermelinda Dixon MD;  Location:  [facility] ENDOSCOPY;  Service: Gastroenterology;  Laterality: N/A;  pre-anemia  post-hiatal hernia    ENTEROSCOPY SMALL BOWEL N/A 07/09/2023    Procedure: ENTEROSCOPY SMALL BOWEL;  Surgeon: Isrrael Gibson MD;  Location: [facility] ENDOSCOPY;  Service: Gastroenterology;  Laterality: N/A;  PRE- GI BLEED  POST- NORMAL    UPPER GASTROINTESTINAL ENDOSCOPY           FAMILY HISTORY  Family History   Problem Relation Age of Onset    Diabetes Father     Diabetes Sister     Hypertension Sister     Diabetes Brother     Diabetes Maternal Grandmother     Diabetes Maternal Grandfather     Diabetes Paternal Grandmother     Diabetes Paternal Grandfather     Heart attack Maternal Aunt 73    Colon cancer Neg Hx     Prostate cancer Neg Hx          SOCIAL HISTORY  Social History     Socioeconomic History    Marital status:    Tobacco Use    Smoking status: Every Day     Packs/day: 1.00     Years: 20.00     Additional pack years: 0.00     Total pack years: 20.00     Types: Cigarettes    Smokeless tobacco: Never   Vaping Use    Vaping Use: Never used   Substance and Sexual Activity    Alcohol use: Not Currently     Comment: hasnt had a drink in a month. States he is under the care of PCP to monit.    Drug use: Yes     Types: Marijuana     Comment: daily.    Sexual activity: Yes     Partners: Female         ALLERGIES  Patient has no known allergies.        REVIEW OF SYSTEMS  Review of Systems         PHYSICAL EXAM  ED Triage Vitals   Temp Heart Rate Resp BP SpO2   10/30/23 1251 10/30/23 1251 10/30/23 1251 10/30/23 1305 10/30/23 1251   97.6 °F (36.4 °C) 107 16 130/87 100 %      Temp src Heart Rate Source Patient Position BP Location FiO2 (%)   10/30/23 1251  10/30/23 1251 10/30/23 1305 10/30/23 1305 --   Tympanic Monitor Sitting Right arm        Physical Exam    GENERAL: Alert, NAD - vitals N  HENT: nares patent  EYES: no scleral icterus  CV: regular rhythm, regular rate  RESPIRATORY: normal effort  ABDOMEN: soft, NTTP  MUSCULOSKELETAL: no deformity  NEURO: Strength sensation and coordination are grossly intact.  Speech and mentation are unremarkable  SKIN: warm, dry      Vital signs and nursing notes reviewed.          LAB RESULTS  Recent Results (from the past 24 hour(s))   Comprehensive Metabolic Panel    Collection Time: 10/30/23  1:29 PM    Specimen: Blood   Result Value Ref Range    Glucose 151 (H) 65 - 99 mg/dL    BUN 15 6 - 20 mg/dL    Creatinine 1.21 0.76 - 1.27 mg/dL    Sodium 137 136 - 145 mmol/L    Potassium 3.7 3.5 - 5.2 mmol/L    Chloride 104 98 - 107 mmol/L    CO2 24.1 22.0 - 29.0 mmol/L    Calcium 9.2 8.6 - 10.5 mg/dL    Total Protein 6.9 6.0 - 8.5 g/dL    Albumin 4.3 3.5 - 5.2 g/dL    ALT (SGPT) 13 1 - 41 U/L    AST (SGOT) 13 1 - 40 U/L    Alkaline Phosphatase 71 39 - 117 U/L    Total Bilirubin <0.2 0.0 - 1.2 mg/dL    Globulin 2.6 gm/dL    A/G Ratio 1.7 g/dL    BUN/Creatinine Ratio 12.4 7.0 - 25.0    Anion Gap 8.9 5.0 - 15.0 mmol/L    eGFR 76.7 >60.0 mL/min/1.73   Protime-INR    Collection Time: 10/30/23  1:29 PM    Specimen: Blood   Result Value Ref Range    Protime 13.4 11.7 - 14.2 Seconds    INR 1.01 0.90 - 1.10   Type & Screen    Collection Time: 10/30/23  1:29 PM    Specimen: Blood   Result Value Ref Range    ABO Type B     RH type Positive     Antibody Screen Negative     T&S Expiration Date 11/2/2023 11:59:59 PM    Green Top (Gel)    Collection Time: 10/30/23  1:29 PM   Result Value Ref Range    Extra Tube Hold for add-ons.    Lavender Top    Collection Time: 10/30/23  1:29 PM   Result Value Ref Range    Extra Tube hold for add-on    Gold Top - SST    Collection Time: 10/30/23  1:29 PM   Result Value Ref Range    Extra Tube Hold for add-ons.     Light Blue Top    Collection Time: 10/30/23  1:29 PM   Result Value Ref Range    Extra Tube Hold for add-ons.    CBC Auto Differential    Collection Time: 10/30/23  1:29 PM    Specimen: Blood   Result Value Ref Range    WBC 5.19 3.40 - 10.80 10*3/mm3    RBC 3.89 (L) 4.14 - 5.80 10*6/mm3    Hemoglobin 10.5 (L) 13.0 - 17.7 g/dL    Hematocrit 32.2 (L) 37.5 - 51.0 %    MCV 82.8 79.0 - 97.0 fL    MCH 27.0 26.6 - 33.0 pg    MCHC 32.6 31.5 - 35.7 g/dL    RDW 15.5 (H) 12.3 - 15.4 %    RDW-SD 46.6 37.0 - 54.0 fl    MPV 10.7 6.0 - 12.0 fL    Platelets 239 140 - 450 10*3/mm3    Neutrophil % 57.8 42.7 - 76.0 %    Lymphocyte % 29.7 19.6 - 45.3 %    Monocyte % 9.4 5.0 - 12.0 %    Eosinophil % 2.3 0.3 - 6.2 %    Basophil % 0.6 0.0 - 1.5 %    Immature Grans % 0.2 0.0 - 0.5 %    Neutrophils, Absolute 3.00 1.70 - 7.00 10*3/mm3    Lymphocytes, Absolute 1.54 0.70 - 3.10 10*3/mm3    Monocytes, Absolute 0.49 0.10 - 0.90 10*3/mm3    Eosinophils, Absolute 0.12 0.00 - 0.40 10*3/mm3    Basophils, Absolute 0.03 0.00 - 0.20 10*3/mm3    Immature Grans, Absolute 0.01 0.00 - 0.05 10*3/mm3    nRBC 0.0 0.0 - 0.2 /100 WBC       Ordered the above labs and independently interpreted results. My findings will be discussed in the medical decision making section below        RADIOLOGY  No Radiology Exams Resulted Within Past 24 Hours          PROCEDURES  Procedures          MEDICATIONS GIVEN IN ER  Medications   sodium chloride 0.9 % flush 10 mL (has no administration in time range)   sodium chloride 0.9 % flush 10 mL (10 mL Intravenous Given 10/30/23 1427)   sodium chloride 0.9 % flush 10 mL (10 mL Intravenous Given 10/30/23 3914)   sodium chloride 0.9 % infusion 40 mL (has no administration in time range)   sennosides-docusate (PERICOLACE) 8.6-50 MG per tablet 2 tablet (has no administration in time range)     And   polyethylene glycol (MIRALAX) packet 17 g (has no administration in time range)     And   bisacodyl (DULCOLAX) EC tablet 5 mg (has no  administration in time range)     And   bisacodyl (DULCOLAX) suppository 10 mg (has no administration in time range)   ondansetron (ZOFRAN) tablet 4 mg (has no administration in time range)     Or   ondansetron (ZOFRAN) injection 4 mg (has no administration in time range)   nitroglycerin (NITROSTAT) SL tablet 0.4 mg (has no administration in time range)   lactated ringers infusion (100 mL/hr Intravenous New Bag 10/30/23 1427)   melatonin tablet 5 mg (has no administration in time range)   ferrous sulfate tablet 325 mg (has no administration in time range)   folic acid (FOLVITE) tablet 1 mg (1 mg Oral Not Given 10/30/23 1551)   pantoprazole (PROTONIX) EC tablet 40 mg (40 mg Oral Not Given 10/30/23 1552)   thiamine (VITAMIN B-1) tablet 100 mg (100 mg Oral Not Given 10/30/23 1551)               MEDICAL DECISION MAKING, PROGRESS, and CONSULTS    All labs have been independently reviewed by me.  All radiology studies have been reviewed by me and I have also reviewed the radiology report.   EKG's independently viewed and interpreted by me.  Discussion below represents my analysis of pertinent findings related to patient's condition, differential diagnosis, treatment plan and final disposition.      Additional sources:  - Discussed/ obtained information from independent historians:  GI provider on call, wife, Lucille VILLEDA from OBS unit    - External (non-ED) record review: Please see documented above    - Chronic or social conditions impacting care: anticoag, h/o GI bleed    - Shared decision making: I discussed ED evaluation and treatment plan with patient who is in agreement.  BRPR on AC.  Will admit for further eval, serial HGB, GI consult      Orders placed during this visit:  Orders Placed This Encounter   Procedures    NM GI Blood Loss    [place] Draw    Comprehensive Metabolic Panel    Protime-INR    Occult Blood X 1, Stool - Stool, Per Rectum    CBC Auto Differential    CBC (No Diff)    Basic Metabolic Panel    CBC (No  Diff)    Diet: Liquid Diets; Clear Liquid; Fluid Consistency: Thin (IDDSI 0)    NPO Diet NPO Type: Strict NPO    Vital Signs    Intake & Output    Weigh Patient    Oral Care    Place Sequential Compression Device    Maintain Sequential Compression Device    Telemetry - Maintain IV Access    Telemetry - Place Orders & Notify Provider of Results When Patient Experiences Acute Chest Pain, Dysrhythmia or Respiratory Distress    May Be Off Telemetry for Tests    Vital Signs    Pulse Oximetry, Continuous    Up With Assistance    Notify Provider (With Default Parameters)    Transfuse 2 units of packed red blood cells for hemoglobin less than 7.0  Nursing Communication    Code Status and Medical Interventions:    Gastroenterology (on-call MD unless specified)    Pulse Oximetry    Oxygen Therapy- Nasal Cannula; Titrate 1-6 LPM Per SpO2; 90 - 95%    POC Occult Blood Stool    Type & Screen    Insert Peripheral IV    Insert Peripheral IV    Initiate ED Observation Status    CBC & Differential    Green Top (Gel)    Lavender Top    Gold Top - SST    Light Blue Top           Differential diagnosis:    Please see as documented below in ED course      Independent interpretation of labs, radiology studies, and discussions with consultants:               DIAGNOSIS  Final diagnoses:   Gastrointestinal hemorrhage, unspecified gastrointestinal hemorrhage type   Anticoagulated by anticoagulation treatment         DISPOSITION  Admission            Latest Documented Vital Signs:  As of 16:08 EDT  BP- 126/77 HR- 79 Temp- 97.4 °F (36.3 °C) (Oral) O2 sat- 100%              --    Please note that portions of this were completed with a voice recognition program.       Note Disclaimer: At [facility], we believe that sharing information builds trust and better relationships. You are receiving this note because you are receiving care at [facility] or recently visited. It is possible you will see health information before a provider has  talked with you about it. This kind of information can be easy to misunderstand. To help you fully understand what it means for your health, we urge you to discuss this note with your provider.             Adryan Lee MD  10/30/23 [id number]

## 2023-10-30 NOTE — H&P
[facility]   HISTORY AND PHYSICAL    Patient Name: Radha Noriega V  : 1980  MRN: [id number]  Primary Care Physician:  Hellen Skelton APRN  Date of admission: 10/30/2023    Subjective   Subjective     Chief Complaint:   Chief Complaint   Patient presents with    Black or Bloody Stool         HPI:    Radha Noriega V is a 43 y.o. male with history of recurrent GI bleeding requiring coil embolization in the past, left upper extremity DVT secondary to midline on Eliquis who presents to [facility] ER with blood in the stool.  His stool was darker in color than normal and had a tar-like consistency to it.  He states today he noted some blood in the stool noted it was in maroonish color.  He reports some intermittent burning discomfort but denies any nausea vomiting.  Denies fevers and chills.  Denies chest pain and shortness of breath.  He states his last dose of Eliquis was 2 days ago as he stopped after he had the first episode of abnormal stool.    In the ER, laboratory evaluation notes a hemoglobin of 10.5.  Patient hemodynamically stable.  Electrolytes within normal limits.    Review of Systems   All systems were reviewed and negative except for: what is mentioned above in the HPI.     Personal History     Past Medical History:   Diagnosis Date    Anemia     ETOH abuse     Tachycardia 2023    Tobacco dependence        Past Surgical History:   Procedure Laterality Date    APPENDECTOMY      COLONOSCOPY N/A 2023    Procedure: COLONOSCOPY TO CECUM AND TERMINAL ILEUM WITH BIOPSIES AND COLD BIOPSY POLYPECTOMIES;  Surgeon: Imtiaz Lee MD;  Location: [facility] ENDOSCOPY;  Service: Gastroenterology;  Laterality: N/A;  PRE- MELENA  POST- COLON POLYPS, HEMORRHOIDS    ENDOSCOPY Left 2023    Procedure: ESOPHAGOGASTRODUODENOSCOPY;  Surgeon: Imtiaz Lee MD;  Location: [facility] ENDOSCOPY;  Service: Gastroenterology;  Laterality: Left;  small hiatal hernia, esophagitis    ENDOSCOPY  N/A 07/15/2023    Procedure: ESOPHAGOGASTRODUODENOSCOPY;  Surgeon: Ermelinda Dixon MD;  Location:  [facility] ENDOSCOPY;  Service: Gastroenterology;  Laterality: N/A;  pre-anemia  post-hiatal hernia    ENTEROSCOPY SMALL BOWEL N/A 07/09/2023    Procedure: ENTEROSCOPY SMALL BOWEL;  Surgeon: Isrrael Gibson MD;  Location:  [facility] ENDOSCOPY;  Service: Gastroenterology;  Laterality: N/A;  PRE- GI BLEED  POST- NORMAL    UPPER GASTROINTESTINAL ENDOSCOPY         Family History: family history includes Diabetes in his brother, father, maternal grandfather, maternal grandmother, paternal grandfather, paternal grandmother, and sister; Heart attack (age of onset: 73) in his maternal aunt; Hypertension in his sister. Otherwise pertinent FHx was reviewed and not pertinent to current issue.    Social History:  reports that he has been smoking cigarettes. He has a 20.00 pack-year smoking history. He has never used smokeless tobacco. He reports that he does not currently use alcohol. He reports current drug use. Drug: Marijuana.    Home Medications:  apixaban, ferrous sulfate, folic acid, pantoprazole, and thiamine    Allergies:  No Known Allergies    Objective   Objective     Vitals:   Temp:  [97.6 °F (36.4 °C)] 97.6 °F (36.4 °C)  Heart Rate:  [] 91  Resp:  [16] 16  BP: (121-130)/(84-87) 130/84  Physical Exam    Constitutional: 43-year-old male in no acute distress on room air   Eyes: PERRLA, sclerae anicteric, no conjunctival injection   HENT: NCAT, mucous membranes moist   Neck: Supple, no thyromegaly, no lymphadenopathy, trachea midline   Respiratory: Clear to auscultation bilaterally, nonlabored respirations    Cardiovascular: RRR, no murmurs, rubs, or gallops, palpable pedal pulses bilaterally   Gastrointestinal: Positive bowel sounds, soft, nontender, nondistended   Musculoskeletal: No bilateral ankle edema, no clubbing or cyanosis to extremities   Psychiatric: Appropriate affect, cooperative   Neurologic: Oriented x 3,  strength symmetric in all extremities, Cranial Nerves grossly intact to confrontation, speech clear   Skin: No rashes     Result Review    Result Review:  I have personally reviewed the results from the time of this admission to 10/30/2023 14:53 EDT and agree with these findings:  [x]  Laboratory list / accordion  []  Microbiology  []  Radiology  []  EKG/Telemetry   []  Cardiology/Vascular   []  Pathology  [x]  Old records  []  Other:  Most notable findings include:   Hgb 10.5     Recent capsule study showed no active bleeding, few pictures of a mucosal abnormality which could be a small ulcer was seen, not well visualized    Assessment & Plan   Assessment / Plan     Brief Patient Summary:  Radha Noriega V is a 43 y.o. male who admitted the observation unit with blood in the stool, lower GI bleed.  Plan for IV fluid hydration, serial hemoglobins, and GI consultation.    Active Hospital Problems:  Active Hospital Problems    Diagnosis     **GI bleed      Plan:     Lower GI bleed  -Hemoglobin 10.5, trend  -Hold Eliquis  -IV fluids  -GI consulted  -Continue Protonix and ferrous sulfate  -Telemetry monitoring  -Clear liquid diet, then n.p.o. after midnight    Left upper extremity DVT  -Secondary to midline  -Eliquis held      DVT prophylaxis:  Mechanical DVT prophylaxis orders are present.    CODE STATUS:    Code Status (Patient has no pulse and is not breathing): CPR (Attempt to Resuscitate)  Medical Interventions (Patient has pulse or is breathing): Full Support    Admission Status:  I believe this patient meets observation status.    78 minutes have been spent by [facility] providers in the care of this patient while under observation status.      Appropriate PPE worn during patient encounter.  Hand hygeine performed before and after seeing the patient.      Electronically signed by Catherine Johnson PA-C, 10/30/23, 2:20 PM EDT.

## 2023-10-30 NOTE — PROGRESS NOTES
Clinical Pharmacy Services: Medication History    Radha Noriega V is a 43 y.o. male presenting to [facility] for   Chief Complaint   Patient presents with    Black or Bloody Stool       He  has a past medical history of Anemia, ETOH abuse, Tachycardia (06/23/2023), and Tobacco dependence.    Allergies as of 10/30/2023    (No Known Allergies)       Medication information was obtained from: Patient   Pharmacy and Phone Number:     Prior to Admission Medications       Prescriptions Last Dose Informant Patient Reported? Taking?    apixaban (ELIQUIS) 5 MG tablet tablet  Self No Yes    Take 1 tablet by mouth Every 12 (Twelve) Hours. Indications: DVT/PE (active thrombosis)    Patient taking differently:  Take 1 tablet by mouth Daily. Indications: DVT/PE (active thrombosis)    ferrous sulfate (FerrouSul) 325 (65 FE) MG tablet  Self No Yes    Take 1 tablet by mouth Daily With Breakfast.    folic acid (FOLVITE) 1 MG tablet  Self No Yes    Take 1 tablet by mouth Daily.    pantoprazole (PROTONIX) 40 MG EC tablet  Self No Yes    Take 1 tablet by mouth Every Morning.    Patient taking differently:  Take 1 tablet by mouth Daily.    thiamine (VITAMIN B1) 100 MG tablet  Self No Yes    Take 1 tablet by mouth Daily.              Medication notes: Patient stated he is only taking his eliquis once a day.    This medication list is complete to the best of my knowledge as of 10/30/2023    Please call if questions.    Dora Barlow  Medication History Technician   [phone]    10/30/2023 14:27 EDT

## 2023-10-30 NOTE — PLAN OF CARE
Goal Outcome Evaluation:           Progress: improving          Patient oriented to unit. No complaint at this time. VSS. Alert, oriented and ambulatory.

## 2023-10-30 NOTE — ED NOTES
Pt had rectal bleed last night.  The other day his stool was dark.  He had rectal bleed 4 months ago    Pt reports he has been on a blood thinning medication x 3 months, reports abdominal pain around naval

## 2023-10-31 ENCOUNTER — APPOINTMENT (OUTPATIENT)
Dept: NUCLEAR MEDICINE | Facility: HOSPITAL | Age: 43
End: 2023-10-31
Payer: COMMERCIAL

## 2023-10-31 LAB
ANION GAP SERPL CALCULATED.3IONS-SCNC: 7.9 MMOL/L (ref 5–15)
BUN SERPL-MCNC: 19 MG/DL (ref 6–20)
BUN/CREAT SERPL: 16 (ref 7–25)
CALCIUM SPEC-SCNC: 8.7 MG/DL (ref 8.6–10.5)
CHLORIDE SERPL-SCNC: 104 MMOL/L (ref 98–107)
CO2 SERPL-SCNC: 25.1 MMOL/L (ref 22–29)
CREAT SERPL-MCNC: 1.19 MG/DL (ref 0.76–1.27)
DEPRECATED RDW RBC AUTO: 43.8 FL (ref 37–54)
DEPRECATED RDW RBC AUTO: 45.7 FL (ref 37–54)
EGFRCR SERPLBLD CKD-EPI 2021: 78.2 ML/MIN/1.73
ERYTHROCYTE [DISTWIDTH] IN BLOOD BY AUTOMATED COUNT: 15.1 % (ref 12.3–15.4)
ERYTHROCYTE [DISTWIDTH] IN BLOOD BY AUTOMATED COUNT: 15.2 % (ref 12.3–15.4)
GLUCOSE SERPL-MCNC: 90 MG/DL (ref 65–99)
HCT VFR BLD AUTO: 24.2 % (ref 37.5–51)
HCT VFR BLD AUTO: 25.9 % (ref 37.5–51)
HCT VFR BLD AUTO: 27.1 % (ref 37.5–51)
HGB BLD-MCNC: 8 G/DL (ref 13–17.7)
HGB BLD-MCNC: 8.4 G/DL (ref 13–17.7)
HGB BLD-MCNC: 9 G/DL (ref 13–17.7)
MCH RBC QN AUTO: 26.7 PG (ref 26.6–33)
MCH RBC QN AUTO: 26.9 PG (ref 26.6–33)
MCHC RBC AUTO-ENTMCNC: 32.4 G/DL (ref 31.5–35.7)
MCHC RBC AUTO-ENTMCNC: 33.1 G/DL (ref 31.5–35.7)
MCV RBC AUTO: 80.7 FL (ref 79–97)
MCV RBC AUTO: 83 FL (ref 79–97)
PLATELET # BLD AUTO: 211 10*3/MM3 (ref 140–450)
PLATELET # BLD AUTO: 217 10*3/MM3 (ref 140–450)
PMV BLD AUTO: 10.4 FL (ref 6–12)
PMV BLD AUTO: 10.6 FL (ref 6–12)
POTASSIUM SERPL-SCNC: 4.1 MMOL/L (ref 3.5–5.2)
RBC # BLD AUTO: 3 10*6/MM3 (ref 4.14–5.8)
RBC # BLD AUTO: 3.12 10*6/MM3 (ref 4.14–5.8)
SODIUM SERPL-SCNC: 137 MMOL/L (ref 136–145)
WBC NRBC COR # BLD: 5.33 10*3/MM3 (ref 3.4–10.8)
WBC NRBC COR # BLD: 6.11 10*3/MM3 (ref 3.4–10.8)

## 2023-10-31 PROCEDURE — G0378 HOSPITAL OBSERVATION PER HR: HCPCS

## 2023-10-31 PROCEDURE — 80048 BASIC METABOLIC PNL TOTAL CA: CPT | Performed by: STUDENT IN AN ORGANIZED HEALTH CARE EDUCATION/TRAINING PROGRAM

## 2023-10-31 PROCEDURE — 99232 SBSQ HOSP IP/OBS MODERATE 35: CPT | Performed by: PHYSICIAN ASSISTANT

## 2023-10-31 PROCEDURE — 0 TECHNETIUM LABELED RED BLOOD CELLS: Performed by: EMERGENCY MEDICINE

## 2023-10-31 PROCEDURE — 78278 ACUTE GI BLOOD LOSS IMAGING: CPT

## 2023-10-31 PROCEDURE — A9560 TC99M LABELED RBC: HCPCS | Performed by: EMERGENCY MEDICINE

## 2023-10-31 PROCEDURE — 85027 COMPLETE CBC AUTOMATED: CPT | Performed by: INTERNAL MEDICINE

## 2023-10-31 PROCEDURE — 25810000003 LACTATED RINGERS PER 1000 ML: Performed by: STUDENT IN AN ORGANIZED HEALTH CARE EDUCATION/TRAINING PROGRAM

## 2023-10-31 PROCEDURE — 82272 OCCULT BLD FECES 1-3 TESTS: CPT | Performed by: EMERGENCY MEDICINE

## 2023-10-31 RX ADMIN — Medication 100 MG: at 09:03

## 2023-10-31 RX ADMIN — SODIUM CHLORIDE, POTASSIUM CHLORIDE, SODIUM LACTATE AND CALCIUM CHLORIDE 100 ML/HR: 600; 310; 30; 20 INJECTION, SOLUTION INTRAVENOUS at 10:46

## 2023-10-31 RX ADMIN — SENNOSIDES AND DOCUSATE SODIUM 2 TABLET: 50; 8.6 TABLET ORAL at 20:17

## 2023-10-31 RX ADMIN — Medication 10 ML: at 20:21

## 2023-10-31 RX ADMIN — PANTOPRAZOLE SODIUM 40 MG: 40 INJECTION, POWDER, FOR SOLUTION INTRAVENOUS at 20:17

## 2023-10-31 RX ADMIN — FERROUS SULFATE TAB 325 MG (65 MG ELEMENTAL FE) 325 MG: 325 (65 FE) TAB at 09:03

## 2023-10-31 RX ADMIN — FOLIC ACID 1 MG: 1 TABLET ORAL at 09:03

## 2023-10-31 RX ADMIN — TECHNETIUM TC 99M-LABELED RED BLOOD CELLS 1 DOSE: KIT at 11:42

## 2023-10-31 RX ADMIN — PANTOPRAZOLE SODIUM 40 MG: 40 INJECTION, POWDER, FOR SOLUTION INTRAVENOUS at 09:03

## 2023-10-31 NOTE — PROGRESS NOTES
[facility]  Inpatient Progress Note    Reason for Follow Up:  GI bleed    Subjective     Interval History:   Pt w/o any melena/hematochezia overnight or this morning  He got tagged RBCs scan this afternoon, results pending.  He is doing okay otherwise.       Current Facility-Administered Medications:     sennosides-docusate (PERICOLACE) 8.6-50 MG per tablet 2 tablet, 2 tablet, Oral, BID, 2 tablet at 10/30/23 2054 **AND** polyethylene glycol (MIRALAX) packet 17 g, 17 g, Oral, Daily PRN **AND** bisacodyl (DULCOLAX) EC tablet 5 mg, 5 mg, Oral, Daily PRN **AND** bisacodyl (DULCOLAX) suppository 10 mg, 10 mg, Rectal, Daily PRN, Hailey Johnsonlin R, PA-C    ferrous sulfate tablet 325 mg, 325 mg, Oral, Daily With Breakfast, Hailey Johnsonlin R, PA-C, 325 mg at 10/31/23 0903    folic acid (FOLVITE) tablet 1 mg, 1 mg, Oral, Daily, Catherine Johnson R, PA-C, 1 mg at 10/31/23 0903    lactated ringers infusion, 100 mL/hr, Intravenous, Continuous, Catherine Johnson PA-C, Last Rate: 100 mL/hr at 10/31/23 1046, 100 mL/hr at 10/31/23 1046    melatonin tablet 5 mg, 5 mg, Oral, Nightly PRN, Elizabeth Catherine R, PA-C    nitroglycerin (NITROSTAT) SL tablet 0.4 mg, 0.4 mg, Sublingual, Q5 Min PRN, Hailey Johnsonlin R, PA-C    ondansetron (ZOFRAN) tablet 4 mg, 4 mg, Oral, Q6H PRN **OR** ondansetron (ZOFRAN) injection 4 mg, 4 mg, Intravenous, Q6H PRN, Elizabeth Catherine R, PA-C    pantoprazole (PROTONIX) injection 40 mg, 40 mg, Intravenous, Q12H, Tommie Johnsonitlin R, PA-C, 40 mg at 10/31/23 0903    sodium chloride 0.9 % flush 10 mL, 10 mL, Intravenous, PRN, Adryan Lee MD    sodium chloride 0.9 % flush 10 mL, 10 mL, Intravenous, Q12H, Catherine Johnson PA-C, 10 mL at 10/30/23 2058    sodium chloride 0.9 % flush 10 mL, 10 mL, Intravenous, PRN, Catherine Johnson PA-C, 10 mL at 10/30/23 1427    sodium chloride 0.9 % infusion 40 mL, 40 mL, Intravenous, PRN, Catherine Johnson PA-C    thiamine  (VITAMIN B-1) tablet 100 mg, 100 mg, Oral, Daily, Elizabeth Catherineberry SPRING PA-C, 100 mg at 10/31/23 0903        Objective     Vital Signs  Temp:  [97.4 °F (36.3 °C)-98.6 °F (37 °C)] 97.9 °F (36.6 °C)  Heart Rate:  [79] 79  Resp:  [15-18] 15  BP: (105-144)/(69-82) 124/76  Body mass index is 25.52 kg/m².  No intake or output data in the 24 hours ending 10/31/23 1457  No intake/output data recorded.     Physical Exam:   General: patient awake, alert and cooperative   Eyes: Normal lids and lashes, no scleral icterus   Abdomen: soft, nontender, nondistended; normal bowel sounds      Results Review:     I reviewed the patient's new clinical results.    Results from last 7 days   Lab Units 10/31/23  0431 10/30/23  2353 10/30/23  1938 10/30/23  1329   WBC 10*3/mm3 6.11  --  5.51 5.19   HEMOGLOBIN g/dL 8.4* 9.0* 9.4* 10.5*   HEMATOCRIT % 25.9* 27.1* 29.3* 32.2*   PLATELETS 10*3/mm3 211  --  219 239     Results from last 7 days   Lab Units 10/31/23  0431 10/30/23  1329   SODIUM mmol/L 137 137   POTASSIUM mmol/L 4.1 3.7   CHLORIDE mmol/L 104 104   CO2 mmol/L 25.1 24.1   BUN mg/dL 19 15   CREATININE mg/dL 1.19 1.21   CALCIUM mg/dL 8.7 9.2   BILIRUBIN mg/dL  --  <0.2   ALK PHOS U/L  --  71   ALT (SGPT) U/L  --  13   AST (SGOT) U/L  --  13   GLUCOSE mg/dL 90 151*     Results from last 7 days   Lab Units 10/30/23  1329   INR  1.01     Lab Results   Lab Value Date/Time    LIPASE 11 (L) 07/12/2023 0612    LIPASE 26 06/23/2023 1201       Radiology:  NM GI Blood Loss    (Results Pending)       Assessment & Plan     Active Hospital Problems    Diagnosis     **GI bleed          Assessment:   Recurrent GI bleeding, fifth admission this year  He had small bowel enteroscopy, CTA, colonoscopy, and EGD in last few months and no source of bleeding found at the time.   Interventional radiology coiling/embolization of a distal branch of the SMA feeding the ileum in July of this year x2  BRBPR as well as melena- last dose of Eliquis 3 days  ago    All problems new to me   Plan:   Will f/u w/ bleeding scan.   He can have clears meanwhile  Continue to trend H/H, transfuse to maintain hgb of at least 7  Monitor for GI bleed.  Please contact GI if patient having GI bleed again. He may be rebleeding from distal ileum- he would need IR consult for angio/embolization if so  Continue to hold his eliquis    I discussed the patients findings and my recommendations with patient.         Lexi Thomas PA-C  [facility]  [address] [place] [address]  Office: [phone]

## 2023-10-31 NOTE — PROGRESS NOTES
.ED OBSERVATION PROGRESS/DISCHARGE SUMMARY    Date of Admission: 10/30/2023   LOS: 0 days   PCP: Hellen Skelton APRN      Subjective   Resting comfortably and in no acute distress.  Patient denies having bowel movement or any blood per rectum while in the unit.    Hospital Outcome:   43-year-old male was seen and examined at bedside following admission to the observation unit due to recurrent GI bleed.  Patient had a black stool yesterday that is similar to his previous episodes in the past and then had maroon stool Monday morning.  Patient is on Eliquis for LUE DVT and last dose was 2 days ago.  Laboratory evaluation in the ED shows creatinine 1.21, glucose 151, and hemoglobin 10.5 - 8.4.     Dr. Loo with GI evaluated patient and has ordered serial hemoglobin and tagged red blood scan.  Scan shows no scintigraphic findings of active gastrointestinal bleeding during the course of the study.  Hemoglobin down to 8.0.  Patient denies black or bloody stool during admission.  We will observe patient tonight and check CBC again in the morning.  Discussed with patient who is in agreement with plan.  I anticipate patient will be discharged home tomorrow.    ROS:  General: no fevers, chills  Respiratory: no cough, dyspnea  Cardiovascular: no chest pain, palpitations  Abdomen: No abdominal pain, nausea, vomiting, or diarrhea  Neurologic: No focal weakness    Objective   Physical Exam:  I have reviewed the vital signs.  Temp:  [97.4 °F (36.3 °C)-98.4 °F (36.9 °C)] 98.2 °F (36.8 °C)  Heart Rate:  [] 79  Resp:  [16] 16  BP: (117-130)/(75-87) 121/77  General Appearance:    Alert, cooperative, no distress  Head:    Normocephalic, atraumatic  Eyes:    Sclerae anicteric  Neck:   Supple, no mass  Lungs: Clear to auscultation bilaterally, respirations unlabored  Heart: Regular rate and rhythm, S1 and S2 normal, no murmur, rub or gallop  Abdomen:  Soft, non-tender, bowel sounds active, nondistended  Extremities: No  clubbing, cyanosis, or edema to lower extremities  Pulses:  2+ and symmetric in distal lower extremities  Skin: No rashes   Neurologic: Oriented x3, Normal strength to extremities    Results Review:    I have reviewed the labs, radiology results and diagnostic studies.    Results from last 7 days   Lab Units 10/30/23  2353 10/30/23  1938   WBC 10*3/mm3  --  5.51   HEMOGLOBIN g/dL 9.0* 9.4*   HEMATOCRIT % 27.1* 29.3*   PLATELETS 10*3/mm3  --  219     Results from last 7 days   Lab Units 10/30/23  1329   SODIUM mmol/L 137   POTASSIUM mmol/L 3.7   CHLORIDE mmol/L 104   CO2 mmol/L 24.1   BUN mg/dL 15   CREATININE mg/dL 1.21   CALCIUM mg/dL 9.2   BILIRUBIN mg/dL <0.2   ALK PHOS U/L 71   ALT (SGPT) U/L 13   AST (SGOT) U/L 13   GLUCOSE mg/dL 151*     Imaging Results (Last 24 Hours)       ** No results found for the last 24 hours. **            I have reviewed the medications.  ---------------------------------------------------------------------------------------------  Assessment & Plan   Assessment/Problem List    GI bleed      Plan:    Lower GI bleed  -Hemoglobin 10.5 --> 8.0  -Hold Eliquis  -IV fluids  -GI consulted, Dr. Loo  -Tagged red blood scan shows no scintigraphic findings of active gastrointestinal bleeding   -Continue Protonix and ferrous sulfate  -Telemetry monitoring  -Clear liquid diet  -Continue to monitor      Left upper extremity DVT  -Secondary to midline  -Eliquis held    Disposition: Anticipate patient will be discharged home tomorrow    This note will serve as a progress note    HAYDER Garcia 10/31/23 16:47 EDT    I have worn appropriate PPE during this patient encounter, sanitized my hands both with entering and exiting patient's room.    50 minutes has been spent by [facility] providers in the care of this patient while under observation status.

## 2023-10-31 NOTE — PLAN OF CARE
Goal Outcome Evaluation:      Patient admitted to the observation unit for blood in his stool. No complaints of bloody stool overnight or even bowel movements. Vss. NPO as of midnight. GI consulting. Will continue to monitor for any changes

## 2023-10-31 NOTE — PLAN OF CARE
Goal Outcome Evaluation:  Pt is alert and oriented x 4, gets up independently. Test so far have showed no active bleeding. Hemoglobin steadily decreasing. No c/o abdomen pain. No BM since before admission. Pt is staying overnight for additional labs in AM. Vss

## 2023-11-01 ENCOUNTER — APPOINTMENT (OUTPATIENT)
Dept: CT IMAGING | Facility: HOSPITAL | Age: 43
End: 2023-11-01
Payer: COMMERCIAL

## 2023-11-01 LAB
ANION GAP SERPL CALCULATED.3IONS-SCNC: 10.4 MMOL/L (ref 5–15)
BUN SERPL-MCNC: 15 MG/DL (ref 6–20)
BUN/CREAT SERPL: 13 (ref 7–25)
CALCIUM SPEC-SCNC: 8.6 MG/DL (ref 8.6–10.5)
CHLORIDE SERPL-SCNC: 103 MMOL/L (ref 98–107)
CO2 SERPL-SCNC: 22.6 MMOL/L (ref 22–29)
CREAT SERPL-MCNC: 1.15 MG/DL (ref 0.76–1.27)
DEPRECATED RDW RBC AUTO: 44 FL (ref 37–54)
DEPRECATED RDW RBC AUTO: 46.5 FL (ref 37–54)
EGFRCR SERPLBLD CKD-EPI 2021: 81.5 ML/MIN/1.73
ERYTHROCYTE [DISTWIDTH] IN BLOOD BY AUTOMATED COUNT: 14.7 % (ref 12.3–15.4)
ERYTHROCYTE [DISTWIDTH] IN BLOOD BY AUTOMATED COUNT: 15.4 % (ref 12.3–15.4)
GLUCOSE BLDC GLUCOMTR-MCNC: 119 MG/DL (ref 70–130)
GLUCOSE BLDC GLUCOMTR-MCNC: 182 MG/DL (ref 70–130)
GLUCOSE SERPL-MCNC: 93 MG/DL (ref 65–99)
HCT VFR BLD AUTO: 19.5 % (ref 37.5–51)
HCT VFR BLD AUTO: 23 % (ref 37.5–51)
HCT VFR BLD AUTO: 23.5 % (ref 37.5–51)
HEMOCCULT STL QL: POSITIVE
HGB BLD-MCNC: 6.4 G/DL (ref 13–17.7)
HGB BLD-MCNC: 7.7 G/DL (ref 13–17.7)
HGB BLD-MCNC: 7.8 G/DL (ref 13–17.7)
MCH RBC QN AUTO: 27.1 PG (ref 26.6–33)
MCH RBC QN AUTO: 28.6 PG (ref 26.6–33)
MCHC RBC AUTO-ENTMCNC: 32.8 G/DL (ref 31.5–35.7)
MCHC RBC AUTO-ENTMCNC: 33.9 G/DL (ref 31.5–35.7)
MCV RBC AUTO: 82.7 FL (ref 79–97)
MCV RBC AUTO: 84.2 FL (ref 79–97)
PLATELET # BLD AUTO: 155 10*3/MM3 (ref 140–450)
PLATELET # BLD AUTO: 225 10*3/MM3 (ref 140–450)
PMV BLD AUTO: 10.5 FL (ref 6–12)
PMV BLD AUTO: 10.7 FL (ref 6–12)
POTASSIUM SERPL-SCNC: 3.7 MMOL/L (ref 3.5–5.2)
RBC # BLD AUTO: 2.73 10*6/MM3 (ref 4.14–5.8)
RBC # BLD AUTO: 2.84 10*6/MM3 (ref 4.14–5.8)
SODIUM SERPL-SCNC: 136 MMOL/L (ref 136–145)
WBC NRBC COR # BLD: 12.1 10*3/MM3 (ref 3.4–10.8)
WBC NRBC COR # BLD: 6.16 10*3/MM3 (ref 3.4–10.8)

## 2023-11-01 PROCEDURE — 82948 REAGENT STRIP/BLOOD GLUCOSE: CPT

## 2023-11-01 PROCEDURE — 99221 1ST HOSP IP/OBS SF/LOW 40: CPT | Performed by: SURGERY

## 2023-11-01 PROCEDURE — 85018 HEMOGLOBIN: CPT | Performed by: PHYSICIAN ASSISTANT

## 2023-11-01 PROCEDURE — 25010000002 ONDANSETRON PER 1 MG: Performed by: STUDENT IN AN ORGANIZED HEALTH CARE EDUCATION/TRAINING PROGRAM

## 2023-11-01 PROCEDURE — 85027 COMPLETE CBC AUTOMATED: CPT | Performed by: INTERNAL MEDICINE

## 2023-11-01 PROCEDURE — 74174 CTA ABD&PLVS W/CONTRAST: CPT

## 2023-11-01 PROCEDURE — 25510000001 IOPAMIDOL PER 1 ML: Performed by: INTERNAL MEDICINE

## 2023-11-01 PROCEDURE — 80048 BASIC METABOLIC PNL TOTAL CA: CPT | Performed by: PHYSICIAN ASSISTANT

## 2023-11-01 PROCEDURE — 85027 COMPLETE CBC AUTOMATED: CPT | Performed by: PHYSICIAN ASSISTANT

## 2023-11-01 PROCEDURE — P9016 RBC LEUKOCYTES REDUCED: HCPCS

## 2023-11-01 PROCEDURE — 99232 SBSQ HOSP IP/OBS MODERATE 35: CPT | Performed by: PHYSICIAN ASSISTANT

## 2023-11-01 PROCEDURE — 85014 HEMATOCRIT: CPT | Performed by: PHYSICIAN ASSISTANT

## 2023-11-01 PROCEDURE — 25810000003 LACTATED RINGERS SOLUTION: Performed by: PHYSICIAN ASSISTANT

## 2023-11-01 PROCEDURE — 36430 TRANSFUSION BLD/BLD COMPNT: CPT

## 2023-11-01 PROCEDURE — 25010000002 MORPHINE PER 10 MG: Performed by: EMERGENCY MEDICINE

## 2023-11-01 PROCEDURE — 25810000003 LACTATED RINGERS PER 1000 ML: Performed by: PHYSICIAN ASSISTANT

## 2023-11-01 RX ORDER — SODIUM CHLORIDE, SODIUM LACTATE, POTASSIUM CHLORIDE, CALCIUM CHLORIDE 600; 310; 30; 20 MG/100ML; MG/100ML; MG/100ML; MG/100ML
100 INJECTION, SOLUTION INTRAVENOUS CONTINUOUS
Status: DISCONTINUED | OUTPATIENT
Start: 2023-11-01 | End: 2023-11-03 | Stop reason: HOSPADM

## 2023-11-01 RX ORDER — MORPHINE SULFATE 2 MG/ML
2 INJECTION, SOLUTION INTRAMUSCULAR; INTRAVENOUS ONCE
Status: COMPLETED | OUTPATIENT
Start: 2023-11-01 | End: 2023-11-01

## 2023-11-01 RX ORDER — DICYCLOMINE HYDROCHLORIDE 10 MG/1
10 CAPSULE ORAL 4 TIMES DAILY PRN
Status: DISCONTINUED | OUTPATIENT
Start: 2023-11-01 | End: 2023-11-03 | Stop reason: HOSPADM

## 2023-11-01 RX ORDER — POLYETHYLENE GLYCOL 3350 17 G/17G
0.5 POWDER, FOR SOLUTION ORAL EVERY 12 HOURS
Status: COMPLETED | OUTPATIENT
Start: 2023-11-01 | End: 2023-11-02

## 2023-11-01 RX ADMIN — FERROUS SULFATE TAB 325 MG (65 MG ELEMENTAL FE) 325 MG: 325 (65 FE) TAB at 08:34

## 2023-11-01 RX ADMIN — FOLIC ACID 1 MG: 1 TABLET ORAL at 08:34

## 2023-11-01 RX ADMIN — Medication 10 ML: at 08:34

## 2023-11-01 RX ADMIN — MORPHINE SULFATE 2 MG: 2 INJECTION, SOLUTION INTRAMUSCULAR; INTRAVENOUS at 13:28

## 2023-11-01 RX ADMIN — DICYCLOMINE HYDROCHLORIDE 10 MG: 10 CAPSULE ORAL at 12:54

## 2023-11-01 RX ADMIN — PANTOPRAZOLE SODIUM 40 MG: 40 INJECTION, POWDER, FOR SOLUTION INTRAVENOUS at 21:38

## 2023-11-01 RX ADMIN — IOPAMIDOL 95 ML: 755 INJECTION, SOLUTION INTRAVENOUS at 15:54

## 2023-11-01 RX ADMIN — SENNOSIDES AND DOCUSATE SODIUM 2 TABLET: 50; 8.6 TABLET ORAL at 08:34

## 2023-11-01 RX ADMIN — Medication 10 ML: at 21:39

## 2023-11-01 RX ADMIN — SODIUM CHLORIDE, POTASSIUM CHLORIDE, SODIUM LACTATE AND CALCIUM CHLORIDE 1000 ML: 600; 310; 30; 20 INJECTION, SOLUTION INTRAVENOUS at 13:26

## 2023-11-01 RX ADMIN — ONDANSETRON 4 MG: 2 INJECTION INTRAMUSCULAR; INTRAVENOUS at 12:02

## 2023-11-01 RX ADMIN — SENNOSIDES AND DOCUSATE SODIUM 2 TABLET: 50; 8.6 TABLET ORAL at 21:38

## 2023-11-01 RX ADMIN — ONDANSETRON 4 MG: 2 INJECTION INTRAMUSCULAR; INTRAVENOUS at 15:51

## 2023-11-01 RX ADMIN — PANTOPRAZOLE SODIUM 40 MG: 40 INJECTION, POWDER, FOR SOLUTION INTRAVENOUS at 08:34

## 2023-11-01 RX ADMIN — Medication 100 MG: at 08:34

## 2023-11-01 RX ADMIN — POLYETHYLENE GLYCOL 3350 0.5 BOTTLE: 17 POWDER, FOR SOLUTION ORAL at 18:32

## 2023-11-01 RX ADMIN — SODIUM CHLORIDE, POTASSIUM CHLORIDE, SODIUM LACTATE AND CALCIUM CHLORIDE 100 ML/HR: 600; 310; 30; 20 INJECTION, SOLUTION INTRAVENOUS at 14:56

## 2023-11-01 NOTE — PROGRESS NOTES
MD ATTESTATION NOTE    The RUT and I have discussed this patient's history, physical exam, and treatment plan.  I have reviewed the documentation and personally had a face to face interaction with the patient. I affirm the documentation and agree with the treatment and plan.  The attached note describes my personal findings.      I provided a substantive portion of the care of the patient.  I personally performed the physical exam in its entirety, and below are my findings.       Brief HPI: This patient is a 43-year-old male admitted to the observation unit due to a GI bleed.  He is currently on Eliquis secondary to a previous GI bleed.  Currently, he reports that he has had no further GI bleeding and denies abdominal discomfort.      PHYSICAL EXAM  ED Triage Vitals   Temp Heart Rate Resp BP SpO2   10/30/23 1251 10/30/23 1251 10/30/23 1251 10/30/23 1305 10/30/23 1251   97.6 °F (36.4 °C) 107 16 130/87 100 %      Temp src Heart Rate Source Patient Position BP Location FiO2 (%)   10/30/23 1251 10/30/23 1251 10/30/23 1305 10/30/23 1305 --   Tympanic Monitor Sitting Right arm          GENERAL: Resting comfortably and in no acute distress, nontoxic in appearance  HENT: nares patent  EYES: no scleral icterus  CV: regular rhythm, normal rate, no M/R/G  RESPIRATORY: normal effort, lungs clear bilaterally  ABDOMEN: soft, nontender, no rebound or guarding  MUSCULOSKELETAL: no deformity, no edema  NEURO: alert, moves all extremities, follows commands  PSYCH:  calm, cooperative  SKIN: warm, dry    Vital signs and nursing notes reviewed.        Plan: The patient did have a tagged RBC test performed which was negative.  However, his hemoglobin did decrease from 10.5-7.7.  We will asked GI to continue to see in consultation with further testing to come.  We will continue to monitor closely.

## 2023-11-01 NOTE — PROGRESS NOTES
.ED OBSERVATION PROGRESS/DISCHARGE SUMMARY    Date of Admission: 10/30/2023   LOS: 0 days   PCP: Hellen Skelton APRN      Subjective   Patient reports severe lower abdominal pain and subsequently had very large liquid bloody bowel movement.    Hospital Outcome:   43-year-old male was seen and examined at bedside following admission to the observation unit due to recurrent GI bleed.  Patient had a black stool yesterday that is similar to his previous episodes in the past and then had maroon stool Monday morning.  Patient is on Eliquis for LUE DVT and last dose was 2 days ago.  Laboratory evaluation in the ED shows creatinine 1.21, glucose 151, and hemoglobin 10.5 - 8.4.      10/31: Dr. Loo with GI evaluated patient and has ordered serial hemoglobin and tagged red blood scan.  Scan shows no scintigraphic findings of active gastrointestinal bleeding during the course of the study.  Patient's hemoglobin 8.0 this evening, will observe overnight off IV fluids to avoid dilution and check hemoglobin in the morning.    11/1: Patient hemoglobin this morning 7.7.  Discussed with GI, they are going to consult IR.  This afternoon around 1200, patient developed increased abdominal pain.  He became tachycardic to the 160s.  I assessed patient at the bedside, he was having severe lower abdominal pain.  Patient also turned to nurse and grabbed her shirt and had a brief episode of unresponsiveness.  During this time, his blood pressure was stable, 121/73.  Rapid was called at this time.  Patient subsequently had very large liquid bloody bowel movement with blood clots present.  Stat page to GI, they are discussing with IR and consulting general surgery.  Discussed with Dr. Loja who has accepted patient for admission to ICU.  Discussed with  who has arranged for transfer to ICU.  RN briefed patient's family.    ROS:  General: no fevers, chills  Respiratory: no cough, dyspnea  Cardiovascular: no chest pain,  palpitations  Abdomen: No abdominal pain, nausea, vomiting, or diarrhea  Neurologic: No focal weakness    Objective   Physical Exam:  I have reviewed the vital signs.  Temp:  [97.6 °F (36.4 °C)-98.6 °F (37 °C)] 98.6 °F (37 °C)  Resp:  [15-18] 16  BP: (105-144)/(66-82) 118/74  General Appearance:    Alert, cooperative, no distress  Head:    Normocephalic, atraumatic  Eyes:    Sclerae anicteric  Neck:   Supple, no mass  Lungs: Clear to auscultation bilaterally, respirations unlabored  Heart: Regular rate and rhythm, S1 and S2 normal, no murmur, rub or gallop  Abdomen:  Soft, non-tender, bowel sounds active, nondistended  Extremities: No clubbing, cyanosis, or edema to lower extremities  Pulses:  2+ and symmetric in distal lower extremities  Skin: No rashes   Neurologic: Oriented x3, Normal strength to extremities    Results Review:    I have reviewed the labs, radiology results and diagnostic studies.    Results from last 7 days   Lab Units 10/31/23  1601   WBC 10*3/mm3 5.33   HEMOGLOBIN g/dL 8.0*   HEMATOCRIT % 24.2*   PLATELETS 10*3/mm3 217     Results from last 7 days   Lab Units 10/31/23  0431 10/30/23  1329   SODIUM mmol/L 137 137   POTASSIUM mmol/L 4.1 3.7   CHLORIDE mmol/L 104 104   CO2 mmol/L 25.1 24.1   BUN mg/dL 19 15   CREATININE mg/dL 1.19 1.21   CALCIUM mg/dL 8.7 9.2   BILIRUBIN mg/dL  --  <0.2   ALK PHOS U/L  --  71   ALT (SGPT) U/L  --  13   AST (SGOT) U/L  --  13   GLUCOSE mg/dL 90 151*     Imaging Results (Last 24 Hours)       Procedure Component Value Units Date/Time    NM GI Blood Loss [[id number]] Collected: 10/31/23 1506     Updated: 10/31/23 1513    Narrative:      EXAM: NM GI BLOOD LOSS-     Technique: 31 mCi of technetium 99m labeled RBCs were injected into the  left forearm intravenously. Dynamic cine imaging was obtained for 60  minutes thereafter. Lateral projection planar imaging of the pelvis was  obtained immediately thereafter.     INDICATION: Recurrent gastrointestinal bleeding.      COMPARISON: GI bleed studies 7/19/2023, 7/15/2023, 7/13/2023.     FINDINGS: Prompt activity within the circulatory system following  radiotracer injection. Accumulation of radiotracer activity within the  bladder and penis, confirmed on lateral projection planar imaging. No  changing, mobile tubular activity conforming to the shape of bowel.          Impression:      No scintigraphic findings of active gastrointestinal  bleeding during the course of the study.           This report was finalized on 10/31/2023 3:10 PM by Dr. Girish Rand M.D on Workstation: [id number]               I have reviewed the medications.  ---------------------------------------------------------------------------------------------  Assessment & Plan   Assessment/Problem List    GI bleed      Plan:    GI bleed  -Hemoglobin 10.5 --> 6.4  -Hold Eliquis  -IV fluids  -NPO  -GI consulted, Dr. Loo  -10/31: Tagged red blood scan shows no scintigraphic findings of active gastrointestinal bleeding   -11/1: patient developed worsening abdominal pain and had a very large liquid bloody bowel movement  -Stat H&H hemoglobin dropped to 6.4  -GI aware, consulting IR and general surgery  -Transfer patient to ICU, discussed with Dr. Loja     Left upper extremity DVT  -Secondary to midline  -Eliquis held    Disposition: Admit to ICU/Dr. Loja    This note will serve as a progress note/transfer summary    HAYDER Garcia 11/01/23 14:04 EDT    I have worn appropriate PPE during this patient encounter, sanitized my hands both with entering and exiting patient's room.    80 minutes has been spent by [facility] providers in the care of this patient while under observation status

## 2023-11-01 NOTE — PLAN OF CARE
Goal Outcome Evaluation:  Plan of Care Reviewed With: patient        Progress: improving  Outcome Evaluation: Pt hemoglobin stable at 7.8 after 2 units PRBCs given. CT angiography done. Colonoscopy and enteroscopy scheduled for tomorrow. Bowel prep started this evening. Consents signed and on chart. No complaints at this time. No active bleeding noted at this time.

## 2023-11-01 NOTE — CODE DOCUMENTATION
Pt requires transfer to icu, decided by provider in observation and Dr Loo. Pt being transferred now.

## 2023-11-01 NOTE — CONSULTS
Patient Identification:  Radha Noriega V  43 y.o.  male  1980  [id number]          LOS 0    Requesting physician: Dr Cuauhtemoc Allan    Reason for Consultation: GI bleed    History of Present Illness:   43-year-old male presented with black and bloody stools.  Has a history of GI bleed requiring coiling embolization in the past.  Has a history of left upper extremity DVT secondary to previous midline and has been on Eliquis.  Stool has been dark in nature and tar-like consistency.  Has had crampy abdominal pain which has worsened today.  Had an episode of worsened abdominal discomfort and acute tachycardia in the 160s then altered mental status with lethargy for about 1 minute per ER physician assistant.  GI was consulted on 10/30/2023.  He did not have hypotension during his lethargy event according to staff.  He was admitted initially to the observation unit.  GI has consulted interventional radiology to see if repeat angio/coiling would be of benefit.  With his acute change they have requested consideration of admission to intensive care.  He had a tagged red blood cell study that was negative but did significant drop in hemoglobin.    Past Medical History:  Past Medical History:   Diagnosis Date    Anemia     ETOH abuse     Tachycardia 06/23/2023    Tobacco dependence        Past Surgical History:  Past Surgical History:   Procedure Laterality Date    APPENDECTOMY      COLONOSCOPY N/A 06/25/2023    Procedure: COLONOSCOPY TO CECUM AND TERMINAL ILEUM WITH BIOPSIES AND COLD BIOPSY POLYPECTOMIES;  Surgeon: Imtiaz Lee MD;  Location: [facility] ENDOSCOPY;  Service: Gastroenterology;  Laterality: N/A;  PRE- MELENA  POST- COLON POLYPS, HEMORRHOIDS    ENDOSCOPY Left 06/24/2023    Procedure: ESOPHAGOGASTRODUODENOSCOPY;  Surgeon: Imtiaz Lee MD;  Location: [facility] ENDOSCOPY;  Service: Gastroenterology;  Laterality: Left;  small hiatal hernia, esophagitis    ENDOSCOPY N/A 07/15/2023    Procedure:  ESOPHAGOGASTRODUODENOSCOPY;  Surgeon: Ermelinda Dixon MD;  Location: [facility] ENDOSCOPY;  Service: Gastroenterology;  Laterality: N/A;  pre-anemia  post-hiatal hernia    ENTEROSCOPY SMALL BOWEL N/A 07/09/2023    Procedure: ENTEROSCOPY SMALL BOWEL;  Surgeon: Isrrael Gibson MD;  Location: [facility] ENDOSCOPY;  Service: Gastroenterology;  Laterality: N/A;  PRE- GI BLEED  POST- NORMAL    UPPER GASTROINTESTINAL ENDOSCOPY          Home Meds:  Medications Prior to Admission   Medication Sig Dispense Refill Last Dose    apixaban (ELIQUIS) 5 MG tablet tablet Take 1 tablet by mouth Every 12 (Twelve) Hours. Indications: DVT/PE (active thrombosis) (Patient taking differently: Take 1 tablet by mouth Daily. Indications: DVT/PE (active thrombosis)) 60 tablet 2     ferrous sulfate (FerrouSul) 325 (65 FE) MG tablet Take 1 tablet by mouth Daily With Breakfast. 90 tablet 1     folic acid (FOLVITE) 1 MG tablet Take 1 tablet by mouth Daily. 30 tablet 0     pantoprazole (PROTONIX) 40 MG EC tablet Take 1 tablet by mouth Every Morning. (Patient taking differently: Take 1 tablet by mouth Daily.) 90 tablet 1     thiamine (VITAMIN B1) 100 MG tablet Take 1 tablet by mouth Daily. 30 tablet 0          Allergies:  No Known Allergies    Social History:   Social History     Socioeconomic History    Marital status:    Tobacco Use    Smoking status: Every Day     Packs/day: 1.00     Years: 20.00     Additional pack years: 0.00     Total pack years: 20.00     Types: Cigarettes    Smokeless tobacco: Never   Vaping Use    Vaping Use: Never used   Substance and Sexual Activity    Alcohol use: Not Currently     Comment: hasnt had a drink in a month. States he is under the care of PCP to [facility].    Drug use: Yes     Types: Marijuana     Comment: daily.    Sexual activity: Yes     Partners: Female       Family History:  Family History   Problem Relation Age of Onset    Diabetes Father     Diabetes Sister     Hypertension Sister     Diabetes Brother      "Diabetes Maternal Grandmother     Diabetes Maternal Grandfather     Diabetes Paternal Grandmother     Diabetes Paternal Grandfather     Heart attack Maternal Aunt 73    Colon cancer Neg Hx     Prostate cancer Neg Hx        Review of Systems:  Denies fevers or chills  Denies nausea or vomiting  No new vision or hearing changes  No chest pain  No productive cough or shortness of breath  Crampy abdominal pain and bloody stool  No musculoskeletal complaints  No heat or cold intolerance  No skin rashes  No dizziness or confusion.  No seizure activity  No new anxiety or depression  12 system review of systems performed and all else negative    Objective:    PHYSICAL EXAM:    /75   Pulse (!) 127   Temp 99 °F (37.2 °C) (Oral)   Resp 16   Ht 180.3 cm (71\")   Wt 83 kg (183 lb)   SpO2 99%   BMI 25.52 kg/m²  Body mass index is 25.52 kg/m². 99% 83 kg (183 lb)    GENERAL APPEARANCE:   Well developed  Well nourished  No acute distress   EYES:    PERRL                                                                           Conjunctivae normal  Sclerae nonicteric.  HENT:   Atraumatic, normocephalic  External ears and nose normal  Moist mucous membranes and no ulcers  NECK:  Thyroid not enlarged  Trachea midline   RESPIRATORY:    Nonlabored breathing   Normal breath sounds  No rales. No wheezing  No dullness  CARDIOVASCULAR:    RRR  Normal S1, S2  No murmur  Lower extremity edema: none    GI:   Bowel sounds normal  Abdomen soft , nondistended, nontender  No abdominal masses  MUSCULOSKELETAL:  Normal movement of extremities  No tenderness, no deformities  No clubbing or cyanosis   Skin:    No visible rashes  No palpable nodules  Cap refill normal.  No mottling.   PSYCHIATRIC:  Speech and behavior appropriate  Normal mood and affect  Oriented to person, place and time  NEUROLOGIC:  Cranial nerves II through XII grossly intact.  Sensation intact.      Lab Review:      Results from last 7 days   Lab Units 11/01/23  0625 " 10/31/23  1601 10/31/23  0431   WBC 10*3/mm3 6.16 5.33 6.11   HEMOGLOBIN g/dL 7.7* 8.0* 8.4*   HEMATOCRIT % 23.5* 24.2* 25.9*   PLATELETS 10*3/mm3 225 217 211     Results from last 7 days   Lab Units 11/01/23  0543 10/31/23  0431 10/30/23  1329   SODIUM mmol/L 136 137 137   POTASSIUM mmol/L 3.7 4.1 3.7   CHLORIDE mmol/L 103 104 104   CO2 mmol/L 22.6 25.1 24.1   BUN mg/dL 15 19 15   CREATININE mg/dL 1.15 1.19 1.21   CALCIUM mg/dL 8.6 8.7 9.2   BILIRUBIN mg/dL  --   --  <0.2   ALK PHOS U/L  --   --  71   ALT (SGPT) U/L  --   --  13   AST (SGOT) U/L  --   --  13   GLUCOSE mg/dL 93 90 151*                          Imaging reviewed  Nuclear medicine tagged red blood cell study reviewed: Negative       Assessment:  Acute and recurrent GI bleed  Sinus tachycardia  Brief altered mental status: Resolved  Red blood per rectum  Acute blood loss anemia        Recommendations:  Admit to intensive care.  Getting stat H&H we will continue serial H&H evaluation for his anemia.  GI has already contacted interventional radiology for consideration of possible coiling needed again.  Unclear etiology of his altered mental status.  This has resolved.  No issues of hypotension during the event according to staff however I question this.  Altered mental status seems too short to have been seizure with postictal state.  We will continue to monitor and if occurs again consult neurology.  No focal deficit noted.  Continue IV fluid resuscitation.  Transfuse packed red blood cells for hemoglobin less than 7.  Control glucose.  Control blood pressure.  Mechanical DVT prophylaxis.  Continue PPI.  Thiamine and folic acid.  Watch for alcohol withdrawal.    Discussed with Dr Loo with GI and Dr Mayes with general surgery at bedside.      CCT: 55 min    Ryland Loja MD  [facility], [facility]  Pulmonary and Critical Care Medicine    11/1/2023  13:57 EDT

## 2023-11-01 NOTE — CONSULTS
Cc: GI bleed    History of presenting illness:   This is a 43-year-old gentleman with a history of obscure GI bleed who was admitted in July of this year with fairly significant bleeding.  He underwent upper and lower endoscopy which was largely unrevealing.  He continued to have evidence of GI bleed and had eventually a CT angiogram followed by selective IR embolization of what was noted to be a bleeding vessel off the SMA.  This seemed to stabilize him and he did well for the past couple of months, but more recently was diagnosed with a DVT and placed on Eliquis.  He was readmitted a couple of days ago with some abdominal pain and maroon stools.  Repeat nuclear medicine scan yesterday was negative for any acute bleeding.  He seemed to be stable until earlier today when he had this fairly sudden onset of tachycardia and became briefly unresponsive.  Repeat check of his hemoglobin showed a drop of a couple of grams down to 6.4.  He was urgently brought to the ICU and vigorous resuscitation was undertaken with some improvement of his hemodynamic status.  Discussion with GI and interventional radiology suggested that he undergo CT angiography and potentially repeat mesenteric angiography.    Past Medical History: GI bleed, DVT    Past Surgical History: Appendectomy, EGD and colonoscopy June and July 2023, IR embolization (2 separate occasions) of small bowel bleeding July 2023    Medications: Noted to include Eliquis at admission, since held    Allergies: None known    Social History: He is a long-term cigarette smoker of greater than 20 years.  Uses marijuana occasionally.    Family History: Negative for colon or rectal cancer    Review of Systems:  Unable to be obtained currently due to patient's critical ill situation    Physical Exam:  BMI: 25.5  Most recent vitals with a pressure of 98.0, heart rate 83, blood pressure 121/79  General: alert and oriented, appropriate, no acute distress  Eyes: No scleral icterus,  extraocular movements are intact  Neck: Supple without lymphadenopathy or thyromegaly, trachea is in the midline  Respiratory: There is good bilateral chest expansion, no use of accessory muscles is noted  Cardiovascular: No jugular venous distention or peripheral edema is seen  Gastrointestinal: Soft, mild tenderness, mild distended, no mass felt    Laboratory data: Hemoglobin of 10.5 at admission declined to 8.0 yesterday and then down to 7.7 this morning and 6.4 earlier this afternoon.  Up to 7.8 after transfusion.  Platelet count is normal.  Chemistries unremarkable.    Imaging data: Nuclear medicine bleeding scan from yesterday negative.  CT angiography today negative for any obvious bleeding      Assessment and plan:   -GI bleed, recurrent in the setting of a patient with a history of small bowel bleed a couple of months ago, recently having started Eliquis  -Currently seems to be more hemodynamically stable  -Obviously in the absence of clear source, surgical treatment is quite challenging and ability fairly limited, exploration likely to be morbid and difficult to localize  -We will follow closely along, if there are further signs of bleeding I think that another attempt at angiography would probably be the better bet, obviously a very challenging situation at the moment      Kayode Donnelly MD, FACS  General, Minimally Invasive and Endoscopic Surgery  [facility]    [address], [address]  [place], [place], [address]  P: [phone]  F: [phone]

## 2023-11-01 NOTE — PROGRESS NOTES
[facility]  Inpatient Progress Note    Reason for Follow Up:  GI bleed    Subjective     Interval History:   Pt reports he doesn't feel so good.States his whole seems to be hurting.  He had a BM this morning that he states was bloody (FOBT+).  Tagged RBCs scan yesterday negative for GI bleed      Current Facility-Administered Medications:     sennosides-docusate (PERICOLACE) 8.6-50 MG per tablet 2 tablet, 2 tablet, Oral, BID, 2 tablet at 11/01/23 0834 **AND** polyethylene glycol (MIRALAX) packet 17 g, 17 g, Oral, Daily PRN **AND** bisacodyl (DULCOLAX) EC tablet 5 mg, 5 mg, Oral, Daily PRN **AND** bisacodyl (DULCOLAX) suppository 10 mg, 10 mg, Rectal, Daily PRN, Catherine Johnson PA-C    dicyclomine (BENTYL) capsule 10 mg, 10 mg, Oral, 4x Daily PRN, Dee Price PA, 10 mg at 11/01/23 1254    ferrous sulfate tablet 325 mg, 325 mg, Oral, Daily With Breakfast, Catherine Johnson PA-C, 325 mg at 11/01/23 0834    folic acid (FOLVITE) tablet 1 mg, 1 mg, Oral, Daily, Catherine Johnson PA-C, 1 mg at 11/01/23 0834    lactated ringers bolus 1,000 mL, 1,000 mL, Intravenous, Once **FOLLOWED BY** lactated ringers infusion, 100 mL/hr, Intravenous, Continuous, Dee Price PA    melatonin tablet 5 mg, 5 mg, Oral, Nightly PRN, Catherine Johnson PA-C    nitroglycerin (NITROSTAT) SL tablet 0.4 mg, 0.4 mg, Sublingual, Q5 Min PRN, Catherine Johnson PA-SIERRA    ondansetron (ZOFRAN) tablet 4 mg, 4 mg, Oral, Q6H PRN **OR** ondansetron (ZOFRAN) injection 4 mg, 4 mg, Intravenous, Q6H PRN, Catherine Johnson PA-C, 4 mg at 11/01/23 1202    pantoprazole (PROTONIX) injection 40 mg, 40 mg, Intravenous, Q12H, Catherine Johnson PA-C, 40 mg at 11/01/23 0834    sodium chloride 0.9 % flush 10 mL, 10 mL, Intravenous, PRN, [place], Adryan DIAZ MD    sodium chloride 0.9 % flush 10 mL, 10 mL, Intravenous, Q12H, Catherine Johnson PA-C, 10 mL at 11/01/23 0834    sodium chloride 0.9 % flush 10 mL, 10  mL, Intravenous, PRN, Catherine Johnson PA-C, 10 mL at 10/30/23 1427    sodium chloride 0.9 % infusion 40 mL, 40 mL, Intravenous, PRN, Catherine Johnson PA-C    thiamine (VITAMIN B-1) tablet 100 mg, 100 mg, Oral, Daily, Catherine Johnson PA-C, 100 mg at 11/01/23 0834        Objective     Vital Signs  Temp:  [97.6 °F (36.4 °C)-99 °F (37.2 °C)] 99 °F (37.2 °C)  Heart Rate:  [] 127  Resp:  [15-16] 16  BP: (118-127)/(66-86) 125/86  Body mass index is 25.52 kg/m².  No intake or output data in the 24 hours ending 11/01/23 1314  No intake/output data recorded.     Physical Exam:   General: patient awake, alert and cooperative   Eyes: Normal lids and lashes, no scleral icterus   Abdomen: soft, mild TTP in lower abdomen     Results Review:     I reviewed the patient's new clinical results.    Results from last 7 days   Lab Units 11/01/23  0625 10/31/23  1601 10/31/23  0431   WBC 10*3/mm3 6.16 5.33 6.11   HEMOGLOBIN g/dL 7.7* 8.0* 8.4*   HEMATOCRIT % 23.5* 24.2* 25.9*   PLATELETS 10*3/mm3 225 217 211     Results from last 7 days   Lab Units 11/01/23  0543 10/31/23  0431 10/30/23  1329   SODIUM mmol/L 136 137 137   POTASSIUM mmol/L 3.7 4.1 3.7   CHLORIDE mmol/L 103 104 104   CO2 mmol/L 22.6 25.1 24.1   BUN mg/dL 15 19 15   CREATININE mg/dL 1.15 1.19 1.21   CALCIUM mg/dL 8.6 8.7 9.2   BILIRUBIN mg/dL  --   --  <0.2   ALK PHOS U/L  --   --  71   ALT (SGPT) U/L  --   --  13   AST (SGOT) U/L  --   --  13   GLUCOSE mg/dL 93 90 151*     Results from last 7 days   Lab Units 10/30/23  1329   INR  1.01     Lab Results   Lab Value Date/Time    LIPASE 11 (L) 07/12/2023 0612    LIPASE 26 06/23/2023 1201       Radiology:  NM GI Blood Loss   Final Result   No scintigraphic findings of active gastrointestinal   bleeding during the course of the study.               This report was finalized on 10/31/2023 3:10 PM by Dr. Girish Rand M.D on Workstation: [id number]              Assessment & Plan     [facility]  Problems    Diagnosis     **GI bleed          Assessment:   Recurrent GI bleeding, fifth admission this year  He had small bowel enteroscopy, CTA, colonoscopy, and EGD in last few months and no source of bleeding found at the time.   Interventional radiology coiling/embolization of a distal branch of the SMA feeding the ileum in July of this year x2  BRBPR as well as melena- last dose of Eliquis 4 days ago    Plan:   Given drop in H/H, recurrent GI bleed, negative tagged RBCs, now tachycardia, and recent IR coiling of SMA- will consult IR again to see if repeat angio/coiling necessary. Called IR and placed consult with nurse.   Continue to trend H/H, transfuse to maintain hgb of at least 7  Monitor for worsening GI bleed    I discussed the patients findings and my recommendations with patient.         Lexi Thomas PA-C  [facility]  [address] [place] [address] - [address] [place] [address]  Office: [phone]      Addendum 4:45 PM.  CT angiography negative.  No utility in performing angiography with coiling tonight    If he develops further bleeding overnight please send for tagged red blood cell scan    If he does not we will prep for colonoscopy and small bowel enteroscopy tomorrow.  I am heading to the bedside to discuss with family

## 2023-11-02 ENCOUNTER — ANESTHESIA (OUTPATIENT)
Dept: PERIOP | Facility: HOSPITAL | Age: 43
End: 2023-11-02
Payer: COMMERCIAL

## 2023-11-02 ENCOUNTER — ANESTHESIA EVENT (OUTPATIENT)
Dept: PERIOP | Facility: HOSPITAL | Age: 43
End: 2023-11-02
Payer: COMMERCIAL

## 2023-11-02 VITALS
HEART RATE: 106 BPM | SYSTOLIC BLOOD PRESSURE: 94 MMHG | RESPIRATION RATE: 25 BRPM | DIASTOLIC BLOOD PRESSURE: 59 MMHG | OXYGEN SATURATION: 100 %

## 2023-11-02 PROBLEM — K92.2 LOWER GI BLEED: Status: ACTIVE | Noted: 2023-10-30

## 2023-11-02 LAB
ALBUMIN SERPL-MCNC: 3.4 G/DL (ref 3.5–5.2)
ALBUMIN/GLOB SERPL: 1.9 G/DL
ALP SERPL-CCNC: 47 U/L (ref 39–117)
ALT SERPL W P-5'-P-CCNC: 11 U/L (ref 1–41)
ANION GAP SERPL CALCULATED.3IONS-SCNC: 6.5 MMOL/L (ref 5–15)
AST SERPL-CCNC: 16 U/L (ref 1–40)
BASOPHILS # BLD AUTO: 0.02 10*3/MM3 (ref 0–0.2)
BASOPHILS NFR BLD AUTO: 0.2 % (ref 0–1.5)
BILIRUB SERPL-MCNC: 0.2 MG/DL (ref 0–1.2)
BUN SERPL-MCNC: 12 MG/DL (ref 6–20)
BUN/CREAT SERPL: 10.1 (ref 7–25)
CALCIUM SPEC-SCNC: 8 MG/DL (ref 8.6–10.5)
CHLORIDE SERPL-SCNC: 108 MMOL/L (ref 98–107)
CO2 SERPL-SCNC: 22.5 MMOL/L (ref 22–29)
CREAT SERPL-MCNC: 1.19 MG/DL (ref 0.76–1.27)
DEPRECATED RDW RBC AUTO: 45.4 FL (ref 37–54)
DEPRECATED RDW RBC AUTO: 45.4 FL (ref 37–54)
DEPRECATED RDW RBC AUTO: 46.5 FL (ref 37–54)
DEPRECATED RDW RBC AUTO: 47 FL (ref 37–54)
DEPRECATED RDW RBC AUTO: 48 FL (ref 37–54)
EGFRCR SERPLBLD CKD-EPI 2021: 78.2 ML/MIN/1.73
EOSINOPHIL # BLD AUTO: 0.13 10*3/MM3 (ref 0–0.4)
EOSINOPHIL NFR BLD AUTO: 1.4 % (ref 0.3–6.2)
ERYTHROCYTE [DISTWIDTH] IN BLOOD BY AUTOMATED COUNT: 15 % (ref 12.3–15.4)
ERYTHROCYTE [DISTWIDTH] IN BLOOD BY AUTOMATED COUNT: 15.2 % (ref 12.3–15.4)
ERYTHROCYTE [DISTWIDTH] IN BLOOD BY AUTOMATED COUNT: 15.6 % (ref 12.3–15.4)
GLOBULIN UR ELPH-MCNC: 1.8 GM/DL
GLUCOSE BLDC GLUCOMTR-MCNC: 106 MG/DL (ref 70–130)
GLUCOSE BLDC GLUCOMTR-MCNC: 107 MG/DL (ref 70–130)
GLUCOSE BLDC GLUCOMTR-MCNC: 120 MG/DL (ref 70–130)
GLUCOSE BLDC GLUCOMTR-MCNC: 98 MG/DL (ref 70–130)
GLUCOSE SERPL-MCNC: 90 MG/DL (ref 65–99)
HCT VFR BLD AUTO: 18.8 % (ref 37.5–51)
HCT VFR BLD AUTO: 21.1 % (ref 37.5–51)
HCT VFR BLD AUTO: 21.1 % (ref 37.5–51)
HCT VFR BLD AUTO: 22.1 % (ref 37.5–51)
HCT VFR BLD AUTO: 27.7 % (ref 37.5–51)
HGB BLD-MCNC: 6.2 G/DL (ref 13–17.7)
HGB BLD-MCNC: 7.1 G/DL (ref 13–17.7)
HGB BLD-MCNC: 7.1 G/DL (ref 13–17.7)
HGB BLD-MCNC: 7.3 G/DL (ref 13–17.7)
HGB BLD-MCNC: 9.1 G/DL (ref 13–17.7)
IMM GRANULOCYTES # BLD AUTO: 0.05 10*3/MM3 (ref 0–0.05)
IMM GRANULOCYTES NFR BLD AUTO: 0.6 % (ref 0–0.5)
INR PPP: 1.16 (ref 0.9–1.1)
LYMPHOCYTES # BLD AUTO: 2.03 10*3/MM3 (ref 0.7–3.1)
LYMPHOCYTES NFR BLD AUTO: 22.5 % (ref 19.6–45.3)
MCH RBC QN AUTO: 28.1 PG (ref 26.6–33)
MCH RBC QN AUTO: 28.3 PG (ref 26.6–33)
MCH RBC QN AUTO: 28.4 PG (ref 26.6–33)
MCHC RBC AUTO-ENTMCNC: 32.9 G/DL (ref 31.5–35.7)
MCHC RBC AUTO-ENTMCNC: 33 G/DL (ref 31.5–35.7)
MCHC RBC AUTO-ENTMCNC: 33 G/DL (ref 31.5–35.7)
MCHC RBC AUTO-ENTMCNC: 33.6 G/DL (ref 31.5–35.7)
MCHC RBC AUTO-ENTMCNC: 33.6 G/DL (ref 31.5–35.7)
MCV RBC AUTO: 84.4 FL (ref 79–97)
MCV RBC AUTO: 84.4 FL (ref 79–97)
MCV RBC AUTO: 85 FL (ref 79–97)
MCV RBC AUTO: 86 FL (ref 79–97)
MCV RBC AUTO: 86.2 FL (ref 79–97)
MONOCYTES # BLD AUTO: 0.98 10*3/MM3 (ref 0.1–0.9)
MONOCYTES NFR BLD AUTO: 10.9 % (ref 5–12)
NEUTROPHILS NFR BLD AUTO: 5.81 10*3/MM3 (ref 1.7–7)
NEUTROPHILS NFR BLD AUTO: 64.4 % (ref 42.7–76)
NRBC BLD AUTO-RTO: 0.1 /100 WBC (ref 0–0.2)
PLATELET # BLD AUTO: 131 10*3/MM3 (ref 140–450)
PLATELET # BLD AUTO: 153 10*3/MM3 (ref 140–450)
PLATELET # BLD AUTO: 166 10*3/MM3 (ref 140–450)
PLATELET # BLD AUTO: 166 10*3/MM3 (ref 140–450)
PLATELET # BLD AUTO: 175 10*3/MM3 (ref 140–450)
PMV BLD AUTO: 10.5 FL (ref 6–12)
PMV BLD AUTO: 10.5 FL (ref 6–12)
PMV BLD AUTO: 10.6 FL (ref 6–12)
PMV BLD AUTO: 11.2 FL (ref 6–12)
PMV BLD AUTO: 11.2 FL (ref 6–12)
POTASSIUM SERPL-SCNC: 3.9 MMOL/L (ref 3.5–5.2)
PROT SERPL-MCNC: 5.2 G/DL (ref 6–8.5)
PROTHROMBIN TIME: 15 SECONDS (ref 11.7–14.2)
RBC # BLD AUTO: 2.18 10*6/MM3 (ref 4.14–5.8)
RBC # BLD AUTO: 2.5 10*6/MM3 (ref 4.14–5.8)
RBC # BLD AUTO: 2.5 10*6/MM3 (ref 4.14–5.8)
RBC # BLD AUTO: 2.6 10*6/MM3 (ref 4.14–5.8)
RBC # BLD AUTO: 3.22 10*6/MM3 (ref 4.14–5.8)
SODIUM SERPL-SCNC: 137 MMOL/L (ref 136–145)
WBC NRBC COR # BLD: 10.84 10*3/MM3 (ref 3.4–10.8)
WBC NRBC COR # BLD: 6.85 10*3/MM3 (ref 3.4–10.8)
WBC NRBC COR # BLD: 8.46 10*3/MM3 (ref 3.4–10.8)
WBC NRBC COR # BLD: 9.02 10*3/MM3 (ref 3.4–10.8)
WBC NRBC COR # BLD: 9.02 10*3/MM3 (ref 3.4–10.8)

## 2023-11-02 PROCEDURE — 0DJ08ZZ INSPECTION OF UPPER INTESTINAL TRACT, VIA NATURAL OR ARTIFICIAL OPENING ENDOSCOPIC: ICD-10-PCS | Performed by: INTERNAL MEDICINE

## 2023-11-02 PROCEDURE — 86900 BLOOD TYPING SEROLOGIC ABO: CPT

## 2023-11-02 PROCEDURE — P9016 RBC LEUKOCYTES REDUCED: HCPCS

## 2023-11-02 PROCEDURE — 36430 TRANSFUSION BLD/BLD COMPNT: CPT

## 2023-11-02 PROCEDURE — 82948 REAGENT STRIP/BLOOD GLUCOSE: CPT

## 2023-11-02 PROCEDURE — 25810000003 LACTATED RINGERS PER 1000 ML: Performed by: STUDENT IN AN ORGANIZED HEALTH CARE EDUCATION/TRAINING PROGRAM

## 2023-11-02 PROCEDURE — 85610 PROTHROMBIN TIME: CPT | Performed by: INTERNAL MEDICINE

## 2023-11-02 PROCEDURE — 0DJD8ZZ INSPECTION OF LOWER INTESTINAL TRACT, VIA NATURAL OR ARTIFICIAL OPENING ENDOSCOPIC: ICD-10-PCS | Performed by: INTERNAL MEDICINE

## 2023-11-02 PROCEDURE — 99231 SBSQ HOSP IP/OBS SF/LOW 25: CPT | Performed by: SURGERY

## 2023-11-02 PROCEDURE — 85027 COMPLETE CBC AUTOMATED: CPT | Performed by: INTERNAL MEDICINE

## 2023-11-02 PROCEDURE — 45378 DIAGNOSTIC COLONOSCOPY: CPT | Performed by: INTERNAL MEDICINE

## 2023-11-02 PROCEDURE — 44360 SMALL BOWEL ENDOSCOPY: CPT | Performed by: INTERNAL MEDICINE

## 2023-11-02 PROCEDURE — 25810000003 LACTATED RINGERS PER 1000 ML: Performed by: PHYSICIAN ASSISTANT

## 2023-11-02 PROCEDURE — 80053 COMPREHEN METABOLIC PANEL: CPT | Performed by: INTERNAL MEDICINE

## 2023-11-02 PROCEDURE — 25010000002 PROPOFOL 10 MG/ML EMULSION: Performed by: STUDENT IN AN ORGANIZED HEALTH CARE EDUCATION/TRAINING PROGRAM

## 2023-11-02 PROCEDURE — 85025 COMPLETE CBC W/AUTO DIFF WBC: CPT | Performed by: INTERNAL MEDICINE

## 2023-11-02 RX ORDER — SODIUM CHLORIDE, SODIUM LACTATE, POTASSIUM CHLORIDE, CALCIUM CHLORIDE 600; 310; 30; 20 MG/100ML; MG/100ML; MG/100ML; MG/100ML
INJECTION, SOLUTION INTRAVENOUS CONTINUOUS PRN
Status: DISCONTINUED | OUTPATIENT
Start: 2023-11-02 | End: 2023-11-02 | Stop reason: SURG

## 2023-11-02 RX ORDER — LIDOCAINE HYDROCHLORIDE 20 MG/ML
INJECTION, SOLUTION INFILTRATION; PERINEURAL AS NEEDED
Status: DISCONTINUED | OUTPATIENT
Start: 2023-11-02 | End: 2023-11-02 | Stop reason: SURG

## 2023-11-02 RX ORDER — PROPOFOL 10 MG/ML
VIAL (ML) INTRAVENOUS CONTINUOUS PRN
Status: DISCONTINUED | OUTPATIENT
Start: 2023-11-02 | End: 2023-11-02 | Stop reason: SURG

## 2023-11-02 RX ADMIN — Medication 10 ML: at 09:00

## 2023-11-02 RX ADMIN — SODIUM CHLORIDE, POTASSIUM CHLORIDE, SODIUM LACTATE AND CALCIUM CHLORIDE: 600; 310; 30; 20 INJECTION, SOLUTION INTRAVENOUS at 15:12

## 2023-11-02 RX ADMIN — SODIUM CHLORIDE, POTASSIUM CHLORIDE, SODIUM LACTATE AND CALCIUM CHLORIDE 100 ML/HR: 600; 310; 30; 20 INJECTION, SOLUTION INTRAVENOUS at 04:57

## 2023-11-02 RX ADMIN — PANTOPRAZOLE SODIUM 40 MG: 40 INJECTION, POWDER, FOR SOLUTION INTRAVENOUS at 08:40

## 2023-11-02 RX ADMIN — PANTOPRAZOLE SODIUM 40 MG: 40 INJECTION, POWDER, FOR SOLUTION INTRAVENOUS at 20:13

## 2023-11-02 RX ADMIN — SODIUM CHLORIDE, POTASSIUM CHLORIDE, SODIUM LACTATE AND CALCIUM CHLORIDE 100 ML/HR: 600; 310; 30; 20 INJECTION, SOLUTION INTRAVENOUS at 17:32

## 2023-11-02 RX ADMIN — Medication 10 ML: at 20:14

## 2023-11-02 RX ADMIN — LIDOCAINE HYDROCHLORIDE 100 MG: 20 INJECTION, SOLUTION INFILTRATION; PERINEURAL at 15:24

## 2023-11-02 RX ADMIN — PROPOFOL 300 MCG/KG/MIN: 10 INJECTION, EMULSION INTRAVENOUS at 15:23

## 2023-11-02 RX ADMIN — POLYETHYLENE GLYCOL 3350 0.5 BOTTLE: 17 POWDER, FOR SOLUTION ORAL at 04:57

## 2023-11-02 RX ADMIN — PROPOFOL 100 MG: 10 INJECTION, EMULSION INTRAVENOUS at 15:24

## 2023-11-02 RX ADMIN — SENNOSIDES AND DOCUSATE SODIUM 2 TABLET: 50; 8.6 TABLET ORAL at 20:13

## 2023-11-02 NOTE — PROGRESS NOTES
[facility] INPATIENT PROGRESS NOTE         [facility] INTENSIVE CARE    2023      PATIENT IDENTIFICATION:  Name: Radha Noriega V ADMIT: 10/30/2023   : 1980  PCP: Hellen Skelton APRN    MRN: [id number] LOS: 1 days   AGE/SEX: 43 y.o. male  ROOM: [facility]                     LOS 1    Reason for visit: GI bleed      SUBJECTIVE:      Noted no CT evidence of GI bleed on CT angio.  No new complaints.  GI and surgery following.  Black tarry, loose stools noted last night.      Objective   OBJECTIVE:    Vital Sign Min/Max for last 24 hours  Temp  Min: 97.7 °F (36.5 °C)  Max: 99 °F (37.2 °C)   BP  Min: 103/51  Max: 133/70   Pulse  Min: 67  Max: 127   Resp  Min: 9  Max: 18   SpO2  Min: 97 %  Max: 100 %   No data recorded   No data recorded    Vitals:    23 0700 23 0715 23 0800 23 0904   BP: 114/73  118/68 130/69   BP Location:       Patient Position:       Pulse: 83  76 89   Resp:   16    Temp:  97.7 °F (36.5 °C)     TempSrc:  Oral     SpO2: 100%  100% 100%   Weight:       Height:                10/30/23  1305 10/30/23  1513   Weight: 83.9 kg (185 lb) 83 kg (183 lb)       Body mass index is 25.52 kg/m².                          Body mass index is 25.52 kg/m².    Intake/Output Summary (Last 24 hours) at 2023 1004  Last data filed at 2023 0844  Gross per 24 hour   Intake 3935.81 ml   Output 1780 ml   Net 2155.81 ml         Exam:  GEN:  No distress, appears stated age  EYES:   PERRL, anicteric sclerae  ENT:    External ears/nose normal, OP clear  NECK:  No adenopathy, midline trachea  LUNGS: Normal chest on inspection, palpation and auscultation  CV:  Normal S1S2, without murmur  ABD:  Nontender, nondistended, no hepatosplenomegaly, +BS  EXT:  No edema.  No cyanosis or clubbing.  No mottling and normal cap refill.    Assessment     Scheduled meds:  ferrous sulfate, 325 mg, Oral, Daily With Breakfast  folic acid, 1 mg, Oral, Daily  pantoprazole, 40 mg, Intravenous,  Q12H  senna-docusate sodium, 2 tablet, Oral, BID  sodium chloride, 10 mL, Intravenous, Q12H  thiamine, 100 mg, Oral, Daily      IV meds:                      lactated ringers, 100 mL/hr, Last Rate: 100 mL/hr (11/02/23 0457)      Data Review:  Results from last 7 days   Lab Units 11/02/23 0428 11/01/23  0543 10/31/23  0431 10/30/23  1329   SODIUM mmol/L 137 136 137 137   POTASSIUM mmol/L 3.9 3.7 4.1 3.7   CHLORIDE mmol/L 108* 103 104 104   CO2 mmol/L 22.5 22.6 25.1 24.1   BUN mg/dL 12 15 19 15   CREATININE mg/dL 1.19 1.15 1.19 1.21   GLUCOSE mg/dL 90 93 90 151*   CALCIUM mg/dL 8.0* 8.6 8.7 9.2         Estimated Creatinine Clearance: 94.9 mL/min (by C-G formula based on SCr of 1.19 mg/dL).  Results from last 7 days   Lab Units 11/02/23 0428 11/02/23  0018 11/01/23  1610 11/01/23  1326 11/01/23  0625 10/31/23  1601   WBC 10*3/mm3 9.02  9.02 10.84* 12.10*  --  6.16 5.33   HEMOGLOBIN g/dL 7.1*  7.1* 7.3* 7.8* 6.4* 7.7* 8.0*   PLATELETS 10*3/mm3 166  166 131* 155  --  225 217     Results from last 7 days   Lab Units 11/02/23  0428 10/30/23  1329   INR  1.16* 1.01     Results from last 7 days   Lab Units 11/02/23  0428 10/30/23  1329   ALT (SGPT) U/L 11 13   AST (SGOT) U/L 16 13                 Glucose   Date/Time Value Ref Range Status   11/02/2023 0611 106 70 - 130 mg/dL Final   11/01/2023 2343 119 70 - 130 mg/dL Final   11/01/2023 1416 182 (H) 70 - 130 mg/dL Final         Imaging reviewed  CTA abd/pelvis 11/1 reviewed:  IMPRESSION:  No CT evidence of active gastrointestinal bleeding.    Microbiology reviewed            Active Hospital Problems    Diagnosis  POA    **GI bleed [K92.2]  Yes      Resolved Hospital Problems    Diagnosis Date Resolved POA    Lower GI bleed [K92.2] 10/30/2023 Yes         ASSESSMENT:  Acute and recurrent GI bleed  Sinus tachycardia  Brief altered mental status: Resolved  Red blood per rectum  Acute blood loss anemia      PLAN:  Serial HH and transfuse for Hgb <7.  Scope with GI.  IV PPI  BID.  Control glucose.  Control BP.   Mechanical DVT prophylaxis.      Discussed with multidisciplinary ICU team on rounds this morning.      CCT: 32 min    Ryland Loja MD  Pulmonary and Critical Care Medicine  [facility], [facility]  11/2/2023    10:04 EDT

## 2023-11-02 NOTE — CASE MANAGEMENT/SOCIAL WORK
Continued Stay Note  [facility]     Patient Name: Radha Noriega V  MRN: [id number]  Today's Date: 11/2/2023    Admit Date: 10/30/2023    Plan: home with family; follow for needs   Discharge Plan       Row Name 11/02/23 1137       Plan    Plan home with family; follow for needs    Patient/Family in Agreement with Plan yes    Plan Comments Spoke with pt bedside in ICU. Confirmed facesheet correct. Explained role of CCP. Pt reports he lives with his wife and 4 children. He is IADLs no DME used. Pt has no HH or SNF history. Verified PCP and pharmacy. He plans home at d/c. CCP to follow for d/c needs.                   Discharge Codes    No documentation.                       JOVANY Persaud

## 2023-11-02 NOTE — PROGRESS NOTES
Follow-up GI bleed    Subjective:  Took bowel prep, and initially had some dark stools but no evidence of any active bleeding.  Denies any crampy abdominal pain.    Objective:  Asleep but easily awakened, heart rate 60s to 80s blood pressure 117/66 oxygenation 99% on room air  General: Asleep, no acute distress, answers questions appropriately  Abdomen: Soft and nontender, nondistended    Labs reviewed, hemoglobin is drifted down to 7.1, white count normal, chemistries unremarkable    Assessment and plan:  -Recurrent GI bleed, felt to be related to small bowel bleeding with episode of fairly heavy bleeding yesterday which seems to have spontaneously ceased  -Recommend continuing to hold anticoagulation and would consider risks and benefits longer-term  -Noted plans for endoscopy today  -Recommend angiography for further evidence of bleeding  -We will follow peripherally    Kayode Donnelly MD  General and Endoscopic Surgery  [facility]    [address], [address]  [place], [place], [address]  P: [phone]  F: [phone]

## 2023-11-02 NOTE — PLAN OF CARE
Goal Outcome Evaluation:  Plan of Care Reviewed With: patient        Progress: improving    This RN cared for pt [phone]. A+O x4, VSS. HGB 6.2 and 2 units PRBC infused. Voiding and have BM in BSC- stool loose and tarry. No complaints of pain. SCDs in place. Colonoscopy today. Ongoing care provided.

## 2023-11-02 NOTE — PROGRESS NOTES
Small bowel endoscopy and colonoscopy 11-2-2023    Negative upper gi tract for active bleeding or obvious source  Colonoscopy:  I HAD TO LAVAGE RED BLOOD AND OLD HEMATIN TO EVEN BE ABLE TO GET TO THE CECUM AND TERMINAL ILEUM--WHILE THE TI HAD SOME DARK MATERIAL THERE WAS NO FRESH BLOOD IN THE TERMINAL ILEUM.  NO OBVIOUS DIVERTICULAR DISEASE OR SOURCE SEEN.  I LAVAGED EACH SECTION AT AT CONCLUSION NO REACCUMULATION OF BLOOD WAS SEEN.  DISCUSSED WITD DR HARTLEY    Recs:  1. trend hemoglobin  2. continue to transfuse as needed  3. NPO for overnight   4. surgery following  5. if rebleeding suspected would discuss with surgery/IR for repeat angiography given pts history of such  6. high risk for rebleed, monitor in ICU  7. high risk for rebleeding on anticoagualtion

## 2023-11-02 NOTE — PROGRESS NOTES
"Nutrition Services    Patient Name:  Radha Noriega V  YOB: 1980  MRN: [id number]  Admit Date:  10/30/2023    Assessment Date:  11/02/23    Summary: Nutrition assessment initiated due to NPO/CL x 4 days.  Pt was admitted with Acute and recurrent GI Bleed, anemia. Had recent IR coiling of SMA. Hgb down to 6.2, getting blood. Plans for colonoscopy noted. RD to follow for readiness to try po diet once GI bleed is stable.    Plan/Recommendations:  Adv diet as tolerated per GI     CLINICAL NUTRITION ASSESSMENT      Reason for Assessment NPO/Clear Liquid Status     Diagnosis/Problem   Acute and recurrent GI Bleed, anemia. Had recent IR coiling of SMA.    Medical/Surgical History Past Medical History:   Diagnosis Date    Anemia     ETOH abuse     Tachycardia 06/23/2023    Tobacco dependence        Past Surgical History:   Procedure Laterality Date    APPENDECTOMY      COLONOSCOPY N/A 06/25/2023    Procedure: COLONOSCOPY TO CECUM AND TERMINAL ILEUM WITH BIOPSIES AND COLD BIOPSY POLYPECTOMIES;  Surgeon: Imtiaz Lee MD;  Location: [facility] ENDOSCOPY;  Service: Gastroenterology;  Laterality: N/A;  PRE- MELENA  POST- COLON POLYPS, HEMORRHOIDS    ENDOSCOPY Left 06/24/2023    Procedure: ESOPHAGOGASTRODUODENOSCOPY;  Surgeon: Imtiaz Lee MD;  Location: [facility] ENDOSCOPY;  Service: Gastroenterology;  Laterality: Left;  small hiatal hernia, esophagitis    ENDOSCOPY N/A 07/15/2023    Procedure: ESOPHAGOGASTRODUODENOSCOPY;  Surgeon: Ermelinda Dixon MD;  Location: [facility] ENDOSCOPY;  Service: Gastroenterology;  Laterality: N/A;  pre-anemia  post-hiatal hernia    ENTEROSCOPY SMALL BOWEL N/A 07/09/2023    Procedure: ENTEROSCOPY SMALL BOWEL;  Surgeon: Isrrael Gibson MD;  Location: [facility] ENDOSCOPY;  Service: Gastroenterology;  Laterality: N/A;  PRE- GI BLEED  POST- NORMAL    UPPER GASTROINTESTINAL ENDOSCOPY          Anthropometrics        Current Height  Current Weight  BMI kg/m2 Height: 180.3 cm (71\")  Weight: 83 kg (183 " lb) (10/30/23 1513)  Body mass index is 25.52 kg/m².   Adjusted BMI (if applicable)    BMI Category Overweight (25 - 29.9)   Ideal Body Weight (IBW) 75.3kg   Usual Body Weight (UBW) ~180lb   Weight Trend Stable   Weight History Wt Readings from Last 30 Encounters:   10/30/23 1513 83 kg (183 lb)   10/30/23 1305 83.9 kg (185 lb)   10/13/23 1307 86 kg (189 lb 9.6 oz)   09/20/23 1108 84.6 kg (186 lb 8 oz)   08/29/23 0915 83 kg (182 lb 14.4 oz)   08/24/23 2104 84.6 kg (186 lb 6.4 oz)   08/24/23 1704 83.9 kg (185 lb)   07/28/23 0921 81.2 kg (179 lb)   07/18/23 1603 78 kg (172 lb)   07/15/23 1650 78 kg (172 lb)   07/12/23 0525 78 kg (172 lb)   07/10/23 0525 78 kg (172 lb)   07/09/23 0553 77.7 kg (171 lb 4.8 oz)   07/08/23 0555 79.5 kg (175 lb 3.2 oz)   07/07/23 1436 78.7 kg (173 lb 9.6 oz)   07/06/23 2316 79.4 kg (175 lb)   06/29/23 1506 81.2 kg (179 lb 1.6 oz)   06/27/23 0609 80.6 kg (177 lb 11.2 oz)   06/26/23 0530 81.6 kg (179 lb 12.8 oz)   06/25/23 0523 82.2 kg (181 lb 4.8 oz)   06/23/23 1920 81.6 kg (179 lb 14.3 oz)      --  Labs       Pertinent Labs    Results from last 7 days   Lab Units 11/02/23  0428 11/01/23  0543 10/31/23  0431 10/30/23  1329   SODIUM mmol/L 137 136 137 137   POTASSIUM mmol/L 3.9 3.7 4.1 3.7   CHLORIDE mmol/L 108* 103 104 104   CO2 mmol/L 22.5 22.6 25.1 24.1   BUN mg/dL 12 15 19 15   CREATININE mg/dL 1.19 1.15 1.19 1.21   CALCIUM mg/dL 8.0* 8.6 8.7 9.2   BILIRUBIN mg/dL 0.2  --   --  <0.2   ALK PHOS U/L 47  --   --  71   ALT (SGPT) U/L 11  --   --  13   AST (SGOT) U/L 16  --   --  13   GLUCOSE mg/dL 90 93 90 151*     Results from last 7 days   Lab Units 11/02/23  1111 11/02/23  0428   HEMOGLOBIN g/dL 6.2* 7.1*  7.1*   HEMATOCRIT % 18.8* 21.1*  21.1*   WBC 10*3/mm3 6.85 9.02  9.02   ALBUMIN g/dL  --  3.4*     Results from last 7 days   Lab Units 11/02/23  1111 11/02/23  0428 11/02/23  0018 11/01/23  1610 11/01/23  0625 10/30/23  1938 10/30/23  1329   INR   --  1.16*  --   --   --   --  1.01  "  PLATELETS 10*3/mm3 153 166  166 131* 155 225   < > 239    < > = values in this interval not displayed.     No results found for: \"COVID19\"  Lab Results   Component Value Date    HGBA1C 4.90 07/03/2023          Medications           Scheduled Medications ferrous sulfate, 325 mg, Oral, Daily With Breakfast  folic acid, 1 mg, Oral, Daily  pantoprazole, 40 mg, Intravenous, Q12H  senna-docusate sodium, 2 tablet, Oral, BID  sodium chloride, 10 mL, Intravenous, Q12H  thiamine, 100 mg, Oral, Daily       Infusions lactated ringers, 100 mL/hr, Last Rate: 100 mL/hr (11/02/23 0454)       PRN Medications   senna-docusate sodium **AND** polyethylene glycol **AND** bisacodyl **AND** bisacodyl    dicyclomine    melatonin    nitroglycerin    ondansetron **OR** ondansetron    sodium chloride    sodium chloride    sodium chloride     Physical Findings          General Findings alert, oriented   Oral/Mouth Cavity WDL   Edema  not assessed   Gastrointestinal last bowel movement: 11/2, dark red, clots   Skin  skin intact   Tubes/Drains/Lines none   NFPE No clinical signs of muscle wasting or fat loss   --  Current Nutrition Orders & Evaluation of Intake       Oral Nutrition     Food Allergies NKFA   Current PO Diet NPO Diet NPO Type: Strict NPO   Supplement n/a   PO Evaluation     % PO Intake Not well documented    Factors Affecting Intake: altered GI function   --  PES STATEMENT / NUTRITION DIAGNOSIS      Nutrition Dx Problem  Problem: Altered GI Function  Etiology: Medical Diagnosis - Acute and recurrent GI Bleed, anemia. Had recent IR coiling of SMA.     Signs/Symptoms: NPO and Clear Liquid Diet     NUTRITION INTERVENTION / PLAN OF CARE      Intervention Goal(s) Maintain nutrition status, Meet estimated needs, Disease management/therapy, Initiate feeding/diet, Tolerate PO , and No significant weight loss         RD Intervention/Action Await initiation/advancement of PO diet, Continue to monitor, and Care plan reviewed   --    "   Prescription/Orders:       PO Diet       Supplements       Enteral Nutrition       Parenteral Nutrition    New Prescription Ordered? No changes at this time   --      Monitor/Evaluation Per protocol   Discharge Plan/Needs Pending clinical course   --    RD to follow per protocol.      Electronically signed by:  Guerita Harris RD  11/02/23 15:16 EDT

## 2023-11-02 NOTE — ANESTHESIA PREPROCEDURE EVALUATION
Anesthesia Evaluation     Patient summary reviewed and Nursing notes reviewed                Airway   Mallampati: II  TM distance: >3 FB  Neck ROM: full  Dental      Comment: Denies any chipped, cracked, or loose teeth     Pulmonary - normal exam   (+) a smoker (0.5ppd for 20 years) Current,  (-) COPD, asthma  Cardiovascular - normal exam  Exercise tolerance: good (4-7 METS)    (+) DVT (upper extremity vein thrombosis)  (-) hypertension, past MI, CAD, dysrhythmias, angina      Neuro/Psych  (+) seizures, headaches  (-) TIA, CVA  GI/Hepatic/Renal/Endo    (+) GI bleeding active bleeding    Musculoskeletal     Abdominal    Substance History      OB/GYN          Other                        Anesthesia Plan    ASA 3 - emergent     general     (I have reviewed the patient's history with the patient and the chart, including all pertinent laboratory results and imaging. I have explained the risks of anesthesia including but not limited to dental damage, corneal abrasion, nerve injury, MI, stroke, and death. Questions asked and answered. Anesthetic plan discussed with patient and team as indicated. Patient expressed understanding of the above.  )    Anesthetic plan, risks, benefits, and alternatives have been provided, discussed and informed consent has been obtained with: patient.      CODE STATUS:    Code Status (Patient has no pulse and is not breathing): CPR (Attempt to Resuscitate)  Medical Interventions (Patient has pulse or is breathing): Full Support

## 2023-11-02 NOTE — ANESTHESIA POSTPROCEDURE EVALUATION
Patient: Radha Noriega V    Procedure Summary       Date: 11/02/23 Room / Location:  [facility] OR 03 /  [facility] OR;  [facility] /  [facility]    Anesthesia Start: 1512 Anesthesia Stop: 1613    Procedures:       COLONOSCOPY AT BEDSIDE      ENTEROSCOPY SMALL BOWEL AT BEDSIDE (Esophagus)      COLONOSCOPY AT BEDSIDE      ENTEROSCOPY SMALL BOWEL AT BEDSIDE (Esophagus) Diagnosis:       Lower GI bleed      (Lower GI bleed [K92.2])    Surgeons: Mani Celeste MD Provider: Jeremy Ponce MD    Anesthesia Type: general ASA Status: 3 - Emergent            Anesthesia Type: general    Vitals  Vitals Value Taken Time   BP 94/59 11/02/23 1607   Temp     Pulse 101 11/02/23 1614   Resp 25 11/02/23 1600   SpO2 100 % 11/02/23 1614   Vitals shown include unfiled device data.        Post Anesthesia Care and Evaluation    Patient location during evaluation: bedside  Patient participation: complete - patient cannot participate  Level of consciousness: sleepy but conscious  Pain management: adequate    Airway patency: patent  Anesthetic complications: No anesthetic complications  PONV Status: controlled  Cardiovascular status: acceptable  Respiratory status: acceptable  Hydration status: acceptable

## 2023-11-02 NOTE — PAYOR COMM NOTE
"Radha Noriega (43 y.o. Male)                       ATTENTION - OBSERVATION TO INPATIENT ADMISSION ON 11/1/23 - AUTH PENDING                     [id number] - , REPLY TO [facility] DEPT  [phone] OR CALL   DONNY DALAL LPBOAZ             Date of Birth   1980    Social Security Number       Address   [address] [id number]    Home Phone   [phone]    MRN   [id number]       Islam   None    Marital Status                               Admission Date   10/30/23    Admission Type   Emergency    Admitting Provider   Ryland Loja MD    Attending Provider   Ryland Loja MD    Department, Room/Bed   [facility] INTENSIVE CARE, I373/1       Discharge Date       Discharge Disposition       Discharge Destination                                 Attending Provider: Ryland Loja MD    Allergies: No Known Allergies    Isolation: None   Infection: None   Code Status: CPR    Ht: 180.3 cm (71\")   Wt: 83 kg (183 lb)    Admission Cmt: None   Principal Problem: GI bleed [K92.2]                   Active Insurance as of 10/30/2023       Primary Coverage       Payor Plan Insurance Group Employer/Plan Group    [employer]HE[employer] BLUE CROSS ANTHEM BLUE CROSS BLUE SHIELD PPO [id number]       Payor Plan Address Payor Plan Phone Number Payor Plan Fax Number Effective Dates    [phone]  3/17/2022 - None Entered    [facility] [address]         Subscriber Name Subscriber Birth Date Member ID       RADHA NORIEGA 1980 [id number]               Secondary Coverage       Payor Plan Insurance Group Employer/Plan Group    [facility] BY LUISA [employer] BY LUISA [id number]       Payor Plan Address Payor Plan Phone Number Payor Plan Fax Number Effective Dates    [address] [address]   1/1/2021 - None Entered    [facility] [address]         Subscriber Name Subscriber Birth Date Member ID       RADHA NORIEGA 1980 [id number]               "       Emergency Contacts        (Rel.) Home Phone Work Phone Mobile Phone    NED NICHOLS (Spouse) [phone] -- [phone]    Zainab Elaine (Mother) [phone] -- [phone]                 History & Physical        Catherine Johnson PA-C at 10/30/23 1420       Attestation signed by Ryland Christiansen MD at 10/30/23 1701    MD ATTESTATION NOTE    The RUT and I have discussed this patient's history, physical exam, and treatment plan.  I have reviewed the documentation and personally had a face to face interaction with the patient. I affirm the documentation and agree with the treatment and plan.  The attached note describes my personal findings.      I provided a substantive portion of the care of the patient.  I personally performed the physical exam in its entirety, and below are my findings.      Brief HPI: This is a 43-year-old male who has had a history of GI bleed.  He needed coiling in the past and I believe the bleeding was from an ulcer.  He recently was diagnosed with a DVT diagnosis to his upper extremity after an IV was placed and removed.  He is on Eliquis.  His last dose of Eliquis was almost 48 hours ago.  He denies any pain or any new swelling to his upper extremity.  He feels as if that is resolved.  He came to the emergency department because he had a black stool yesterday similar to when he had bleeding in the past and then had a maroon stool late last night.  The maroon stool was positive for blood.  It was not a large amount.  When he presented with bleeding in July of this year he had much more bleeding.  The last bowel movement he had was last night.  He has had no diarrhea.  He has had no vomiting or any blood with any vomit or spit up.  He denies any chest pain, shortness of breath, abdominal pain.  Again he stopped taking the Eliquis because he was concerned about bleeding.  Denies any focal weakness to arms or legs.  Currently he is asymptomatic.    General :  Pleasant young male.  Patient is awake alert and oriented.  He is in no acute cardiovascular or respiratory distress.  Vital signs are normal.  He is not tachycardic  HEENT: NCAT  CV: Heart is regular rate and rhythm with no murmurs  Respiratory: CTA bilaterally.  No respiratory distress  Abd: Soft and nontender  Ext: No acute abnormalities.  No edema.  Intact distal pulses to upper and lower extremities that are equal strong and symmetric.  Skin: No rash  Neuro: Cranial nerves II through XII grossly intact as tested.  No acute lateralizing deficits.  Psych: Normal mood and affect    I have reviewed the patient's vital signs, lab work, EKG and imaging.    Plan:  1.  Upper GI bleed: This patient has a history of an upper GI bleed likely from a previous ulcer requiring intervention.  He has recurrent upper GI bleed at this time.  His last episode of bleeding was with a maroon stool last night.  We are holding his Eliquis.  His hemoglobin today is 10.5.  We can see that his previous hemoglobin was 12.1 a couple months ago.  He is hemodynamically stable and is asymptomatic at this time.  We will can start him on a Protonix drip under the assumption that this is probably an upper GI bleed.  GI has seen this patient and the plan is to do further starting to evaluate the source of his GI bleeding.  He is asymptomatic and hemodynamically stable.  We will continue to trend his hemoglobin continue to watch his status here in the observation unit.  Discussed the plan with the patient and all questions answered.        Note Disclaimer: At [facility], we believe that sharing information builds trust and better relationships. You are receiving this note because you recently visited [facility]. It is possible you will see health information before a provider has talked with you about it. This kind of information can be easy to misunderstand. To help you fully understand what it means for your health, we urge you to discuss  this note with your provider.                     [facility]   HISTORY AND PHYSICAL    Patient Name: Radha Noriega V  : 1980  MRN: [id number]  Primary Care Physician:  Hellen Skelton APRN  Date of admission: 10/30/2023    Subjective  Subjective     Chief Complaint:   Chief Complaint   Patient presents with    Black or Bloody Stool         HPI:    Radha Noriega V is a 43 y.o. male with history of recurrent GI bleeding requiring coil embolization in the past, left upper extremity DVT secondary to midline on Eliquis who presents to [facility] ER with blood in the stool.  His stool was darker in color than normal and had a tar-like consistency to it.  He states today he noted some blood in the stool noted it was in maroonish color.  He reports some intermittent burning discomfort but denies any nausea vomiting.  Denies fevers and chills.  Denies chest pain and shortness of breath.  He states his last dose of Eliquis was 2 days ago as he stopped after he had the first episode of abnormal stool.    In the ER, laboratory evaluation notes a hemoglobin of 10.5.  Patient hemodynamically stable.  Electrolytes within normal limits.    Review of Systems   All systems were reviewed and negative except for: what is mentioned above in the HPI.     Personal History     Past Medical History:   Diagnosis Date    Anemia     ETOH abuse     Tachycardia 2023    Tobacco dependence        Past Surgical History:   Procedure Laterality Date    APPENDECTOMY      COLONOSCOPY N/A 2023    Procedure: COLONOSCOPY TO CECUM AND TERMINAL ILEUM WITH BIOPSIES AND COLD BIOPSY POLYPECTOMIES;  Surgeon: Imtiaz Lee MD;  Location: [facility] ENDOSCOPY;  Service: Gastroenterology;  Laterality: N/A;  PRE- MELENA  POST- COLON POLYPS, HEMORRHOIDS    ENDOSCOPY Left 2023    Procedure: ESOPHAGOGASTRODUODENOSCOPY;  Surgeon: Imtiaz Lee MD;  Location: [facility] ENDOSCOPY;  Service: Gastroenterology;  Laterality: Left;   small hiatal hernia, esophagitis    ENDOSCOPY N/A 07/15/2023    Procedure: ESOPHAGOGASTRODUODENOSCOPY;  Surgeon: Ermelinda Dixon MD;  Location:  [facility] ENDOSCOPY;  Service: Gastroenterology;  Laterality: N/A;  pre-anemia  post-hiatal hernia    ENTEROSCOPY SMALL BOWEL N/A 07/09/2023    Procedure: ENTEROSCOPY SMALL BOWEL;  Surgeon: Isrrael Gibson MD;  Location:  [facility] ENDOSCOPY;  Service: Gastroenterology;  Laterality: N/A;  PRE- GI BLEED  POST- NORMAL    UPPER GASTROINTESTINAL ENDOSCOPY         Family History: family history includes Diabetes in his brother, father, maternal grandfather, maternal grandmother, paternal grandfather, paternal grandmother, and sister; Heart attack (age of onset: 73) in his maternal aunt; Hypertension in his sister. Otherwise pertinent FHx was reviewed and not pertinent to current issue.    Social History:  reports that he has been smoking cigarettes. He has a 20.00 pack-year smoking history. He has never used smokeless tobacco. He reports that he does not currently use alcohol. He reports current drug use. Drug: Marijuana.    Home Medications:  apixaban, ferrous sulfate, folic acid, pantoprazole, and thiamine    Allergies:  No Known Allergies    Objective  Objective     Vitals:   Temp:  [97.6 °F (36.4 °C)] 97.6 °F (36.4 °C)  Heart Rate:  [] 91  Resp:  [16] 16  BP: (121-130)/(84-87) 130/84  Physical Exam    Constitutional: 43-year-old male in no acute distress on room air   Eyes: PERRLA, sclerae anicteric, no conjunctival injection   HENT: NCAT, mucous membranes moist   Neck: Supple, no thyromegaly, no lymphadenopathy, trachea midline   Respiratory: Clear to auscultation bilaterally, nonlabored respirations    Cardiovascular: RRR, no murmurs, rubs, or gallops, palpable pedal pulses bilaterally   Gastrointestinal: Positive bowel sounds, soft, nontender, nondistended   Musculoskeletal: No bilateral ankle edema, no clubbing or cyanosis to extremities   Psychiatric: Appropriate affect,  cooperative   Neurologic: Oriented x 3, strength symmetric in all extremities, Cranial Nerves grossly intact to confrontation, speech clear   Skin: No rashes     Result Review   Result Review:  I have personally reviewed the results from the time of this admission to 10/30/2023 14:53 EDT and agree with these findings:  [x]  Laboratory list / accordion  []  Microbiology  []  Radiology  []  EKG/Telemetry   []  Cardiology/Vascular   []  Pathology  [x]  Old records  []  Other:  Most notable findings include:   Hgb 10.5     Recent capsule study showed no active bleeding, few pictures of a mucosal abnormality which could be a small ulcer was seen, not well visualized    Assessment & Plan  Assessment / Plan     Brief Patient Summary:  Radha Noriega V is a 43 y.o. male who admitted the observation unit with blood in the stool, lower GI bleed.  Plan for IV fluid hydration, serial hemoglobins, and GI consultation.    Active Hospital Problems:  Active Hospital Problems    Diagnosis     **GI bleed      Plan:     Lower GI bleed  -Hemoglobin 10.5, trend  -Hold Eliquis  -IV fluids  -GI consulted  -Continue Protonix and ferrous sulfate  -Telemetry monitoring  -Clear liquid diet, then n.p.o. after midnight    Left upper extremity DVT  -Secondary to midline  -Eliquis held      DVT prophylaxis:  Mechanical DVT prophylaxis orders are present.    CODE STATUS:    Code Status (Patient has no pulse and is not breathing): CPR (Attempt to Resuscitate)  Medical Interventions (Patient has pulse or is breathing): Full Support    Admission Status:  I believe this patient meets observation status.    78 minutes have been spent by [facility] providers in the care of this patient while under observation status.      Appropriate PPE worn during patient encounter.  Hand hygeine performed before and after seeing the patient.      Electronically signed by Catherine Johnson PA-C, 10/30/23, 2:20 PM EDT.              Electronically signed by Ryland Christiansen MD at 10/30/23 1701          Emergency Department Notes        Adryan Lee MD at 10/30/23 1400           EMERGENCY DEPARTMENT ENCOUNTER    Room Number:  115/1  Date seen:  10/30/2023  PCP: Hellen Skelton APRN  Historian: Patient      HPI:  Chief Complaint: Rectal bleeding  A complete HPI/ROS/PMH/PSH/SH/FH are unobtainable due to:   Context: Radha Noriega V is a 43 y.o. male who presents to the ED c/o rectal bleeding      MEDICAL RECORD REVIEW (non ED)  I reviewed prior medical records note the patient was hospitalized in August with DVT of the upper extremity.  Patient does have history of recurrent GI bleeding which was felt to be related to small bowel and has had coil embolization in the past.  Patient currently anticoagulated on Eliquis.    PAST MEDICAL HISTORY  Active Ambulatory Problems     Diagnosis Date Noted    Gastrointestinal hemorrhage with melena 06/23/2023    ETOH abuse 06/23/2023    Migraine 02/09/2021    Current every day smoker 06/23/2023    Tachycardia 06/23/2023    Acute blood loss anemia 06/23/2023    Family history of diabetes mellitus 06/29/2023    Iron deficiency anemia 07/08/2023    Sepsis 07/13/2023    Metabolic acidosis 07/14/2023    Enteritis of possible infectious origin 07/14/2023    Blood per rectum 07/12/2023    Arm DVT (deep venous thromboembolism), acute, left 08/24/2023    Complication associated with peripherally inserted central catheter (PICC) 08/24/2023     Resolved Ambulatory Problems     Diagnosis Date Noted    GI bleed 07/07/2023    ABLA (acute blood loss anemia) 07/12/2023     Past Medical History:   Diagnosis Date    Anemia     Tobacco dependence          PAST SURGICAL HISTORY  Past Surgical History:   Procedure Laterality Date    APPENDECTOMY      COLONOSCOPY N/A 06/25/2023    Procedure: COLONOSCOPY TO CECUM AND TERMINAL ILEUM WITH BIOPSIES AND COLD BIOPSY POLYPECTOMIES;  Surgeon: Imtiaz Lee MD;  Location: [facility]  ENDOSCOPY;  Service: Gastroenterology;  Laterality: N/A;  PRE- MELENA  POST- COLON POLYPS, HEMORRHOIDS    ENDOSCOPY Left 06/24/2023    Procedure: ESOPHAGOGASTRODUODENOSCOPY;  Surgeon: Imtiaz Lee MD;  Location: [facility] ENDOSCOPY;  Service: Gastroenterology;  Laterality: Left;  small hiatal hernia, esophagitis    ENDOSCOPY N/A 07/15/2023    Procedure: ESOPHAGOGASTRODUODENOSCOPY;  Surgeon: Ermelinda Dixon MD;  Location: [facility] ENDOSCOPY;  Service: Gastroenterology;  Laterality: N/A;  pre-anemia  post-hiatal hernia    ENTEROSCOPY SMALL BOWEL N/A 07/09/2023    Procedure: ENTEROSCOPY SMALL BOWEL;  Surgeon: Isrrael Gibson MD;  Location: [facility] ENDOSCOPY;  Service: Gastroenterology;  Laterality: N/A;  PRE- GI BLEED  POST- NORMAL    UPPER GASTROINTESTINAL ENDOSCOPY           FAMILY HISTORY  Family History   Problem Relation Age of Onset    Diabetes Father     Diabetes Sister     Hypertension Sister     Diabetes Brother     Diabetes Maternal Grandmother     Diabetes Maternal Grandfather     Diabetes Paternal Grandmother     Diabetes Paternal Grandfather     Heart attack Maternal Aunt 73    Colon cancer Neg Hx     Prostate cancer Neg Hx          SOCIAL HISTORY  Social History     Socioeconomic History    Marital status:    Tobacco Use    Smoking status: Every Day     Packs/day: 1.00     Years: 20.00     Additional pack years: 0.00     Total pack years: 20.00     Types: Cigarettes    Smokeless tobacco: Never   Vaping Use    Vaping Use: Never used   Substance and Sexual Activity    Alcohol use: Not Currently     Comment: hasnt had a drink in a month. States he is under the care of PCP to [facility].    Drug use: Yes     Types: Marijuana     Comment: daily.    Sexual activity: Yes     Partners: Female         ALLERGIES  Patient has no known allergies.        REVIEW OF SYSTEMS  Review of Systems         PHYSICAL EXAM  ED Triage Vitals   Temp Heart Rate Resp BP SpO2   10/30/23 1251 10/30/23 1251 10/30/23 1251 10/30/23 1305  10/30/23 1251   97.6 °F (36.4 °C) 107 16 130/87 100 %      Temp src Heart Rate Source Patient Position BP Location FiO2 (%)   10/30/23 1251 10/30/23 1251 10/30/23 1305 10/30/23 1305 --   Tympanic Monitor Sitting Right arm        Physical Exam    GENERAL: Alert, NAD - vitals N  HENT: nares patent  EYES: no scleral icterus  CV: regular rhythm, regular rate  RESPIRATORY: normal effort  ABDOMEN: soft, NTTP  MUSCULOSKELETAL: no deformity  NEURO: Strength sensation and coordination are grossly intact.  Speech and mentation are unremarkable  SKIN: warm, dry      Vital signs and nursing notes reviewed.          LAB RESULTS  Recent Results (from the past 24 hour(s))   Comprehensive Metabolic Panel    Collection Time: 10/30/23  1:29 PM    Specimen: Blood   Result Value Ref Range    Glucose 151 (H) 65 - 99 mg/dL    BUN 15 6 - 20 mg/dL    Creatinine 1.21 0.76 - 1.27 mg/dL    Sodium 137 136 - 145 mmol/L    Potassium 3.7 3.5 - 5.2 mmol/L    Chloride 104 98 - 107 mmol/L    CO2 24.1 22.0 - 29.0 mmol/L    Calcium 9.2 8.6 - 10.5 mg/dL    Total Protein 6.9 6.0 - 8.5 g/dL    Albumin 4.3 3.5 - 5.2 g/dL    ALT (SGPT) 13 1 - 41 U/L    AST (SGOT) 13 1 - 40 U/L    Alkaline Phosphatase 71 39 - 117 U/L    Total Bilirubin <0.2 0.0 - 1.2 mg/dL    Globulin 2.6 gm/dL    A/G Ratio 1.7 g/dL    BUN/Creatinine Ratio 12.4 7.0 - 25.0    Anion Gap 8.9 5.0 - 15.0 mmol/L    eGFR 76.7 >60.0 mL/min/1.73   Protime-INR    Collection Time: 10/30/23  1:29 PM    Specimen: Blood   Result Value Ref Range    Protime 13.4 11.7 - 14.2 Seconds    INR 1.01 0.90 - 1.10   Type & Screen    Collection Time: 10/30/23  1:29 PM    Specimen: Blood   Result Value Ref Range    ABO Type B     RH type Positive     Antibody Screen Negative     T&S Expiration Date 11/2/2023 11:59:59 PM    Green Top (Gel)    Collection Time: 10/30/23  1:29 PM   Result Value Ref Range    Extra Tube Hold for add-ons.    Lavender Top    Collection Time: 10/30/23  1:29 PM   Result Value Ref Range     Extra Tube hold for add-on    Gold Top - SST    Collection Time: 10/30/23  1:29 PM   Result Value Ref Range    Extra Tube Hold for add-ons.    Light Blue Top    Collection Time: 10/30/23  1:29 PM   Result Value Ref Range    Extra Tube Hold for add-ons.    CBC Auto Differential    Collection Time: 10/30/23  1:29 PM    Specimen: Blood   Result Value Ref Range    WBC 5.19 3.40 - 10.80 10*3/mm3    RBC 3.89 (L) 4.14 - 5.80 10*6/mm3    Hemoglobin 10.5 (L) 13.0 - 17.7 g/dL    Hematocrit 32.2 (L) 37.5 - 51.0 %    MCV 82.8 79.0 - 97.0 fL    MCH 27.0 26.6 - 33.0 pg    MCHC 32.6 31.5 - 35.7 g/dL    RDW 15.5 (H) 12.3 - 15.4 %    RDW-SD 46.6 37.0 - 54.0 fl    MPV 10.7 6.0 - 12.0 fL    Platelets 239 140 - 450 10*3/mm3    Neutrophil % 57.8 42.7 - 76.0 %    Lymphocyte % 29.7 19.6 - 45.3 %    Monocyte % 9.4 5.0 - 12.0 %    Eosinophil % 2.3 0.3 - 6.2 %    Basophil % 0.6 0.0 - 1.5 %    Immature Grans % 0.2 0.0 - 0.5 %    Neutrophils, Absolute 3.00 1.70 - 7.00 10*3/mm3    Lymphocytes, Absolute 1.54 0.70 - 3.10 10*3/mm3    Monocytes, Absolute 0.49 0.10 - 0.90 10*3/mm3    Eosinophils, Absolute 0.12 0.00 - 0.40 10*3/mm3    Basophils, Absolute 0.03 0.00 - 0.20 10*3/mm3    Immature Grans, Absolute 0.01 0.00 - 0.05 10*3/mm3    nRBC 0.0 0.0 - 0.2 /100 WBC       Ordered the above labs and independently interpreted results. My findings will be discussed in the medical decision making section below        RADIOLOGY  No Radiology Exams Resulted Within Past 24 Hours          PROCEDURES  Procedures          MEDICATIONS GIVEN IN ER  Medications   sodium chloride 0.9 % flush 10 mL (has no administration in time range)   sodium chloride 0.9 % flush 10 mL (10 mL Intravenous Given 10/30/23 1427)   sodium chloride 0.9 % flush 10 mL (10 mL Intravenous Given 10/30/23 1427)   sodium chloride 0.9 % infusion 40 mL (has no administration in time range)   sennosides-docusate (PERICOLACE) 8.6-50 MG per tablet 2 tablet (has no administration in time range)      And   polyethylene glycol (MIRALAX) packet 17 g (has no administration in time range)     And   bisacodyl (DULCOLAX) EC tablet 5 mg (has no administration in time range)     And   bisacodyl (DULCOLAX) suppository 10 mg (has no administration in time range)   ondansetron (ZOFRAN) tablet 4 mg (has no administration in time range)     Or   ondansetron (ZOFRAN) injection 4 mg (has no administration in time range)   nitroglycerin (NITROSTAT) SL tablet 0.4 mg (has no administration in time range)   lactated ringers infusion (100 mL/hr Intravenous New Bag 10/30/23 1427)   melatonin tablet 5 mg (has no administration in time range)   ferrous sulfate tablet 325 mg (has no administration in time range)   folic acid (FOLVITE) tablet 1 mg (1 mg Oral Not Given 10/30/23 1551)   pantoprazole (PROTONIX) EC tablet 40 mg (40 mg Oral Not Given 10/30/23 1552)   thiamine (VITAMIN B-1) tablet 100 mg (100 mg Oral Not Given 10/30/23 1551)               MEDICAL DECISION MAKING, PROGRESS, and CONSULTS    All labs have been independently reviewed by me.  All radiology studies have been reviewed by me and I have also reviewed the radiology report.   EKG's independently viewed and interpreted by me.  Discussion below represents my analysis of pertinent findings related to patient's condition, differential diagnosis, treatment plan and final disposition.      Additional sources:  - Discussed/ obtained information from independent historians:  GI provider on call, wife, Lucille VILLEDA from OBS unit    - External (non-ED) record review: Please see documented above    - Chronic or social conditions impacting care: anticoag, h/o GI bleed    - Shared decision making: I discussed ED evaluation and treatment plan with patient who is in agreement.  BRPR on AC.  Will admit for further eval, serial HGB, GI consult      Orders placed during this visit:  Orders Placed This Encounter   Procedures    NM GI Blood Loss    [place] Draw    Comprehensive Metabolic Panel     Protime-INR    Occult Blood X 1, Stool - Stool, Per Rectum    CBC Auto Differential    CBC (No Diff)    Basic Metabolic Panel    CBC (No Diff)    Diet: Liquid Diets; Clear Liquid; Fluid Consistency: Thin (IDDSI 0)    NPO Diet NPO Type: Strict NPO    Vital Signs    Intake & Output    Weigh Patient    Oral Care    Place Sequential Compression Device    Maintain Sequential Compression Device    Telemetry - Maintain IV Access    Telemetry - Place Orders & Notify Provider of Results When Patient Experiences Acute Chest Pain, Dysrhythmia or Respiratory Distress    May Be Off Telemetry for Tests    Vital Signs    Pulse Oximetry, Continuous    Up With Assistance    Notify Provider (With Default Parameters)    Transfuse 2 units of packed red blood cells for hemoglobin less than 7.0  Nursing Communication    Code Status and Medical Interventions:    Gastroenterology (on-call MD unless specified)    Pulse Oximetry    Oxygen Therapy- Nasal Cannula; Titrate 1-6 LPM Per SpO2; 90 - 95%    POC Occult Blood Stool    Type & Screen    Insert Peripheral IV    Insert Peripheral IV    Initiate ED Observation Status    CBC & Differential    Green Top (Gel)    Lavender Top    Gold Top - SST    Light Blue Top           Differential diagnosis:    Please see as documented below in ED course      Independent interpretation of labs, radiology studies, and discussions with consultants:               DIAGNOSIS  Final diagnoses:   Gastrointestinal hemorrhage, unspecified gastrointestinal hemorrhage type   Anticoagulated by anticoagulation treatment         DISPOSITION  Admission            Latest Documented Vital Signs:  As of 16:08 EDT  BP- 126/77 HR- 79 Temp- 97.4 °F (36.3 °C) (Oral) O2 sat- 100%              --    Please note that portions of this were completed with a voice recognition program.       Note Disclaimer: At [facility], we believe that sharing information builds trust and better relationships. You are receiving this note  because you are receiving care at [facility] or recently visited. It is possible you will see health information before a provider has talked with you about it. This kind of information can be easy to misunderstand. To help you fully understand what it means for your health, we urge you to discuss this note with your provider.             Adryan Lee MD  10/30/23 1608      Electronically signed by Adryan Lee MD at 10/30/23 1608       Sushma Vuong, RN at 10/30/23 1250          Pt had rectal bleed last night.  The other day his stool was dark.  He had rectal bleed 4 months ago    Pt reports he has been on a blood thinning medication x 3 months, reports abdominal pain around naval    Electronically signed by Sushma Vuong, RN at 10/30/23 1307       Vital Signs      Date/Time Temp Temp src Pulse Resp BP Patient Position SpO2    11/02/23 1219 97.7 (36.5) Oral 80 16 126/75 -- 99    11/02/23 1200 -- -- 96 -- 129/65 -- 99    11/02/23 1151 98 (36.7) Oral 81 16 122/52 Lying 100    11/02/23 1146 98 (36.7) Oral -- -- -- -- --    11/02/23 1100 -- -- 86 -- 120/65 -- 98    11/02/23 1000 -- -- 68 -- 117/66 -- 99    11/02/23 0904 -- -- 89 -- 130/69 -- 100    11/02/23 0800 -- -- 76 16 118/68 -- 100    11/02/23 0715 97.7 (36.5) Oral -- -- -- -- --    11/02/23 0700 -- -- 83 -- 114/73 -- 100    11/02/23 0630 -- -- 67 -- 115/51 -- 100    11/02/23 0600 -- -- 89 16 105/66 Lying 99    11/02/23 0530 -- -- 67 -- 112/67 -- 100    11/02/23 0500 -- -- 85 17 111/64 Lying 100    11/02/23 0430 -- -- 86 -- 131/79 -- 99    11/02/23 0400 -- -- 73 17 118/63 Lying 100    11/02/23 0330 -- -- 92 -- 117/72 -- 99    11/02/23 0300 98.2 (36.8) Oral 75 16 108/53 Lying 100    11/02/23 0230 -- -- 92 -- 108/61 -- 99    11/02/23 0200 -- -- 74 9 103/51 Lying 97    11/02/23 0130 -- -- 91 -- 109/55 -- 100    11/02/23 0100 -- -- 77 14 105/49 Lying 99    11/02/23 0030 -- -- 84 -- 114/50 -- 100    11/02/23 0000 -- -- 89 16 133/70  Lying 100    11/01/23 2330 -- -- 79 -- 128/79 -- 99    11/01/23 2300 97.9 (36.6) Oral 82 16 119/87 Lying 99    11/01/23 2230 -- -- 77 -- -- -- 100    11/01/23 2200 -- -- 85 17 122/74 Lying 100    11/01/23 2130 -- -- 83 -- -- -- 100    11/01/23 2100 -- -- 88 18 -- -- 100    11/01/23 2030 -- -- 89 -- 123/76 -- 100    11/01/23 2000 -- -- 92 17 122/72 Lying 100    11/01/23 1930 -- -- 86 -- 122/81 -- 100    11/01/23 1900 98.1 (36.7) Oral 77 17 121/75 Lying 100    11/01/23 1800 -- -- 87 -- 125/87 -- 99    11/01/23 1600 -- -- 83 -- 121/79 -- 100    11/01/23 1500 -- -- 88 -- 132/68 -- 100    11/01/23 1450 -- -- 92 -- 124/63 -- 100    11/01/23 1445 -- -- 92 -- -- -- 100    11/01/23 1440 -- -- 89 -- 133/70 -- 100    11/01/23 1430 98 (36.7) Oral 96 -- 125/67 -- 100    11/01/23 1420 -- -- 99 -- 105/71 -- 100    11/01/23 1415 99 (37.2) Oral 95 16 125/66 -- 100    11/01/23 14:04:12 -- -- -- -- 132/62 -- --    11/01/23 1357 -- -- 112 -- -- -- --    11/01/23 13:56:14 -- -- -- -- 122/75 -- --    11/01/23 1354 -- -- 110 -- -- -- --    11/01/23 1351 -- -- 118 -- -- -- --    11/01/23 1258 -- -- -- -- 121/73 Lying --    11/01/23 1233 99 (37.2) Oral 127 16 125/86 Lying 99    11/01/23 0846 98.1 (36.7) Oral 87 16 124/76 Lying 99    11/01/23 0520 98.1 (36.7) Oral -- 16 122/68 Lying 99    10/31/23 2346 98.6 (37) Oral -- 16 118/74 Lying 99    10/31/23 1900 98.6 (37) Oral -- 16 118/66 Lying --    10/31/23 1700 97.6 (36.4) Oral -- 15 127/78 Lying 100    10/31/23 1329 97.9 (36.6) Oral -- 15 124/76 Lying 100    10/31/23 0900 98.2 (36.8) Oral -- 16 144/82 Lying 100    10/31/23 0415 98.6 (37) Oral -- 18 105/69 Lying 100    10/30/23 2312 98.2 (36.8) Oral -- 16 121/77 Lying 100    10/30/23 1922 98.4 (36.9) Oral -- 16 117/75 Lying 99    10/30/23 1513 97.4 (36.3) Oral 79 16 126/77 -- 100    10/30/23 1431 -- -- 91 -- 130/84 -- 99    10/30/23 1401 -- -- 87 -- 121/85 -- 99    10/30/23 1305 -- -- -- -- 130/87 Sitting --    10/30/23 1251 97.6 (36.4)  Tympanic 107 16 -- -- 100          Oxygen Therapy (last 7 days)       Date/Time SpO2 Device (Oxygen Therapy) Flow (L/min) Oxygen Concentration (%) ETCO2 (mmHg)    11/02/23 1219 99 room air -- -- --    11/02/23 1213 -- room air -- -- --    11/02/23 1200 99 room air -- -- --    11/02/23 1151 100 room air -- -- --    11/02/23 1100 98 room air -- -- --    11/02/23 1000 99 room air -- -- --    11/02/23 0904 100 room air -- -- --    11/02/23 0800 100 room air -- -- --    11/02/23 0715 -- room air -- -- --    11/02/23 0700 100 room air -- -- --    11/02/23 0630 100 -- -- -- --    11/02/23 0600 99 room air -- -- --    11/02/23 0530 100 -- -- -- --    11/02/23 0500 100 room air -- -- --    11/02/23 0430 99 -- -- -- --    11/02/23 0400 100 room air -- -- --    11/02/23 0330 99 -- -- -- --    11/02/23 0300 100 room air -- -- --    11/02/23 0230 99 -- -- -- --    11/02/23 0200 97 room air -- -- --    11/02/23 0130 100 -- -- -- --    11/02/23 0100 99 room air -- -- --    11/02/23 0030 100 -- -- -- --    11/02/23 0000 100 room air -- -- --    11/01/23 2330 99 -- -- -- --    11/01/23 2300 99 room air -- -- --    11/01/23 2230 100 -- -- -- --    11/01/23 2200 100 room air -- -- --    11/01/23 2130 100 -- -- -- --    11/01/23 2100 100 room air -- -- --    11/01/23 2030 100 -- -- -- --    11/01/23 2000 100 room air -- -- --    11/01/23 1930 100 -- -- -- --    11/01/23 1900 100 room air -- -- --    11/01/23 1800 99 -- -- -- --    11/01/23 1600 100 -- -- -- --    11/01/23 1525 -- room air -- -- --    11/01/23 1500 100 -- -- -- --    11/01/23 1450 100 -- -- -- --    11/01/23 1445 100 -- -- -- --    11/01/23 1440 100 -- -- -- --    11/01/23 1430 100 room air -- -- --    11/01/23 1420 100 -- -- -- --    11/01/23 1415 100 room air -- -- --    11/01/23 1233 99 room air -- -- --    11/01/23 0846 99 room air -- -- --    11/01/23 0834 -- room air -- -- --    11/01/23 0520 99 room air -- -- --    10/31/23 2346 99 room air -- -- --    10/31/23  1700 100 -- -- -- --    10/31/23 1430 -- room air -- -- --    10/31/23 1329 100 room air -- -- --    10/31/23 0905 -- room air -- -- --    10/31/23 0900 100 room air -- -- --    10/31/23 0415 100 room air -- -- --    10/30/23 2312 100 room air -- -- --    10/30/23 2054 -- room air -- -- --    10/30/23 1922 99 room air -- -- --    10/30/23 1528 -- room air -- -- --    10/30/23 1513 100 room air -- -- --    10/30/23 1431 99 -- -- -- --    10/30/23 1401 99 -- -- -- --    10/30/23 1251 100 room air -- -- --          Lines, Drains & Airways       Active LDAs       Name Placement date Placement time Site Days    Peripheral IV 10/30/23 1333 Left;Posterior Forearm 10/30/23  1333  Forearm  2    Peripheral IV 11/02/23 0000 Anterior;Distal;Right Forearm 11/02/23  0000  Forearm  less than 1                               Facility-Administered Medications as of 11/2/2023   Medication Dose Route Frequency Provider Last Rate Last Admin    sennosides-docusate (PERICOLACE) 8.6-50 MG per tablet 2 tablet  2 tablet Oral BID Catherine Johnson PA-C   2 tablet at 11/01/23 2138    And    polyethylene glycol (MIRALAX) packet 17 g  17 g Oral Daily PRN Catherine Johnson PA-C        And    bisacodyl (DULCOLAX) EC tablet 5 mg  5 mg Oral Daily PRN Catherine Johnson PA-C        And    bisacodyl (DULCOLAX) suppository 10 mg  10 mg Rectal Daily PRN Catherine Johnson PA-C        dicyclomine (BENTYL) capsule 10 mg  10 mg Oral 4x Daily PRN Dee Price PA   10 mg at 11/01/23 1254    ferrous sulfate tablet 325 mg  325 mg Oral Daily With Breakfast Catherine Johnson PA-C   325 mg at 11/01/23 0834    folic acid (FOLVITE) tablet 1 mg  1 mg Oral Daily Catherine Johnson PA-C   1 mg at 11/01/23 0834    [COMPLETED] iopamidol (ISOVUE-370) 76 % injection 100 mL  100 mL Intravenous Once in imaging Ryland Loja MD   95 mL at 11/01/23 7214    [COMPLETED] lactated ringers bolus 1,000 mL  1,000 mL Intravenous Once Dee Price,  PA 1,000 mL/hr at 11/01/23 1326 1,000 mL at 11/01/23 1326    Followed by    lactated ringers infusion  100 mL/hr Intravenous Continuous Dee Price  mL/hr at 11/02/23 0457 100 mL/hr at 11/02/23 0457    melatonin tablet 5 mg  5 mg Oral Nightly PRN Catherine Johnson PA-C        [COMPLETED] morphine injection 2 mg  2 mg Intravenous Once Cuauhtemoc Allan MD   2 mg at 11/01/23 1328    nitroglycerin (NITROSTAT) SL tablet 0.4 mg  0.4 mg Sublingual Q5 Min PRN Catherine Johnson PA-C        ondansetron (ZOFRAN) tablet 4 mg  4 mg Oral Q6H PRN Catherine Johnson PA-C        Or    ondansetron (ZOFRAN) injection 4 mg  4 mg Intravenous Q6H PRN Catherine Johnson PA-C   4 mg at 11/01/23 1551    pantoprazole (PROTONIX) injection 40 mg  40 mg Intravenous Q12H Catherine Johnson PA-C   40 mg at 11/02/23 0840    [COMPLETED] polyethylene glycol (MIRALAX) powder 0.5 bottle  0.5 bottle Oral Q12H Wojciech Loo MD   0.5 bottle at 11/02/23 0457    sodium chloride 0.9 % flush 10 mL  10 mL Intravenous PRN Adryan Lee MD        sodium chloride 0.9 % flush 10 mL  10 mL Intravenous Q12H Catherine Johnson PA-C   10 mL at 11/02/23 0900    sodium chloride 0.9 % flush 10 mL  10 mL Intravenous PRN Catherine Johnson PA-C   10 mL at 10/30/23 1427    sodium chloride 0.9 % infusion 40 mL  40 mL Intravenous PRN Catherine Johnson PA-C        [COMPLETED] technetium labeled red blood cells (ULTRATAG) injection 1 dose  1 dose Intravenous Once in imaging Ryland Christiansen MD   1 dose at 10/31/23 1142    thiamine (VITAMIN B-1) tablet 100 mg  100 mg Oral Daily Catherine Johnson PA-C   100 mg at 11/01/23 0834     Blood Administration Record (From admission, onward)      Transfusions to release       Ordered     Start    11/02/23 1147  Transfuse RBC, 2 Units Infuse Each Unit Over: 3.5H  Transfusion      Released Time Blood Unit Number Status   11/02/23 1152   23  [id number]  [id number] Transfusing        Unscheduled            Completed transfusions       Ordered     Start    11/01/23 1403  Transfuse RBC, 2 Units Infuse Each Unit Over: 3.5H  Transfusion      Released Time Blood Unit Number Status   11/01/23 1416 Not assigned Completed 11/01/23 1846   11/01/23 1420 Not assigned Completed 11/01/23 1846       11/01/23 1403                  Orders        Start     Ordered    11/02/23 1155  POC Glucose Once  PROCEDURE ONCE        Comments: Complete no more than 45 minutes prior to patient eating      11/02/23 1147    11/02/23 1148  Verify Informed Consent for Blood Product Administration  Once         11/02/23 1147    11/02/23 1148  Prepare RBC, 2 Units  Blood - Once         11/02/23 1147    11/02/23 0613  POC Glucose Once  PROCEDURE ONCE        Comments: Complete no more than 45 minutes prior to patient eating      11/02/23 0611    11/02/23 0600  CBC & Differential  Morning Draw         11/01/23 1645    11/02/23 0600  Comprehensive Metabolic Panel  Morning Draw         11/01/23 1645    11/02/23 0600  Protime-INR  Morning Draw         11/01/23 1645    11/02/23 0600  CBC Auto Differential  PROCEDURE ONCE         11/01/23 2202    11/01/23 2346  POC Glucose Once  PROCEDURE ONCE        Comments: Complete no more than 45 minutes prior to patient eating      11/01/23 2343    11/01/23 1800  CBC (No Diff)  Every 6 Hours      Comments: CBC every 6 hours      11/01/23 1435    11/01/23 1730  polyethylene glycol (MIRALAX) powder 0.5 bottle  Every 12 Hours         11/01/23 1644    11/01/23 1646  Consent for colonoscopy and enteroscopy  Nursing Communication  Until Discontinued        Comments: Consent for colonoscopy and enteroscopy    11/01/23 1645    11/01/23 1645  iopamidol (ISOVUE-370) 76 % injection 100 mL  Once in Imaging         11/01/23 1548    11/01/23 1645  Case Request  Once         11/01/23 1644    11/01/23 1621  POC Glucose Once  PROCEDURE ONCE        Comments: Complete no more than 45 minutes prior to patient eating       11/01/23 1416    11/01/23 1605  CBC (No Diff)  STAT         11/01/23 1605    11/01/23 1441  CT Angiogram Abdomen Pelvis  1 Time Imaging        Comments: Dr. Teena CUMMINS need a triple phase CT angiography noncontrasted with arterial and venous phases.  If this is not ordered correctly please fix it    This is a stat CT    11/01/23 1431    11/01/23 1436  Transfuse 2 units of packed red blood cells for hemoglobin less than 7.0  Nursing Communication  Until Discontinued        Comments: Transfuse 2 units of packed red blood cells for hemoglobin less than 7.0    11/01/23 1435    11/01/23 1431  CT Abdomen With & Without Contrast  1 Time Imaging,   Status:  Canceled        Comments: Dr. Teena CUMMINS need a triple phase CT angiography noncontrasted with arterial and venous phases.  If this is not ordered correctly please fix it    This is a stat CT    11/01/23 1431    11/01/23 1415  morphine injection 2 mg  Once         11/01/23 1323    11/01/23 1408  Verify Informed Consent for Blood Product Administration  Once         11/01/23 1407    11/01/23 1408  Prepare RBC, 2 Units  Blood - Once         11/01/23 1407    11/01/23 1408  RN To Enter The Following Labs When Transfusion Complete  Continuous         11/01/23 1407    11/01/23 1404  NPO Diet NPO Type: Strict NPO  Diet Effective Now         11/01/23 1403    11/01/23 1404  Verify Informed Consent for Blood Product Administration  Once         11/01/23 1403    11/01/23 1404  Prepare RBC, 2 Units  Blood - Once,   Status:  Canceled         11/01/23 1403    11/01/23 1403  Transfuse RBC, 2 Units Infuse Each Unit Over: 3.5H  Transfusion       11/01/23 1403    11/01/23 1400  Inpatient General Surgery Consult  Once        Specialty:  General Surgery  Provider:  Kayode Donnelly MD    11/01/23 1401    11/01/23 1357  Inpatient Admission  Once         11/01/23 1356    11/01/23 1357  Transfer Patient  Once         11/01/23 1356    11/01/23 1352  NM GI Blood Loss  1 Time Imaging,    Status:  Canceled         11/01/23 1351    11/01/23 1351  Inpatient General Surgery Consult  Once        Specialty:  General Surgery  Provider:  Kayode Donnelly MD    11/01/23 1351    11/01/23 1350  Patient bleeding again from known distal ileal bleed likely.  Tachycardic.  Consider critical care consult and transfer to ICU Please send for repeat tagged red blood cell scan as interventional radiology wants this test before considering repeat ...  Until Discontinued        Comments: Patient bleeding again from known distal ileal bleed likely.  Tachycardic.  Consider critical care consult and transfer to ICU  Please send for repeat tagged red blood cell scan as interventional radiology wants this test before considering repeat angiography  Interventional radiology also wants a general surgery consult immediately for continued bleeding from what is likely a small branch off of the SMA feeding the distal ileum    11/01/23 1350    11/01/23 1345  lactated ringers bolus 1,000 mL  Once        See Hyperspace for full Linked Orders Report.    11/01/23 1259    11/01/23 1345  lactated ringers infusion  Continuous        See Hyperspace for full Linked Orders Report.    11/01/23 1259    11/01/23 1345  Inpatient Pulmonology Consult  Once        Specialty:  Pulmonary Disease  Provider:  Miguelina Knowles MD    11/01/23 1344    11/01/23 1333  Initiate & Follow Hypercapnic Monitoring Guideline for Opioid Administration via EtCO2 and / or SpO2  Continuous        Comments: Follow Hypercapnic Monitoring Guideline As Outlined in Process Instructions (Open Order Report to View Full Instructions)    11/01/23 1333    11/01/23 1333  Opioid Administration - Document EtCO2 and / or SpO2 With Each Set of Vitals & Any Change in Patient Status  Per Order Details        Comments: With Each Set of Vitals & Any Change in Patient Status    11/01/23 1333    11/01/23 1333  Opioid Administration - Notify Provider Hypercapnic Monitoring  Until  Discontinued         11/01/23 1333    11/01/23 1333  Opioid Administration - Continuous Pulse Oximetry (SpO2)  Continuous         11/01/23 1333    11/01/23 1325  Inpatient Hospitalist Consult  Once        Specialty:  Hospitalist  Provider:  Janie Harris MD    11/01/23 1325    11/01/23 1258  Hemoglobin & Hematocrit, Blood  STAT         11/01/23 1259    11/01/23 1210  dicyclomine (BENTYL) capsule 10 mg  4 Times Daily PRN         11/01/23 1210    11/01/23 0618  CBC (No Diff)  Morning Draw         10/31/23 1637    11/01/23 0600  Basic Metabolic Panel  Morning Draw         10/31/23 1637    10/31/23 1530  Hemoglobin & Hematocrit, Blood  Once,   Status:  Canceled         10/31/23 1529    10/31/23 1447  Diet: Liquid Diets; Clear Liquid; Texture: Regular Texture (IDDSI 7); Fluid Consistency: Thin (IDDSI 0)  Diet Effective Now,   Status:  Canceled         10/31/23 1446    10/31/23 1230  technetium labeled red blood cells (ULTRATAG) injection 1 dose  Once in Imaging         10/31/23 1142    10/31/23 1230  technetium labeled red blood cells (ULTRATAG) injection 1 dose  Once in Imaging,   Status:  Discontinued         10/31/23 1144    10/31/23 0855  NPO Diet NPO Type: Sips with Meds  Diet Effective Midnight,   Status:  Canceled         10/31/23 0854    10/31/23 0800  ferrous sulfate tablet 325 mg  Daily With Breakfast         10/30/23 1524    10/31/23 0600  CBC (No Diff)  Morning Draw,   Status:  Canceled         10/30/23 1419    10/31/23 0600  Basic Metabolic Panel  Morning Draw         10/30/23 1419    10/31/23 0001  NPO Diet NPO Type: Strict NPO  Diet Effective Midnight,   Status:  Canceled         10/30/23 1536    10/31/23 0000  Hemoglobin & Hematocrit, Blood  Once         10/30/23 1655    10/30/23 2100  sennosides-docusate (PERICOLACE) 8.6-50 MG per tablet 2 tablet  2 Times Daily        See Hyperspace for full Linked Orders Report.    10/30/23 1419    10/30/23 2100  pantoprazole (PROTONIX) injection 40 mg  Every  12 Hours Scheduled         10/30/23 1655    10/30/23 2000  CBC (No Diff)  Every 12 Hours,   Status:  Canceled      Comments: CBC every 12 hours      10/30/23 1555    10/30/23 1800  Oral Care  2 Times Daily       10/30/23 1419    10/30/23 1615  folic acid (FOLVITE) tablet 1 mg  Daily         10/30/23 1524    10/30/23 1615  pantoprazole (PROTONIX) EC tablet 40 mg  Daily,   Status:  Discontinued         10/30/23 1524    10/30/23 1615  thiamine (VITAMIN B-1) tablet 100 mg  Daily         10/30/23 1524    10/30/23 1600  Vital Signs  Every 4 Hours       10/30/23 1419    10/30/23 1556  Transfuse 2 units of packed red blood cells for hemoglobin less than 7.0  Nursing Communication  Until Discontinued        Comments: Transfuse 2 units of packed red blood cells for hemoglobin less than 7.0    10/30/23 1555    10/30/23 1555  NM GI Blood Loss  1 Time Imaging         10/30/23 1555    10/30/23 1537  Diet: Liquid Diets; Clear Liquid; Fluid Consistency: Thin (IDDSI 0)  Diet Effective Now,   Status:  Canceled         10/30/23 1536    10/30/23 1435  sodium chloride 0.9 % flush 10 mL  Every 12 Hours Scheduled         10/30/23 1419    10/30/23 1435  lactated ringers infusion  Continuous,   Status:  Discontinued         10/30/23 1419    10/30/23 1420  Place Sequential Compression Device  Once         10/30/23 1419    10/30/23 1420  Maintain Sequential Compression Device  Continuous         10/30/23 1419    10/30/23 1420  Continuous Cardiac Monitoring  Continuous        Comments: Follow Standing Orders As Outlined in Process Instructions (Open Order Report to View Full Instructions)    10/30/23 1419    10/30/23 1420  Telemetry - Maintain IV Access  Continuous         10/30/23 1419    10/30/23 1420  Telemetry - Place Orders & Notify Provider of Results When Patient Experiences Acute Chest Pain, Dysrhythmia or Respiratory Distress  Until Discontinued         10/30/23 1419    10/30/23 1420  May Be Off Telemetry for Tests  Continuous          10/30/23 1419    10/30/23 1420  Pulse Oximetry, Continuous  Continuous,   Status:  Canceled         10/30/23 1419    10/30/23 1420  Notify Provider (With Default Parameters)  Until Discontinued         10/30/23 1419    10/30/23 1419  melatonin tablet 5 mg  Nightly PRN         10/30/23 1419    10/30/23 1419  nitroglycerin (NITROSTAT) SL tablet 0.4 mg  Every 5 Minutes PRN         10/30/23 1419    10/30/23 1419  Code Status and Medical Interventions:  Continuous         10/30/23 1419    10/30/23 1416  Advance Diet As Tolerated -  Until Discontinued,   Status:  Canceled         10/30/23 1419    10/30/23 1416  Intake & Output  Every Shift       10/30/23 1419    10/30/23 1416  Weigh Patient  Once         10/30/23 1419    10/30/23 1416  Insert Peripheral IV  Once         10/30/23 1419    10/30/23 1416  Saline Lock & Maintain IV Access  Continuous,   Status:  Canceled         10/30/23 1419    10/30/23 1415  sodium chloride 0.9 % flush 10 mL  As Needed         10/30/23 1419    10/30/23 1415  sodium chloride 0.9 % infusion 40 mL  As Needed         10/30/23 1419    10/30/23 1415  polyethylene glycol (MIRALAX) packet 17 g  Daily PRN        See Hyperspace for full Linked Orders Report.    10/30/23 1419    10/30/23 1415  bisacodyl (DULCOLAX) EC tablet 5 mg  Daily PRN        See Hyperspace for full Linked Orders Report.    10/30/23 1419    10/30/23 1415  bisacodyl (DULCOLAX) suppository 10 mg  Daily PRN        See Hyperspace for full Linked Orders Report.    10/30/23 1419    10/30/23 1415  ondansetron (ZOFRAN) tablet 4 mg  Every 6 Hours PRN        See Hyperspace for full Linked Orders Report.    10/30/23 1419    10/30/23 1415  ondansetron (ZOFRAN) injection 4 mg  Every 6 Hours PRN        See Hyperspace for full Linked Orders Report.    10/30/23 1419    10/30/23 1415  Initiate ED Observation Status  Once         10/30/23 1414    10/30/23 1413  Gastroenterology (on-call MD unless specified)  Once        Specialty:  Gastroenterology   Provider:  Hellen Kern MD    10/30/23 1412    10/30/23 1329  Insert Peripheral IV  Once         10/30/23 1329    10/30/23 1329  [place] Draw  Once         10/30/23 1329    10/30/23 1329  Cardiac Monitoring  Continuous,   Status:  Canceled        Comments: Follow Standing Orders As Outlined in Process Instructions (Open Order Report to View Full Instructions)    10/30/23 1329    10/30/23 1329  Vital Signs  Per Hospital Policy         10/30/23 1329    10/30/23 1329  Pulse Oximetry  Per Hospital Policy         10/30/23 1329    10/30/23 1329  NPO Diet NPO Type: Strict NPO  Diet Effective Now,   Status:  Canceled         10/30/23 1329    10/30/23 1329  CBC & Differential  Once         10/30/23 1329    10/30/23 1329  Comprehensive Metabolic Panel  Once         10/30/23 1329    10/30/23 1329  Protime-INR  Once         10/30/23 1329    10/30/23 1329  Type & Screen  Once         10/30/23 1329    10/30/23 1329  Occult Blood X 1, Stool - Stool, Per Rectum  Once         10/30/23 1329    10/30/23 1329  POC Occult Blood Stool  Once         10/30/23 1329    10/30/23 1329  Green Top (Gel)  PROCEDURE ONCE         10/30/23 1329    10/30/23 1329  Lavender Top  PROCEDURE ONCE         10/30/23 1329    10/30/23 1329  Gold Top - SST  PROCEDURE ONCE         10/30/23 1329    10/30/23 1329  Light Blue Top  PROCEDURE ONCE         10/30/23 1329    10/30/23 1329  CBC Auto Differential  PROCEDURE ONCE         10/30/23 1329    10/30/23 1328  sodium chloride 0.9 % flush 10 mL  As Needed         10/30/23 1329    Unscheduled  Oxygen Therapy- Nasal Cannula; Titrate 1-6 LPM Per SpO2; 90 - 95%  Continuous PRN       10/30/23 1329    Unscheduled  Up With Assistance  As Needed       10/30/23 1419    Unscheduled  Transfuse RBC, 2 Units Infuse Each Unit Over: 3.5H  Transfusion       11/01/23 1407    Unscheduled  Transfuse RBC, 2 Units Infuse Each Unit Over: 3.5H  Transfusion       11/02/23 1147    --  SCANNED - TELEMETRY           10/30/23 0000    --   SCANNED - TELEMETRY           10/30/23 0000    --  SCANNED - TELEMETRY           10/30/23 0000    --  SCANNED - TELEMETRY           10/30/23 0000    --  SCANNED - TELEMETRY           10/30/23 0000    --  SCANNED - TELEMETRY           10/30/23 0000    --  SCANNED - TELEMETRY           10/30/23 0000    --  SCANNED - TELEMETRY           10/30/23 0000    --  SCANNED - TELEMETRY           10/30/23 0000    --  SCANNED - TELEMETRY           10/30/23 0000    Signed and Held  Implement Anesthesia orders day of procedure.  Once         Signed and Held    Signed and Held  Obtain informed consent  Once         Signed and Held    Signed and Held  Verify bowel prep was successful  Once         Signed and Held    Signed and Held  Give tap water enema if bowel prep was insufficient  Once         Signed and Held                     Physician Progress Notes         Kayode Donnelly MD at 11/02/23 1007          Follow-up GI bleed    Subjective:  Took bowel prep, and initially had some dark stools but no evidence of any active bleeding.  Denies any crampy abdominal pain.    Objective:  Asleep but easily awakened, heart rate 60s to 80s blood pressure 117/66 oxygenation 99% on room air  General: Asleep, no acute distress, answers questions appropriately  Abdomen: Soft and nontender, nondistended    Labs reviewed, hemoglobin is drifted down to 7.1, white count normal, chemistries unremarkable    Assessment and plan:  -Recurrent GI bleed, felt to be related to small bowel bleeding with episode of fairly heavy bleeding yesterday which seems to have spontaneously ceased  -Recommend continuing to hold anticoagulation and would consider risks and benefits longer-term  -Noted plans for endoscopy today  -Recommend angiography for further evidence of bleeding  -We will follow peripherally    Kayode Donnelly MD  General and Endoscopic Surgery  [facility]    [address] [place] [address], [address]  [place], [place], [address]  P: [phone]  F:  [phone]      Electronically signed by Kayode Donnelly MD at 23 1008       Ryland Loja MD at 23 0701              [facility] INPATIENT PROGRESS NOTE         [facility] INTENSIVE CARE    2023      PATIENT IDENTIFICATION:  Name: Radha Noriega V ADMIT: 10/30/2023   : 1980  PCP: Hellen Skelton APRN    MRN: [id number] LOS: 1 days   AGE/SEX: 43 y.o. male  ROOM: [facility]                     LOS 1    Reason for visit: GI bleed      SUBJECTIVE:      Noted no CT evidence of GI bleed on CT angio.  No new complaints.  GI and surgery following.  Black tarry, loose stools noted last night.      Objective   OBJECTIVE:    Vital Sign Min/Max for last 24 hours  Temp  Min: 97.7 °F (36.5 °C)  Max: 99 °F (37.2 °C)   BP  Min: 103/51  Max: 133/70   Pulse  Min: 67  Max: 127   Resp  Min: 9  Max: 18   SpO2  Min: 97 %  Max: 100 %   No data recorded   No data recorded    Vitals:    23 0700 23 0715 23 0800 23 0904   BP: 114/73  118/68 130/69   BP Location:       Patient Position:       Pulse: 83  76 89   Resp:   16    Temp:  97.7 °F (36.5 °C)     TempSrc:  Oral     SpO2: 100%  100% 100%   Weight:       Height:                10/30/23  1305 10/30/23  1513   Weight: 83.9 kg (185 lb) 83 kg (183 lb)       Body mass index is 25.52 kg/m².                          Body mass index is 25.52 kg/m².    Intake/Output Summary (Last 24 hours) at 2023 1004  Last data filed at 2023 0844  Gross per 24 hour   Intake 3935.81 ml   Output 1780 ml   Net 2155.81 ml         Exam:  GEN:  No distress, appears stated age  EYES:   PERRL, anicteric sclerae  ENT:    External ears/nose normal, OP clear  NECK:  No adenopathy, midline trachea  LUNGS: Normal chest on inspection, palpation and auscultation  CV:  Normal S1S2, without murmur  ABD:  Nontender, nondistended, no hepatosplenomegaly, +BS  EXT:  No edema.  No cyanosis or clubbing.  No mottling and normal cap refill.    Assessment      Scheduled meds:  ferrous sulfate, 325 mg, Oral, Daily With Breakfast  folic acid, 1 mg, Oral, Daily  pantoprazole, 40 mg, Intravenous, Q12H  senna-docusate sodium, 2 tablet, Oral, BID  sodium chloride, 10 mL, Intravenous, Q12H  thiamine, 100 mg, Oral, Daily      IV meds:                      lactated ringers, 100 mL/hr, Last Rate: 100 mL/hr (11/02/23 0457)      Data Review:  Results from last 7 days   Lab Units 11/02/23 0428 11/01/23  0543 10/31/23  0431 10/30/23  1329   SODIUM mmol/L 137 136 137 137   POTASSIUM mmol/L 3.9 3.7 4.1 3.7   CHLORIDE mmol/L 108* 103 104 104   CO2 mmol/L 22.5 22.6 25.1 24.1   BUN mg/dL 12 15 19 15   CREATININE mg/dL 1.19 1.15 1.19 1.21   GLUCOSE mg/dL 90 93 90 151*   CALCIUM mg/dL 8.0* 8.6 8.7 9.2         Estimated Creatinine Clearance: 94.9 mL/min (by C-G formula based on SCr of 1.19 mg/dL).  Results from last 7 days   Lab Units 11/02/23 0428 11/02/23  0018 11/01/23  1610 11/01/23  1326 11/01/23  0625 10/31/23  1601   WBC 10*3/mm3 9.02  9.02 10.84* 12.10*  --  6.16 5.33   HEMOGLOBIN g/dL 7.1*  7.1* 7.3* 7.8* 6.4* 7.7* 8.0*   PLATELETS 10*3/mm3 166  166 131* 155  --  225 217     Results from last 7 days   Lab Units 11/02/23  0428 10/30/23  1329   INR  1.16* 1.01     Results from last 7 days   Lab Units 11/02/23  0428 10/30/23  1329   ALT (SGPT) U/L 11 13   AST (SGOT) U/L 16 13                 Glucose   Date/Time Value Ref Range Status   11/02/2023 0611 106 70 - 130 mg/dL Final   11/01/2023 2343 119 70 - 130 mg/dL Final   11/01/2023 1416 182 (H) 70 - 130 mg/dL Final         Imaging reviewed  CTA abd/pelvis 11/1 reviewed:  IMPRESSION:  No CT evidence of active gastrointestinal bleeding.    Microbiology reviewed           Active Hospital Problems    Diagnosis  POA    **GI bleed [K92.2]  Yes      Resolved Hospital Problems    Diagnosis Date Resolved POA    Lower GI bleed [K92.2] 10/30/2023 Yes         ASSESSMENT:  Acute and recurrent GI bleed  Sinus tachycardia  Brief altered mental  status: Resolved  Red blood per rectum  Acute blood loss anemia      PLAN:  Serial HH and transfuse for Hgb <7.  Scope with GI.  IV PPI BID.  Control glucose.  Control BP.   Mechanical DVT prophylaxis.      Discussed with multidisciplinary ICU team on rounds this morning.      CCT: 32 min    Ryland Loja MD  Pulmonary and Critical Care Medicine  [facility], [facility]  11/2/2023    10:04 EDT       Electronically signed by Ryland Loja MD at 11/02/23 1008       Dee Price PA at 11/01/23 1400       Attestation signed by Cuauhtemoc Allan MD at 11/01/23 2119    The RUT and I have discussed this patients history, physical exam, and treatment plan. I have reviewed the documentation and personally had a face to face interaction with the patient. I affirm the documentation and agree with the treatment and plan.  The separate note describes my personal findings                  .ED OBSERVATION PROGRESS/DISCHARGE SUMMARY    Date of Admission: 10/30/2023   LOS: 0 days   PCP: Hellen Skelton APRN      Subjective   Patient reports severe lower abdominal pain and subsequently had very large liquid bloody bowel movement.    Hospital Outcome:   43-year-old male was seen and examined at bedside following admission to the observation unit due to recurrent GI bleed.  Patient had a black stool yesterday that is similar to his previous episodes in the past and then had maroon stool Monday morning.  Patient is on Eliquis for LUE DVT and last dose was 2 days ago.  Laboratory evaluation in the ED shows creatinine 1.21, glucose 151, and hemoglobin 10.5 - 8.4.      10/31: Dr. Loo with GI evaluated patient and has ordered serial hemoglobin and tagged red blood scan.  Scan shows no scintigraphic findings of active gastrointestinal bleeding during the course of the study.  Patient's hemoglobin 8.0 this evening, will observe overnight off IV fluids to avoid dilution and check hemoglobin in the morning.    11/1:  Patient hemoglobin this morning 7.7.  Discussed with GI, they are going to consult IR.  This afternoon around 1200, patient developed increased abdominal pain.  He became tachycardic to the 160s.  I assessed patient at the bedside, he was having severe lower abdominal pain.  Patient also turned to nurse and grabbed her shirt and had a brief episode of unresponsiveness.  During this time, his blood pressure was stable, 121/73.  Rapid was called at this time.  Patient subsequently had very large liquid bloody bowel movement with blood clots present.  Stat page to GI, they are discussing with IR and consulting general surgery.  Discussed with Dr. Loja who has accepted patient for admission to ICU.  Discussed with  who has arranged for transfer to ICU.  RN briefed patient's family.    ROS:  General: no fevers, chills  Respiratory: no cough, dyspnea  Cardiovascular: no chest pain, palpitations  Abdomen: No abdominal pain, nausea, vomiting, or diarrhea  Neurologic: No focal weakness    Objective   Physical Exam:  I have reviewed the vital signs.  Temp:  [97.6 °F (36.4 °C)-98.6 °F (37 °C)] 98.6 °F (37 °C)  Resp:  [15-18] 16  BP: (105-144)/(66-82) 118/74  General Appearance:    Alert, cooperative, no distress  Head:    Normocephalic, atraumatic  Eyes:    Sclerae anicteric  Neck:   Supple, no mass  Lungs: Clear to auscultation bilaterally, respirations unlabored  Heart: Regular rate and rhythm, S1 and S2 normal, no murmur, rub or gallop  Abdomen:  Soft, non-tender, bowel sounds active, nondistended  Extremities: No clubbing, cyanosis, or edema to lower extremities  Pulses:  2+ and symmetric in distal lower extremities  Skin: No rashes   Neurologic: Oriented x3, Normal strength to extremities    Results Review:    I have reviewed the labs, radiology results and diagnostic studies.    Results from last 7 days   Lab Units 10/31/23  1601   WBC 10*3/mm3 5.33   HEMOGLOBIN g/dL 8.0*   HEMATOCRIT % 24.2*   PLATELETS  10*3/mm3 217     Results from last 7 days   Lab Units 10/31/23  0431 10/30/23  1329   SODIUM mmol/L 137 137   POTASSIUM mmol/L 4.1 3.7   CHLORIDE mmol/L 104 104   CO2 mmol/L 25.1 24.1   BUN mg/dL 19 15   CREATININE mg/dL 1.19 1.21   CALCIUM mg/dL 8.7 9.2   BILIRUBIN mg/dL  --  <0.2   ALK PHOS U/L  --  71   ALT (SGPT) U/L  --  13   AST (SGOT) U/L  --  13   GLUCOSE mg/dL 90 151*     Imaging Results (Last 24 Hours)       Procedure Component Value Units Date/Time    NM GI Blood Loss [[id number]] Collected: 10/31/23 1506     Updated: 10/31/23 1513    Narrative:      EXAM: NM GI BLOOD LOSS-     Technique: 31 mCi of technetium 99m labeled RBCs were injected into the  left forearm intravenously. Dynamic cine imaging was obtained for 60  minutes thereafter. Lateral projection planar imaging of the pelvis was  obtained immediately thereafter.     INDICATION: Recurrent gastrointestinal bleeding.     COMPARISON: GI bleed studies 7/19/2023, 7/15/2023, 7/13/2023.     FINDINGS: Prompt activity within the circulatory system following  radiotracer injection. Accumulation of radiotracer activity within the  bladder and penis, confirmed on lateral projection planar imaging. No  changing, mobile tubular activity conforming to the shape of bowel.          Impression:      No scintigraphic findings of active gastrointestinal  bleeding during the course of the study.           This report was finalized on 10/31/2023 3:10 PM by Dr. Girish Rand M.D on Workstation: [id number]               I have reviewed the medications.  ---------------------------------------------------------------------------------------------  Assessment & Plan   Assessment/Problem List    GI bleed      Plan:    GI bleed  -Hemoglobin 10.5 --> 6.4  -Hold Eliquis  -IV fluids  -NPO  -GI consulted, Dr. Loo  -10/31: Tagged red blood scan shows no scintigraphic findings of active gastrointestinal bleeding   -11/1: patient developed worsening abdominal pain and had a  very large liquid bloody bowel movement  -Stat H&H hemoglobin dropped to 6.4  -GI aware, consulting IR and general surgery  -Transfer patient to ICU, discussed with Dr. Loja     Left upper extremity DVT  -Secondary to midline  -Eliquis held    Disposition: Admit to ICU/Dr. Loja    This note will serve as a progress note/transfer summary    HAYDER Garcia 11/01/23 14:04 EDT    I have worn appropriate PPE during this patient encounter, sanitized my hands both with entering and exiting patient's room.    80 minutes has been spent by [facility] providers in the care of this patient while under observation status    Electronically signed by Cuauhtemoc Allan MD at 11/01/23 4320       Wojciech Loo MD at 11/01/23 1308          [facility], [place], operated by [facility]  Inpatient Progress Note    Reason for Follow Up:  GI bleed    Subjective     Interval History:   Pt reports he doesn't feel so good.States his whole seems to be hurting.  He had a BM this morning that he states was bloody (FOBT+).  Tagged RBCs scan yesterday negative for GI bleed      Current Facility-Administered Medications:     sennosides-docusate (PERICOLACE) 8.6-50 MG per tablet 2 tablet, 2 tablet, Oral, BID, 2 tablet at 11/01/23 0834 **AND** polyethylene glycol (MIRALAX) packet 17 g, 17 g, Oral, Daily PRN **AND** bisacodyl (DULCOLAX) EC tablet 5 mg, 5 mg, Oral, Daily PRN **AND** bisacodyl (DULCOLAX) suppository 10 mg, 10 mg, Rectal, Daily PRN, Catherine Johnson PA-C    dicyclomine (BENTYL) capsule 10 mg, 10 mg, Oral, 4x Daily PRN, Dee Price PA, 10 mg at 11/01/23 1254    ferrous sulfate tablet 325 mg, 325 mg, Oral, Daily With Breakfast, Catherine Johnson PA-C, 325 mg at 11/01/23 0834    folic acid (FOLVITE) tablet 1 mg, 1 mg, Oral, Daily, Catherine Johnson PA-C, 1 mg at 11/01/23 0834    lactated ringers bolus 1,000 mL, 1,000 mL, Intravenous, Once **FOLLOWED BY** lactated ringers infusion,  100 mL/hr, Intravenous, Continuous, Dee Price PA    melatonin tablet 5 mg, 5 mg, Oral, Nightly PRN, Elizabeth, Catherine R, PA-C    nitroglycerin (NITROSTAT) SL tablet 0.4 mg, 0.4 mg, Sublingual, Q5 Min PRN, Elizabeth, Catherine R, PA-C    ondansetron (ZOFRAN) tablet 4 mg, 4 mg, Oral, Q6H PRN **OR** ondansetron (ZOFRAN) injection 4 mg, 4 mg, Intravenous, Q6H PRN, Elizabeth, Catherine R, PA-C, 4 mg at 11/01/23 1202    pantoprazole (PROTONIX) injection 40 mg, 40 mg, Intravenous, Q12H, Elizabeth, Catherine R, PA-C, 40 mg at 11/01/23 0834    sodium chloride 0.9 % flush 10 mL, 10 mL, Intravenous, PRN, Adryan Lee MD    sodium chloride 0.9 % flush 10 mL, 10 mL, Intravenous, Q12H, Elizabeth, Catherine R, PA-C, 10 mL at 11/01/23 0834    sodium chloride 0.9 % flush 10 mL, 10 mL, Intravenous, PRN, Elizabeth, Catherine R, PA-C, 10 mL at 10/30/23 1427    sodium chloride 0.9 % infusion 40 mL, 40 mL, Intravenous, PRN, Elizabeth, Catherine R, PA-C    thiamine (VITAMIN B-1) tablet 100 mg, 100 mg, Oral, Daily, Elizabeth, Catherine R, PA-C, 100 mg at 11/01/23 0834        Objective     Vital Signs  Temp:  [97.6 °F (36.4 °C)-99 °F (37.2 °C)] 99 °F (37.2 °C)  Heart Rate:  [] 127  Resp:  [15-16] 16  BP: (118-127)/(66-86) 125/86  Body mass index is 25.52 kg/m².  No intake or output data in the 24 hours ending 11/01/23 1314  No intake/output data recorded.     Physical Exam:   General: patient awake, alert and cooperative   Eyes: Normal lids and lashes, no scleral icterus   Abdomen: soft, mild TTP in lower abdomen     Results Review:     I reviewed the patient's new clinical results.    Results from last 7 days   Lab Units 11/01/23  0625 10/31/23  1601 10/31/23  0431   WBC 10*3/mm3 6.16 5.33 6.11   HEMOGLOBIN g/dL 7.7* 8.0* 8.4*   HEMATOCRIT % 23.5* 24.2* 25.9*   PLATELETS 10*3/mm3 225 217 211     Results from last 7 days   Lab Units 11/01/23  0543 10/31/23  0431 10/30/23  1329   SODIUM mmol/L 136 137 137   POTASSIUM mmol/L 3.7 4.1 3.7    CHLORIDE mmol/L 103 104 104   CO2 mmol/L 22.6 25.1 24.1   BUN mg/dL 15 19 15   CREATININE mg/dL 1.15 1.19 1.21   CALCIUM mg/dL 8.6 8.7 9.2   BILIRUBIN mg/dL  --   --  <0.2   ALK PHOS U/L  --   --  71   ALT (SGPT) U/L  --   --  13   AST (SGOT) U/L  --   --  13   GLUCOSE mg/dL 93 90 151*     Results from last 7 days   Lab Units 10/30/23  1329   INR  1.01     Lab Results   Lab Value Date/Time    LIPASE 11 (L) 07/12/2023 0612    LIPASE 26 06/23/2023 1201       Radiology:  NM GI Blood Loss   Final Result   No scintigraphic findings of active gastrointestinal   bleeding during the course of the study.               This report was finalized on 10/31/2023 3:10 PM by Dr. Girish Rand M.D on Workstation: [id number]              Assessment & Plan     Active Hospital Problems    Diagnosis     **GI bleed          Assessment:   Recurrent GI bleeding, fifth admission this year  He had small bowel enteroscopy, CTA, colonoscopy, and EGD in last few months and no source of bleeding found at the time.   Interventional radiology coiling/embolization of a distal branch of the SMA feeding the ileum in July of this year x2  BRBPR as well as melena- last dose of Eliquis 4 days ago    Plan:   Given drop in H/H, recurrent GI bleed, negative tagged RBCs, now tachycardia, and recent IR coiling of SMA- will consult IR again to see if repeat angio/coiling necessary. Called IR and placed consult with nurse.   Continue to trend H/H, transfuse to maintain hgb of at least 7  Monitor for worsening GI bleed    I discussed the patients findings and my recommendations with patient.         Lexi Thomas PA-C  [facility]  [address] [place] [address] - [address] [place] [address]  Office: [phone]      Addendum 4:45 PM.  CT angiography negative.  No utility in performing angiography with coiling tonight    If he develops further bleeding overnight please send for tagged red blood cell scan    If he does not we will prep for  colonoscopy and small bowel enteroscopy tomorrow.  I am heading to the bedside to discuss with family      Electronically signed by Wojciech Loo MD at 11/01/23 8266       Cuauhtemoc Allan MD at 11/01/23 1046          MD ATTESTATION NOTE    The RUT and I have discussed this patient's history, physical exam, and treatment plan.  I have reviewed the documentation and personally had a face to face interaction with the patient. I affirm the documentation and agree with the treatment and plan.  The attached note describes my personal findings.      I provided a substantive portion of the care of the patient.  I personally performed the physical exam in its entirety, and below are my findings.       Brief HPI: This patient is a 43-year-old male admitted to the observation unit due to a GI bleed.  He is currently on Eliquis secondary to a previous GI bleed.  Currently, he reports that he has had no further GI bleeding and denies abdominal discomfort.      PHYSICAL EXAM  ED Triage Vitals   Temp Heart Rate Resp BP SpO2   10/30/23 1251 10/30/23 1251 10/30/23 1251 10/30/23 1305 10/30/23 1251   97.6 °F (36.4 °C) 107 16 130/87 100 %      Temp src Heart Rate Source Patient Position BP Location FiO2 (%)   10/30/23 1251 10/30/23 1251 10/30/23 1305 10/30/23 1305 --   Tympanic Monitor Sitting Right arm          GENERAL: Resting comfortably and in no acute distress, nontoxic in appearance  HENT: nares patent  EYES: no scleral icterus  CV: regular rhythm, normal rate, no M/R/G  RESPIRATORY: normal effort, lungs clear bilaterally  ABDOMEN: soft, nontender, no rebound or guarding  MUSCULOSKELETAL: no deformity, no edema  NEURO: alert, moves all extremities, follows commands  PSYCH:  calm, cooperative  SKIN: warm, dry    Vital signs and nursing notes reviewed.        Plan: The patient did have a tagged RBC test performed which was negative.  However, his hemoglobin did decrease from 10.5-7.7.  We will asked GI to continue to see in  consultation with further testing to come.  We will continue to monitor closely.      Electronically signed by Cuauhtemoc Allan MD at 11/01/23 1052       Dee Price PA at 10/31/23 1646       Attestation signed by Tamir Harris II, MD at 10/31/23 2785    MD ATTESTATION NOTE    The RUT and I have discussed this patient's history, physical exam, and treatment plan.  I have reviewed the documentation and personally had a face to face interaction with the patient. I affirm the documentation and agree with the treatment and plan.  The attached note describes my personal findings.      I provided a substantive portion of the care of the patient.  I personally performed the physical exam in its entirety, and below are my findings.      Brief HPI: Patient presents with GI bleed.  He describes this as being maroon-colored stool.  He is on Eliquis for history of prior left upper extremity DVT.  He has noticed no bleeding overnight.    PHYSICAL EXAM  ED Triage Vitals   Temp Heart Rate Resp BP SpO2   10/30/23 1251 10/30/23 1251 10/30/23 1251 10/30/23 1305 10/30/23 1251   97.6 °F (36.4 °C) 107 16 130/87 100 %      Temp src Heart Rate Source Patient Position BP Location FiO2 (%)   10/30/23 1251 10/30/23 1251 10/30/23 1305 10/30/23 1305 --   Tympanic Monitor Sitting Right arm          GENERAL: no acute distress  HENT: nares patent  EYES: no scleral icterus  CV: regular rhythm, normal rate  RESPIRATORY: normal effort  ABDOMEN: soft, nontender  MUSCULOSKELETAL: no deformity  NEURO: alert, moves all extremities, follows commands  PSYCH:  calm, cooperative  SKIN: warm, dry    Vital signs and nursing notes reviewed.        Plan:   GI consultation recommendations noted.  Scan is negative for bleeding.  Trend hemoglobin.                    .ED OBSERVATION PROGRESS/DISCHARGE SUMMARY    Date of Admission: 10/30/2023   LOS: 0 days   PCP: Hellen Skelton APRN      Subjective   Resting comfortably and in no acute distress.   Patient denies having bowel movement or any blood per rectum while in the unit.    Hospital Outcome:   43-year-old male was seen and examined at bedside following admission to the observation unit due to recurrent GI bleed.  Patient had a black stool yesterday that is similar to his previous episodes in the past and then had maroon stool Monday morning.  Patient is on Eliquis for LUE DVT and last dose was 2 days ago.  Laboratory evaluation in the ED shows creatinine 1.21, glucose 151, and hemoglobin 10.5 - 8.4.     Dr. Loo with GI evaluated patient and has ordered serial hemoglobin and tagged red blood scan.  Scan shows no scintigraphic findings of active gastrointestinal bleeding during the course of the study.  Hemoglobin down to 8.0.  Patient denies black or bloody stool during admission.  We will observe patient tonight and check CBC again in the morning.  Discussed with patient who is in agreement with plan.  I anticipate patient will be discharged home tomorrow.    ROS:  General: no fevers, chills  Respiratory: no cough, dyspnea  Cardiovascular: no chest pain, palpitations  Abdomen: No abdominal pain, nausea, vomiting, or diarrhea  Neurologic: No focal weakness    Objective   Physical Exam:  I have reviewed the vital signs.  Temp:  [97.4 °F (36.3 °C)-98.4 °F (36.9 °C)] 98.2 °F (36.8 °C)  Heart Rate:  [] 79  Resp:  [16] 16  BP: (117-130)/(75-87) 121/77  General Appearance:    Alert, cooperative, no distress  Head:    Normocephalic, atraumatic  Eyes:    Sclerae anicteric  Neck:   Supple, no mass  Lungs: Clear to auscultation bilaterally, respirations unlabored  Heart: Regular rate and rhythm, S1 and S2 normal, no murmur, rub or gallop  Abdomen:  Soft, non-tender, bowel sounds active, nondistended  Extremities: No clubbing, cyanosis, or edema to lower extremities  Pulses:  2+ and symmetric in distal lower extremities  Skin: No rashes   Neurologic: Oriented x3, Normal strength to extremities    Results  Review:    I have reviewed the labs, radiology results and diagnostic studies.    Results from last 7 days   Lab Units 10/30/23  2353 10/30/23  1938   WBC 10*3/mm3  --  5.51   HEMOGLOBIN g/dL 9.0* 9.4*   HEMATOCRIT % 27.1* 29.3*   PLATELETS 10*3/mm3  --  219     Results from last 7 days   Lab Units 10/30/23  1329   SODIUM mmol/L 137   POTASSIUM mmol/L 3.7   CHLORIDE mmol/L 104   CO2 mmol/L 24.1   BUN mg/dL 15   CREATININE mg/dL 1.21   CALCIUM mg/dL 9.2   BILIRUBIN mg/dL <0.2   ALK PHOS U/L 71   ALT (SGPT) U/L 13   AST (SGOT) U/L 13   GLUCOSE mg/dL 151*     Imaging Results (Last 24 Hours)       ** No results found for the last 24 hours. **            I have reviewed the medications.  ---------------------------------------------------------------------------------------------  Assessment & Plan   Assessment/Problem List    GI bleed      Plan:    Lower GI bleed  -Hemoglobin 10.5 --> 8.0  -Hold Eliquis  -IV fluids  -GI consulted, Dr. Loo  -Tagged red blood scan shows no scintigraphic findings of active gastrointestinal bleeding   -Continue Protonix and ferrous sulfate  -Telemetry monitoring  -Clear liquid diet  -Continue to monitor      Left upper extremity DVT  -Secondary to midline  -Eliquis held    Disposition: Anticipate patient will be discharged home tomorrow    This note will serve as a progress note    HAYDER Garcia 10/31/23 16:47 EDT    I have worn appropriate PPE during this patient encounter, sanitized my hands both with entering and exiting patient's room.    50 minutes has been spent by [facility] providers in the care of this patient while under observation status.    Electronically signed by Tamir Harris II, MD at 10/31/23 9130       Lexi Thomas PA-C at 10/31/23 2028       Attestation signed by Wojciech Loo MD at 10/31/23 1521    I have reviewed this documentation and agree.                  [facility], [facility]  Inpatient Progress  Note    Reason for Follow Up:  GI bleed    Subjective     Interval History:   Pt w/o any melena/hematochezia overnight or this morning  He got tagged RBCs scan this afternoon, results pending.  He is doing okay otherwise.       Current Facility-Administered Medications:     sennosides-docusate (PERICOLACE) 8.6-50 MG per tablet 2 tablet, 2 tablet, Oral, BID, 2 tablet at 10/30/23 2054 **AND** polyethylene glycol (MIRALAX) packet 17 g, 17 g, Oral, Daily PRN **AND** bisacodyl (DULCOLAX) EC tablet 5 mg, 5 mg, Oral, Daily PRN **AND** bisacodyl (DULCOLAX) suppository 10 mg, 10 mg, Rectal, Daily PRN, Catherine Johnson R PA-C    ferrous sulfate tablet 325 mg, 325 mg, Oral, Daily With Breakfast, Hailey Johnsonlin R, PA-C, 325 mg at 10/31/23 0903    folic acid (FOLVITE) tablet 1 mg, 1 mg, Oral, Daily, Catherine Johnson, PA-C, 1 mg at 10/31/23 0903    lactated ringers infusion, 100 mL/hr, Intravenous, Continuous, Catherine Johnson PA-C, Last Rate: 100 mL/hr at 10/31/23 1046, 100 mL/hr at 10/31/23 1046    melatonin tablet 5 mg, 5 mg, Oral, Nightly PRN, Tommie Johnsonitlin R, PA-C    nitroglycerin (NITROSTAT) SL tablet 0.4 mg, 0.4 mg, Sublingual, Q5 Min PRN, Hailey Johnsonlin R, PA-C    ondansetron (ZOFRAN) tablet 4 mg, 4 mg, Oral, Q6H PRN **OR** ondansetron (ZOFRAN) injection 4 mg, 4 mg, Intravenous, Q6H PRN, Hailey Johnsonlin R, PA-C    pantoprazole (PROTONIX) injection 40 mg, 40 mg, Intravenous, Q12H, Tommie Johnsonitlin R, PA-C, 40 mg at 10/31/23 0903    sodium chloride 0.9 % flush 10 mL, 10 mL, Intravenous, PRN, Adryan Lee MD    sodium chloride 0.9 % flush 10 mL, 10 mL, Intravenous, Q12H, Catherine Johnson PA-C, 10 mL at 10/30/23 2058    sodium chloride 0.9 % flush 10 mL, 10 mL, Intravenous, PRN, Catherine Johnson PA-C, 10 mL at 10/30/23 1427    sodium chloride 0.9 % infusion 40 mL, 40 mL, Intravenous, PRN, Catherine Johnson PA-C    thiamine (VITAMIN B-1) tablet 100 mg, 100 mg, Oral, Daily,  Catherine Johnson PA-C, 100 mg at 10/31/23 0903        Objective     Vital Signs  Temp:  [97.4 °F (36.3 °C)-98.6 °F (37 °C)] 97.9 °F (36.6 °C)  Heart Rate:  [79] 79  Resp:  [15-18] 15  BP: (105-144)/(69-82) 124/76  Body mass index is 25.52 kg/m².  No intake or output data in the 24 hours ending 10/31/23 1457  No intake/output data recorded.     Physical Exam:   General: patient awake, alert and cooperative   Eyes: Normal lids and lashes, no scleral icterus   Abdomen: soft, nontender, nondistended; normal bowel sounds      Results Review:     I reviewed the patient's new clinical results.    Results from last 7 days   Lab Units 10/31/23  0431 10/30/23  2353 10/30/23  1938 10/30/23  1329   WBC 10*3/mm3 6.11  --  5.51 5.19   HEMOGLOBIN g/dL 8.4* 9.0* 9.4* 10.5*   HEMATOCRIT % 25.9* 27.1* 29.3* 32.2*   PLATELETS 10*3/mm3 211  --  219 239     Results from last 7 days   Lab Units 10/31/23  0431 10/30/23  1329   SODIUM mmol/L 137 137   POTASSIUM mmol/L 4.1 3.7   CHLORIDE mmol/L 104 104   CO2 mmol/L 25.1 24.1   BUN mg/dL 19 15   CREATININE mg/dL 1.19 1.21   CALCIUM mg/dL 8.7 9.2   BILIRUBIN mg/dL  --  <0.2   ALK PHOS U/L  --  71   ALT (SGPT) U/L  --  13   AST (SGOT) U/L  --  13   GLUCOSE mg/dL 90 151*     Results from last 7 days   Lab Units 10/30/23  1329   INR  1.01     Lab Results   Lab Value Date/Time    LIPASE 11 (L) 07/12/2023 0612    LIPASE 26 06/23/2023 1201       Radiology:  NM GI Blood Loss    (Results Pending)       Assessment & Plan     Active Hospital Problems    Diagnosis     **GI bleed          Assessment:   Recurrent GI bleeding, fifth admission this year  He had small bowel enteroscopy, CTA, colonoscopy, and EGD in last few months and no source of bleeding found at the time.   Interventional radiology coiling/embolization of a distal branch of the SMA feeding the ileum in July of this year x2  BRBPR as well as melena- last dose of Eliquis 3 days ago    All problems new to me   Plan:   Will f/u w/  bleeding scan.   He can have clears meanwhile  Continue to trend H/H, transfuse to maintain hgb of at least 7  Monitor for GI bleed.  Please contact GI if patient having GI bleed again. He may be rebleeding from distal ileum- he would need IR consult for angio/embolization if so  Continue to hold his eliquis    I discussed the patients findings and my recommendations with patient.         Lexi Thomas PA-C  [facility]  [address] - [address]  [place] [address]  Office: [phone]            Electronically signed by Wojciech Loo MD at 10/31/23 1525          Consult Notes         Wojciech Loo MD at 10/30/23 1550        Consult Orders    1. Gastroenterology (on-call MD unless specified) [[id number]] ordered by Adryan Lee MD at 10/30/23 1419                 [facility]  Initial Inpatient Consult Note    Referring Provider: Елена    Reason for Consultation: GI bleeding    Subjective     History of present illness:    43 y.o. male with a history of 4 admissions this year for recurrent GI bleeding.    He is admitted again with maroon stool, blood in the stool after having black stool for a few days.  He stopped the Eliquis 2 days ago because of the bleeding    Endoscopic evaluation this year is as below:    7/9/2023 small bowel enteroscopy that was normal  7/8/2023 CT angiogram that was normal  6/25/2023 colonoscopy with nodular terminal ileum mucosa, small rectal polyps-removed, internal hemorrhoids.  Pathology with benign TI mucosa and hyperplastic polyps.  6/24/2023 EGD with grade a distal esophagitis and gastritis    In July due to lack of bleeding lesions found on multiple endoscopies above he underwent angiography with coiling of a distal branch of the SMA that was feeding the distal ileum.  At admission again 2 weeks later required recoiling of this area.  He had not had any bleeding since July 18, 2023 until the last few days    He is  hemodynamically stable and currently in the observation unit      Past Medical History:  Past Medical History:   Diagnosis Date    Anemia     ETOH abuse     Tachycardia 06/23/2023    Tobacco dependence      Past Surgical History:  Past Surgical History:   Procedure Laterality Date    APPENDECTOMY      COLONOSCOPY N/A 06/25/2023    Procedure: COLONOSCOPY TO CECUM AND TERMINAL ILEUM WITH BIOPSIES AND COLD BIOPSY POLYPECTOMIES;  Surgeon: Imtiaz Lee MD;  Location: [facility] ENDOSCOPY;  Service: Gastroenterology;  Laterality: N/A;  PRE- MELENA  POST- COLON POLYPS, HEMORRHOIDS    ENDOSCOPY Left 06/24/2023    Procedure: ESOPHAGOGASTRODUODENOSCOPY;  Surgeon: Imtiaz Lee MD;  Location: [facility] ENDOSCOPY;  Service: Gastroenterology;  Laterality: Left;  small hiatal hernia, esophagitis    ENDOSCOPY N/A 07/15/2023    Procedure: ESOPHAGOGASTRODUODENOSCOPY;  Surgeon: Ermelinda Dixon MD;  Location: [facility] ENDOSCOPY;  Service: Gastroenterology;  Laterality: N/A;  pre-anemia  post-hiatal hernia    ENTEROSCOPY SMALL BOWEL N/A 07/09/2023    Procedure: ENTEROSCOPY SMALL BOWEL;  Surgeon: Isrrael Gibson MD;  Location: [facility] ENDOSCOPY;  Service: Gastroenterology;  Laterality: N/A;  PRE- GI BLEED  POST- NORMAL    UPPER GASTROINTESTINAL ENDOSCOPY        Social History:   Social History     Tobacco Use    Smoking status: Every Day     Packs/day: 1.00     Years: 20.00     Additional pack years: 0.00     Total pack years: 20.00     Types: Cigarettes    Smokeless tobacco: Never   Substance Use Topics    Alcohol use: Not Currently     Comment: hasnt had a drink in a month. States he is under the care of PCP to [facility].      Family History:  Family History   Problem Relation Age of Onset    Diabetes Father     Diabetes Sister     Hypertension Sister     Diabetes Brother     Diabetes Maternal Grandmother     Diabetes Maternal Grandfather     Diabetes Paternal Grandmother     Diabetes Paternal Grandfather     Heart attack Maternal Aunt 73     Colon cancer Neg Hx     Prostate cancer Neg Hx        Home Meds:  Medications Prior to Admission   Medication Sig Dispense Refill Last Dose    apixaban (ELIQUIS) 5 MG tablet tablet Take 1 tablet by mouth Every 12 (Twelve) Hours. Indications: DVT/PE (active thrombosis) (Patient taking differently: Take 1 tablet by mouth Daily. Indications: DVT/PE (active thrombosis)) 60 tablet 2     ferrous sulfate (FerrouSul) 325 (65 FE) MG tablet Take 1 tablet by mouth Daily With Breakfast. 90 tablet 1     folic acid (FOLVITE) 1 MG tablet Take 1 tablet by mouth Daily. 30 tablet 0     pantoprazole (PROTONIX) 40 MG EC tablet Take 1 tablet by mouth Every Morning. (Patient taking differently: Take 1 tablet by mouth Daily.) 90 tablet 1     thiamine (VITAMIN B1) 100 MG tablet Take 1 tablet by mouth Daily. 30 tablet 0      Current Meds:   [START ON 10/31/2023] ferrous sulfate, 325 mg, Oral, Daily With Breakfast  folic acid, 1 mg, Oral, Daily  pantoprazole, 40 mg, Oral, Daily  senna-docusate sodium, 2 tablet, Oral, BID  sodium chloride, 10 mL, Intravenous, Q12H  thiamine, 100 mg, Oral, Daily      Allergies:  No Known Allergies  Review of Systems  There is weakness and fatigue all other systems reviewed and negative     Objective     Vital Signs  Temp:  [97.4 °F (36.3 °C)-97.6 °F (36.4 °C)] 97.4 °F (36.3 °C)  Heart Rate:  [] 79  Resp:  [16] 16  BP: (121-130)/(77-87) 126/77  Physical Exam:  General Appearance:    Alert, cooperative, in no acute distress   Head:    Normocephalic, without obvious abnormality, atraumatic   Eyes:          conjunctivae and sclerae normal, no   icterus   Throat:   no thrush, oral mucosa moist   Neck:   Supple, no adenopathy   Lungs:     Clear to auscultation bilaterally    Heart:    Regular rhythm and normal rate    Chest Wall:    No abnormalities observed   Abdomen:     Soft, nondistended, nontender; normal bowel sounds   Extremities:   no edema, no redness   Skin:   No bruising or rash   Psychiatric:   normal mood and insight     Results Review:       Results from last 7 days   Lab Units 10/30/23  1329   WBC 10*3/mm3 5.19   HEMOGLOBIN g/dL 10.5*   HEMATOCRIT % 32.2*   PLATELETS 10*3/mm3 239     Results from last 7 days   Lab Units 10/30/23  1329   SODIUM mmol/L 137   POTASSIUM mmol/L 3.7   CHLORIDE mmol/L 104   CO2 mmol/L 24.1   BUN mg/dL 15   CREATININE mg/dL 1.21   CALCIUM mg/dL 9.2   BILIRUBIN mg/dL <0.2   ALK PHOS U/L 71   ALT (SGPT) U/L 13   AST (SGOT) U/L 13   GLUCOSE mg/dL 151*     Results from last 7 days   Lab Units 10/30/23  1329   INR  1.01     Lab Results   Lab Value Date/Time    LIPASE 11 (L) 07/12/2023 0612    LIPASE 26 06/23/2023 1201       Radiology:  No orders to display       Assessment & Plan   Active Hospital Problems    Diagnosis     **GI bleed        Assessment:  Recurrent GI bleeding, fifth admission this year  Interventional radiology coiling/embolization of a distal branch of the SMA feeding the ileum in July of this year x2  Full endoscopic evaluation as described above failed to find bleeding source back in June and July of this year  Red blood per rectum  Maroon stool  Black stool a few days ago  Eliquis last dose 2 days ago    Plan:  Serial hemoglobin  Transfusion parameters written  Order tagged red blood scan to try to localize bleeding, if it truly is distal ileum and interventional radiology consult will need to be obtained for angiography and coiling  No indication to repeat small bowel enteroscopy, EGD or colonoscopy at this time.  Unfortunately the lesion in the ileum is too far from the ileocecal valve to reach via standard c/s  We will follow      I discussed the patients findings and my recommendations with patient and nursing staff.    Wojciech Loo MD             Electronically signed by Wojciech Loo MD at 10/30/23 5569       Ryland Loja MD at 11/01/23 4773                Patient Identification:  Radha Noriega V  43 y.o.  male  1980  [id number]           LOS 0    Requesting physician: Dr Cuauhtemoc Allan    Reason for Consultation: GI bleed    History of Present Illness:   43-year-old male presented with black and bloody stools.  Has a history of GI bleed requiring coiling embolization in the past.  Has a history of left upper extremity DVT secondary to previous midline and has been on Eliquis.  Stool has been dark in nature and tar-like consistency.  Has had crampy abdominal pain which has worsened today.  Had an episode of worsened abdominal discomfort and acute tachycardia in the 160s then altered mental status with lethargy for about 1 minute per ER physician assistant.  GI was consulted on 10/30/2023.  He did not have hypotension during his lethargy event according to staff.  He was admitted initially to the observation unit.  GI has consulted interventional radiology to see if repeat angio/coiling would be of benefit.  With his acute change they have requested consideration of admission to intensive care.  He had a tagged red blood cell study that was negative but did significant drop in hemoglobin.    Past Medical History:  Past Medical History:   Diagnosis Date    Anemia     ETOH abuse     Tachycardia 06/23/2023    Tobacco dependence        Past Surgical History:  Past Surgical History:   Procedure Laterality Date    APPENDECTOMY      COLONOSCOPY N/A 06/25/2023    Procedure: COLONOSCOPY TO CECUM AND TERMINAL ILEUM WITH BIOPSIES AND COLD BIOPSY POLYPECTOMIES;  Surgeon: Imtiaz Lee MD;  Location: [facility] ENDOSCOPY;  Service: Gastroenterology;  Laterality: N/A;  PRE- MELENA  POST- COLON POLYPS, HEMORRHOIDS    ENDOSCOPY Left 06/24/2023    Procedure: ESOPHAGOGASTRODUODENOSCOPY;  Surgeon: Imtiaz Lee MD;  Location: [facility] ENDOSCOPY;  Service: Gastroenterology;  Laterality: Left;  small hiatal hernia, esophagitis    ENDOSCOPY N/A 07/15/2023    Procedure: ESOPHAGOGASTRODUODENOSCOPY;  Surgeon: Ermelinda Dixon MD;  Location: [facility] ENDOSCOPY;  Service:  Gastroenterology;  Laterality: N/A;  pre-anemia  post-hiatal hernia    ENTEROSCOPY SMALL BOWEL N/A 07/09/2023    Procedure: ENTEROSCOPY SMALL BOWEL;  Surgeon: Isrrael Gibson MD;  Location: [facility] ENDOSCOPY;  Service: Gastroenterology;  Laterality: N/A;  PRE- GI BLEED  POST- NORMAL    UPPER GASTROINTESTINAL ENDOSCOPY          Home Meds:  Medications Prior to Admission   Medication Sig Dispense Refill Last Dose    apixaban (ELIQUIS) 5 MG tablet tablet Take 1 tablet by mouth Every 12 (Twelve) Hours. Indications: DVT/PE (active thrombosis) (Patient taking differently: Take 1 tablet by mouth Daily. Indications: DVT/PE (active thrombosis)) 60 tablet 2     ferrous sulfate (FerrouSul) 325 (65 FE) MG tablet Take 1 tablet by mouth Daily With Breakfast. 90 tablet 1     folic acid (FOLVITE) 1 MG tablet Take 1 tablet by mouth Daily. 30 tablet 0     pantoprazole (PROTONIX) 40 MG EC tablet Take 1 tablet by mouth Every Morning. (Patient taking differently: Take 1 tablet by mouth Daily.) 90 tablet 1     thiamine (VITAMIN B1) 100 MG tablet Take 1 tablet by mouth Daily. 30 tablet 0          Allergies:  No Known Allergies    Social History:   Social History     Socioeconomic History    Marital status:    Tobacco Use    Smoking status: Every Day     Packs/day: 1.00     Years: 20.00     Additional pack years: 0.00     Total pack years: 20.00     Types: Cigarettes    Smokeless tobacco: Never   Vaping Use    Vaping Use: Never used   Substance and Sexual Activity    Alcohol use: Not Currently     Comment: hasnt had a drink in a month. States he is under the care of PCP to [facility].    Drug use: Yes     Types: Marijuana     Comment: daily.    Sexual activity: Yes     Partners: Female       Family History:  Family History   Problem Relation Age of Onset    Diabetes Father     Diabetes Sister     Hypertension Sister     Diabetes Brother     Diabetes Maternal Grandmother     Diabetes Maternal Grandfather     Diabetes Paternal Grandmother   "   Diabetes Paternal Grandfather     Heart attack Maternal Aunt 73    Colon cancer Neg Hx     Prostate cancer Neg Hx        Review of Systems:  Denies fevers or chills  Denies nausea or vomiting  No new vision or hearing changes  No chest pain  No productive cough or shortness of breath  Crampy abdominal pain and bloody stool  No musculoskeletal complaints  No heat or cold intolerance  No skin rashes  No dizziness or confusion.  No seizure activity  No new anxiety or depression  12 system review of systems performed and all else negative    Objective:    PHYSICAL EXAM:    /75   Pulse (!) 127   Temp 99 °F (37.2 °C) (Oral)   Resp 16   Ht 180.3 cm (71\")   Wt 83 kg (183 lb)   SpO2 99%   BMI 25.52 kg/m²  Body mass index is 25.52 kg/m². 99% 83 kg (183 lb)    GENERAL APPEARANCE:   Well developed  Well nourished  No acute distress   EYES:    PERRL                                                                           Conjunctivae normal  Sclerae nonicteric.  HENT:   Atraumatic, normocephalic  External ears and nose normal  Moist mucous membranes and no ulcers  NECK:  Thyroid not enlarged  Trachea midline   RESPIRATORY:    Nonlabored breathing   Normal breath sounds  No rales. No wheezing  No dullness  CARDIOVASCULAR:    RRR  Normal S1, S2  No murmur  Lower extremity edema: none    GI:   Bowel sounds normal  Abdomen soft , nondistended, nontender  No abdominal masses  MUSCULOSKELETAL:  Normal movement of extremities  No tenderness, no deformities  No clubbing or cyanosis   Skin:    No visible rashes  No palpable nodules  Cap refill normal.  No mottling.   PSYCHIATRIC:  Speech and behavior appropriate  Normal mood and affect  Oriented to person, place and time  NEUROLOGIC:  Cranial nerves II through XII grossly intact.  Sensation intact.      Lab Review:      Results from last 7 days   Lab Units 11/01/23  0625 10/31/23  1601 10/31/23  0431   WBC 10*3/mm3 6.16 5.33 6.11   HEMOGLOBIN g/dL 7.7* 8.0* 8.4* "   HEMATOCRIT % 23.5* 24.2* 25.9*   PLATELETS 10*3/mm3 225 217 211     Results from last 7 days   Lab Units 11/01/23  0543 10/31/23  0431 10/30/23  1329   SODIUM mmol/L 136 137 137   POTASSIUM mmol/L 3.7 4.1 3.7   CHLORIDE mmol/L 103 104 104   CO2 mmol/L 22.6 25.1 24.1   BUN mg/dL 15 19 15   CREATININE mg/dL 1.15 1.19 1.21   CALCIUM mg/dL 8.6 8.7 9.2   BILIRUBIN mg/dL  --   --  <0.2   ALK PHOS U/L  --   --  71   ALT (SGPT) U/L  --   --  13   AST (SGOT) U/L  --   --  13   GLUCOSE mg/dL 93 90 151*                          Imaging reviewed  Nuclear medicine tagged red blood cell study reviewed: Negative       Assessment:  Acute and recurrent GI bleed  Sinus tachycardia  Brief altered mental status: Resolved  Red blood per rectum  Acute blood loss anemia        Recommendations:  Admit to intensive care.  Getting stat H&H we will continue serial H&H evaluation for his anemia.  GI has already contacted interventional radiology for consideration of possible coiling needed again.  Unclear etiology of his altered mental status.  This has resolved.  No issues of hypotension during the event according to staff however I question this.  Altered mental status seems too short to have been seizure with postictal state.  We will continue to monitor and if occurs again consult neurology.  No focal deficit noted.  Continue IV fluid resuscitation.  Transfuse packed red blood cells for hemoglobin less than 7.  Control glucose.  Control blood pressure.  Mechanical DVT prophylaxis.  Continue PPI.  Thiamine and folic acid.  Watch for alcohol withdrawal.    Discussed with Dr Loo with GI and Dr Mayes with general surgery at bedside.      CCT: 55 min    Ryland Loja MD  [facility]  Pulmonary and Critical Care Medicine    11/1/2023  13:57 EDT    Electronically signed by Ryland Loja MD at 11/01/23 3248       Kayode Donnelly MD at 11/01/23 6890        Consult Orders    1. Inpatient General Surgery Consult  [[id number]] ordered by Wojciech Loo MD at 11/01/23 1401    2. Inpatient General Surgery Consult [[id number]] ordered by Wojciech Loo MD at 11/01/23 1351                 Cc: GI bleed    History of presenting illness:   This is a 43-year-old gentleman with a history of obscure GI bleed who was admitted in July of this year with fairly significant bleeding.  He underwent upper and lower endoscopy which was largely unrevealing.  He continued to have evidence of GI bleed and had eventually a CT angiogram followed by selective IR embolization of what was noted to be a bleeding vessel off the SMA.  This seemed to stabilize him and he did well for the past couple of months, but more recently was diagnosed with a DVT and placed on Eliquis.  He was readmitted a couple of days ago with some abdominal pain and maroon stools.  Repeat nuclear medicine scan yesterday was negative for any acute bleeding.  He seemed to be stable until earlier today when he had this fairly sudden onset of tachycardia and became briefly unresponsive.  Repeat check of his hemoglobin showed a drop of a couple of grams down to 6.4.  He was urgently brought to the ICU and vigorous resuscitation was undertaken with some improvement of his hemodynamic status.  Discussion with GI and interventional radiology suggested that he undergo CT angiography and potentially repeat mesenteric angiography.    Past Medical History: GI bleed, DVT    Past Surgical History: Appendectomy, EGD and colonoscopy June and July 2023, IR embolization (2 separate occasions) of small bowel bleeding July 2023    Medications: Noted to include Eliquis at admission, since held    Allergies: None known    Social History: He is a long-term cigarette smoker of greater than 20 years.  Uses marijuana occasionally.    Family History: Negative for colon or rectal cancer    Review of Systems:  Unable to be obtained currently due to patient's critical ill situation    Physical Exam:  BMI:  25.5  Most recent vitals with a pressure of 98.0, heart rate 83, blood pressure 121/79  General: alert and oriented, appropriate, no acute distress  Eyes: No scleral icterus, extraocular movements are intact  Neck: Supple without lymphadenopathy or thyromegaly, trachea is in the midline  Respiratory: There is good bilateral chest expansion, no use of accessory muscles is noted  Cardiovascular: No jugular venous distention or peripheral edema is seen  Gastrointestinal: Soft, mild tenderness, mild distended, no mass felt    Laboratory data: Hemoglobin of 10.5 at admission declined to 8.0 yesterday and then down to 7.7 this morning and 6.4 earlier this afternoon.  Up to 7.8 after transfusion.  Platelet count is normal.  Chemistries unremarkable.    Imaging data: Nuclear medicine bleeding scan from yesterday negative.  CT angiography today negative for any obvious bleeding      Assessment and plan:   -GI bleed, recurrent in the setting of a patient with a history of small bowel bleed a couple of months ago, recently having started Eliquis  -Currently seems to be more hemodynamically stable  -Obviously in the absence of clear source, surgical treatment is quite challenging and ability fairly limited, exploration likely to be morbid and difficult to localize  -We will follow closely along, if there are further signs of bleeding I think that another attempt at angiography would probably be the better bet, obviously a very challenging situation at the moment      Kayode Donnelly MD, FACS  General, Minimally Invasive and Endoscopic Surgery  [facility]    [address], [address]  [place], [place], [address]  P: [phone]  F: [phone]         Electronically signed by Kayode Donnelly MD at 11/01/23 6343

## 2023-11-02 NOTE — PLAN OF CARE
Goal Outcome Evaluation:   Patient pleasant, alert, and oriented overnight. Bowel prep given and tolerated well per patient. Two black, tarry, loose stools noted early in the night. No additional stools passed. HgB downtrending; 7.3 ~0000 and 7.1 on AM labs ~0400. No blood transfusions given; Only ordered for HgB < 7.0. No complaints of pain overnight. Vital signs stable. Adequate urine output. Will continue to monitor. Care ongoing.

## 2023-11-03 ENCOUNTER — READMISSION MANAGEMENT (OUTPATIENT)
Dept: CALL CENTER | Facility: HOSPITAL | Age: 43
End: 2023-11-03
Payer: COMMERCIAL

## 2023-11-03 ENCOUNTER — APPOINTMENT (OUTPATIENT)
Dept: CT IMAGING | Facility: HOSPITAL | Age: 43
End: 2023-11-03
Payer: COMMERCIAL

## 2023-11-03 VITALS
BODY MASS INDEX: 25.62 KG/M2 | HEART RATE: 90 BPM | WEIGHT: 183 LBS | TEMPERATURE: 98 F | RESPIRATION RATE: 16 BRPM | HEIGHT: 71 IN | DIASTOLIC BLOOD PRESSURE: 73 MMHG | OXYGEN SATURATION: 100 % | SYSTOLIC BLOOD PRESSURE: 124 MMHG

## 2023-11-03 LAB
ABO GROUP BLD: NORMAL
ANION GAP SERPL CALCULATED.3IONS-SCNC: 5 MMOL/L (ref 5–15)
BASOPHILS # BLD AUTO: 0.01 10*3/MM3 (ref 0–0.2)
BASOPHILS NFR BLD AUTO: 0.1 % (ref 0–1.5)
BH BB BLOOD EXPIRATION DATE: NORMAL
BH BB BLOOD EXPIRATION DATE: NORMAL
BH BB BLOOD TYPE BARCODE: 7300
BH BB BLOOD TYPE BARCODE: 7300
BH BB DISPENSE STATUS: NORMAL
BH BB DISPENSE STATUS: NORMAL
BH BB PRODUCT CODE: NORMAL
BH BB PRODUCT CODE: NORMAL
BH BB UNIT NUMBER: NORMAL
BH BB UNIT NUMBER: NORMAL
BLD GP AB SCN SERPL QL: NEGATIVE
BUN SERPL-MCNC: 9 MG/DL (ref 6–20)
BUN/CREAT SERPL: 7.8 (ref 7–25)
CALCIUM SPEC-SCNC: 7.2 MG/DL (ref 8.6–10.5)
CHLORIDE SERPL-SCNC: 111 MMOL/L (ref 98–107)
CO2 SERPL-SCNC: 22 MMOL/L (ref 22–29)
CREAT SERPL-MCNC: 1.15 MG/DL (ref 0.76–1.27)
CROSSMATCH INTERPRETATION: NORMAL
CROSSMATCH INTERPRETATION: NORMAL
DEPRECATED RDW RBC AUTO: 48.6 FL (ref 37–54)
DEPRECATED RDW RBC AUTO: 49.5 FL (ref 37–54)
EGFRCR SERPLBLD CKD-EPI 2021: 81.5 ML/MIN/1.73
EOSINOPHIL # BLD AUTO: 0.1 10*3/MM3 (ref 0–0.4)
EOSINOPHIL NFR BLD AUTO: 1.2 % (ref 0.3–6.2)
ERYTHROCYTE [DISTWIDTH] IN BLOOD BY AUTOMATED COUNT: 15.9 % (ref 12.3–15.4)
ERYTHROCYTE [DISTWIDTH] IN BLOOD BY AUTOMATED COUNT: 16 % (ref 12.3–15.4)
GLUCOSE SERPL-MCNC: 101 MG/DL (ref 65–99)
HCT VFR BLD AUTO: 17.5 % (ref 37.5–51)
HCT VFR BLD AUTO: 21.3 % (ref 37.5–51)
HGB BLD-MCNC: 5.7 G/DL (ref 13–17.7)
HGB BLD-MCNC: 7.2 G/DL (ref 13–17.7)
IMM GRANULOCYTES # BLD AUTO: 0.06 10*3/MM3 (ref 0–0.05)
IMM GRANULOCYTES NFR BLD AUTO: 0.7 % (ref 0–0.5)
LYMPHOCYTES # BLD AUTO: 1.44 10*3/MM3 (ref 0.7–3.1)
LYMPHOCYTES NFR BLD AUTO: 17.3 % (ref 19.6–45.3)
MCH RBC QN AUTO: 27.9 PG (ref 26.6–33)
MCH RBC QN AUTO: 28.6 PG (ref 26.6–33)
MCHC RBC AUTO-ENTMCNC: 32.6 G/DL (ref 31.5–35.7)
MCHC RBC AUTO-ENTMCNC: 33.8 G/DL (ref 31.5–35.7)
MCV RBC AUTO: 84.5 FL (ref 79–97)
MCV RBC AUTO: 85.8 FL (ref 79–97)
MONOCYTES # BLD AUTO: 0.86 10*3/MM3 (ref 0.1–0.9)
MONOCYTES NFR BLD AUTO: 10.3 % (ref 5–12)
NEUTROPHILS NFR BLD AUTO: 5.84 10*3/MM3 (ref 1.7–7)
NEUTROPHILS NFR BLD AUTO: 70.4 % (ref 42.7–76)
NRBC BLD AUTO-RTO: 0.1 /100 WBC (ref 0–0.2)
PLATELET # BLD AUTO: 150 10*3/MM3 (ref 140–450)
PLATELET # BLD AUTO: 155 10*3/MM3 (ref 140–450)
PMV BLD AUTO: 10.8 FL (ref 6–12)
PMV BLD AUTO: 11.8 FL (ref 6–12)
POTASSIUM SERPL-SCNC: 3.6 MMOL/L (ref 3.5–5.2)
RBC # BLD AUTO: 2.04 10*6/MM3 (ref 4.14–5.8)
RBC # BLD AUTO: 2.52 10*6/MM3 (ref 4.14–5.8)
RH BLD: POSITIVE
SODIUM SERPL-SCNC: 138 MMOL/L (ref 136–145)
T&S EXPIRATION DATE: NORMAL
UNIT  ABO: NORMAL
UNIT  ABO: NORMAL
UNIT  RH: NORMAL
UNIT  RH: NORMAL
WBC NRBC COR # BLD: 7.99 10*3/MM3 (ref 3.4–10.8)
WBC NRBC COR # BLD: 8.31 10*3/MM3 (ref 3.4–10.8)

## 2023-11-03 PROCEDURE — 25010000002 ONDANSETRON PER 1 MG: Performed by: STUDENT IN AN ORGANIZED HEALTH CARE EDUCATION/TRAINING PROGRAM

## 2023-11-03 PROCEDURE — 86922 COMPATIBILITY TEST ANTIGLOB: CPT

## 2023-11-03 PROCEDURE — 86920 COMPATIBILITY TEST SPIN: CPT

## 2023-11-03 PROCEDURE — 25510000001 IOPAMIDOL PER 1 ML: Performed by: INTERNAL MEDICINE

## 2023-11-03 PROCEDURE — 25810000003 LACTATED RINGERS SOLUTION: Performed by: HOSPITALIST

## 2023-11-03 PROCEDURE — 74174 CTA ABD&PLVS W/CONTRAST: CPT

## 2023-11-03 PROCEDURE — 86900 BLOOD TYPING SEROLOGIC ABO: CPT

## 2023-11-03 PROCEDURE — 85025 COMPLETE CBC W/AUTO DIFF WBC: CPT | Performed by: HOSPITALIST

## 2023-11-03 PROCEDURE — 25810000003 SODIUM CHLORIDE 0.9 % SOLUTION 1,000 ML FLEX CONT: Performed by: STUDENT IN AN ORGANIZED HEALTH CARE EDUCATION/TRAINING PROGRAM

## 2023-11-03 PROCEDURE — 86850 RBC ANTIBODY SCREEN: CPT | Performed by: HOSPITALIST

## 2023-11-03 PROCEDURE — 86901 BLOOD TYPING SEROLOGIC RH(D): CPT | Performed by: HOSPITALIST

## 2023-11-03 PROCEDURE — 36430 TRANSFUSION BLD/BLD COMPNT: CPT

## 2023-11-03 PROCEDURE — 99232 SBSQ HOSP IP/OBS MODERATE 35: CPT | Performed by: INTERNAL MEDICINE

## 2023-11-03 PROCEDURE — P9016 RBC LEUKOCYTES REDUCED: HCPCS

## 2023-11-03 PROCEDURE — 80048 BASIC METABOLIC PNL TOTAL CA: CPT | Performed by: HOSPITALIST

## 2023-11-03 PROCEDURE — 86900 BLOOD TYPING SEROLOGIC ABO: CPT | Performed by: HOSPITALIST

## 2023-11-03 RX ORDER — PANTOPRAZOLE SODIUM 40 MG/10ML
40 INJECTION, POWDER, LYOPHILIZED, FOR SOLUTION INTRAVENOUS EVERY 12 HOURS SCHEDULED
Start: 2023-11-03

## 2023-11-03 RX ORDER — BISACODYL 5 MG/1
5 TABLET, DELAYED RELEASE ORAL DAILY PRN
Start: 2023-11-03

## 2023-11-03 RX ORDER — BISACODYL 10 MG
10 SUPPOSITORY, RECTAL RECTAL DAILY PRN
Start: 2023-11-03

## 2023-11-03 RX ORDER — SODIUM CHLORIDE, SODIUM LACTATE, POTASSIUM CHLORIDE, CALCIUM CHLORIDE 600; 310; 30; 20 MG/100ML; MG/100ML; MG/100ML; MG/100ML
100 INJECTION, SOLUTION INTRAVENOUS CONTINUOUS
Start: 2023-11-03

## 2023-11-03 RX ORDER — DICYCLOMINE HYDROCHLORIDE 10 MG/1
10 CAPSULE ORAL 4 TIMES DAILY PRN
Start: 2023-11-03

## 2023-11-03 RX ADMIN — ONDANSETRON 4 MG: 2 INJECTION INTRAMUSCULAR; INTRAVENOUS at 02:01

## 2023-11-03 RX ADMIN — FERROUS SULFATE TAB 325 MG (65 MG ELEMENTAL FE) 325 MG: 325 (65 FE) TAB at 09:47

## 2023-11-03 RX ADMIN — SODIUM CHLORIDE 40 ML: 9 INJECTION, SOLUTION INTRAVENOUS at 02:00

## 2023-11-03 RX ADMIN — Medication 100 MG: at 09:47

## 2023-11-03 RX ADMIN — PANTOPRAZOLE SODIUM 40 MG: 40 INJECTION, POWDER, FOR SOLUTION INTRAVENOUS at 09:52

## 2023-11-03 RX ADMIN — FOLIC ACID 1 MG: 1 TABLET ORAL at 09:47

## 2023-11-03 RX ADMIN — IOPAMIDOL 95 ML: 755 INJECTION, SOLUTION INTRAVENOUS at 05:17

## 2023-11-03 RX ADMIN — SODIUM CHLORIDE, POTASSIUM CHLORIDE, SODIUM LACTATE AND CALCIUM CHLORIDE 1000 ML: 600; 310; 30; 20 INJECTION, SOLUTION INTRAVENOUS at 06:27

## 2023-11-03 NOTE — PROGRESS NOTES
[facility] INPATIENT PROGRESS NOTE         [facility] INTENSIVE CARE    11/3/2023      PATIENT IDENTIFICATION:  Name: Radha Noriega V ADMIT: 10/30/2023   : 1980  PCP: Hellen Skelton APRN    MRN: [id number] LOS: 2 days   AGE/SEX: 43 y.o. male  ROOM: [facility]                     LOS 2    Reason for visit: GI bleed      SUBJECTIVE:      No new issues overnight.  Noted CTA results.  Discussed with Dr. Celeste.  He is going to discuss with their interventional GI physician.  May require transfer to [place] if our GI and surgery here does not feel that there is anything they can do.    Objective   OBJECTIVE:    Vital Sign Min/Max for last 24 hours  Temp  Min: 97.5 °F (36.4 °C)  Max: 98.2 °F (36.8 °C)   BP  Min: 94/59  Max: 132/77   Pulse  Min: 74  Max: 133   Resp  Min: 12  Max: 25   SpO2  Min: 93 %  Max: 100 %   No data recorded   No data recorded    Vitals:    23 0915 23 0917 23 0936 23 0950   BP:  113/67 115/78 115/68   BP Location:       Patient Position:       Pulse: 76 80 92 92   Resp: 16  18 17   Temp: 98.1 °F (36.7 °C)  98.1 °F (36.7 °C) 98.1 °F (36.7 °C)   TempSrc: Oral  Oral Oral   SpO2: 100%  100% 100%   Weight:       Height:                10/30/23  1305 10/30/23  1513   Weight: 83.9 kg (185 lb) 83 kg (183 lb)       Body mass index is 25.52 kg/m².                          Body mass index is 25.52 kg/m².    Intake/Output Summary (Last 24 hours) at 11/3/2023 1042  Last data filed at 11/3/2023 0900  Gross per 24 hour   Intake 1824.95 ml   Output 920 ml   Net 904.95 ml         Exam:  GEN:  No distress, appears stated age  EYES:   PERRL, anicteric sclerae  ENT:    External ears/nose normal, OP clear  NECK:  No adenopathy, midline trachea  LUNGS: Normal chest on inspection, palpation and auscultation  CV:  Normal S1S2, without murmur  ABD:  Nontender, nondistended, no hepatosplenomegaly, +BS  EXT:  No edema.  No cyanosis or clubbing.  No mottling and normal cap  refill.    Assessment     Scheduled meds:  ferrous sulfate, 325 mg, Oral, Daily With Breakfast  folic acid, 1 mg, Oral, Daily  pantoprazole, 40 mg, Intravenous, Q12H  senna-docusate sodium, 2 tablet, Oral, BID  sodium chloride, 10 mL, Intravenous, Q12H  thiamine, 100 mg, Oral, Daily      IV meds:                      lactated ringers, 100 mL/hr, Last Rate: 100 mL/hr (11/02/23 1732)      Data Review:  Results from last 7 days   Lab Units 11/03/23  0347 11/02/23 0428 11/01/23  0543 10/31/23  0431 10/30/23  1329   SODIUM mmol/L 138 137 136 137 137   POTASSIUM mmol/L 3.6 3.9 3.7 4.1 3.7   CHLORIDE mmol/L 111* 108* 103 104 104   CO2 mmol/L 22.0 22.5 22.6 25.1 24.1   BUN mg/dL 9 12 15 19 15   CREATININE mg/dL 1.15 1.19 1.15 1.19 1.21   GLUCOSE mg/dL 101* 90 93 90 151*   CALCIUM mg/dL 7.2* 8.0* 8.6 8.7 9.2         Estimated Creatinine Clearance: 98.2 mL/min (by C-G formula based on SCr of 1.15 mg/dL).  Results from last 7 days   Lab Units 11/03/23  0347 11/02/23  2333 11/02/23  1835 11/02/23  1111 11/02/23 0428   WBC 10*3/mm3 8.31 7.99 8.46 6.85 9.02  9.02   HEMOGLOBIN g/dL 5.7* 7.2* 9.1* 6.2* 7.1*  7.1*   PLATELETS 10*3/mm3 150 155 175 153 166  166     Results from last 7 days   Lab Units 11/02/23  0428 10/30/23  1329   INR  1.16* 1.01     Results from last 7 days   Lab Units 11/02/23  0428 10/30/23  1329   ALT (SGPT) U/L 11 13   AST (SGOT) U/L 16 13                 Glucose   Date/Time Value Ref Range Status   11/02/2023 2351 120 70 - 130 mg/dL Final   11/02/2023 1817 98 70 - 130 mg/dL Final   11/02/2023 1147 107 70 - 130 mg/dL Final   11/02/2023 0611 106 70 - 130 mg/dL Final   11/01/2023 2343 119 70 - 130 mg/dL Final   11/01/2023 1416 182 (H) 70 - 130 mg/dL Final         Imaging reviewed  CTA abd/pelvis 11/1 reviewed:  IMPRESSION:  No CT evidence of active gastrointestinal bleeding.    Microbiology reviewed            Active Hospital Problems    Diagnosis  POA    **GI bleed [K92.2]  Yes      Resolved Hospital  Problems    Diagnosis Date Resolved POA    Lower GI bleed [K92.2] 10/30/2023 Yes         ASSESSMENT:  Acute and recurrent GI bleed  Sinus tachycardia  Brief altered mental status: Resolved  Red blood per rectum  Acute blood loss anemia      PLAN:  Serial HH and transfuse for Hgb <7.  Discussed with gastroenterology.  May require transfer to [place].  IV PPI BID.  Control glucose.  Control BP.   Mechanical DVT prophylaxis.      Discussed with multidisciplinary ICU team on rounds this morning.      CCT: 35 min    Ryland Loja MD  Pulmonary and Critical Care Medicine  [facility], [facility]  11/3/2023    10:42 EDT

## 2023-11-03 NOTE — CASE MANAGEMENT/SOCIAL WORK
Continued Stay Note  [facility]     Patient Name: Radha Noriega V  MRN: [id number]  Today's Date: 11/3/2023    Admit Date: 10/30/2023    Plan: Transfer to [facility] via  EMS at 1300   Discharge Plan       Row Name 11/03/23 1122       Plan    Plan Transfer to [facility] via  EMS at 1300    Plan Comments Pt will be transfering to [facility] via  EMS at 1300. Spoke with Martir in radiology who will send images to [facility]. Printed medical records and placed packet to transfer with patient. RN will call report.                   Discharge Codes    No documentation.                       JOVANY Persaud

## 2023-11-03 NOTE — CASE MANAGEMENT/SOCIAL WORK
"Physicians Statement of Medical Necessity for  Ambulance Transportation    GENERAL INFORMATION     Name: Radha Noriega V  YOB: 1980  Medicare #: [id number]   Transport Date: 11/3/23 (Valid for round trips this date, or for scheduled repetitive trips for 60 days from the date signed below.)  Origin: [facility]  Destination: [facility] ICU  Is the Patient's stay covered under Medicare Part A (PPS/DRG?)No   Closest appropriate facility? Yes  If this a hosp-hosp transfer? Yes, describe services needed at 2nd facility not available at 1st facility GI surgery   Is this a hospice patient? No    MEDICAL NECESSITY QUESTIONAIRE    Ambulance Transportation is medically necessary only if other means of transportation are contraindicated or would be potentially harmful to the patient.  To meet this requirement, the patient must be either \"bed confined\" or suffer from a condition such that transport by means other than an ambulance is contraindicated by the patient's condition.  The following questions must be answered by the healthcare professional signing below for this form to be valid:     1) Describe the MEDICAL CONDITION (physical and/or mental) of this patient AT THE TIME OF AMBULANCE TRANSPORT that requires the patient to be transported in an ambulance, and why transport by other means is contraindicated by the patient's condition:   Past Medical History:   Diagnosis Date    Anemia     ETOH abuse     Tachycardia 06/23/2023    Tobacco dependence       Past Surgical History:   Procedure Laterality Date    APPENDECTOMY      COLONOSCOPY N/A 06/25/2023    Procedure: COLONOSCOPY TO CECUM AND TERMINAL ILEUM WITH BIOPSIES AND COLD BIOPSY POLYPECTOMIES;  Surgeon: Imtiaz Lee MD;  Location: [facility] ENDOSCOPY;  Service: Gastroenterology;  Laterality: N/A;  PRE- MELENA  POST- COLON POLYPS, HEMORRHOIDS    COLONOSCOPY N/A 11/2/2023    Procedure: COLONOSCOPY AT BEDSIDE to cecum;  Surgeon: Booker" "Mani STRICKLAND MD;  Location: [facility] ENDOSCOPY;  Service: Gastroenterology;  Laterality: N/A;  Pre: anemia, gi bleed  Post: blood in colon, no active source    ENDOSCOPY Left 06/24/2023    Procedure: ESOPHAGOGASTRODUODENOSCOPY;  Surgeon: Imtiaz Lee MD;  Location: [facility] ENDOSCOPY;  Service: Gastroenterology;  Laterality: Left;  small hiatal hernia, esophagitis    ENDOSCOPY N/A 07/15/2023    Procedure: ESOPHAGOGASTRODUODENOSCOPY;  Surgeon: Ermelinda Dixon MD;  Location: [facility] ENDOSCOPY;  Service: Gastroenterology;  Laterality: N/A;  pre-anemia  post-hiatal hernia    ENTEROSCOPY SMALL BOWEL N/A 07/09/2023    Procedure: ENTEROSCOPY SMALL BOWEL;  Surgeon: Isrrael Gibson MD;  Location: [facility] ENDOSCOPY;  Service: Gastroenterology;  Laterality: N/A;  PRE- GI BLEED  POST- NORMAL    ENTEROSCOPY SMALL BOWEL N/A 11/2/2023    Procedure: ENTEROSCOPY SMALL BOWEL AT BEDSIDE;  Surgeon: Mani eCleste MD;  Location: [facility] ENDOSCOPY;  Service: Gastroenterology;  Laterality: N/A;  Pre: anemia. gi bleed  Post: hiatal hernia, gastritis, esophagitis    UPPER GASTROINTESTINAL ENDOSCOPY        2) Is this patient \"bed confined\" as defined below?Yes   To be \"bed confined\" the patient must satisfy all three of the following criteria:  (1) unable to get up from bed without assistance; AND (2) unable to ambulate;  AND (3) unable to sit in a chair or wheelchair.  3) Can this patient safely be transported by car or wheelchair van (I.e., may safely sit during transport, without an attendant or monitoring?)No   4. In addition to completing questions 1-3 above, please check any of the following conditions that apply*:          *Note: supporting documentation for any boxes checked must be maintained in the patient's medical records IV meds/fluids required, Medical attendant required, Unable to tolerate seated position for time needed to transport, and Other ICU transfe      SIGNATURE OF PHYSICIAN OR OTHER AUTHORIZED HEALTHCARE " PROFESSIONAL    I certify that the above information is true and correct based on my evaluation of this patient, and represent that the patient requires transport by ambulance and that other forms of transport are contraindicated.  I understand that this information will be used by the Centers for Medicare and Medicaid Services (CMS) to support the determiniation of medical necessity for ambulance services, and I represent that I have personal knowledge of the patient's condition at the time of transport.    x   If this box is checked, I also certify that the patient is physically or mentally incapable of signing the ambulance service's claim form and that the institution with which I am affiliated has furnished care, services or assistance to the patient.  My signature below is made on behalf of the patient pursuant to 42 .36(b)(4). In accordance with 42 .37, the specific reason(s) that the patient is physically or mentally incapable of signing the claim for is as follows:     Signature of Physician or Healthcare Professional  Date/Time:        (For Scheduled repetitive transport, this form is not valid for transports performed more than 60 days after this date).                                                                                                                                            --------------------------------------------------------------------------------------------  Printed Name and Credentials of Physician or Authorized Healthcare Professional     *Form must be signed by patient's attending physician for scheduled, repetitive transports,.  For non-repetitive ambulance transports, if unable to obtain the signature of the attending physician, any of the following may sign (please select below):     Physician  Clinical Nurse Specialist  Registered Nurse     Physician Assistant  Discharge Planner  Licensed Practical Nurse     Nurse Practitioner

## 2023-11-03 NOTE — PAYOR COMM NOTE
"Radha Noriega (43 y.o. Male)                           ATTENTION; DISCHARGE CASE REF [id number]                          REPLY TO [facility] DEPT  [phone] OR CALL   DONNY DALAL LPN           Date of Birth   1980    Social Security Number       Address   [address] [address]    Home Phone   [phone]    MRN   [id number]       Jehovah's witness   None    Marital Status                               Admission Date   10/30/23    Admission Type   Emergency    Admitting Provider   Ryland Loja MD    Attending Provider       Department, Room/Bed   [facility] INTENSIVE CARE, I373/1       Discharge Date   11/3/2023    Discharge Disposition   Home or Self Care    Discharge Destination                                 Attending Provider: (none)   Allergies: No Known Allergies    Isolation: None   Infection: None   Code Status: CPR    Ht: 180.3 cm (71\")   Wt: 83 kg (183 lb)    Admission Cmt: None   Principal Problem: GI bleed [K92.2]                   Active Insurance as of 10/30/2023       Primary Coverage       Payor Plan Insurance Group Employer/Plan Group    ANTHEM BLUE CROSS ANTHEM BLUE CROSS BLUE SHIELD PPO [id number]       Payor Plan Address Payor Plan Phone Number Payor Plan Fax Number Effective Dates    [address] [phone]  3/17/2022 - None Entered    [facility] [id number]         Subscriber Name Subscriber Birth Date Member ID       RADHA NORIEGA 1980 [id number]               Secondary Coverage       Payor Plan Insurance Group Employer/Plan Group    [facility] [id number]       Payor Plan Address Payor Plan Phone Number Payor Plan Fax Number Effective Dates    [address] [address]   1/1/2021 - None Entered    [facility] [phone]         Subscriber Name Subscriber Birth Date Member ID       RADHA NORIEGA 1980 [id number]                     Emergency Contacts        (Rel.) Home Phone " Work Phone Mobile Phone    NED NICHOLS (Spouse) [phone] -- [phone]    Zainab Elaine (Mother) [phone] -- [phone]                 Discharge Summary        Ryland Loja MD at 11/03/23 1132                                                                             PHYSICIAN DISCHARGE SUMMARY                                                                        [facility]    Date of admit: 10/30/2023  Date of Discharge:  11/3/2023    PCP: Hellen Skelton APRN    Discharge Diagnosis:     GI bleed      Secondary Diagnoses:  Past Medical History:   Diagnosis Date    Anemia     ETOH abuse     Tachycardia 06/23/2023    Tobacco dependence        Procedures Performed  Procedure(s):  COLONOSCOPY AT BEDSIDE to cecum  ENTEROSCOPY SMALL BOWEL AT BEDSIDE  11/02 1323 SMALL BOWEL ENTEROSCOPY  11/02 1321 COLONOSCOPY      Consults:       Hospital Course  Patient is a 43 y.o. male presented with black tarry stools and admitted to hospitalist service.  Transferred to intensive care stat on 11/1/2023 with acute on recurrent GI bleed.  Was seen by gastroenterology and general surgery.  Endoscopy did not show clear source.  CT angio did not show clear source.  Plan to transfer to the [place] [facility] for further evaluation and treatment.        Condition on Discharge:  Stable.      Vital Signs  Temp:  [97.5 °F (36.4 °C)-98.2 °F (36.8 °C)] 97.8 °F (36.6 °C)  Heart Rate:  [] 85  Resp:  [12-25] 17  BP: ()/(52-88) 126/65    Physical Exam:  General Appearance: no acute distress, appears comfortable  Heart: regular rate and rhythm  Lungs: clear to auscultation bilaterally, respirations unlabored  Abdomen: soft, nontender, nondistended, no guarding/rebound, nl BS  Extremeties: no edema, no cyanosis  Neurology: speech normal, mental status normal, moving all four extremities  Mental Status: alert, oriented        --  Consults:   IP CONSULT TO GASTROENTEROLOGY  IP CONSULT TO HOSPITALIST  IP  CONSULT TO PULMONOLOGY  IP CONSULT TO GENERAL SURGERY  IP CONSULT TO GENERAL SURGERY    Significant Discharge Diagnostics   Procedures Performed:  Procedure(s):  COLONOSCOPY AT BEDSIDE to cecum  ENTEROSCOPY SMALL BOWEL AT BEDSIDE  11/02 1323 SMALL BOWEL ENTEROSCOPY  11/02 1321 COLONOSCOPY    Pertinent Lab Results:  Results from last 7 days   Lab Units 11/03/23  0347 11/02/23  0428 11/01/23  0543 10/31/23  0431 10/30/23  1329   SODIUM mmol/L 138 137 136 137 137   POTASSIUM mmol/L 3.6 3.9 3.7 4.1 3.7   CHLORIDE mmol/L 111* 108* 103 104 104   CO2 mmol/L 22.0 22.5 22.6 25.1 24.1   BUN mg/dL 9 12 15 19 15   CREATININE mg/dL 1.15 1.19 1.15 1.19 1.21   GLUCOSE mg/dL 101* 90 93 90 151*   CALCIUM mg/dL 7.2* 8.0* 8.6 8.7 9.2   AST (SGOT) U/L  --  16  --   --  13   ALT (SGPT) U/L  --  11  --   --  13         Results from last 7 days   Lab Units 11/03/23  0347 11/02/23  2333 11/02/23  1835 11/02/23  1111 11/02/23  0428 11/02/23  0018 11/01/23  1610   WBC 10*3/mm3 8.31 7.99 8.46 6.85 9.02  9.02 10.84* 12.10*   HEMOGLOBIN g/dL 5.7* 7.2* 9.1* 6.2* 7.1*  7.1* 7.3* 7.8*   HEMATOCRIT % 17.5* 21.3* 27.7* 18.8* 21.1*  21.1* 22.1* 23.0*   PLATELETS 10*3/mm3 150 155 175 153 166  166 131* 155   MCV fL 85.8 84.5 86.0 86.2 84.4  84.4 85.0 84.2   MCH pg 27.9 28.6 28.3 28.4 28.4  28.4 28.1 28.6   MCHC g/dL 32.6 33.8 32.9 33.0 33.6  33.6 33.0 33.9   RDW % 15.9* 16.0* 15.6* 15.0 15.0  15.0 15.2 14.7   RDW-SD fl 49.5 48.6 48.0 47.0 45.4  45.4 46.5 44.0   MPV fL 10.8 11.8 10.5 10.5 11.2  11.2 10.6 10.5   NEUTROPHIL % % 70.4  --   --   --  64.4  --   --    LYMPHOCYTE % % 17.3*  --   --   --  22.5  --   --    MONOCYTES % % 10.3  --   --   --  10.9  --   --    EOSINOPHIL % % 1.2  --   --   --  1.4  --   --    BASOPHIL % % 0.1  --   --   --  0.2  --   --    IMM GRAN % % 0.7*  --   --   --  0.6*  --   --    NEUTROS ABS 10*3/mm3 5.84  --   --   --  5.81  --   --    LYMPHS ABS 10*3/mm3 1.44  --   --   --  2.03  --   --    MONOS ABS 10*3/mm3  "0.86  --   --   --  0.98*  --   --    EOS ABS 10*3/mm3 0.10  --   --   --  0.13  --   --    BASOS ABS 10*3/mm3 0.01  --   --   --  0.02  --   --    IMMATURE GRANS (ABS) 10*3/mm3 0.06*  --   --   --  0.05  --   --    NRBC /100 WBC 0.1  --   --   --  0.1  --   --      Results from last 7 days   Lab Units 11/02/23  0428 10/30/23  1329   INR  1.16* 1.01               Invalid input(s): \"LDLCALC\"                                            Imaging Results:  Imaging Results (All)       Procedure Component Value Units Date/Time    CT Angiogram Abdomen Pelvis [[id number]] Collected: 11/03/23 0523     Updated: 11/03/23 0536    Narrative:      CT ANGIOGRAM OF THE ABDOMEN AND PELVIS WITHOUT AND WITH CONTRAST     HISTORY: GI bleeding     COMPARISON: November 1, 2020.     TECHNIQUE: Axial precontrast CT imaging was obtained through the abdomen  and pelvis. Subsequently arterial and venous phase imaging was obtained  through the abdomen and pelvis. Three-D reformatted images were  obtained.     FINDINGS:  Images through the lung bases are clear. The abdominal aorta is normal  in caliber. Celiac axis and spare mesenteric artery are widely patent.  There are 2 left renal arteries and a single right renal artery.  Inferior mesenteric artery is patent. The common and internal iliac  arteries are widely patent, as are the external iliac and common femoral  arteries and visualized portions of the profunda femoris and superficial  femoral arteries. No contrast extravasation is seen to account for the  patient's GI bleeding. There is mild colonic diverticulosis. The  appendix is absent. Embolization coils are noted within the left side of  the abdomen. No pneumatosis or free air is seen. No suspicious hepatic  lesions are seen. The stomach, duodenum, adrenal glands, spleen, and  pancreas are normal, as is the gallbladder. The kidneys enhance  symmetrically. There is no hydronephrosis. No distal ureteral or bladder  stones are seen. No acute " osseous abnormalities are identified.       Impression:      No CT evidence of active gastrointestinal bleeding.     Radiation dose reduction techniques were utilized, including automated  exposure control and exposure modulation based on body size.        This report was finalized on 11/3/2023 5:33 AM by Dr. Bernice Mendiola M.D on Workstation: [id number]       CT Angiogram Abdomen Pelvis [[id number]] Collected: 11/01/23 1629     Updated: 11/01/23 1649    Narrative:      EXAM: CT ANGIOGRAM ABDOMEN PELVIS-     HISTORY: Recurrent gastrointestinal bleeding with two prior ileal artery  embolizations.     TECHNIQUE: Radiation dose reduction techniques were utilized, including  automated exposure control and exposure modulation based on body size.   3 mm images were obtained through the abdomen and pelvis before and  after the administration of IV contrast.     COMPARISON: CT abdomen/pelvis 7/14/2023. Multiple GI bleed studies  dating back to 7/13/2023.        FINDINGS: No bowel dilation or thickening. No high density fluid within  the bowel. No intraluminal contrast extravasation. Appendectomy.     Nonaneurysmal abdominal aorta. Celiac, SMA, ANETA are patent. Patent  mesenteric venous vasculature. Left lower quadrant ileal artery vascular  coils.     Remaining solid organs are normal. No abdominopelvic lymphadenopathy. No  focal osseous abnormality.          Impression:      No CT evidence of active gastrointestinal bleeding.        This report was finalized on 11/1/2023 4:37 PM by Dr. Girish Rand M.D on Workstation: [id number]       NM GI Blood Loss [[id number]] Collected: 10/31/23 1506     Updated: 10/31/23 1513    Narrative:      EXAM: NM GI BLOOD LOSS-     Technique: 31 mCi of technetium 99m labeled RBCs were injected into the  left forearm intravenously. Dynamic cine imaging was obtained for 60  minutes thereafter. Lateral projection planar imaging of the pelvis was  obtained immediately thereafter.      INDICATION: Recurrent gastrointestinal bleeding.     COMPARISON: GI bleed studies 7/19/2023, 7/15/2023, 7/13/2023.     FINDINGS: Prompt activity within the circulatory system following  radiotracer injection. Accumulation of radiotracer activity within the  bladder and penis, confirmed on lateral projection planar imaging. No  changing, mobile tubular activity conforming to the shape of bowel.          Impression:      No scintigraphic findings of active gastrointestinal  bleeding during the course of the study.           This report was finalized on 10/31/2023 3:10 PM by Dr. Girish Rand M.D on Workstation: [id number]             --    Discharge Disposition  Home or Self Care    Discharge Medications     Discharge Medications        New Medications        Instructions Start Date   bisacodyl 5 MG EC tablet  Commonly known as: DULCOLAX   5 mg, Oral, Daily PRN      bisacodyl 10 MG suppository  Commonly known as: DULCOLAX   10 mg, Rectal, Daily PRN      dicyclomine 10 MG capsule  Commonly known as: BENTYL   10 mg, Oral, 4 Times Daily PRN      lactated ringers infusion   100 mL/hr (100 mL/hr), Intravenous, Continuous      pantoprazole 40 MG injection  Commonly known as: PROTONIX  Replaces: pantoprazole 40 MG EC tablet   40 mg, Intravenous, Every 12 Hours Scheduled             Continue These Medications        Instructions Start Date   ferrous sulfate 325 (65 FE) MG tablet  Commonly known as: FerrouSul   325 mg, Oral, Daily With Breakfast      folic acid 1 MG tablet  Commonly known as: FOLVITE   1 mg, Oral, Daily      thiamine 100 MG tablet  Commonly known as: VITAMIN B1   100 mg, Oral, Daily             Stop These Medications      Eliquis 5 MG tablet tablet  Generic drug: apixaban     pantoprazole 40 MG EC tablet  Commonly known as: PROTONIX  Replaced by: pantoprazole 40 MG injection              Discharge Diet:  N.p.o.    Activity at Discharge:      Follow-up Information       Hellen Skelton, CHETNA .     Specialties: Nurse Practitioner, Internal Medicine  Contact information:  [address]  [address]  [place] [address]  [phone]                           See primary care provider, Hellen Skelton APRN, in 1-2 weeks        Test Results Pending at Discharge       Ryland Loja MD  [facility], [facility]  Pulmonary and Critical Care Medicine  11/03/23  11:32 EDT    Time: greater than 30 minutes.        Total time spent discharging patient including evaluation, post hospitalization follow up, medication and post hospitalization instructions and education total time exceeds 30 minutes.      Electronically signed by Ryland Loja MD at 11/03/23 8556

## 2023-11-03 NOTE — PLAN OF CARE
Goal Outcome Evaluation:   Patient alert and oriented overnight. No complaints of abdominal discomfort until ~0000 after passing stool. Pain 5/10 not treated per patient request. Patient became nauseous ~0200; Zofran given without improvement. See MAR. Abdomen seemed more firm and abdominal pain worsening. Dr. Ashby informed and ordered NGT to LWS. Unable to advance NGT and patient refused insertion after first failed attempt. HgB dropped to 5.7 overnight; 2u PRBCs ordered. First unit currently infusing. Increasing tachycardia also noted; All other vital signs stable consistently. 1L LR bolus and CTA Abd/Pelv ordered and completed. CTA showed no acute processes. Much of care after ~0200 performed by RNs Tiesha Ornelas and Tori Tello d/t new, unstable patient admitted to unit to primary RN. Patient discomfort improved after large bloody bowel movement ~0500. All critical lab values reported to Dr. Ashby. Updates given to Dr. Chambers during bedside shift report this morning. Care ongoing.

## 2023-11-03 NOTE — OUTREACH NOTE
Prep Survey      Flowsheet Row Responses   Muslim facility patient discharged from? [facility]   Is LACE score < 7 ? No   Eligibility Not Eligible   What are the reasons patient is not eligible? [facility]  [going to [facility] downtime]   Does the patient have one of the following disease processes/diagnoses(primary or secondary)? Other   Prep survey completed? Yes            LINDA MCGINNIS - Registered Nurse

## 2023-11-03 NOTE — NURSING NOTE
Patient received 2 units PRBC for Hgb=5.7. No reaction noted. Patient then transferred to [facility] via EMS. Report called to receiving CYNTHIA LEYVA AAOx4.

## 2023-11-03 NOTE — DISCHARGE SUMMARY
PHYSICIAN DISCHARGE SUMMARY                                                                        [facility]    Date of admit: 10/30/2023  Date of Discharge:  11/3/2023    PCP: Hellen Skelton APRN    Discharge Diagnosis:     GI bleed      Secondary Diagnoses:  Past Medical History:   Diagnosis Date    Anemia     ETOH abuse     Tachycardia 06/23/2023    Tobacco dependence        Procedures Performed  Procedure(s):  COLONOSCOPY AT BEDSIDE to cecum  ENTEROSCOPY SMALL BOWEL AT BEDSIDE  11/02 1323 SMALL BOWEL ENTEROSCOPY  11/02 1321 COLONOSCOPY      Consults:       Hospital Course  Patient is a 43 y.o. male presented with black tarry stools and admitted to hospitalist service.  Transferred to intensive care stat on 11/1/2023 with acute on recurrent GI bleed.  Was seen by gastroenterology and general surgery.  Endoscopy did not show clear source.  CT angio did not show clear source.  Plan to transfer to the [facility] for further evaluation and treatment.        Condition on Discharge:  Stable.      Vital Signs  Temp:  [97.5 °F (36.4 °C)-98.2 °F (36.8 °C)] 97.8 °F (36.6 °C)  Heart Rate:  [] 85  Resp:  [12-25] 17  BP: ()/(52-88) 126/65    Physical Exam:  General Appearance: no acute distress, appears comfortable  Heart: regular rate and rhythm  Lungs: clear to auscultation bilaterally, respirations unlabored  Abdomen: soft, nontender, nondistended, no guarding/rebound, nl BS  Extremeties: no edema, no cyanosis  Neurology: speech normal, mental status normal, moving all four extremities  Mental Status: alert, oriented        --  Consults:   IP CONSULT TO GASTROENTEROLOGY  IP CONSULT TO HOSPITALIST  IP CONSULT TO PULMONOLOGY  IP CONSULT TO GENERAL SURGERY  IP CONSULT TO GENERAL SURGERY    Significant Discharge Diagnostics   Procedures Performed:  Procedure(s):  COLONOSCOPY AT BEDSIDE to cecum  ENTEROSCOPY SMALL BOWEL AT  BEDSIDE  11/02 1323 SMALL BOWEL ENTEROSCOPY  11/02 1321 COLONOSCOPY    Pertinent Lab Results:  Results from last 7 days   Lab Units 11/03/23  0347 11/02/23  0428 11/01/23  0543 10/31/23  0431 10/30/23  1329   SODIUM mmol/L 138 137 136 137 137   POTASSIUM mmol/L 3.6 3.9 3.7 4.1 3.7   CHLORIDE mmol/L 111* 108* 103 104 104   CO2 mmol/L 22.0 22.5 22.6 25.1 24.1   BUN mg/dL 9 12 15 19 15   CREATININE mg/dL 1.15 1.19 1.15 1.19 1.21   GLUCOSE mg/dL 101* 90 93 90 151*   CALCIUM mg/dL 7.2* 8.0* 8.6 8.7 9.2   AST (SGOT) U/L  --  16  --   --  13   ALT (SGPT) U/L  --  11  --   --  13         Results from last 7 days   Lab Units 11/03/23  0347 11/02/23  2333 11/02/23  1835 11/02/23  1111 11/02/23  0428 11/02/23  0018 11/01/23  1610   WBC 10*3/mm3 8.31 7.99 8.46 6.85 9.02  9.02 10.84* 12.10*   HEMOGLOBIN g/dL 5.7* 7.2* 9.1* 6.2* 7.1*  7.1* 7.3* 7.8*   HEMATOCRIT % 17.5* 21.3* 27.7* 18.8* 21.1*  21.1* 22.1* 23.0*   PLATELETS 10*3/mm3 150 155 175 153 166  166 131* 155   MCV fL 85.8 84.5 86.0 86.2 84.4  84.4 85.0 84.2   MCH pg 27.9 28.6 28.3 28.4 28.4  28.4 28.1 28.6   MCHC g/dL 32.6 33.8 32.9 33.0 33.6  33.6 33.0 33.9   RDW % 15.9* 16.0* 15.6* 15.0 15.0  15.0 15.2 14.7   RDW-SD fl 49.5 48.6 48.0 47.0 45.4  45.4 46.5 44.0   MPV fL 10.8 11.8 10.5 10.5 11.2  11.2 10.6 10.5   NEUTROPHIL % % 70.4  --   --   --  64.4  --   --    LYMPHOCYTE % % 17.3*  --   --   --  22.5  --   --    MONOCYTES % % 10.3  --   --   --  10.9  --   --    EOSINOPHIL % % 1.2  --   --   --  1.4  --   --    BASOPHIL % % 0.1  --   --   --  0.2  --   --    IMM GRAN % % 0.7*  --   --   --  0.6*  --   --    NEUTROS ABS 10*3/mm3 5.84  --   --   --  5.81  --   --    LYMPHS ABS 10*3/mm3 1.44  --   --   --  2.03  --   --    MONOS ABS 10*3/mm3 0.86  --   --   --  0.98*  --   --    EOS ABS 10*3/mm3 0.10  --   --   --  0.13  --   --    BASOS ABS 10*3/mm3 0.01  --   --   --  0.02  --   --    IMMATURE GRANS (ABS) 10*3/mm3 0.06*  --   --   --  0.05  --   --    NRBC  "/100 WBC 0.1  --   --   --  0.1  --   --      Results from last 7 days   Lab Units 11/02/23  0428 10/30/23  1329   INR  1.16* 1.01               Invalid input(s): \"LDLCALC\"                                            Imaging Results:  Imaging Results (All)       Procedure Component Value Units Date/Time    CT Angiogram Abdomen Pelvis [[id number]] Collected: 11/03/23 0523     Updated: 11/03/23 0536    Narrative:      CT ANGIOGRAM OF THE ABDOMEN AND PELVIS WITHOUT AND WITH CONTRAST     HISTORY: GI bleeding     COMPARISON: November 1, 2020.     TECHNIQUE: Axial precontrast CT imaging was obtained through the abdomen  and pelvis. Subsequently arterial and venous phase imaging was obtained  through the abdomen and pelvis. Three-D reformatted images were  obtained.     FINDINGS:  Images through the lung bases are clear. The abdominal aorta is normal  in caliber. Celiac axis and spare mesenteric artery are widely patent.  There are 2 left renal arteries and a single right renal artery.  Inferior mesenteric artery is patent. The common and internal iliac  arteries are widely patent, as are the external iliac and common femoral  arteries and visualized portions of the profunda femoris and superficial  femoral arteries. No contrast extravasation is seen to account for the  patient's GI bleeding. There is mild colonic diverticulosis. The  appendix is absent. Embolization coils are noted within the left side of  the abdomen. No pneumatosis or free air is seen. No suspicious hepatic  lesions are seen. The stomach, duodenum, adrenal glands, spleen, and  pancreas are normal, as is the gallbladder. The kidneys enhance  symmetrically. There is no hydronephrosis. No distal ureteral or bladder  stones are seen. No acute osseous abnormalities are identified.       Impression:      No CT evidence of active gastrointestinal bleeding.     Radiation dose reduction techniques were utilized, including automated  exposure control and exposure " modulation based on body size.        This report was finalized on 11/3/2023 5:33 AM by Dr. Bernice Mendiola M.D on Workstation: [id number]       CT Angiogram Abdomen Pelvis [[id number]] Collected: 11/01/23 1629     Updated: 11/01/23 1649    Narrative:      EXAM: CT ANGIOGRAM ABDOMEN PELVIS-     HISTORY: Recurrent gastrointestinal bleeding with two prior ileal artery  embolizations.     TECHNIQUE: Radiation dose reduction techniques were utilized, including  automated exposure control and exposure modulation based on body size.   3 mm images were obtained through the abdomen and pelvis before and  after the administration of IV contrast.     COMPARISON: CT abdomen/pelvis 7/14/2023. Multiple GI bleed studies  dating back to 7/13/2023.        FINDINGS: No bowel dilation or thickening. No high density fluid within  the bowel. No intraluminal contrast extravasation. Appendectomy.     Nonaneurysmal abdominal aorta. Celiac, SMA, ANETA are patent. Patent  mesenteric venous vasculature. Left lower quadrant ileal artery vascular  coils.     Remaining solid organs are normal. No abdominopelvic lymphadenopathy. No  focal osseous abnormality.          Impression:      No CT evidence of active gastrointestinal bleeding.        This report was finalized on 11/1/2023 4:37 PM by Dr. Girish Rand M.D on Workstation: [id number]       NM GI Blood Loss [[id number]] Collected: 10/31/23 1506     Updated: 10/31/23 1513    Narrative:      EXAM: NM GI BLOOD LOSS-     Technique: 31 mCi of technetium 99m labeled RBCs were injected into the  left forearm intravenously. Dynamic cine imaging was obtained for 60  minutes thereafter. Lateral projection planar imaging of the pelvis was  obtained immediately thereafter.     INDICATION: Recurrent gastrointestinal bleeding.     COMPARISON: GI bleed studies 7/19/2023, 7/15/2023, 7/13/2023.     FINDINGS: Prompt activity within the circulatory system following  radiotracer injection. Accumulation of  radiotracer activity within the  bladder and penis, confirmed on lateral projection planar imaging. No  changing, mobile tubular activity conforming to the shape of bowel.          Impression:      No scintigraphic findings of active gastrointestinal  bleeding during the course of the study.           This report was finalized on 10/31/2023 3:10 PM by Dr. Girish Rand M.D on Workstation: [email or web address]             --    Discharge Disposition  Home or Self Care    Discharge Medications     Discharge Medications        New Medications        Instructions Start Date   bisacodyl 5 MG EC tablet  Commonly known as: DULCOLAX   5 mg, Oral, Daily PRN      bisacodyl 10 MG suppository  Commonly known as: DULCOLAX   10 mg, Rectal, Daily PRN      dicyclomine 10 MG capsule  Commonly known as: BENTYL   10 mg, Oral, 4 Times Daily PRN      lactated ringers infusion   100 mL/hr (100 mL/hr), Intravenous, Continuous      pantoprazole 40 MG injection  Commonly known as: PROTONIX  Replaces: pantoprazole 40 MG EC tablet   40 mg, Intravenous, Every 12 Hours Scheduled             Continue These Medications        Instructions Start Date   ferrous sulfate 325 (65 FE) MG tablet  Commonly known as: FerrouSul   325 mg, Oral, Daily With Breakfast      folic acid 1 MG tablet  Commonly known as: FOLVITE   1 mg, Oral, Daily      thiamine 100 MG tablet  Commonly known as: VITAMIN B1   100 mg, Oral, Daily             Stop These Medications      Eliquis 5 MG tablet tablet  Generic drug: apixaban     pantoprazole 40 MG EC tablet  Commonly known as: PROTONIX  Replaced by: pantoprazole 40 MG injection              Discharge Diet:  N.p.o.    Activity at Discharge:      Follow-up Information       Hellen Skelton APRN .    Specialties: Nurse Practitioner, Internal Medicine  Contact information:  [facility] [address] [address] [phone]                           See primary care provider, Hellen Skelton APRN, in 1-2  weeks        Test Results Pending at Discharge       Ryland Loja MD  [facility], [facility]  Pulmonary and Critical Care Medicine  11/03/23  11:32 EDT    Time: greater than 30 minutes.        Total time spent discharging patient including evaluation, post hospitalization follow up, medication and post hospitalization instructions and education total time exceeds 30 minutes.

## 2023-11-03 NOTE — PROGRESS NOTES
Gastroenterology   Inpatient Progress Note    Reason for Follow Up:  ongoing GI bleeding    Subjective     Interval History:   Hgb 9.1-7.2-5.7 at 3 am   CT Angiogram Abdomen Pelvis (11/03/2023 05:17) negative   Patient continues to require blood transfusion for GI bleeding.  Unclear if the hemoglobin of 9.1 is accurate as he had been more around the 7 range more consistently.  However, need for ongoing blood transfusion    Patient with cramping improving with flatus        Current Facility-Administered Medications:     sennosides-docusate (PERICOLACE) 8.6-50 MG per tablet 2 tablet, 2 tablet, Oral, BID, 2 tablet at 11/02/23 2013 **AND** polyethylene glycol (MIRALAX) packet 17 g, 17 g, Oral, Daily PRN **AND** bisacodyl (DULCOLAX) EC tablet 5 mg, 5 mg, Oral, Daily PRN **AND** bisacodyl (DULCOLAX) suppository 10 mg, 10 mg, Rectal, Daily PRN, Catherine Johnson PA-C    dicyclomine (BENTYL) capsule 10 mg, 10 mg, Oral, 4x Daily PRN, Dee Price PA, 10 mg at 11/01/23 1254    ferrous sulfate tablet 325 mg, 325 mg, Oral, Daily With Breakfast, Catherine Johnson PA-C, 325 mg at 11/01/23 0834    folic acid (FOLVITE) tablet 1 mg, 1 mg, Oral, Daily, Catherine Johnson PA-C, 1 mg at 11/01/23 0834    lactated ringers bolus 1,000 mL, 1,000 mL, Intravenous, Once, Raul Ashby MD, Last Rate: 1,000 mL/hr at 11/03/23 0627, 1,000 mL at 11/03/23 0627    [COMPLETED] lactated ringers bolus 1,000 mL, 1,000 mL, Intravenous, Once, Last Rate: 1,000 mL/hr at 11/01/23 1326, 1,000 mL at 11/01/23 1326 **FOLLOWED BY** lactated ringers infusion, 100 mL/hr, Intravenous, Continuous, Dee Price PA, Last Rate: 100 mL/hr at 11/02/23 1732, 100 mL/hr at 11/02/23 1732    melatonin tablet 5 mg, 5 mg, Oral, Nightly PRN, Catherine Johnson PA-C    nitroglycerin (NITROSTAT) SL tablet 0.4 mg, 0.4 mg, Sublingual, Q5 Min PRN, Catherine Johnson PA-C    ondansetron (ZOFRAN) tablet 4 mg, 4 mg, Oral, Q6H PRN **OR** ondansetron  (ZOFRAN) injection 4 mg, 4 mg, Intravenous, Q6H PRN, Elizabeth, Catherine R, PA-C, 4 mg at 11/03/23 0201    pantoprazole (PROTONIX) injection 40 mg, 40 mg, Intravenous, Q12H, Elizabeth, Catherine R, PA-C, 40 mg at 11/02/23 2013    sodium chloride 0.9 % flush 10 mL, 10 mL, Intravenous, PRN, Adryan Lee MD    sodium chloride 0.9 % flush 10 mL, 10 mL, Intravenous, Q12H, Elizabeth, Catherine R, PA-C, 10 mL at 11/02/23 2014    sodium chloride 0.9 % flush 10 mL, 10 mL, Intravenous, PRN, Elizabeth, Catherine R, PA-C, 10 mL at 10/30/23 1427    sodium chloride 0.9 % infusion 40 mL, 40 mL, Intravenous, PRN, Elizabeth, Catherine R, PA-C, 40 mL at 11/03/23 0200    thiamine (VITAMIN B-1) tablet 100 mg, 100 mg, Oral, Daily, Elizabeth, Catherine R, PA-C, 100 mg at 11/01/23 0834  Review of Systems:               All systems were reviewed and negative except for:  Gastrointestinal: positive for  bright red blood per rectum    Objective     Vital Signs  Temp:  [97.5 °F (36.4 °C)-98.2 °F (36.8 °C)] 98 °F (36.7 °C)  Heart Rate:  [] 100  Resp:  [12-25] 12  BP: ()/(52-88) 119/60  Body mass index is 25.52 kg/m².    Intake/Output Summary (Last 24 hours) at 11/3/2023 0718  Last data filed at 11/2/2023 2155  Gross per 24 hour   Intake 1789.76 ml   Output 1300 ml   Net 489.76 ml     No intake/output data recorded.                Physical Exam:              General: patient awake, alert and cooperative              Eyes: no scleral icterus              Skin: warm and dry, not jaundiced              Abdomen: soft, nontender, nondistended; normal bowel sounds, no masses palpated, no periumbical lymphadenopathy              Psychiatric: Appropriate affect and behavior                Results Review:                I reviewed the patient's new clinical results.    Results from last 7 days   Lab Units 11/03/23  0347 11/02/23  2333 11/02/23  1835   WBC 10*3/mm3 8.31 7.99 8.46   HEMOGLOBIN g/dL 5.7* 7.2* 9.1*   HEMATOCRIT % 17.5* 21.3* 27.7*    PLATELETS 10*3/mm3 150 155 175     Results from last 7 days   Lab Units 11/03/23  0347 11/02/23  0428 11/01/23  0543 10/31/23  0431 10/30/23  1329   SODIUM mmol/L 138 137 136   < > 137   POTASSIUM mmol/L 3.6 3.9 3.7   < > 3.7   CHLORIDE mmol/L 111* 108* 103   < > 104   CO2 mmol/L 22.0 22.5 22.6   < > 24.1   BUN mg/dL 9 12 15   < > 15   CREATININE mg/dL 1.15 1.19 1.15   < > 1.21   CALCIUM mg/dL 7.2* 8.0* 8.6   < > 9.2   BILIRUBIN mg/dL  --  0.2  --   --  <0.2   ALK PHOS U/L  --  47  --   --  71   ALT (SGPT) U/L  --  11  --   --  13   AST (SGOT) U/L  --  16  --   --  13   GLUCOSE mg/dL 101* 90 93   < > 151*    < > = values in this interval not displayed.     Results from last 7 days   Lab Units 11/02/23  0428 10/30/23  1329   INR  1.16* 1.01     Lab Results   Lab Value Date/Time    LIPASE 11 (L) 07/12/2023 0612    LIPASE 26 06/23/2023 1201       Radiology:  CT Angiogram Abdomen Pelvis   Final Result   No CT evidence of active gastrointestinal bleeding.       Radiation dose reduction techniques were utilized, including automated   exposure control and exposure modulation based on body size.           This report was finalized on 11/3/2023 5:33 AM by Dr. Bernice Mendiola M.D on Workstation: [id number]          CT Angiogram Abdomen Pelvis   Final Result   No CT evidence of active gastrointestinal bleeding.           This report was finalized on 11/1/2023 4:37 PM by Dr. Girish Rand M.D on Workstation: [id number]          NM GI Blood Loss   Final Result   No scintigraphic findings of active gastrointestinal   bleeding during the course of the study.               This report was finalized on 10/31/2023 3:10 PM by Dr. Girish Rand M.D on Workstation: [id number]              Assessment & Plan     Active Hospital Problems    Diagnosis     **GI bleed        Assessment:  Obscure overt GIB  Blood loss anemia  Severe anemia, ongoing and continuous requirement for transfusion  H/o recurrent gi bleeding with  multiple admissions just this past year--recent workup ha included small bowel enteroscopy, CTA, clonoscopy, EGD and now repeat enteroscopy and colonoscopy--he has had IR coiling and embolization of a distal branch of the SMA feeding the ileum in July of this year x 2 with 2 recent negative CT angiography is in the last couple of days  Ongoing BRBP       Plan:  Complicated GI bleed.  Confusing picture clinically.  The patient has had numerous evaluations of the upper and lower GI tract.  Most recently he has undergone small bowel enteroscopy and colonoscopy as well as 2 CT angiography's.  There is red blood in the colon and black stool in the colon and small bowel.  Previous IR coiling and embolization of the SMA.  The patient continues to show clinical signs of bleeding as well as requiring blood transfusion products.  Surgery is following the patient.  I have contacted our on-call therapeutic endoscopist who do not do balloon enteroscopy.  I have a call out to [facility] to talk to their GI department about their therapeutic endoscopist's.  A balloon enteroscopy could potentially intubate deeper into the terminal ileum retrograde from the colon to both evaluate the colon and the small bowel in greater detail to help localize a potential source that could either be treated endoscopically or localized for a surgeon.  I have discussed this with the ICU team the patient and Dr. Loja.  Awaiting callback from [facility]  Continue aggressive supportive measures as currently doing with monitoring, trending hemoglobin and transfusing as necessary  I discussed the patients findings and my recommendations with patient, nursing staff, and consulting provider.    Addendum: At 9:17 AM.  Discussed with Dr. Kevin August and Dr. Lynn at [facility].  Dr. August will accept the patient and the ICU will accept the patient as well.  Plans are for attempt at balloon enteroscopy from a  retrograde view to try and identify a bleeding source from the lower GI tract either for endoscopic treatment or for localization if surgery might be required.  Formed ICU and Dr. Opal Celeste M.D.  [facility] [place] [id number]  Office: [phone]

## 2023-11-04 LAB
BH BB BLOOD EXPIRATION DATE: NORMAL
BH BB BLOOD EXPIRATION DATE: NORMAL
BH BB BLOOD TYPE BARCODE: 7300
BH BB BLOOD TYPE BARCODE: 7300
BH BB DISPENSE STATUS: NORMAL
BH BB DISPENSE STATUS: NORMAL
BH BB PRODUCT CODE: NORMAL
BH BB PRODUCT CODE: NORMAL
BH BB UNIT NUMBER: NORMAL
BH BB UNIT NUMBER: NORMAL
CROSSMATCH INTERPRETATION: NORMAL
CROSSMATCH INTERPRETATION: NORMAL
UNIT  ABO: NORMAL
UNIT  ABO: NORMAL
UNIT  RH: NORMAL
UNIT  RH: NORMAL

## 2023-11-05 NOTE — CASE MANAGEMENT/SOCIAL WORK
Case Management Discharge Note      Final Note: [facility]    Provided Post Acute Provider List?: N/A    Selected Continued Care - Discharged on 11/3/2023 Admission date: 10/30/2023 - Discharge disposition: Home or Self Care      Destination    No services have been selected for the patient.                Durable Medical Equipment    No services have been selected for the patient.                Dialysis/Infusion    No services have been selected for the patient.                Home Medical Care    No services have been selected for the patient.                Therapy    No services have been selected for the patient.                Community Resources    No services have been selected for the patient.                Community & DME    No services have been selected for the patient.                         Final Discharge Disposition Code: 02 -  for [facility]

## 2023-12-13 ENCOUNTER — OFFICE VISIT (OUTPATIENT)
Dept: INTERNAL MEDICINE | Facility: CLINIC | Age: 43
End: 2023-12-13
Payer: COMMERCIAL

## 2023-12-13 VITALS
HEART RATE: 90 BPM | BODY MASS INDEX: 26.39 KG/M2 | WEIGHT: 188.5 LBS | OXYGEN SATURATION: 98 % | SYSTOLIC BLOOD PRESSURE: 112 MMHG | TEMPERATURE: 98.4 F | HEIGHT: 71 IN | DIASTOLIC BLOOD PRESSURE: 82 MMHG

## 2023-12-13 DIAGNOSIS — D62 ACUTE BLOOD LOSS ANEMIA: ICD-10-CM

## 2023-12-13 DIAGNOSIS — F17.200 CURRENT EVERY DAY SMOKER: ICD-10-CM

## 2023-12-13 DIAGNOSIS — K92.1 GASTROINTESTINAL HEMORRHAGE WITH MELENA: Primary | ICD-10-CM

## 2023-12-13 DIAGNOSIS — I82.622 ARM DVT (DEEP VENOUS THROMBOEMBOLISM), ACUTE, LEFT: ICD-10-CM

## 2023-12-13 DIAGNOSIS — F10.10 ETOH ABUSE: ICD-10-CM

## 2023-12-13 DIAGNOSIS — K21.9 GASTROESOPHAGEAL REFLUX DISEASE, UNSPECIFIED WHETHER ESOPHAGITIS PRESENT: ICD-10-CM

## 2023-12-13 PROCEDURE — 99214 OFFICE O/P EST MOD 30 MIN: CPT | Performed by: NURSE PRACTITIONER

## 2023-12-13 RX ORDER — LANOLIN ALCOHOL/MO/W.PET/CERES
100 CREAM (GRAM) TOPICAL DAILY
Qty: 90 TABLET | Refills: 2 | Status: SHIPPED | OUTPATIENT
Start: 2023-12-13

## 2023-12-13 RX ORDER — PANTOPRAZOLE SODIUM 40 MG/1
40 TABLET, DELAYED RELEASE ORAL DAILY
Qty: 90 TABLET | Refills: 2 | Status: SHIPPED | OUTPATIENT
Start: 2023-12-13

## 2023-12-13 RX ORDER — FOLIC ACID 1 MG/1
1 TABLET ORAL DAILY
Qty: 90 TABLET | Refills: 2 | Status: SHIPPED | OUTPATIENT
Start: 2023-12-13

## 2023-12-13 RX ORDER — PANTOPRAZOLE SODIUM 40 MG/1
40 TABLET, DELAYED RELEASE ORAL
COMMUNITY
Start: 2023-11-03 | End: 2023-12-13 | Stop reason: SDUPTHER

## 2023-12-13 NOTE — PROGRESS NOTES
Subjective   Radha Noriega V is a 43 y.o. male.     Chief Complaint   Patient presents with    arm DVT     Pt is here to follow up on DVT, PT was in hospital recently for rectal bleeding and ws found due to Eliquis so it was stopped.    Rectal Bleeding        History of Present Illness   Here today for follow-up on left arm DVT and recent hospitalization for GI hemorrhage.  He was seen in the [facility] emergency room on 10/30 with hospital admission for acute on recurrent GI bleed.  He was admitted to the ICU with a hemoglobin of 5.  He was evaluated by GI and general surgery.  Endoscopy and CT angio did not show any source of bleed.  He was transferred to [facility] for further evaluation and management. His Eliquis was stopped.  He received multiple units of blood.  He underwent capsule study and colonoscopy during hospitalization at [facility].  He was discharged to follow-up for outpatient iron infusions and with GI.  He presents today with resolution of melena.  He still notes fatigue, shortness of breath with activity, intermittent heart racing since being discharged from the hospital.  He denies any acute abdominal pain, nausea, vomiting, hematemesis, melena or BRBPR.  He has been receiving iron infusions weekly with [facility]. He had and iron infusion completed today with plan for 2 more infusions. He is currently taking oral ferrous sulfate daily.  He is currently out of his folic acid and thiamine supplement.  He is also needing a refill on his pantoprazole.  He notes some constipation on the oral and IV iron.  He also notes some abdominal bloating and gas over the past few days.  He is not currently using a stool softener or laxative at home.  Last bowel movement was this morning, small, hard.  He is not currently able to work secondary to anemia.  He is due for repeat blood work.  He has been limiting alcohol intake and has been cutting back on smoking.    The following portions of the patient's history were  reviewed and updated as appropriate: allergies, current medications, past family history, past medical history, past social history, past surgical history, and problem list.    Review of Systems   Constitutional:  Positive for fatigue. Negative for chills and fever.   Respiratory:  Positive for shortness of breath (with acitvity). Negative for cough, chest tightness and wheezing.    Cardiovascular:  Negative for chest pain, palpitations and leg swelling.   Gastrointestinal:  Positive for abdominal distention and constipation. Negative for abdominal pain, blood in stool, diarrhea, nausea, vomiting and GERD.   Neurological:  Negative for weakness and numbness.       Objective   Physical Exam  Constitutional:       Appearance: He is well-developed.   Neck:      Thyroid: No thyroid mass, thyromegaly or thyroid tenderness.      Vascular: No carotid bruit.      Trachea: Trachea normal.   Cardiovascular:      Rate and Rhythm: Normal rate and regular rhythm.      Chest Wall: PMI is not displaced.      Pulses:           Radial pulses are 2+ on the right side and 2+ on the left side.        Dorsalis pedis pulses are 2+ on the right side and 2+ on the left side.        Posterior tibial pulses are 2+ on the right side and 2+ on the left side.      Heart sounds: S1 normal and S2 normal.   Pulmonary:      Effort: Pulmonary effort is normal.      Breath sounds: Normal breath sounds.   Abdominal:      General: Bowel sounds are normal. There is no distension or abdominal bruit. There are no signs of injury.      Palpations: Abdomen is soft. There is no hepatomegaly or splenomegaly.      Tenderness:  in the epigastric area There is no guarding or rebound.      Hernia: No hernia is present.   Musculoskeletal:      Right lower leg: No edema.      Left lower leg: No edema.   Lymphadenopathy:      Head:      Right side of head: No submental, submandibular, tonsillar or occipital adenopathy.      Left side of head: No submental,  submandibular, tonsillar or occipital adenopathy.      Cervical: No cervical adenopathy.   Skin:     General: Skin is warm and dry.      Capillary Refill: Capillary refill takes less than 2 seconds.      Nails: There is no clubbing.   Neurological:      Mental Status: He is alert and oriented to person, place, and time.   Psychiatric:         Attention and Perception: Attention normal.         Mood and Affect: Mood and affect normal.         Speech: Speech normal.         Behavior: Behavior normal.         Thought Content: Thought content normal.         Cognition and Memory: Cognition normal.         Vitals:    12/13/23 1526   BP: 112/82   Pulse: 90   Temp: 98.4 °F (36.9 °C)   SpO2: 98%      Body mass index is 26.29 kg/m².    Assessment & Plan   Problems Addressed this Visit       Gastrointestinal hemorrhage with melena - Primary    Relevant Medications    pantoprazole (PROTONIX) 40 MG EC tablet    Other Relevant Orders    CBC & Differential    Comprehensive Metabolic Panel    Ferritin    Iron Profile    ETOH abuse    Relevant Medications    pantoprazole (PROTONIX) 40 MG EC tablet    folic acid (FOLVITE) 1 MG tablet    thiamine (VITAMIN B1) 100 MG tablet    Current every day smoker    Relevant Medications    pantoprazole (PROTONIX) 40 MG EC tablet    Acute blood loss anemia    Relevant Medications    pantoprazole (PROTONIX) 40 MG EC tablet    folic acid (FOLVITE) 1 MG tablet    Other Relevant Orders    CBC & Differential    Comprehensive Metabolic Panel    Ferritin    Iron Profile    Arm DVT (deep venous thromboembolism), acute, left    Relevant Orders    Duplex Venous Upper Extremity - Left CAR     Other Visit Diagnoses       Gastroesophageal reflux disease, unspecified whether esophagitis present        Relevant Medications    pantoprazole (PROTONIX) 40 MG EC tablet    Other Relevant Orders    Comprehensive Metabolic Panel          Diagnoses         Codes Comments    Gastrointestinal hemorrhage with melena    -   Primary ICD-10-CM: K92.1  ICD-9-CM: 578.1     Arm DVT (deep venous thromboembolism), acute, left     ICD-10-CM: I82.622  ICD-9-CM: 453.82     Gastroesophageal reflux disease, unspecified whether esophagitis present     ICD-10-CM: K21.9  ICD-9-CM: 530.81     Acute blood loss anemia     ICD-10-CM: D62  ICD-9-CM: 285.1     ETOH abuse     ICD-10-CM: F10.10  ICD-9-CM: 305.00     Current every day smoker     ICD-10-CM: F17.200  ICD-9-CM: 305.1           1.  Gastrointestinal hemorrhage with melena/acute blood loss anemia-check lab work tomorrow including CBC, ferritin, iron profile and CMP.  Recommend continuing with weekly iron infusions and continuing oral ferrous sulfate 325 mg daily.  Recommend a stool softener and MiraLAX as needed for constipation.  Take iron with vitamin C to enhance absorption.  Follow-up with GI as scheduled.  2.  Arm DVT left-repeat venous duplex left upper extremity to ensure resolution of DVT.  Recommend continuing off oral anticoagulants secondary to history of recurrent GI hemorrhage.  3.  Alcohol use/current everyday smoker-encouraged him to continue to avoid alcohol and work on smoking cessation.  Continue pantoprazole 40 mg daily.  Prescription refilled today.  Continue folic acid and thiamine supplements.  Prescriptions refilled today.    Follow-up in 6 weeks for CPE.

## 2023-12-15 ENCOUNTER — TELEPHONE (OUTPATIENT)
Dept: INTERNAL MEDICINE | Facility: CLINIC | Age: 43
End: 2023-12-15
Payer: COMMERCIAL

## 2023-12-15 NOTE — TELEPHONE ENCOUNTER
Caller: Radha Noriega    Relationship: Self    Best call back number: [phone]     Who are you requesting to speak with (clinical staff, provider,  specific staff member): CLINICAL     What was the call regarding: PATIENT STATED THAT HIS WORK HAS FAXED OVER PAPERWORK FOR [employer]ENITO JACKSON TO FILL OUT    PATIENT IS REQUESTING AN UPDATE ON THIS PAPERWORK    PLEASE ADVISE

## 2023-12-19 ENCOUNTER — TELEPHONE (OUTPATIENT)
Dept: INTERNAL MEDICINE | Facility: CLINIC | Age: 43
End: 2023-12-19

## 2023-12-19 ENCOUNTER — TELEPHONE (OUTPATIENT)
Dept: INTERNAL MEDICINE | Facility: CLINIC | Age: 43
End: 2023-12-19
Payer: COMMERCIAL

## 2023-12-19 NOTE — TELEPHONE ENCOUNTER
Caller: Radha Noriega    Relationship: Self    Best call back number: [phone]     What was the call regarding: PATIENT STATED THAT HE HAD FAXED OVER PAPERWORK YESTERDAY FOR SUE AND IS CHECKING ON THE STATUS OF IT. PLEASE ADVISE.

## 2023-12-19 NOTE — TELEPHONE ENCOUNTER
Caller: Radha Noriega    Relationship: Self    Best call back number: [phone]     What was the call regarding: PATIENT STATED THAT HE JUST HAD PAPERWORK FAXED OVER AGAIN AND IS CALLING TO SEE IF THAT HAS BEEN RECEIVED. PATIENT STATED HE CAN BRING HIS COPIES OF THE PAPERWORK INTO THE OFFICE. PLEASE ADVISE.

## 2024-07-24 ENCOUNTER — HOSPITAL ENCOUNTER (OUTPATIENT)
Facility: HOSPITAL | Age: 44
Setting detail: OBSERVATION
Discharge: HOME | End: 2024-07-26
Attending: EMERGENCY MEDICINE | Admitting: STUDENT IN AN ORGANIZED HEALTH CARE EDUCATION/TRAINING PROGRAM

## 2024-07-24 DIAGNOSIS — D64.9 SYMPTOMATIC ANEMIA: Primary | ICD-10-CM

## 2024-07-24 DIAGNOSIS — R55 VASOVAGAL SYNCOPE: ICD-10-CM

## 2024-07-24 DIAGNOSIS — D62 ACUTE BLOOD LOSS ANEMIA: ICD-10-CM

## 2024-07-24 LAB
ABO GROUP BLD: NORMAL
ALBUMIN SERPL-MCNC: 4.2 G/DL (ref 3.5–5.2)
ALBUMIN/GLOB SERPL: 1.5 G/DL
ALP SERPL-CCNC: 91 U/L (ref 39–117)
ALT SERPL W P-5'-P-CCNC: 16 U/L (ref 1–41)
ANION GAP SERPL CALCULATED.3IONS-SCNC: 10 MMOL/L (ref 5–15)
AST SERPL-CCNC: 24 U/L (ref 1–40)
BASOPHILS # BLD AUTO: 0.04 10*3/MM3 (ref 0–0.2)
BASOPHILS NFR BLD AUTO: 0.5 % (ref 0–1.5)
BILIRUB SERPL-MCNC: <0.2 MG/DL (ref 0–1.2)
BLD GP AB SCN SERPL QL: NEGATIVE
BUN SERPL-MCNC: 9 MG/DL (ref 6–20)
BUN/CREAT SERPL: 7.6 (ref 7–25)
CALCIUM SPEC-SCNC: 9 MG/DL (ref 8.6–10.5)
CHLORIDE SERPL-SCNC: 104 MMOL/L (ref 98–107)
CO2 SERPL-SCNC: 20 MMOL/L (ref 22–29)
CREAT SERPL-MCNC: 1.18 MG/DL (ref 0.76–1.27)
DEPRECATED RDW RBC AUTO: 59.6 FL (ref 37–54)
EGFRCR SERPLBLD CKD-EPI 2021: 78.5 ML/MIN/1.73
EOSINOPHIL # BLD AUTO: 0.08 10*3/MM3 (ref 0–0.4)
EOSINOPHIL NFR BLD AUTO: 0.9 % (ref 0.3–6.2)
ERYTHROCYTE [DISTWIDTH] IN BLOOD BY AUTOMATED COUNT: 21.2 % (ref 12.3–15.4)
FERRITIN SERPL-MCNC: 2.34 NG/ML (ref 30–400)
FOLATE SERPL-MCNC: 10.5 NG/ML (ref 4.78–24.2)
GLOBULIN UR ELPH-MCNC: 2.8 GM/DL
GLUCOSE BLDC GLUCOMTR-MCNC: 88 MG/DL (ref 70–130)
GLUCOSE SERPL-MCNC: 90 MG/DL (ref 65–99)
HCT VFR BLD AUTO: 18.4 % (ref 37.5–51)
HGB BLD-MCNC: 5.5 G/DL (ref 13–17.7)
HOLD SPECIMEN: NORMAL
HOLD SPECIMEN: NORMAL
IMM GRANULOCYTES # BLD AUTO: 0.04 10*3/MM3 (ref 0–0.05)
IMM GRANULOCYTES NFR BLD AUTO: 0.5 % (ref 0–0.5)
IRON 24H UR-MRATE: 9 MCG/DL (ref 59–158)
IRON SATN MFR SERPL: 2 % (ref 20–50)
LIPASE SERPL-CCNC: 36 U/L (ref 13–60)
LYMPHOCYTES # BLD AUTO: 1.47 10*3/MM3 (ref 0.7–3.1)
LYMPHOCYTES NFR BLD AUTO: 17.1 % (ref 19.6–45.3)
MCH RBC QN AUTO: 24 PG (ref 26.6–33)
MCHC RBC AUTO-ENTMCNC: 29.9 G/DL (ref 31.5–35.7)
MCV RBC AUTO: 80.3 FL (ref 79–97)
MONOCYTES # BLD AUTO: 0.63 10*3/MM3 (ref 0.1–0.9)
MONOCYTES NFR BLD AUTO: 7.3 % (ref 5–12)
NEUTROPHILS NFR BLD AUTO: 6.34 10*3/MM3 (ref 1.7–7)
NEUTROPHILS NFR BLD AUTO: 73.7 % (ref 42.7–76)
NRBC BLD AUTO-RTO: 0 /100 WBC (ref 0–0.2)
PLATELET # BLD AUTO: 412 10*3/MM3 (ref 140–450)
PMV BLD AUTO: 10.1 FL (ref 6–12)
POTASSIUM SERPL-SCNC: 4.5 MMOL/L (ref 3.5–5.2)
PROT SERPL-MCNC: 7 G/DL (ref 6–8.5)
QT INTERVAL: 370 MS
QTC INTERVAL: 427 MS
RBC # BLD AUTO: 2.29 10*6/MM3 (ref 4.14–5.8)
RH BLD: POSITIVE
SODIUM SERPL-SCNC: 134 MMOL/L (ref 136–145)
T&S EXPIRATION DATE: NORMAL
TIBC SERPL-MCNC: 569 MCG/DL (ref 298–536)
TRANSFERRIN SERPL-MCNC: 382 MG/DL (ref 200–360)
WBC NRBC COR # BLD AUTO: 8.6 10*3/MM3 (ref 3.4–10.8)
WHOLE BLOOD HOLD COAG: NORMAL
WHOLE BLOOD HOLD SPECIMEN: NORMAL

## 2024-07-24 PROCEDURE — 83690 ASSAY OF LIPASE: CPT | Performed by: EMERGENCY MEDICINE

## 2024-07-24 PROCEDURE — 86920 COMPATIBILITY TEST SPIN: CPT

## 2024-07-24 PROCEDURE — G0378 HOSPITAL OBSERVATION PER HR: HCPCS

## 2024-07-24 PROCEDURE — 82948 REAGENT STRIP/BLOOD GLUCOSE: CPT

## 2024-07-24 PROCEDURE — 82728 ASSAY OF FERRITIN: CPT | Performed by: NURSE PRACTITIONER

## 2024-07-24 PROCEDURE — P9016 RBC LEUKOCYTES REDUCED: HCPCS

## 2024-07-24 PROCEDURE — 84466 ASSAY OF TRANSFERRIN: CPT | Performed by: NURSE PRACTITIONER

## 2024-07-24 PROCEDURE — 86900 BLOOD TYPING SEROLOGIC ABO: CPT | Performed by: EMERGENCY MEDICINE

## 2024-07-24 PROCEDURE — 93010 ELECTROCARDIOGRAM REPORT: CPT | Performed by: INTERNAL MEDICINE

## 2024-07-24 PROCEDURE — 93005 ELECTROCARDIOGRAM TRACING: CPT | Performed by: EMERGENCY MEDICINE

## 2024-07-24 PROCEDURE — 36415 COLL VENOUS BLD VENIPUNCTURE: CPT | Performed by: EMERGENCY MEDICINE

## 2024-07-24 PROCEDURE — 86900 BLOOD TYPING SEROLOGIC ABO: CPT

## 2024-07-24 PROCEDURE — 36430 TRANSFUSION BLD/BLD COMPNT: CPT

## 2024-07-24 PROCEDURE — 99291 CRITICAL CARE FIRST HOUR: CPT

## 2024-07-24 PROCEDURE — 83540 ASSAY OF IRON: CPT | Performed by: NURSE PRACTITIONER

## 2024-07-24 PROCEDURE — 86901 BLOOD TYPING SEROLOGIC RH(D): CPT | Performed by: EMERGENCY MEDICINE

## 2024-07-24 PROCEDURE — 86922 COMPATIBILITY TEST ANTIGLOB: CPT

## 2024-07-24 PROCEDURE — 80053 COMPREHEN METABOLIC PANEL: CPT | Performed by: EMERGENCY MEDICINE

## 2024-07-24 PROCEDURE — 82746 ASSAY OF FOLIC ACID SERUM: CPT | Performed by: NURSE PRACTITIONER

## 2024-07-24 PROCEDURE — 86850 RBC ANTIBODY SCREEN: CPT | Performed by: EMERGENCY MEDICINE

## 2024-07-24 PROCEDURE — 85025 COMPLETE CBC W/AUTO DIFF WBC: CPT | Performed by: EMERGENCY MEDICINE

## 2024-07-24 RX ORDER — BISACODYL 10 MG
10 SUPPOSITORY, RECTAL RECTAL DAILY PRN
Status: DISCONTINUED | OUTPATIENT
Start: 2024-07-24 | End: 2024-07-26 | Stop reason: HOSPADM

## 2024-07-24 RX ORDER — ONDANSETRON 4 MG/1
4 TABLET, ORALLY DISINTEGRATING ORAL EVERY 6 HOURS PRN
Status: DISCONTINUED | OUTPATIENT
Start: 2024-07-24 | End: 2024-07-26 | Stop reason: HOSPADM

## 2024-07-24 RX ORDER — ONDANSETRON 2 MG/ML
4 INJECTION INTRAMUSCULAR; INTRAVENOUS EVERY 6 HOURS PRN
Status: DISCONTINUED | OUTPATIENT
Start: 2024-07-24 | End: 2024-07-26 | Stop reason: HOSPADM

## 2024-07-24 RX ORDER — POLYETHYLENE GLYCOL 3350 17 G/17G
17 POWDER, FOR SOLUTION ORAL DAILY PRN
Status: DISCONTINUED | OUTPATIENT
Start: 2024-07-24 | End: 2024-07-26 | Stop reason: HOSPADM

## 2024-07-24 RX ORDER — ACETAMINOPHEN 325 MG/1
650 TABLET ORAL EVERY 4 HOURS PRN
Status: DISCONTINUED | OUTPATIENT
Start: 2024-07-24 | End: 2024-07-26 | Stop reason: HOSPADM

## 2024-07-24 RX ORDER — NITROGLYCERIN 0.4 MG/1
0.4 TABLET SUBLINGUAL
Status: DISCONTINUED | OUTPATIENT
Start: 2024-07-24 | End: 2024-07-26 | Stop reason: HOSPADM

## 2024-07-24 RX ORDER — BISACODYL 5 MG/1
5 TABLET, DELAYED RELEASE ORAL DAILY PRN
Status: DISCONTINUED | OUTPATIENT
Start: 2024-07-24 | End: 2024-07-26 | Stop reason: HOSPADM

## 2024-07-24 RX ORDER — ACETAMINOPHEN 650 MG/1
650 SUPPOSITORY RECTAL EVERY 4 HOURS PRN
Status: DISCONTINUED | OUTPATIENT
Start: 2024-07-24 | End: 2024-07-26 | Stop reason: HOSPADM

## 2024-07-24 RX ORDER — ALUMINA, MAGNESIA, AND SIMETHICONE 2400; 2400; 240 MG/30ML; MG/30ML; MG/30ML
15 SUSPENSION ORAL EVERY 6 HOURS PRN
Status: DISCONTINUED | OUTPATIENT
Start: 2024-07-24 | End: 2024-07-26 | Stop reason: HOSPADM

## 2024-07-24 RX ORDER — SODIUM CHLORIDE 0.9 % (FLUSH) 0.9 %
10 SYRINGE (ML) INJECTION AS NEEDED
Status: DISCONTINUED | OUTPATIENT
Start: 2024-07-24 | End: 2024-07-26 | Stop reason: HOSPADM

## 2024-07-24 RX ORDER — ACETAMINOPHEN 160 MG/5ML
650 SOLUTION ORAL EVERY 4 HOURS PRN
Status: DISCONTINUED | OUTPATIENT
Start: 2024-07-24 | End: 2024-07-26 | Stop reason: HOSPADM

## 2024-07-24 RX ORDER — AMOXICILLIN 250 MG
2 CAPSULE ORAL 2 TIMES DAILY PRN
Status: DISCONTINUED | OUTPATIENT
Start: 2024-07-24 | End: 2024-07-26 | Stop reason: HOSPADM

## 2024-07-24 NOTE — ED NOTES
[facility] [place] [facility]  Patient was standing in line waiting to order food when he had some nausea and vomiting and passed out    Wife denies patient hitting head states he was out for aprox 20 second

## 2024-07-24 NOTE — ED PROVIDER NOTES
EMERGENCY DEPARTMENT ENCOUNTER    Room Number:  28/28  PCP: Hellen Skelton APRN  Historian: Patient, EMS    I initially evaluated the patient at 1801    HPI:  Chief Complaint: Syncope  A complete HPI/ROS/PMH/PSH/SH/FH are unobtainable due to: Nothing  Context: Radha Noriega V is a 44 y.o. male with a medical history of GI bleed, alcohol abuse, anemia who presents to the ED by EMS c/o acute syncopal episode that occurred just prior to arrival.  Patient was standing in line at [employer] waiting to order food when he began to feel hot, sweaty, lightheaded, and faint.  He then passed out.  He denies preceding headache, chest pain, abdominal pain, or shortness of breath.  His wife told EMS that he did not hit his head.  He was unconscious for about 20 seconds.  There were no reports of seizure activity.  When he regained consciousness, patient was nauseated and had a few episodes of nonbloody vomiting.  He currently complains of mild nausea.  Denies sustaining any injuries.  Denies recent illness, cough, fever, chest pain, abdominal pain, or palpitations.  Patient says he has some black stool couple weeks ago but none since.  He is not currently on any prescription medications.            PAST MEDICAL HISTORY  Active Ambulatory Problems     Diagnosis Date Noted    Gastrointestinal hemorrhage with melena 06/23/2023    ETOH abuse 06/23/2023    Migraine 02/09/2021    Current every day smoker 06/23/2023    Tachycardia 06/23/2023    Acute blood loss anemia 06/23/2023    Family history of diabetes mellitus 06/29/2023    Iron deficiency anemia 07/08/2023    Sepsis 07/13/2023    Metabolic acidosis 07/14/2023    Enteritis of possible infectious origin 07/14/2023    Blood per rectum 07/12/2023    Arm DVT (deep venous thromboembolism), acute, left 08/24/2023    Complication associated with peripherally inserted central catheter (PICC) 08/24/2023    GI bleed 10/30/2023    Lower GI bleed 10/30/2023     Resolved Ambulatory Problems      Diagnosis Date Noted    GI bleed 07/07/2023    ABLA (acute blood loss anemia) 07/12/2023    Lower GI bleed 10/30/2023     Past Medical History:   Diagnosis Date    Anemia     Tobacco dependence          PAST SURGICAL HISTORY  Past Surgical History:   Procedure Laterality Date    APPENDECTOMY      COLONOSCOPY N/A 06/25/2023    Procedure: COLONOSCOPY TO CECUM AND TERMINAL ILEUM WITH BIOPSIES AND COLD BIOPSY POLYPECTOMIES;  Surgeon: Imtiaz Lee MD;  Location: [facility] ENDOSCOPY;  Service: Gastroenterology;  Laterality: N/A;  PRE- MELENA  POST- COLON POLYPS, HEMORRHOIDS    COLONOSCOPY N/A 11/2/2023    Procedure: COLONOSCOPY AT BEDSIDE to cecum;  Surgeon: Mani Celeste MD;  Location: [facility] ENDOSCOPY;  Service: Gastroenterology;  Laterality: N/A;  Pre: anemia, gi bleed  Post: blood in colon, no active source    ENDOSCOPY Left 06/24/2023    Procedure: ESOPHAGOGASTRODUODENOSCOPY;  Surgeon: Imtiaz Lee MD;  Location: [facility] ENDOSCOPY;  Service: Gastroenterology;  Laterality: Left;  small hiatal hernia, esophagitis    ENDOSCOPY N/A 07/15/2023    Procedure: ESOPHAGOGASTRODUODENOSCOPY;  Surgeon: Ermelinda Dixon MD;  Location: [facility] ENDOSCOPY;  Service: Gastroenterology;  Laterality: N/A;  pre-anemia  post-hiatal hernia    ENTEROSCOPY SMALL BOWEL N/A 07/09/2023    Procedure: ENTEROSCOPY SMALL BOWEL;  Surgeon: Isrrael Gibson MD;  Location: [facility] ENDOSCOPY;  Service: Gastroenterology;  Laterality: N/A;  PRE- GI BLEED  POST- NORMAL    ENTEROSCOPY SMALL BOWEL N/A 11/2/2023    Procedure: ENTEROSCOPY SMALL BOWEL AT BEDSIDE;  Surgeon: Mani Celeste MD;  Location: [facility] ENDOSCOPY;  Service: Gastroenterology;  Laterality: N/A;  Pre: anemia. gi bleed  Post: hiatal hernia, gastritis, esophagitis    UPPER GASTROINTESTINAL ENDOSCOPY           FAMILY HISTORY  Family History   Problem Relation Age of Onset    Diabetes Father     Diabetes Sister     Hypertension Sister     Diabetes Brother     Diabetes Maternal Grandmother      Diabetes Maternal Grandfather     Diabetes Paternal Grandmother     Diabetes Paternal Grandfather     Heart attack Maternal Aunt 73    Colon cancer Neg Hx     Prostate cancer Neg Hx          SOCIAL HISTORY  Social History     Socioeconomic History    Marital status:    Tobacco Use    Smoking status: Every Day     Current packs/day: 1.00     Average packs/day: 1 pack/day for 20.0 years (20.0 ttl pk-yrs)     Types: Cigarettes    Smokeless tobacco: Never   Vaping Use    Vaping status: Never Used   Substance and Sexual Activity    Alcohol use: Not Currently     Comment: hasnt had a drink in a month. States he is under the care of PCP to reyna.    Drug use: Yes     Types: Marijuana     Comment: daily.    Sexual activity: Yes     Partners: Female         ALLERGIES  Patient has no known allergies.    REVIEW OF SYSTEMS  Review of Systems  Included in HPI  All systems reviewed and negative except for those discussed in HPI.      PHYSICAL EXAM  ED Triage Vitals [07/24/24 1722]   Temp Heart Rate Resp BP SpO2   98.4 °F (36.9 °C) 92 16 110/60 98 %      Temp src Heart Rate Source Patient Position BP Location FiO2 (%)   -- -- -- -- --       Physical Exam      GENERAL: Awake, alert, oriented x 3.  Well-developed, well-nourished male.  Resting comfortably in no acute distress  HENT: NCAT, nares patent  EYES: PERRL, EOMI  CV: regular rhythm, normal rate  RESPIRATORY: normal effort, clear to auscultation bilaterally  ABDOMEN: soft, nondistended, nontender  MUSCULOSKELETAL: Extremities are nontender with full range of motion.  C/T/L-spine and chest are nontender.  NEURO: Speech is normal.  No facial droop.  Follows commands.  Normal strength in all extremities.  GCS 15.  PSYCH:  calm, cooperative  SKIN: warm, dry    Vital signs and nursing notes reviewed.          LAB RESULTS  Recent Results (from the past 24 hour(s))   [facility]    Collection Time: 07/24/24  5:35 PM   Result Value Ref Range    Extra Tube Hold for  add-ons.    Light Blue Top    Collection Time: 07/24/24  5:35 PM   Result Value Ref Range    Extra Tube Hold for add-ons.    POC Glucose Once    Collection Time: 07/24/24  5:50 PM    Specimen: Blood   Result Value Ref Range    Glucose 88 70 - 130 mg/dL   ECG 12 Lead Syncope    Collection Time: 07/24/24  6:14 PM   Result Value Ref Range    QT Interval 370 ms    QTC Interval 427 ms   Comprehensive Metabolic Panel    Collection Time: 07/24/24  8:17 PM    Specimen: Blood   Result Value Ref Range    Glucose 90 65 - 99 mg/dL    BUN 9 6 - 20 mg/dL    Creatinine 1.18 0.76 - 1.27 mg/dL    Sodium 134 (L) 136 - 145 mmol/L    Potassium 4.5 3.5 - 5.2 mmol/L    Chloride 104 98 - 107 mmol/L    CO2 20.0 (L) 22.0 - 29.0 mmol/L    Calcium 9.0 8.6 - 10.5 mg/dL    Total Protein 7.0 6.0 - 8.5 g/dL    Albumin 4.2 3.5 - 5.2 g/dL    ALT (SGPT) 16 1 - 41 U/L    AST (SGOT) 24 1 - 40 U/L    Alkaline Phosphatase 91 39 - 117 U/L    Total Bilirubin <0.2 0.0 - 1.2 mg/dL    Globulin 2.8 gm/dL    A/G Ratio 1.5 g/dL    BUN/Creatinine Ratio 7.6 7.0 - 25.0    Anion Gap 10.0 5.0 - 15.0 mmol/L    eGFR 78.5 >60.0 mL/min/1.73   Lipase    Collection Time: 07/24/24  8:17 PM    Specimen: Blood   Result Value Ref Range    Lipase 36 13 - 60 U/L   Green Top (Gel)    Collection Time: 07/24/24  8:17 PM   Result Value Ref Range    Extra Tube Hold for add-ons.    Lavender Top    Collection Time: 07/24/24  8:17 PM   Result Value Ref Range    Extra Tube hold for add-on    CBC Auto Differential    Collection Time: 07/24/24  8:17 PM    Specimen: Blood   Result Value Ref Range    WBC 8.60 3.40 - 10.80 10*3/mm3    RBC 2.29 (L) 4.14 - 5.80 10*6/mm3    Hemoglobin 5.5 (C) 13.0 - 17.7 g/dL    Hematocrit 18.4 (C) 37.5 - 51.0 %    MCV 80.3 79.0 - 97.0 fL    MCH 24.0 (L) 26.6 - 33.0 pg    MCHC 29.9 (L) 31.5 - 35.7 g/dL    RDW 21.2 (H) 12.3 - 15.4 %    RDW-SD 59.6 (H) 37.0 - 54.0 fl    MPV 10.1 6.0 - 12.0 fL    Platelets 412 140 - 450 10*3/mm3    Neutrophil % 73.7 42.7 - 76.0 %     Lymphocyte % 17.1 (L) 19.6 - 45.3 %    Monocyte % 7.3 5.0 - 12.0 %    Eosinophil % 0.9 0.3 - 6.2 %    Basophil % 0.5 0.0 - 1.5 %    Immature Grans % 0.5 0.0 - 0.5 %    Neutrophils, Absolute 6.34 1.70 - 7.00 10*3/mm3    Lymphocytes, Absolute 1.47 0.70 - 3.10 10*3/mm3    Monocytes, Absolute 0.63 0.10 - 0.90 10*3/mm3    Eosinophils, Absolute 0.08 0.00 - 0.40 10*3/mm3    Basophils, Absolute 0.04 0.00 - 0.20 10*3/mm3    Immature Grans, Absolute 0.04 0.00 - 0.05 10*3/mm3    nRBC 0.0 0.0 - 0.2 /100 WBC       Ordered the above labs and reviewed the results.        RADIOLOGY  No Radiology Exams Resulted Within Past 24 Hours    Ordered the above noted radiological studies. Reviewed by me in PACS.            PROCEDURES  Critical Care    Performed by: Dmitriy Ovalle MD  Authorized by: Dmitriy Ovalle MD    Critical care provider statement:     Critical care time (minutes):  31    Critical care time was exclusive of:  Separately billable procedures and treating other patients    Critical care was necessary to treat or prevent imminent or life-threatening deterioration of the following conditions:  Cardiac failure and circulatory failure    Critical care was time spent personally by me on the following activities:  Development of treatment plan with patient or surrogate, discussions with consultants, evaluation of patient's response to treatment, examination of patient, obtaining history from patient or surrogate, ordering and performing treatments and interventions, ordering and review of laboratory studies, pulse oximetry, re-evaluation of patient's condition and review of old charts    I assumed direction of critical care for this patient from another provider in my specialty: no      Care discussed with: admitting provider            OUTPATIENT MEDICATION MANAGEMENT:  Current Facility-Administered Medications Ordered in Epic   Medication Dose Route Frequency Provider Last Rate Last Admin    sodium chloride 0.9 %  flush 10 mL  10 mL Intravenous PRN Dmitriy Ovalle MD         Current Outpatient Medications Ordered in Epic   Medication Sig Dispense Refill    bisacodyl (DULCOLAX) 5 MG EC tablet Take 1 tablet by mouth Daily As Needed for Constipation (Use if polyethylene glycol is ineffective).      dicyclomine (BENTYL) 10 MG capsule Take 1 capsule by mouth 4 (Four) Times a Day As Needed (abdominal cramping). (Patient not taking: Reported on 12/13/2023)      ferrous sulfate (FerrouSul) 325 (65 FE) MG tablet Take 1 tablet by mouth Daily With Breakfast. 90 tablet 1    folic acid (FOLVITE) 1 MG tablet Take 1 tablet by mouth Daily. 90 tablet 2    pantoprazole (PROTONIX) 40 MG EC tablet Take 1 tablet by mouth Daily. 90 tablet 2    thiamine (VITAMIN B1) 100 MG tablet Take 1 tablet by mouth Daily. 90 tablet 2           MEDICATIONS GIVEN IN ER  Medications   sodium chloride 0.9 % flush 10 mL (has no administration in time range)                   MEDICAL DECISION MAKING, PROGRESS, and CONSULTS    All labs have been independently reviewed by me.  All radiology studies have been reviewed by me and I have also reviewed the radiology report.   EKG's independently viewed and interpreted by me.  Discussion below represents my analysis of pertinent findings related to patient's condition, differential diagnosis, treatment plan and final disposition.      Additional sources:    - Discussed/ obtained information from independent historians: EMS, spouse at bedside    - External (non-ED) record review: Patient was last admitted here in October 2023 for acute GI bleed.  He was seen by GI and general surgery.  Endoscopy and CTA did not show clear source of bleeding..  He was transferred to [facility].    -Prescription drug monitoring program review:     N/A    - Chronic or social conditions impacting patient care (Social Determinants of Health): Health Care System (Health Coverage, Provider Availability, Provider Linguistic and Cultural Competency,  Quality of Care)  Patient does not currently have health insurance        Orders placed during this visit:  Orders Placed This Encounter   Procedures    Critical Care    [place] Draw    Comprehensive Metabolic Panel    Lipase    Urinalysis With Microscopic If Indicated (No Culture) - Urine, Clean Catch    CBC Auto Differential    Iron Profile    Ferritin    Folate    Vitamin B12    NPO Diet NPO Type: Strict NPO    Undress & Gown    Monitor Blood Pressure    Verify Informed Consent    [facility] (on-call MD unless specified) Details    POC Glucose Once    ECG 12 Lead Syncope    Telemetry Scan    Telemetry Scan    Prepare RBC, 2 Units    Type & Screen    Insert Peripheral IV    Initiate Observation Status    CBC & Differential    Green Top (Gel)    Lavender Top    Gold Top - SST    Light Blue Top         Additional orders considered but not ordered:          Differential diagnosis includes, but is not limited to:    Vasovagal episode, orthostasis, arrhythmia, seizure, electrolyte abnormality, anemia      Independent interpretation of labs, radiology studies, and discussions with consultants:  ED Course as of 07/24/24 2217 Wed Jul 24, 2024 1806 Glucose: 88 [WH]   1808 Patient presents to the ED after having a syncopal episode.  Symptoms are consistent with vasovagal syncope.  Will obtain labs and EKG for further evaluation.  Differential diagnosis includes but is not limited to: Vasovagal episode, orthostasis, arrhythmia, seizure [WH]   1910 EKG personally interpreted by me at 1820.  My personal interpretation is:          EKG time: 1814  Rhythm/Rate: Sinus rhythm, rate 80  P waves and [place]: Normal  QRS, axis: Normal  ST and T waves: Normal  QT interval: Normal    Interpreted Contemporaneously by me, independently viewed  EKG is changed compared to prior EKG done on 7/13/2023.  Rate was 137 and there were diffuse inverted T waves at that time.   [WH]   2041 Hemoglobin(!!): 5.5  5.7 eight months ago [WH]   2047 CBC results  were discussed with the patient and his wife.  Decision making was discussed concerning the need for blood transfusion and admission.  Patient is agreeable with this.  He is unsure when he last had his hemoglobin checked.  States he did have an iron infusion 7 to 8 months ago.  He currently does not have health insurance. [WH]   2103 Glucose: 90 [WH]   2103 BUN: 9 [WH]   2103 Creatinine: 1.18 [WH]   2104 Potassium: 4.5 [WH]   2104 Sodium(!): 134 [WH]   2127 Case discussed with RUT Boone, she agrees to admit the patient to Dr. Belcher.  Pertinent history, exam findings, ED course, test results and diagnoses were discussed with her. [WH]   2150 MDM: Patient presented the ED after having a syncopal episode.  Symptoms were suggestive of a vasovagal episode.  EKG was normal and unchanged.  Patient was found to have profound anemia with a hemoglobin of 5.5.  He has a history of anemia due to GI bleeds.  He has not had his hemoglobin checked for at least 6 months.  2 units of PRBCs were ordered for transfusion.  Patient was hemodynamically stable in the ED.  Will be admitted.  Critical care was performed on this patient. [WH]      ED Course User Index  [WH] Dmitriy Ovalle MD         COMPLEXITY OF CARE  The patient requires admission.      DIAGNOSIS  Final diagnoses:   Symptomatic anemia   Vasovagal syncope         DISPOSITION  ADMISSION    Discussed treatment plan and reason for admission with pt/family and admitting physician.  Pt/family voiced understanding of the plan for admission for further testing/treatment as needed.               Latest Documented Vital Signs:  AS OF 22:17 EDT VITALS:    BP - 124/74  HR - 74  TEMP - 98.4 °F (36.9 °C)  O2 SATS - 100%            --    Please note that portions of this were completed with a voice recognition program.       Note Disclaimer: At [facility], we believe that sharing information builds trust and better relationships. You are receiving this note because you are  receiving care at [facility] or recently visited. It is possible you will see health information before a provider has talked with you about it. This kind of information can be easy to misunderstand. To help you fully understand what it means for your health, we urge you to discuss this note with your provider.             Dmitriy Ovalle MD  07/24/24 3809

## 2024-07-25 LAB
ANION GAP SERPL CALCULATED.3IONS-SCNC: 10 MMOL/L (ref 5–15)
BH BB BLOOD EXPIRATION DATE: NORMAL
BH BB BLOOD EXPIRATION DATE: NORMAL
BH BB BLOOD TYPE BARCODE: 7300
BH BB BLOOD TYPE BARCODE: 7300
BH BB DISPENSE STATUS: NORMAL
BH BB DISPENSE STATUS: NORMAL
BH BB PRODUCT CODE: NORMAL
BH BB PRODUCT CODE: NORMAL
BH BB UNIT NUMBER: NORMAL
BH BB UNIT NUMBER: NORMAL
BUN SERPL-MCNC: 9 MG/DL (ref 6–20)
BUN/CREAT SERPL: 8.2 (ref 7–25)
CALCIUM SPEC-SCNC: 9.1 MG/DL (ref 8.6–10.5)
CHLORIDE SERPL-SCNC: 106 MMOL/L (ref 98–107)
CO2 SERPL-SCNC: 22 MMOL/L (ref 22–29)
CREAT SERPL-MCNC: 1.1 MG/DL (ref 0.76–1.27)
CROSSMATCH INTERPRETATION: NORMAL
CROSSMATCH INTERPRETATION: NORMAL
DEPRECATED RDW RBC AUTO: 57.1 FL (ref 37–54)
EGFRCR SERPLBLD CKD-EPI 2021: 85.4 ML/MIN/1.73
ERYTHROCYTE [DISTWIDTH] IN BLOOD BY AUTOMATED COUNT: 19.8 % (ref 12.3–15.4)
GLUCOSE SERPL-MCNC: 83 MG/DL (ref 65–99)
HCT VFR BLD AUTO: 26.4 % (ref 37.5–51)
HCT VFR BLD AUTO: 26.9 % (ref 37.5–51)
HCT VFR BLD AUTO: 28.2 % (ref 37.5–51)
HGB BLD-MCNC: 8.2 G/DL (ref 13–17.7)
HGB BLD-MCNC: 8.2 G/DL (ref 13–17.7)
HGB BLD-MCNC: 8.6 G/DL (ref 13–17.7)
MCH RBC QN AUTO: 25.2 PG (ref 26.6–33)
MCHC RBC AUTO-ENTMCNC: 31.1 G/DL (ref 31.5–35.7)
MCV RBC AUTO: 81 FL (ref 79–97)
PLATELET # BLD AUTO: 349 10*3/MM3 (ref 140–450)
PMV BLD AUTO: 9.9 FL (ref 6–12)
POTASSIUM SERPL-SCNC: 4.1 MMOL/L (ref 3.5–5.2)
RBC # BLD AUTO: 3.26 10*6/MM3 (ref 4.14–5.8)
SODIUM SERPL-SCNC: 138 MMOL/L (ref 136–145)
UNIT  ABO: NORMAL
UNIT  ABO: NORMAL
UNIT  RH: NORMAL
UNIT  RH: NORMAL
VIT B12 BLD-MCNC: 665 PG/ML (ref 211–946)
WBC NRBC COR # BLD AUTO: 6.25 10*3/MM3 (ref 3.4–10.8)

## 2024-07-25 PROCEDURE — 63710000001 DIPHENHYDRAMINE PER 50 MG: Performed by: STUDENT IN AN ORGANIZED HEALTH CARE EDUCATION/TRAINING PROGRAM

## 2024-07-25 PROCEDURE — 85014 HEMATOCRIT: CPT | Performed by: STUDENT IN AN ORGANIZED HEALTH CARE EDUCATION/TRAINING PROGRAM

## 2024-07-25 PROCEDURE — 85018 HEMOGLOBIN: CPT | Performed by: STUDENT IN AN ORGANIZED HEALTH CARE EDUCATION/TRAINING PROGRAM

## 2024-07-25 PROCEDURE — 96361 HYDRATE IV INFUSION ADD-ON: CPT

## 2024-07-25 PROCEDURE — P9016 RBC LEUKOCYTES REDUCED: HCPCS

## 2024-07-25 PROCEDURE — G0378 HOSPITAL OBSERVATION PER HR: HCPCS

## 2024-07-25 PROCEDURE — 96366 THER/PROPH/DIAG IV INF ADDON: CPT

## 2024-07-25 PROCEDURE — 25010000002 NA FERRIC GLUC CPLX PER 12.5 MG: Performed by: STUDENT IN AN ORGANIZED HEALTH CARE EDUCATION/TRAINING PROGRAM

## 2024-07-25 PROCEDURE — 99244 OFF/OP CNSLTJ NEW/EST MOD 40: CPT | Performed by: INTERNAL MEDICINE

## 2024-07-25 PROCEDURE — 80048 BASIC METABOLIC PNL TOTAL CA: CPT | Performed by: NURSE PRACTITIONER

## 2024-07-25 PROCEDURE — 96375 TX/PRO/DX INJ NEW DRUG ADDON: CPT

## 2024-07-25 PROCEDURE — 85027 COMPLETE CBC AUTOMATED: CPT | Performed by: NURSE PRACTITIONER

## 2024-07-25 PROCEDURE — 25810000003 SODIUM CHLORIDE 0.9 % SOLUTION: Performed by: STUDENT IN AN ORGANIZED HEALTH CARE EDUCATION/TRAINING PROGRAM

## 2024-07-25 PROCEDURE — 86900 BLOOD TYPING SEROLOGIC ABO: CPT

## 2024-07-25 PROCEDURE — 36430 TRANSFUSION BLD/BLD COMPNT: CPT

## 2024-07-25 PROCEDURE — 96365 THER/PROPH/DIAG IV INF INIT: CPT

## 2024-07-25 PROCEDURE — 82607 VITAMIN B-12: CPT | Performed by: NURSE PRACTITIONER

## 2024-07-25 RX ORDER — PANTOPRAZOLE SODIUM 40 MG/10ML
40 INJECTION, POWDER, LYOPHILIZED, FOR SOLUTION INTRAVENOUS
Status: DISCONTINUED | OUTPATIENT
Start: 2024-07-25 | End: 2024-07-25

## 2024-07-25 RX ORDER — DIPHENHYDRAMINE HCL 50 MG
50 CAPSULE ORAL ONCE
Status: COMPLETED | OUTPATIENT
Start: 2024-07-25 | End: 2024-07-25

## 2024-07-25 RX ORDER — SODIUM CHLORIDE 9 MG/ML
100 INJECTION, SOLUTION INTRAVENOUS CONTINUOUS
Status: DISCONTINUED | OUTPATIENT
Start: 2024-07-25 | End: 2024-07-26 | Stop reason: HOSPADM

## 2024-07-25 RX ORDER — PANTOPRAZOLE SODIUM 40 MG/1
40 TABLET, DELAYED RELEASE ORAL
Status: DISCONTINUED | OUTPATIENT
Start: 2024-07-25 | End: 2024-07-26 | Stop reason: HOSPADM

## 2024-07-25 RX ORDER — ACETAMINOPHEN 325 MG/1
650 TABLET ORAL ONCE
Status: COMPLETED | OUTPATIENT
Start: 2024-07-25 | End: 2024-07-25

## 2024-07-25 RX ADMIN — ACETAMINOPHEN 325MG 650 MG: 325 TABLET ORAL at 16:17

## 2024-07-25 RX ADMIN — PANTOPRAZOLE SODIUM 40 MG: 40 INJECTION, POWDER, FOR SOLUTION INTRAVENOUS at 09:30

## 2024-07-25 RX ADMIN — SODIUM CHLORIDE 100 ML/HR: 9 INJECTION, SOLUTION INTRAVENOUS at 09:30

## 2024-07-25 RX ADMIN — SODIUM CHLORIDE 250 MG: 9 INJECTION, SOLUTION INTRAVENOUS at 16:39

## 2024-07-25 RX ADMIN — DIPHENHYDRAMINE HCL 50 MG: 50 CAPSULE ORAL at 16:17

## 2024-07-25 NOTE — H&P
Patient Name:  Radha Noriega V  YOB: 1980  MRN:  [id number]  Admit Date:  7/24/2024  Patient Care Team:  Hellen Skelton APRN as PCP - General (Nurse Practitioner)  Merly Ruggiero PA-C as Physician Assistant (Physician Assistant)      Subjective   History Present Illness     Chief Complaint   Patient presents with    Nausea    Vomiting       Mr. Noriega is a 44 y.o. male with a history of previous GI bleed that presents to [facility] complaining of syncopal episode.  Patient was in line at [employer] when he began feeling hot and sweaty.  He sat down because he was lightheaded.  After sitting down, he passed out.  Reportedly did not hit his head.  Wife was with him at the the time of the incident.  EMS was called and patient was brought here for further evaluation.  He has been in his usual state of health leading up to this.  Denies any nausea or vomiting.  No abdominal pains.  States that he did have some dark stools a couple weeks ago but has not had any since that time.  Patient was admitted in October of last year with acute GI bleed.  During that admission, endoscopy did not reveal a source and neither did CT angiogram.  The patient was ultimately transferred to [facility] for further management. On arrival, Hgb was found to be 5.5.  Patient was transfused 2 unit PRBC and admitted to [facility] for further management.    Personal History     Past Medical History:   Diagnosis Date    Anemia     ETOH abuse     Tachycardia 06/23/2023    Tobacco dependence      Past Surgical History:   Procedure Laterality Date    APPENDECTOMY      COLONOSCOPY N/A 06/25/2023    Procedure: COLONOSCOPY TO CECUM AND TERMINAL ILEUM WITH BIOPSIES AND COLD BIOPSY POLYPECTOMIES;  Surgeon: Imtiaz Lee MD;  Location: [facility] ENDOSCOPY;  Service: Gastroenterology;  Laterality: N/A;  PRE- MELENA  POST- COLON POLYPS, HEMORRHOIDS    COLONOSCOPY N/A 11/2/2023    Procedure: COLONOSCOPY AT  BEDSIDE to cecum;  Surgeon: Mani Celeste MD;  Location:  [facility] ENDOSCOPY;  Service: Gastroenterology;  Laterality: N/A;  Pre: anemia, gi bleed  Post: blood in colon, no active source    ENDOSCOPY Left 06/24/2023    Procedure: ESOPHAGOGASTRODUODENOSCOPY;  Surgeon: Imtiaz Lee MD;  Location:  [facility] ENDOSCOPY;  Service: Gastroenterology;  Laterality: Left;  small hiatal hernia, esophagitis    ENDOSCOPY N/A 07/15/2023    Procedure: ESOPHAGOGASTRODUODENOSCOPY;  Surgeon: Ermelinda Dixon MD;  Location: [facility] ENDOSCOPY;  Service: Gastroenterology;  Laterality: N/A;  pre-anemia  post-hiatal hernia    ENTEROSCOPY SMALL BOWEL N/A 07/09/2023    Procedure: ENTEROSCOPY SMALL BOWEL;  Surgeon: Isrrael Gibson MD;  Location: [facility] ENDOSCOPY;  Service: Gastroenterology;  Laterality: N/A;  PRE- GI BLEED  POST- NORMAL    ENTEROSCOPY SMALL BOWEL N/A 11/2/2023    Procedure: ENTEROSCOPY SMALL BOWEL AT BEDSIDE;  Surgeon: Mani Celeste MD;  Location: [facility] ENDOSCOPY;  Service: Gastroenterology;  Laterality: N/A;  Pre: anemia. gi bleed  Post: hiatal hernia, gastritis, esophagitis    UPPER GASTROINTESTINAL ENDOSCOPY       Family History   Problem Relation Age of Onset    Diabetes Father     Diabetes Sister     Hypertension Sister     Diabetes Brother     Diabetes Maternal Grandmother     Diabetes Maternal Grandfather     Diabetes Paternal Grandmother     Diabetes Paternal Grandfather     Heart attack Maternal Aunt 73    Colon cancer Neg Hx     Prostate cancer Neg Hx      Social History     Tobacco Use    Smoking status: Every Day     Current packs/day: 1.00     Average packs/day: 1 pack/day for 20.0 years (20.0 ttl pk-yrs)     Types: Cigarettes    Smokeless tobacco: Never   Vaping Use    Vaping status: Never Used   Substance Use Topics    Alcohol use: Not Currently     Comment: hasnt had a drink in a month. States he is under the care of PCP to [facility].    Drug use: Yes     Types: Marijuana     Comment: daily.     No current  facility-administered medications on file prior to encounter.     Current Outpatient Medications on File Prior to Encounter   Medication Sig Dispense Refill    bisacodyl (DULCOLAX) 5 MG EC tablet Take 1 tablet by mouth Daily As Needed for Constipation (Use if polyethylene glycol is ineffective).      dicyclomine (BENTYL) 10 MG capsule Take 1 capsule by mouth 4 (Four) Times a Day As Needed (abdominal cramping). (Patient not taking: Reported on 12/13/2023)      ferrous sulfate (FerrouSul) 325 (65 FE) MG tablet Take 1 tablet by mouth Daily With Breakfast. 90 tablet 1    folic acid (FOLVITE) 1 MG tablet Take 1 tablet by mouth Daily. 90 tablet 2    pantoprazole (PROTONIX) 40 MG EC tablet Take 1 tablet by mouth Daily. 90 tablet 2    thiamine (VITAMIN B1) 100 MG tablet Take 1 tablet by mouth Daily. 90 tablet 2     No Known Allergies    Objective    Objective     Vital Signs  Temp:  [97.7 °F (36.5 °C)-98.4 °F (36.9 °C)] 98.1 °F (36.7 °C)  Heart Rate:  [69-93] 69  Resp:  [16-18] 18  BP: (110-132)/(49-86) 111/60  SpO2:  [98 %-100 %] 100 %  on   ;   Device (Oxygen Therapy): room air  Body mass index is 26.3 kg/m².    Physical Exam  General: Alert, no acute distress.  Lying in bed.  Very pleasant.  ENT: No conjunctival injection or scleral icterus. Moist mucous membranes. No JVD.   Neuro: Eyes open and moving in all directions, strength normal in all extremities, no focal deficits.   Lungs: Clear to auscultation bilaterally. No wheeze or crackles. No distress.   Heart: RRR, no murmurs. No edema.  Abdomen: Soft, non-tender, non-distended. Normal bowel sounds.   Ext: Warm and well-perfused. No edema.   Skin: No rashes or lesions. IV site without swelling or erythema.     Lab Results (last 24 hours)       Procedure Component Value Units Date/Time    POC Glucose Once [[id number]]  (Normal) Collected: 07/24/24 1750    Specimen: Blood Updated: 07/24/24 1750     Glucose 88 mg/dL     CBC & Differential [[id number]]  (Abnormal) Collected:  07/24/24 2017    Specimen: Blood Updated: 07/24/24 2037    Narrative:      The following orders were created for panel order CBC & Differential.  Procedure                               Abnormality         Status                     ---------                               -----------         ------                     CBC Auto Differential[[id number]]        Abnormal            Final result                 Please view results for these tests on the individual orders.    Comprehensive Metabolic Panel [[id number]]  (Abnormal) Collected: 07/24/24 2017    Specimen: Blood Updated: 07/24/24 2056     Glucose 90 mg/dL      BUN 9 mg/dL      Creatinine 1.18 mg/dL      Sodium 134 mmol/L      Potassium 4.5 mmol/L      Comment: Slight hemolysis detected by analyzer. Result may be falsely elevated.        Chloride 104 mmol/L      CO2 20.0 mmol/L      Calcium 9.0 mg/dL      Total Protein 7.0 g/dL      Albumin 4.2 g/dL      ALT (SGPT) 16 U/L      AST (SGOT) 24 U/L      Comment: Slight hemolysis detected by analyzer. Result may be falsely elevated.        Alkaline Phosphatase 91 U/L      Total Bilirubin <0.2 mg/dL      Globulin 2.8 gm/dL      A/G Ratio 1.5 g/dL      BUN/Creatinine Ratio 7.6     Anion Gap 10.0 mmol/L      eGFR 78.5 mL/min/1.73     Narrative:      GFR Normal >60  Chronic Kidney Disease <60  Kidney Failure <15      Lipase [[id number]]  (Normal) Collected: 07/24/24 2017    Specimen: Blood Updated: 07/24/24 2054     Lipase 36 U/L     CBC Auto Differential [[id number]]  (Abnormal) Collected: 07/24/24 2017    Specimen: Blood Updated: 07/24/24 2037     WBC 8.60 10*3/mm3      RBC 2.29 10*6/mm3      Hemoglobin 5.5 g/dL      Hematocrit 18.4 %      MCV 80.3 fL      MCH 24.0 pg      MCHC 29.9 g/dL      RDW 21.2 %      RDW-SD 59.6 fl      MPV 10.1 fL      Platelets 412 10*3/mm3      Neutrophil % 73.7 %      Lymphocyte % 17.1 %      Monocyte % 7.3 %      Eosinophil % 0.9 %      Basophil % 0.5 %      Immature Grans % 0.5 %       Neutrophils, Absolute 6.34 10*3/mm3      Lymphocytes, Absolute 1.47 10*3/mm3      Monocytes, Absolute 0.63 10*3/mm3      Eosinophils, Absolute 0.08 10*3/mm3      Basophils, Absolute 0.04 10*3/mm3      Immature Grans, Absolute 0.04 10*3/mm3      nRBC 0.0 /100 WBC     Iron Profile [[id number]]  (Abnormal) Collected: 07/24/24 2150    Specimen: Blood Updated: 07/24/24 2223     Iron 9 mcg/dL      Iron Saturation (TSAT) 2 %      Transferrin 382 mg/dL      TIBC 569 mcg/dL     Ferritin [[id number]]  (Abnormal) Collected: 07/24/24 2150    Specimen: Blood Updated: 07/24/24 2238     Ferritin 2.34 ng/mL     Narrative:      Results may be falsely decreased if patient taking Biotin.      Folate [[id number]]  (Normal) Collected: 07/24/24 2150    Specimen: Blood Updated: 07/24/24 2238     Folate 10.50 ng/mL     Narrative:      Results may be falsely increased if patient taking Biotin.      Vitamin B12 [[id number]]  (Normal) Collected: 07/25/24 0532    Specimen: Blood Updated: 07/25/24 0643     Vitamin B-12 665 pg/mL     Narrative:      Results may be falsely increased if patient taking Biotin.      CBC (No Diff) [[id number]]  (Abnormal) Collected: 07/25/24 0532    Specimen: Blood Updated: 07/25/24 0616     WBC 6.25 10*3/mm3      RBC 3.26 10*6/mm3      Hemoglobin 8.2 g/dL      Hematocrit 26.4 %      MCV 81.0 fL      MCH 25.2 pg      MCHC 31.1 g/dL      RDW 19.8 %      RDW-SD 57.1 fl      MPV 9.9 fL      Platelets 349 10*3/mm3     Basic Metabolic Panel [[id number]]  (Normal) Collected: 07/25/24 0532    Specimen: Blood Updated: 07/25/24 0637     Glucose 83 mg/dL      BUN 9 mg/dL      Creatinine 1.10 mg/dL      Sodium 138 mmol/L      Potassium 4.1 mmol/L      Chloride 106 mmol/L      CO2 22.0 mmol/L      Calcium 9.1 mg/dL      BUN/Creatinine Ratio 8.2     Anion Gap 10.0 mmol/L      eGFR 85.4 mL/min/1.73     Narrative:      GFR Normal >60  Chronic Kidney Disease <60  Kidney Failure <15              Imaging Results (Last 24 Hours)       ** No  results found for the last 24 hours. **              ECG 12 Lead Syncope   Final Result   HEART RATE=80  bpm   RR Nufdlomt=843  ms   AK Letasfgl=210  ms   P Horizontal Axis=-54  deg   P Front Axis=61  deg   QRSD Interval=84  ms   QT Lvozbnno=620  ms   WZaL=317  ms   QRS Axis=56  deg   T Wave Axis=37  deg   - NORMAL ECG -   Sinus rhythm   When compared with ECG of 13-Jul-2023 06:12:14,   Significant rate decrease with improvement in repolarization abnormalities   Electronically Signed By: Wojciech Morales ([facility]) 2024-07-24 21:32:45   Date and Time of Study:2024-07-24 18:14:42      Telemetry Scan   Final Result      Telemetry Scan   Final Result      Telemetry Scan   Final Result        Assessment/Plan   Assessment & Plan   Active Hospital Problems    Diagnosis  POA    **Symptomatic anemia [D64.9]  Yes      Resolved Hospital Problems   No resolved problems to display.       44 y.o. male admitted with Symptomatic anemia.    Severe iron deficiency anemia  Concern for GI bleed  -Hgb 5.5 on arrival, s/p 2 unit PRBC  -Fecal occult ordered  -No evidence of gross GI bleed at this time, but does have history of this  -GI consulted  -N.p.o. pending GI evaluation  -Labs indicative of severe iron deficiency, start IV iron today  -Hgb has improved to 8.8  -Monitor H&H every 8 hours  -Normal saline at 100 cc/hour  -Pending GI evaluation and decision on endoscopy, if no evidence of GI bleed could consider hematology consultation.  However, at this time given his history, GI bleed certainly seems to be the most likely etiology.    Syncope  -Likely secondary to severe anemia and possible GIB  -EKG with no heart block, sinus rhythm  -Monitor on telemetry  -Management of anemia as above    Substance use disorder  -Denies daily alcohol consumption  -Smokes marijuana daily    SCDs for DVT prophylaxis.  Full code.  Discussed with patient.      Ermelinda Mayes MD  [facility]  07/25/24  08:10 EDT

## 2024-07-25 NOTE — CONSULTS
[facility]  Initial Inpatient Consult Note    Referring Provider: Dr. ANNEL Mayes    Reason for Consultation: Symptomatic anemia    Subjective     History of present illness:    44 y.o. male with a history of GI bleeding of obscure etiology (while on anticoagulation) who was admitted 7/24/2024 with syncope and was noted to be anemic again on admission.  His hemoglobin was 5.5, hematocrit of 18.4, MCV of 80 on admission.  The patient was transfused 2 units of packed red blood cells and his hemoglobin is 8.2 today.  Patient is iron deficient.  He did have a dark bowel movement a month ago.  No diarrhea or constipation.  No abdominal pain or chest pain.  No nausea or vomiting.  He did stop the Protonix and iron a month or more ago because his insurance stopped paying for it.  He does take Excedrin as needed but otherwise does not take any nonsteroidal anti-inflammatory drugs.  He has no heartburn.  No dysphagia.  His weight is stable.  He is not on any blood thinners now.  He does smoke half pack per day.  Denies alcohol use.  He is unemployed now.  He is  and has 4 children.       In 11 of 23 he had an EGD with small bowel enteroscopy (that showed grade a esophagitis and gastritis but otherwise was unrevealing) and colonoscopy ( blood seen in the entire colon but was otherwise unrevealing) by Dr. Artur Celeste.       He was last admitted with melena in 11 of 2023.  EGD, small bowel enteroscopy and colonoscopy did not find a bleeding source.  CT angiogram also did not show a clear source.  The patient was transferred to [facility] for further evaluation. He was also admitted 6/23 and 7/23 with GI bleeding.        In 11/23 he had a Capsule Endoscopy at  that did not show a small bowel bleeding source.  He said that he has had another capsule endoscopy here at [facility] that was unrevealing.    Past Medical History:  Past Medical History:   Diagnosis Date    Anemia      ETOH abuse     Tachycardia 06/23/2023    Tobacco dependence      Past Surgical History:  Past Surgical History:   Procedure Laterality Date    APPENDECTOMY      COLONOSCOPY N/A 06/25/2023    Procedure: COLONOSCOPY TO CECUM AND TERMINAL ILEUM WITH BIOPSIES AND COLD BIOPSY POLYPECTOMIES;  Surgeon: Imtiaz Lee MD;  Location: [facility] ENDOSCOPY;  Service: Gastroenterology;  Laterality: N/A;  PRE- MELENA  POST- COLON POLYPS, HEMORRHOIDS    COLONOSCOPY N/A 11/2/2023    Procedure: COLONOSCOPY AT BEDSIDE to cecum;  Surgeon: Mani Celeste MD;  Location: [facility] ENDOSCOPY;  Service: Gastroenterology;  Laterality: N/A;  Pre: anemia, gi bleed  Post: blood in colon, no active source    ENDOSCOPY Left 06/24/2023    Procedure: ESOPHAGOGASTRODUODENOSCOPY;  Surgeon: Imtiaz Lee MD;  Location: [facility] ENDOSCOPY;  Service: Gastroenterology;  Laterality: Left;  small hiatal hernia, esophagitis    ENDOSCOPY N/A 07/15/2023    Procedure: ESOPHAGOGASTRODUODENOSCOPY;  Surgeon: Ermelinda Dixon MD;  Location: [facility] ENDOSCOPY;  Service: Gastroenterology;  Laterality: N/A;  pre-anemia  post-hiatal hernia    ENTEROSCOPY SMALL BOWEL N/A 07/09/2023    Procedure: ENTEROSCOPY SMALL BOWEL;  Surgeon: Isrrael Gibson MD;  Location: [facility] ENDOSCOPY;  Service: Gastroenterology;  Laterality: N/A;  PRE- GI BLEED  POST- NORMAL    ENTEROSCOPY SMALL BOWEL N/A 11/2/2023    Procedure: ENTEROSCOPY SMALL BOWEL AT BEDSIDE;  Surgeon: Mani Celeste MD;  Location: [facility] ENDOSCOPY;  Service: Gastroenterology;  Laterality: N/A;  Pre: anemia. gi bleed  Post: hiatal hernia, gastritis, esophagitis    UPPER GASTROINTESTINAL ENDOSCOPY        Social History:   Social History     Tobacco Use    Smoking status: Every Day     Current packs/day: 1.00     Average packs/day: 1 pack/day for 20.0 years (20.0 ttl pk-yrs)     Types: Cigarettes    Smokeless tobacco: Never   Substance Use Topics    Alcohol use: Not Currently     Comment: hasnt had a drink in a month.  States he is under the care of PCP to [facility].      Family History:  Family History   Problem Relation Age of Onset    Diabetes Father     Diabetes Sister     Hypertension Sister     Diabetes Brother     Diabetes Maternal Grandmother     Diabetes Maternal Grandfather     Diabetes Paternal Grandmother     Diabetes Paternal Grandfather     Heart attack Maternal Aunt 73    Colon cancer Neg Hx     Prostate cancer Neg Hx        Home Meds:  Medications Prior to Admission   Medication Sig Dispense Refill Last Dose    bisacodyl (DULCOLAX) 5 MG EC tablet Take 1 tablet by mouth Daily As Needed for Constipation (Use if polyethylene glycol is ineffective).   More than a month    dicyclomine (BENTYL) 10 MG capsule Take 1 capsule by mouth 4 (Four) Times a Day As Needed (abdominal cramping). (Patient not taking: Reported on 12/13/2023)   More than a month    ferrous sulfate (FerrouSul) 325 (65 FE) MG tablet Take 1 tablet by mouth Daily With Breakfast. 90 tablet 1 More than a month    folic acid (FOLVITE) 1 MG tablet Take 1 tablet by mouth Daily. 90 tablet 2 More than a month    pantoprazole (PROTONIX) 40 MG EC tablet Take 1 tablet by mouth Daily. 90 tablet 2 More than a month    thiamine (VITAMIN B1) 100 MG tablet Take 1 tablet by mouth Daily. 90 tablet 2 More than a month     Current Meds:   pantoprazole, 40 mg, Intravenous, BID AC      Allergies:  No Known Allergies  Review of Systems  The following systems were reviewed and negative;  respiratory, cardiovascular, musculoskeletal, and neurological     Objective     Vital Signs  Temp:  [97.7 °F (36.5 °C)-98.4 °F (36.9 °C)] 98.1 °F (36.7 °C)  Heart Rate:  [69-93] 69  Resp:  [16-18] 18  BP: (110-132)/(49-86) 111/60  Physical Exam:  General Appearance:    Alert, cooperative, in no acute distress   Head:    Normocephalic, without obvious abnormality, atraumatic   Eyes:            Lids and lashes normal, conjunctivae and sclerae normal, no   icterus   Throat:   No oral lesions, no  thrush, oral mucosa moist   Neck:   No adenopathy, supple, trachea midline, no thyromegaly, no   carotid bruit, no JVD   Lungs:     Clear to auscultation,respirations regular, even and                   unlabored    Heart:    Regular rhythm and normal rate, normal S1 and S2, no            murmur, no gallop, no rub, no click   Chest Wall:    No abnormalities observed   Abdomen:     Normal bowel sounds, no masses, no organomegaly, soft        nontender, nondistended, no guarding, no rebound                 tenderness   Rectal:     Deferred   Extremities:   no edema, no cyanosis, no redness   Skin:   No bleeding, bruising or rash   Lymph nodes:   No palpable adenopathy   Psychiatric:  Judgement and insight: normal   Orientation to person place and time: normal   Mood and affect: normal   Results Review:   I reviewed the patient's new clinical results.    Results from last 7 days   Lab Units 07/25/24  0532 07/24/24 2017   WBC 10*3/mm3 6.25 8.60   HEMOGLOBIN g/dL 8.2* 5.5*   HEMATOCRIT % 26.4* 18.4*   PLATELETS 10*3/mm3 349 412     Results from last 7 days   Lab Units 07/25/24  0532 07/24/24 2017   SODIUM mmol/L 138 134*   POTASSIUM mmol/L 4.1 4.5   CHLORIDE mmol/L 106 104   CO2 mmol/L 22.0 20.0*   BUN mg/dL 9 9   CREATININE mg/dL 1.10 1.18   CALCIUM mg/dL 9.1 9.0   BILIRUBIN mg/dL  --  <0.2   ALK PHOS U/L  --  91   ALT (SGPT) U/L  --  16   AST (SGOT) U/L  --  24   GLUCOSE mg/dL 83 90         Lab Results   Lab Value Date/Time    LIPASE 36 07/24/2024 2017    LIPASE 11 (L) 07/12/2023 0612    LIPASE 26 06/23/2023 1201       Radiology:  No orders to display       Assessment & Plan   Assessment:   Recurrent symptomatic iron deficiency anemia.  He did have dark stool a month ago but no gross evidence of GI bleeding now.  He does take Excedrin as needed but otherwise denies any nonsteroidal anti-inflammatory drugs.  He last had an EGD, small bowel enteroscopy, and colonoscopy in 11 of 2023.  He also had a capsule endoscopy in  11 of 2023.  He is not on anticoagulation now.      Plan:   I offered to do an EGD and small bowel enteroscopy but the patient does not want to have this done since he had a number of endoscopies done last year when he was on anticoagulation.  I would recommend that you put him on Protonix 40 mg p.o. daily empirically  I would also recommend that he avoid all nonsteroidal anti-inflammatory drugs including Excedrin which has aspirin in it, etc.  Consider giving him IV iron.  I will defer this to [facility].  As an outpatient I would recommend that he have weekly CBCs to start and then monthly CBCs for the next year or so to make sure that his blood count remains elevated?  While he is an inpatient I would get serial H&H's and transfuse as necessary.    I discussed the patient's findings and my recommendations with patient and family.         Isrrael Gibson M.D.  [facility]  [address] [place] [address]  Office: [phone]

## 2024-07-25 NOTE — PROGRESS NOTES
Clinical Pharmacy Services: Medication History    Radha Noriega V is a 44 y.o. male presenting to [facility] for Vasovagal syncope [R55]  Symptomatic anemia [D64.9]    He  has a past medical history of Anemia, ETOH abuse, Tachycardia (06/23/2023), and Tobacco dependence.    Allergies as of 07/24/2024    (No Known Allergies)       Medication information was obtained from: Patient  Pharmacy and Phone Number: n/a    Prior to Admission Medications       Prescriptions Last Dose Informant Patient Reported? Taking?    bisacodyl (DULCOLAX) 5 MG EC tablet Not Taking  No No    Take 1 tablet by mouth Daily As Needed for Constipation (Use if polyethylene glycol is ineffective).    Patient not taking:  Reported on 7/25/2024    dicyclomine (BENTYL) 10 MG capsule Not Taking  No No    Take 1 capsule by mouth 4 (Four) Times a Day As Needed (abdominal cramping).    Patient not taking:  Reported on 7/25/2024    ferrous sulfate (FerrouSul) 325 (65 FE) MG tablet Not Taking Self No No    Take 1 tablet by mouth Daily With Breakfast.    Patient not taking:  Reported on 7/25/2024    folic acid (FOLVITE) 1 MG tablet Not Taking  No No    Take 1 tablet by mouth Daily.    Patient not taking:  Reported on 7/25/2024    pantoprazole (PROTONIX) 40 MG EC tablet Not Taking  No No    Take 1 tablet by mouth Daily.    Patient not taking:  Reported on 7/25/2024    thiamine (VITAMIN B1) 100 MG tablet Not Taking  No No    Take 1 tablet by mouth Daily.    Patient not taking:  Reported on 7/25/2024              Medication notes: Patient is not currently taking any prescription/OTC meds or vitamins/supplements.     This medication list is complete to the best of my knowledge as of 7/25/2024    Please call if questions.    Tori Conner, Pharmacy Intern  7/25/2024 11:37 EDT

## 2024-07-25 NOTE — PLAN OF CARE
Goal Outcome Evaluation:  Plan of Care Reviewed With: patient        Progress: improving  Outcome Evaluation: Patient alert and oriented on room air. 2 units PRBC infused. standby assist. denies dizziness, sweating, or almost syncopal episodes. NSR. VSS.

## 2024-07-25 NOTE — ED NOTES
Nursing report ED to floor  Radha Noriega V  44 y.o.  male    HPI :  HPI (Adult)  Stated Reason for Visit: n/v syncope    Chief Complaint  Chief Complaint   Patient presents with    Nausea    Vomiting       Admitting doctor:   Ran Esqueda MD    Admitting diagnosis:   The primary encounter diagnosis was Symptomatic anemia. A diagnosis of Vasovagal syncope was also pertinent to this visit.    Code status:   Current Code Status       Date Active Code Status Order ID Comments User Context       Prior            Allergies:   Patient has no known allergies.    Isolation:   No active isolations    Intake and Output  No intake or output data in the 24 hours ending 07/24/24 2144    Weight:       07/24/24 1828   Weight: 85.5 kg (188 lb 7.9 oz)       Most recent vitals:   Vitals:    07/24/24 1831 07/24/24 1932 07/24/24 2000 07/24/24 2030   BP: 119/64 122/67 119/72 124/74   Pulse: 88 85 81 81   Resp:       Temp:       SpO2: 100% 100% 100% 100%   Weight:       Height:           Active LDAs/IV Access:   Lines, Drains & Airways       Active LDAs       Name Placement date Placement time Site Days    Peripheral IV 07/24/24 2139 Right Antecubital 07/24/24 2139  Antecubital  less than 1                    Labs (abnormal labs have a star):   Labs Reviewed   COMPREHENSIVE METABOLIC PANEL - Abnormal; Notable for the following components:       Result Value    Sodium 134 (*)     CO2 20.0 (*)     All other components within normal limits    Narrative:     GFR Normal >60  Chronic Kidney Disease <60  Kidney Failure <15     CBC WITH AUTO DIFFERENTIAL - Abnormal; Notable for the following components:    RBC 2.29 (*)     Hemoglobin 5.5 (*)     Hematocrit 18.4 (*)     MCH 24.0 (*)     MCHC 29.9 (*)     RDW 21.2 (*)     RDW-SD 59.6 (*)     Lymphocyte % 17.1 (*)     All other components within normal limits   LIPASE - Normal   POCT GLUCOSE FINGERSTICK - Normal   RAINBOW DRAW    Narrative:     The following orders were created for panel  order [facility] Draw.  Procedure                               Abnormality         Status                     ---------                               -----------         ------                     Green Top (Gel)[[id number]]                                  Final result               Lavender Top[[id number]]                                     Final result               Gold Top - SST[[id number]]                                   Final result               Light Blue Top[[id number]]                                   Final result                 Please view results for these tests on the individual orders.   URINALYSIS W/ MICROSCOPIC IF INDICATED (NO CULTURE)   IRON PROFILE   FERRITIN   FOLATE   PREPARE RBC   TYPE AND SCREEN   CBC AND DIFFERENTIAL    Narrative:     The following orders were created for panel order CBC & Differential.  Procedure                               Abnormality         Status                     ---------                               -----------         ------                     CBC Auto Differential[[id number]]        Abnormal            Final result                 Please view results for these tests on the individual orders.   GREEN TOP   LAVENDER TOP   GOLD TOP - SST   LIGHT BLUE TOP       EKG:   ECG 12 Lead Syncope   Final Result   HEART RATE=80  bpm   RR Iayjmgra=094  ms   TX Edfeeasj=519  ms   P Horizontal Axis=-54  deg   P Front Axis=61  deg   QRSD Interval=84  ms   QT Sjzxkowl=579  ms   MFgK=505  ms   QRS Axis=56  deg   T Wave Axis=37  deg   - NORMAL ECG -   Sinus rhythm   When compared with ECG of 13-Jul-2023 06:12:14,   Significant rate decrease with improvement in repolarization abnormalities   Electronically Signed By: Wojciech Morales ([facility]) 2024-07-24 21:32:45   Date and Time of Study:2024-07-24 18:14:42      Telemetry Scan   Final Result      Telemetry Scan   Final Result          Meds given in ED:   Medications   sodium chloride 0.9 % flush 10 mL (has no administration in time  range)       Imaging results:  No radiology results for the last day    Ambulatory status:   - bedrest    Social issues:   Social History     Socioeconomic History    Marital status:    Tobacco Use    Smoking status: Every Day     Current packs/day: 1.00     Average packs/day: 1 pack/day for 20.0 years (20.0 ttl pk-yrs)     Types: Cigarettes    Smokeless tobacco: Never   Vaping Use    Vaping status: Never Used   Substance and Sexual Activity    Alcohol use: Not Currently     Comment: hasnt had a drink in a month. States he is under the care of PCP to moniter.    Drug use: Yes     Types: Marijuana     Comment: daily.    Sexual activity: Yes     Partners: Female       Peripheral Neurovascular  Peripheral Neurovascular (Adult)  Peripheral Neurovascular WDL: WDL    Neuro Cognitive  Neuro Cognitive (Adult)  Cognitive/Neuro/Behavioral WDL: WDL    Learning  Learning Assessment (Adult)  Learning Readiness and Ability: no barriers identified    Respiratory  Respiratory WDL  Respiratory WDL: WDL    Abdominal Pain       Pain Assessments  Pain (Adult)  Preferred Pain Scale: FACES (Gonzalez-Baker FACES Pain Rating Scale)  FACES Pain Rating: Rest: 4-->hurts little more    NIH Stroke Scale       Etta Lott RN  07/24/24 21:44 EDT

## 2024-07-26 ENCOUNTER — READMISSION MANAGEMENT (OUTPATIENT)
Dept: CALL CENTER | Facility: HOSPITAL | Age: 44
End: 2024-07-26

## 2024-07-26 VITALS
SYSTOLIC BLOOD PRESSURE: 120 MMHG | BODY MASS INDEX: 26.39 KG/M2 | RESPIRATION RATE: 16 BRPM | TEMPERATURE: 97.9 F | HEART RATE: 64 BPM | WEIGHT: 188.49 LBS | DIASTOLIC BLOOD PRESSURE: 75 MMHG | OXYGEN SATURATION: 100 % | HEIGHT: 71 IN

## 2024-07-26 LAB
ANION GAP SERPL CALCULATED.3IONS-SCNC: 5.6 MMOL/L (ref 5–15)
BUN SERPL-MCNC: 10 MG/DL (ref 6–20)
BUN/CREAT SERPL: 7.6 (ref 7–25)
CALCIUM SPEC-SCNC: 9.1 MG/DL (ref 8.6–10.5)
CHLORIDE SERPL-SCNC: 108 MMOL/L (ref 98–107)
CO2 SERPL-SCNC: 25.4 MMOL/L (ref 22–29)
CREAT SERPL-MCNC: 1.31 MG/DL (ref 0.76–1.27)
DEPRECATED RDW RBC AUTO: 55.6 FL (ref 37–54)
EGFRCR SERPLBLD CKD-EPI 2021: 69.3 ML/MIN/1.73
ERYTHROCYTE [DISTWIDTH] IN BLOOD BY AUTOMATED COUNT: 19.6 % (ref 12.3–15.4)
GLUCOSE SERPL-MCNC: 94 MG/DL (ref 65–99)
HCT VFR BLD AUTO: 26.4 % (ref 37.5–51)
HCT VFR BLD AUTO: 27.1 % (ref 37.5–51)
HGB BLD-MCNC: 8.1 G/DL (ref 13–17.7)
HGB BLD-MCNC: 8.2 G/DL (ref 13–17.7)
MCH RBC QN AUTO: 25.1 PG (ref 26.6–33)
MCHC RBC AUTO-ENTMCNC: 31.1 G/DL (ref 31.5–35.7)
MCV RBC AUTO: 80.7 FL (ref 79–97)
PLATELET # BLD AUTO: 346 10*3/MM3 (ref 140–450)
PMV BLD AUTO: 9.6 FL (ref 6–12)
POTASSIUM SERPL-SCNC: 4.3 MMOL/L (ref 3.5–5.2)
RBC # BLD AUTO: 3.27 10*6/MM3 (ref 4.14–5.8)
SODIUM SERPL-SCNC: 139 MMOL/L (ref 136–145)
WBC NRBC COR # BLD AUTO: 5.64 10*3/MM3 (ref 3.4–10.8)

## 2024-07-26 PROCEDURE — G0378 HOSPITAL OBSERVATION PER HR: HCPCS

## 2024-07-26 PROCEDURE — 85027 COMPLETE CBC AUTOMATED: CPT | Performed by: STUDENT IN AN ORGANIZED HEALTH CARE EDUCATION/TRAINING PROGRAM

## 2024-07-26 PROCEDURE — 99214 OFFICE O/P EST MOD 30 MIN: CPT

## 2024-07-26 PROCEDURE — 80048 BASIC METABOLIC PNL TOTAL CA: CPT | Performed by: STUDENT IN AN ORGANIZED HEALTH CARE EDUCATION/TRAINING PROGRAM

## 2024-07-26 PROCEDURE — 25810000003 SODIUM CHLORIDE 0.9 % SOLUTION: Performed by: STUDENT IN AN ORGANIZED HEALTH CARE EDUCATION/TRAINING PROGRAM

## 2024-07-26 PROCEDURE — 25010000002 NA FERRIC GLUC CPLX PER 12.5 MG: Performed by: STUDENT IN AN ORGANIZED HEALTH CARE EDUCATION/TRAINING PROGRAM

## 2024-07-26 PROCEDURE — 96366 THER/PROPH/DIAG IV INF ADDON: CPT

## 2024-07-26 RX ORDER — FERROUS SULFATE 325(65) MG
325 TABLET ORAL
Qty: 90 TABLET | Refills: 1 | Status: SHIPPED | OUTPATIENT
Start: 2024-07-26

## 2024-07-26 RX ORDER — PANTOPRAZOLE SODIUM 40 MG/1
40 TABLET, DELAYED RELEASE ORAL
Qty: 90 TABLET | Refills: 0 | Status: SHIPPED | OUTPATIENT
Start: 2024-07-27

## 2024-07-26 RX ADMIN — SODIUM CHLORIDE 250 MG: 9 INJECTION, SOLUTION INTRAVENOUS at 08:41

## 2024-07-26 RX ADMIN — PANTOPRAZOLE SODIUM 40 MG: 40 TABLET, DELAYED RELEASE ORAL at 08:41

## 2024-07-26 NOTE — NURSING NOTE
Patient wanting to leave AMA educated patient on need for continued treatment, stated cannot stand having all the wires hooked up to him and he cannot move. Patient refuses to wear telemetry but agreeable to stay. Notified [facility].

## 2024-07-26 NOTE — PROGRESS NOTES
Discharge Planning Assessment  [facility]     Patient Name: Radha Noriega V  MRN: [id number]  Today's Date: 7/26/2024    Admit Date: 7/24/2024    Plan: Return home with family   Discharge Needs Assessment       Row Name 07/26/24 1352       Living Environment    People in Home child(edna), dependent;spouse    Name(s) of People in Home Wife Zainab Elaine    Current Living Arrangements home    Potentially Unsafe Housing Conditions none    Primary Care Provided by self    Provides Primary Care For child(edna)    Caregiving Concerns Helps with the children    Family Caregiver if Needed spouse    Family Caregiver Names Tyler Noriega [phone]    Quality of Family Relationships helpful    Able to Return to Prior Arrangements yes       Resource/Environmental Concerns    Resource/Environmental Concerns none    Transportation Concerns none       Transition Planning    Patient/Family Anticipates Transition to home with family    Patient/Family Anticipated Services at Transition none    Transportation Anticipated family or friend will provide       Discharge Needs Assessment    Readmission Within the Last 30 Days no previous admission in last 30 days    Equipment Currently Used at Home none    Concerns to be Addressed denies needs/concerns at this time    Anticipated Changes Related to Illness none    Equipment Needed After Discharge none                   Discharge Plan       Row Name 07/26/24 2968       Plan    Plan Return home with family    Patient/Family in Agreement with Plan yes    Plan Comments Spoke with patient and wife Zainab Elaine [phone] at bedside.  Patient lives with wife and 4 children, is IADL, uses no DME, has never used HH.  PCP is Hellen BASILIO and pharmacy is [facility] [address].  Patient plans to return home at DC and does not anticipate any DC needs.  CCP will follow.  Sridhar MARIE                  Continued Care and Services - Admitted Since 7/24/2024    No active coordination exists  for this encounter.       Expected Discharge Date and Time       Expected Discharge Date Expected Discharge Time    Jul 26, 2024            Demographic Summary       Row Name 07/26/24 1347       General Information    Admission Type observation    Arrived From home    Referral Source admission list    Reason for Consult discharge planning    Preferred Language English                   Functional Status       Row Name 07/26/24 1352       Functional Status    Usual Activity Tolerance good    Current Activity Tolerance good       Functional Status, IADL    Medications independent    Meal Preparation independent;assistive person  Wife    Housekeeping assistive person;independent    Laundry independent;assistive person    Shopping assistive person;independent       Mental Status    General Appearance WDL WDL       Mental Status Summary    Recent Changes in Mental Status/Cognitive Functioning no changes                               Becky S. [place], RN

## 2024-07-26 NOTE — PLAN OF CARE
Goal Outcome Evaluation:  Plan of Care Reviewed With: patient        Progress: improving  Outcome Evaluation: vss. refusing telemetry monitor. alert and oriented x4, room air. up ad ignacio. rec. last dose of iv iron. to be discharged.

## 2024-07-26 NOTE — DISCHARGE SUMMARY
Patient Name: Radha Noriega V  : 1980  MRN: [id number]    Date of Admission: 2024  Date of Discharge:  2024  Primary Care Physician: Hellen Skelton APRN      Chief Complaint:   Nausea and Vomiting      Discharge Diagnoses     Active Hospital Problems    Diagnosis  POA    **Symptomatic anemia [D64.9]  Yes      Resolved Hospital Problems   No resolved problems to display.        Hospital Course     Mr. Noriega is a 44 y.o. male with a history of iron deficiency anemia and previous GI bleed who presented to [facility] initially complaining of syncopal episode.  Please see the admitting history and physical for further details.  He was found to have severe anemia and was admitted to the hospital for further evaluation and treatment.      Patient was found to have a hemoglobin of 5.5 on arrival and given 2 units PRBC.  He had not had any evidence of a gross GI bleed.  GI was consulted for further evaluation.  Labs are indicative of severe iron deficiency anemia, and the patient was given IV iron x 2.  GI recommended endoscopy, but the patient declined.  Hgb remained stable after the 2 units of PRBC prior to discharge home.  Did recommend the patient that he stay 1 more day to receive his third dose of IV iron, but the patient once again declined.  He was hemodynamically stable.  Stated he preferred to just take oral iron, with the understanding that it may not be as effective given how low his iron stores are.  He was started on PPI and prescription was sent to [facility] [place], operated by [facility] pharmacy prior to discharge home.  He should continue avoiding NSAIDs.  The patient had a very slight bump in his creatinine on day of discharge, though not severe enough to be an HUDSON.  Suspect this was secondary to severe anemia and expected will continue to improve given oral intake is normal.  Overall, patient had good clinical improvement.  He was counseled extensively on reasons to return to the hospital and  conveyed understanding.  He was discharged home in good condition.  Recommend repeat CBC and follow-up with PCP within 2-3 days of discharge.    Day of Discharge     Subjective:  No acute events overnight.  Patient states that he is feeling well this morning.  No chest pain or shortness of breath.  Had a bowel movement that was normal in color and consistency.  He would really like to be discharged home today.    Physical Exam:  Temp:  [97.7 °F (36.5 °C)-97.9 °F (36.6 °C)] 97.9 °F (36.6 °C)  Heart Rate:  [64-67] 64  Resp:  [16] 16  BP: (115-120)/(59-76) 120/75  Body mass index is 26.3 kg/m².  Physical Exam  General: Alert, no acute distress.  Lying in bed.  Very pleasant.  ENT: No conjunctival injection or scleral icterus. Moist mucous membranes. No JVD.   Neuro: Eyes open and moving in all directions, strength normal in all extremities, no focal deficits.   Lungs: Clear to auscultation bilaterally. No wheeze or crackles. No distress.   Heart: RRR, no murmurs. No edema.  Abdomen: Soft, non-tender, non-distended. Normal bowel sounds.   Ext: Warm and well-perfused. No edema.   Skin: No rashes or lesions. IV site without swelling or erythema.     Consultants     Consult Orders (all) (From admission, onward)       Start     Ordered    07/25/24 0702  Inpatient Gastroenterology Consult  IN AM        Specialty:  Gastroenterology  Provider:  Hellen Kern MD    07/24/24 8601    07/24/24 2105  [facility] (on-call MD unless specified) Details  Once        Specialty:  Hospitalist  Provider:  (Not yet assigned)    07/24/24 2104                  Procedures     * Surgery not found *    Imaging Results (All)       None          Results for orders placed during the hospital encounter of 08/24/23    Duplex Venous Upper Extremity - Left CAR    Interpretation Summary    Acute left upper extremity deep vein thrombosis noted in the subclavian, axillary and brachial.    Acute left upper extremity superficial thrombophlebitis noted in the  "basilic (upper arm) and cephalic (forearm).    All other left sided vessels appear normal.      Pertinent Labs     Results from last 7 days   Lab Units 07/26/24  0745 07/25/24  2352 07/25/24  1619 07/25/24  1200 07/25/24  0532 07/24/24 2017   WBC 10*3/mm3 5.64  --   --   --  6.25 8.60   HEMOGLOBIN g/dL 8.2* 8.1* 8.2* 8.6* 8.2* 5.5*   PLATELETS 10*3/mm3 346  --   --   --  349 412     Results from last 7 days   Lab Units 07/26/24  0745 07/25/24  0532 07/24/24 2017   SODIUM mmol/L 139 138 134*   POTASSIUM mmol/L 4.3 4.1 4.5   CHLORIDE mmol/L 108* 106 104   CO2 mmol/L 25.4 22.0 20.0*   BUN mg/dL 10 9 9   CREATININE mg/dL 1.31* 1.10 1.18   GLUCOSE mg/dL 94 83 90   EGFR mL/min/1.73 69.3 85.4 78.5     Results from last 7 days   Lab Units 07/24/24 2017   ALBUMIN g/dL 4.2   BILIRUBIN mg/dL <0.2   ALK PHOS U/L 91   AST (SGOT) U/L 24   ALT (SGPT) U/L 16     Results from last 7 days   Lab Units 07/26/24  0745 07/25/24 0532 07/24/24 2017   CALCIUM mg/dL 9.1 9.1 9.0   ALBUMIN g/dL  --   --  4.2     Results from last 7 days   Lab Units 07/24/24 2017   LIPASE U/L 36             Invalid input(s): \"LDLCALC\"          Test Results Pending at Discharge   No pending test results at time of discharge.    Discharge Details        Discharge Medications        Changes to Medications        Instructions Start Date   pantoprazole 40 MG EC tablet  Commonly known as: PROTONIX  What changed: when to take this   40 mg, Oral, Every Early Morning   Start Date: July 27, 2024            Continue These Medications        Instructions Start Date   ferrous sulfate 325 (65 FE) MG tablet   325 mg, Oral, Daily With Breakfast             Stop These Medications      bisacodyl 5 MG EC tablet  Commonly known as: DULCOLAX     dicyclomine 10 MG capsule  Commonly known as: BENTYL     folic acid 1 MG tablet  Commonly known as: FOLVITE     thiamine 100 MG tablet  Commonly known as: VITAMIN B1              No Known Allergies    Discharge Disposition:  Home or " Self Care      Discharge Diet:  Diet Order   Procedures    Diet: Regular/House; Fluid Consistency: Thin (IDDSI 0)       Discharge Activity: Activity as tolerated      CODE STATUS:    Code Status and Medical Interventions: CPR (Attempt to Resuscitate); Full Support   Ordered at: 07/24/24 0812     Code Status (Patient has no pulse and is not breathing):    CPR (Attempt to Resuscitate)     Medical Interventions (Patient has pulse or is breathing):    Full Support       Future Appointments   Date Time Provider Department Center   9/12/2024 11:10 AM [facility]   9/19/2024  2:15 PM Hellen Skelton APRN [facility]      Follow-up Information       Hellen Skelton APRN .    Specialties: Nurse Practitioner, Internal Medicine  Contact information:  [address]  [address] [address] [phone]                             Time Spent on Discharge:  Greater than 30 minutes      Ermelinda Mayes MD  [facility]  07/26/24  14:02 EDT

## 2024-07-26 NOTE — OUTREACH NOTE
Prep Survey      Flowsheet Row Responses   [facility] patient discharged from? [facility]   Is LACE score < 7 ? Yes   Eligibility [facility]   Date of Admission 07/24/24   Date of Discharge 07/26/24   Discharge Disposition Home or Self Care   Discharge diagnosis Symptomatic anemia   Does the patient have one of the following disease processes/diagnoses(primary or secondary)? Other   Does the patient have Home health ordered? No   Is there a DME ordered? No   Medication alerts for this patient see AVS   Prep survey completed? Yes            Damari SPRING - Registered Nurse

## 2024-07-26 NOTE — PLAN OF CARE
Goal Outcome Evaluation:  Plan of Care Reviewed With: patient        Progress: improving  Outcome Evaluation: Patient alert and oriented on room air. up ad ignacio. agreed to finish iron infusion treatments refuses tele. serial H/H hgb 8.1. denies dizziness, sweating, or syncopal episodes. eager to be discharged. vss.

## 2024-07-26 NOTE — PROGRESS NOTES
[facility]  Inpatient Progress Note    Reason for Follow Up:  Symptomatic anemia     Subjective     Interval History:   Patient seen this morning.  He reports he is feeling well.  He denies any hematemesis, coffee-ground emesis, melena or hematochezia.  No abdominal pain nausea or vomiting.  Hemoglobin stable at 8.2 this morning.  He still declined EGD and small bowel enteroscopy.    11/2023 - EGD with small bowel enteroscopy (that showed grade a esophagitis and gastritis but otherwise was unrevealing) and colonoscopy ( blood seen in the entire colon but was otherwise unrevealing) by Dr. Artur Celeste.  Last admitted with melena in 11 of 2023.  EGD, small bowel enteroscopy and colonoscopy did not find a bleeding source.  CT angiogram also did not show a clear source.  The patient was transferred to [facility] for further evaluation. He was also admitted 6/23 and 7/23 with GI bleeding.   11/2023 - Capsule Endoscopy at  that did not show a small bowel bleeding source.  He said that he has had another capsule endoscopy here at [facility] that was unrevealing.    Current Facility-Administered Medications:     acetaminophen (TYLENOL) tablet 650 mg, 650 mg, Oral, Q4H PRN **OR** acetaminophen (TYLENOL) 160 MG/5ML oral solution 650 mg, 650 mg, Oral, Q4H PRN **OR** acetaminophen (TYLENOL) suppository 650 mg, 650 mg, Rectal, Q4H PRN, Paolo Hernandez, APRN    aluminum-magnesium hydroxide-simethicone (MAALOX MAX) 400-400-40 MG/5ML suspension 15 mL, 15 mL, Oral, Q6H PRN, Paolo Hernandez, APRN    sennosides-docusate (PERICOLACE) 8.6-50 MG per tablet 2 tablet, 2 tablet, Oral, BID PRN **AND** polyethylene glycol (MIRALAX) packet 17 g, 17 g, Oral, Daily PRN **AND** bisacodyl (DULCOLAX) EC tablet 5 mg, 5 mg, Oral, Daily PRN **AND** bisacodyl (DULCOLAX) suppository 10 mg, 10 mg, Rectal, Daily PRN, Paolo Hernandez, APRN    ferric gluconate (FERRLECIT) 250 MG in sodium  chloride 0.9% 250 mL IVPB, 250 mg, Intravenous, Q24H, Ermelinda Mayes MD, Stopped at 07/25/24 2030    nitroglycerin (NITROSTAT) SL tablet 0.4 mg, 0.4 mg, Sublingual, Q5 Min PRN, Paolo Hernandez APRN    ondansetron ODT (ZOFRAN-ODT) disintegrating tablet 4 mg, 4 mg, Oral, Q6H PRN **OR** ondansetron (ZOFRAN) injection 4 mg, 4 mg, Intravenous, Q6H PRN, Paolo Hernandez APRN    pantoprazole (PROTONIX) EC tablet 40 mg, 40 mg, Oral, Q AM, Isrrael Gibson MD    sodium chloride 0.9 % flush 10 mL, 10 mL, Intravenous, PRN, Dmitriy Ovalle MD    sodium chloride 0.9 % flush 10 mL, 10 mL, Intravenous, PRN, Paolo Hernandez APRN    sodium chloride 0.9 % infusion, 100 mL/hr, Intravenous, Continuous, Ermelinda Mayes MD, Stopped at 07/25/24 2157      Objective     Vital Signs  Temp:  [97.5 °F (36.4 °C)-97.7 °F (36.5 °C)] 97.7 °F (36.5 °C)  Heart Rate:  [65-67] 65  Resp:  [16-18] 16  BP: (109-117)/(59-76) 115/59  Body mass index is 26.3 kg/m².    Intake/Output Summary (Last 24 hours) at 7/26/2024 0748  Last data filed at 7/26/2024 0113  Gross per 24 hour   Intake 1110 ml   Output 1425 ml   Net -315 ml     No intake/output data recorded.     Physical Exam:   General: patient awake, alert and cooperative   Eyes: Normal lids and lashes, no scleral icterus   Pulm: regular and unlabored   Abdomen: soft, nontender, nondistended; normal bowel sounds     Results Review:     I reviewed the patient's new clinical results.    Results from last 7 days   Lab Units 07/25/24  2352 07/25/24  1619 07/25/24  1200 07/25/24  0532 07/24/24 2017   WBC 10*3/mm3  --   --   --  6.25 8.60   HEMOGLOBIN g/dL 8.1* 8.2* 8.6* 8.2* 5.5*   HEMATOCRIT % 27.1* 26.9* 28.2* 26.4* 18.4*   PLATELETS 10*3/mm3  --   --   --  349 412     Results from last 7 days   Lab Units 07/25/24  0532 07/24/24 2017   SODIUM mmol/L 138 134*   POTASSIUM mmol/L 4.1 4.5   CHLORIDE mmol/L 106 104   CO2 mmol/L 22.0 20.0*   BUN mg/dL 9 9   CREATININE mg/dL 1.10 1.18   CALCIUM  mg/dL 9.1 9.0   BILIRUBIN mg/dL  --  <0.2   ALK PHOS U/L  --  91   ALT (SGPT) U/L  --  16   AST (SGOT) U/L  --  24   GLUCOSE mg/dL 83 90         Lab Results   Lab Value Date/Time    LIPASE 36 07/24/2024 2017    LIPASE 11 (L) 07/12/2023 0612    LIPASE 26 06/23/2023 1201         Radiology:  No orders to display       Assessment & Plan     Active Hospital Problems    Diagnosis     **Symptomatic anemia        Assessment:  Recurrent symptomatic iron deficiency anemia  Reported dark stool month ago but no current evidence of GI bleeding  Use of Excedrin as needed-otherwise denies use of NSAIDs  Comprehensive workup in 11/2023 with EGD, small bowel enteroscopy, colonoscopy and capsule endoscopy which did not reveal source of bleeding  Not currently on anticoagulation    All problems are new to me today     Plan:  Once again discussed EGD with small bowel enteroscopy, but patient would not like to have this done.  Recommend continuing PPI daily  Highly encourage patient to avoid NSAIDs  Discussed with patient that since he would not like to have done we highly advise he get CBC checked weekly and then monthly for the next year to monitor hemoglobin  Receiving IV iron per primary team  Monitor hemoglobin and transfuse as needed    Further plans pending clinical course and per attending, Dr. Arthur Minaya PA-C

## 2024-07-29 ENCOUNTER — TRANSITIONAL CARE MANAGEMENT TELEPHONE ENCOUNTER (OUTPATIENT)
Dept: CALL CENTER | Facility: HOSPITAL | Age: 44
End: 2024-07-29

## 2024-07-29 NOTE — PROGRESS NOTES
Case Management Discharge Note      Final Note: DC'd home with family 7/26                 Transportation Services  Private: Car    Final Discharge Disposition Code: 01 - home or self-care

## 2024-07-29 NOTE — OUTREACH NOTE
Call Center TCM Note      Flowsheet Row Responses   [facility] patient discharged from? [facility]   Does the patient have one of the following disease processes/diagnoses(primary or secondary)? Other   TCM attempt successful? Yes   Call start time 1013   Call end time 1020   Discharge diagnosis Symptomatic anemia   Meds reviewed with patient/caregiver? Yes   Is the patient having any side effects they believe may be caused by any medication additions or changes? No   Does the patient have all medications ordered at discharge? Yes   Is the patient taking all medications as directed (includes completed medication regime)? Yes   Comments Patient prefers to schedule own followup.   Does the patient have an appointment with their PCP within 7-14 days of discharge? No   Nursing Interventions Patient declined scheduling/rescheduling appointment at this time   Psychosocial issues? No   Did the patient receive a copy of their discharge instructions? Yes   Nursing interventions Reviewed instructions with patient   What is the patient's perception of their health status since discharge? Improving   Is the patient/caregiver able to teach back signs and symptoms related to disease process for when to call PCP? Yes   Is the patient/caregiver able to teach back signs and symptoms related to disease process for when to call 911? Yes   Is the patient/caregiver able to teach back the hierarchy of who to call/visit for symptoms/problems? PCP, Specialist, Home health nurse, Urgent Care, ED, 911 Yes   If the patient is a current smoker, are they able to teach back resources for cessation? Not a smoker   TCM call completed? Yes   Wrap up additional comments Patient has redness from IV site. They are using warm compresses and will monitor closely. No fever, or drainage. If worsening issues or no improvement encouraged to be checked out right away.   Call end time 1020   Would this patient benefit from a Referral to Amb Social Work?  No   Is the patient interested in additional calls from an ambulatory ? No            Ron Pack RN    7/29/2024, 10:20 EDT

## 2024-09-11 DIAGNOSIS — D50.0 IRON DEFICIENCY ANEMIA DUE TO CHRONIC BLOOD LOSS: ICD-10-CM

## 2024-09-11 DIAGNOSIS — Z00.00 HEALTHCARE MAINTENANCE: Primary | ICD-10-CM

## 2024-09-25 ENCOUNTER — TELEPHONE (OUTPATIENT)
Dept: INTERNAL MEDICINE | Facility: CLINIC | Age: 44
End: 2024-09-25
Payer: MEDICAID

## 2024-09-26 ENCOUNTER — APPOINTMENT (OUTPATIENT)
Dept: GENERAL RADIOLOGY | Facility: HOSPITAL | Age: 44
DRG: 378 | End: 2024-09-26
Payer: MEDICAID

## 2024-09-26 ENCOUNTER — HOSPITAL ENCOUNTER (INPATIENT)
Facility: HOSPITAL | Age: 44
LOS: 3 days | Discharge: HOME | DRG: 378 | End: 2024-09-29
Attending: EMERGENCY MEDICINE | Admitting: HOSPITALIST
Payer: MEDICAID

## 2024-09-26 DIAGNOSIS — D62 ABLA (ACUTE BLOOD LOSS ANEMIA): Primary | ICD-10-CM

## 2024-09-26 DIAGNOSIS — K92.2 UPPER GI BLEED: ICD-10-CM

## 2024-09-26 LAB
ABO GROUP BLD: NORMAL
ALBUMIN SERPL-MCNC: 3.9 G/DL (ref 3.5–5.2)
ALBUMIN/GLOB SERPL: 1.7 G/DL
ALP SERPL-CCNC: 63 U/L (ref 39–117)
ALT SERPL W P-5'-P-CCNC: 23 U/L (ref 1–41)
ANION GAP SERPL CALCULATED.3IONS-SCNC: 7.1 MMOL/L (ref 5–15)
APTT PPP: <20 SECONDS (ref 22.7–35.4)
AST SERPL-CCNC: 17 U/L (ref 1–40)
BASOPHILS # BLD MANUAL: 0 10*3/MM3 (ref 0–0.2)
BASOPHILS NFR BLD MANUAL: 0 % (ref 0–1.5)
BILIRUB SERPL-MCNC: <0.2 MG/DL (ref 0–1.2)
BLD GP AB SCN SERPL QL: NEGATIVE
BUN SERPL-MCNC: 11 MG/DL (ref 6–20)
BUN/CREAT SERPL: 8.7 (ref 7–25)
CALCIUM SPEC-SCNC: 8.8 MG/DL (ref 8.6–10.5)
CHLORIDE SERPL-SCNC: 107 MMOL/L (ref 98–107)
CO2 SERPL-SCNC: 24.9 MMOL/L (ref 22–29)
CREAT SERPL-MCNC: 1.26 MG/DL (ref 0.76–1.27)
DEPRECATED RDW RBC AUTO: 58.7 FL (ref 37–54)
EGFRCR SERPLBLD CKD-EPI 2021: 72.6 ML/MIN/1.73
EOSINOPHIL # BLD MANUAL: 0.15 10*3/MM3 (ref 0–0.4)
EOSINOPHIL NFR BLD MANUAL: 2 % (ref 0.3–6.2)
ERYTHROCYTE [DISTWIDTH] IN BLOOD BY AUTOMATED COUNT: 19 % (ref 12.3–15.4)
GEN 5 1HR TROPONIN T REFLEX: 7 NG/L
GLOBULIN UR ELPH-MCNC: 2.3 GM/DL
GLUCOSE BLDC GLUCOMTR-MCNC: 125 MG/DL (ref 70–130)
GLUCOSE BLDC GLUCOMTR-MCNC: 230 MG/DL (ref 70–130)
GLUCOSE BLDC GLUCOMTR-MCNC: 94 MG/DL (ref 70–130)
GLUCOSE SERPL-MCNC: 124 MG/DL (ref 65–99)
HCT VFR BLD AUTO: 17.8 % (ref 37.5–51)
HGB BLD-MCNC: 5.5 G/DL (ref 13–17.7)
HOLD SPECIMEN: NORMAL
HOLD SPECIMEN: NORMAL
HYPOCHROMIA BLD QL: ABNORMAL
INR PPP: 1.04 (ref 0.9–1.1)
LYMPHOCYTES # BLD MANUAL: 1.7 10*3/MM3 (ref 0.7–3.1)
LYMPHOCYTES NFR BLD MANUAL: 1 % (ref 5–12)
MCH RBC QN AUTO: 27.5 PG (ref 26.6–33)
MCHC RBC AUTO-ENTMCNC: 30.9 G/DL (ref 31.5–35.7)
MCV RBC AUTO: 89 FL (ref 79–97)
MONOCYTES # BLD: 0.08 10*3/MM3 (ref 0.1–0.9)
NEUTROPHILS # BLD AUTO: 5.74 10*3/MM3 (ref 1.7–7)
NEUTROPHILS NFR BLD MANUAL: 74.7 % (ref 42.7–76)
PLAT MORPH BLD: NORMAL
PLATELET # BLD AUTO: 290 10*3/MM3 (ref 140–450)
PMV BLD AUTO: 10.6 FL (ref 6–12)
POTASSIUM SERPL-SCNC: 3.8 MMOL/L (ref 3.5–5.2)
PROT SERPL-MCNC: 6.2 G/DL (ref 6–8.5)
PROTHROMBIN TIME: 13.8 SECONDS (ref 11.7–14.2)
QT INTERVAL: 320 MS
QTC INTERVAL: 431 MS
RBC # BLD AUTO: 2 10*6/MM3 (ref 4.14–5.8)
RH BLD: POSITIVE
SODIUM SERPL-SCNC: 139 MMOL/L (ref 136–145)
T&S EXPIRATION DATE: NORMAL
TROPONIN T NUMERIC DELTA: -1 NG/L
TROPONIN T SERPL HS-MCNC: 8 NG/L
VARIANT LYMPHS NFR BLD MANUAL: 22.2 % (ref 19.6–45.3)
WBC MORPH BLD: NORMAL
WBC NRBC COR # BLD AUTO: 7.68 10*3/MM3 (ref 3.4–10.8)
WHOLE BLOOD HOLD COAG: NORMAL
WHOLE BLOOD HOLD SPECIMEN: NORMAL

## 2024-09-26 PROCEDURE — 71045 X-RAY EXAM CHEST 1 VIEW: CPT

## 2024-09-26 PROCEDURE — 86901 BLOOD TYPING SEROLOGIC RH(D): CPT | Performed by: EMERGENCY MEDICINE

## 2024-09-26 PROCEDURE — 36415 COLL VENOUS BLD VENIPUNCTURE: CPT

## 2024-09-26 PROCEDURE — 93005 ELECTROCARDIOGRAM TRACING: CPT

## 2024-09-26 PROCEDURE — 85730 THROMBOPLASTIN TIME PARTIAL: CPT | Performed by: EMERGENCY MEDICINE

## 2024-09-26 PROCEDURE — 96376 TX/PRO/DX INJ SAME DRUG ADON: CPT

## 2024-09-26 PROCEDURE — 96366 THER/PROPH/DIAG IV INF ADDON: CPT

## 2024-09-26 PROCEDURE — P9016 RBC LEUKOCYTES REDUCED: HCPCS

## 2024-09-26 PROCEDURE — 86920 COMPATIBILITY TEST SPIN: CPT

## 2024-09-26 PROCEDURE — 80053 COMPREHEN METABOLIC PANEL: CPT | Performed by: EMERGENCY MEDICINE

## 2024-09-26 PROCEDURE — 93005 ELECTROCARDIOGRAM TRACING: CPT | Performed by: EMERGENCY MEDICINE

## 2024-09-26 PROCEDURE — 99285 EMERGENCY DEPT VISIT HI MDM: CPT

## 2024-09-26 PROCEDURE — 86850 RBC ANTIBODY SCREEN: CPT | Performed by: EMERGENCY MEDICINE

## 2024-09-26 PROCEDURE — 84484 ASSAY OF TROPONIN QUANT: CPT | Performed by: EMERGENCY MEDICINE

## 2024-09-26 PROCEDURE — 86922 COMPATIBILITY TEST ANTIGLOB: CPT

## 2024-09-26 PROCEDURE — 85610 PROTHROMBIN TIME: CPT | Performed by: EMERGENCY MEDICINE

## 2024-09-26 PROCEDURE — 82948 REAGENT STRIP/BLOOD GLUCOSE: CPT

## 2024-09-26 PROCEDURE — 86900 BLOOD TYPING SEROLOGIC ABO: CPT

## 2024-09-26 PROCEDURE — 36430 TRANSFUSION BLD/BLD COMPNT: CPT

## 2024-09-26 PROCEDURE — 96365 THER/PROPH/DIAG IV INF INIT: CPT

## 2024-09-26 PROCEDURE — 85025 COMPLETE CBC W/AUTO DIFF WBC: CPT | Performed by: EMERGENCY MEDICINE

## 2024-09-26 PROCEDURE — 93010 ELECTROCARDIOGRAM REPORT: CPT | Performed by: INTERNAL MEDICINE

## 2024-09-26 PROCEDURE — 86900 BLOOD TYPING SEROLOGIC ABO: CPT | Performed by: EMERGENCY MEDICINE

## 2024-09-26 PROCEDURE — 36415 COLL VENOUS BLD VENIPUNCTURE: CPT | Performed by: EMERGENCY MEDICINE

## 2024-09-26 PROCEDURE — 85007 BL SMEAR W/DIFF WBC COUNT: CPT | Performed by: EMERGENCY MEDICINE

## 2024-09-26 RX ORDER — AMOXICILLIN 250 MG
2 CAPSULE ORAL 2 TIMES DAILY
Status: DISCONTINUED | OUTPATIENT
Start: 2024-09-26 | End: 2024-09-29 | Stop reason: HOSPADM

## 2024-09-26 RX ORDER — BISACODYL 10 MG
10 SUPPOSITORY, RECTAL RECTAL DAILY PRN
Status: DISCONTINUED | OUTPATIENT
Start: 2024-09-26 | End: 2024-09-29 | Stop reason: HOSPADM

## 2024-09-26 RX ORDER — NITROGLYCERIN 0.4 MG/1
0.4 TABLET SUBLINGUAL
Status: DISCONTINUED | OUTPATIENT
Start: 2024-09-26 | End: 2024-09-29 | Stop reason: HOSPADM

## 2024-09-26 RX ORDER — ALUMINA, MAGNESIA, AND SIMETHICONE 2400; 2400; 240 MG/30ML; MG/30ML; MG/30ML
15 SUSPENSION ORAL EVERY 6 HOURS PRN
Status: DISCONTINUED | OUTPATIENT
Start: 2024-09-26 | End: 2024-09-29 | Stop reason: HOSPADM

## 2024-09-26 RX ORDER — ONDANSETRON 4 MG/1
4 TABLET, ORALLY DISINTEGRATING ORAL EVERY 6 HOURS PRN
Status: DISCONTINUED | OUTPATIENT
Start: 2024-09-26 | End: 2024-09-29 | Stop reason: HOSPADM

## 2024-09-26 RX ORDER — POLYETHYLENE GLYCOL 3350 17 G/17G
17 POWDER, FOR SOLUTION ORAL DAILY PRN
Status: DISCONTINUED | OUTPATIENT
Start: 2024-09-26 | End: 2024-09-29 | Stop reason: HOSPADM

## 2024-09-26 RX ORDER — ONDANSETRON 2 MG/ML
4 INJECTION INTRAMUSCULAR; INTRAVENOUS EVERY 6 HOURS PRN
Status: DISCONTINUED | OUTPATIENT
Start: 2024-09-26 | End: 2024-09-29 | Stop reason: HOSPADM

## 2024-09-26 RX ORDER — ASPIRIN 325 MG
325 TABLET ORAL ONCE
Status: DISCONTINUED | OUTPATIENT
Start: 2024-09-26 | End: 2024-09-26

## 2024-09-26 RX ORDER — BISACODYL 5 MG/1
5 TABLET, DELAYED RELEASE ORAL DAILY PRN
Status: DISCONTINUED | OUTPATIENT
Start: 2024-09-26 | End: 2024-09-29 | Stop reason: HOSPADM

## 2024-09-26 RX ORDER — ACETAMINOPHEN 650 MG/1
650 SUPPOSITORY RECTAL EVERY 4 HOURS PRN
Status: DISCONTINUED | OUTPATIENT
Start: 2024-09-26 | End: 2024-09-29 | Stop reason: HOSPADM

## 2024-09-26 RX ORDER — PANTOPRAZOLE SODIUM 40 MG/10ML
80 INJECTION, POWDER, LYOPHILIZED, FOR SOLUTION INTRAVENOUS ONCE
Status: COMPLETED | OUTPATIENT
Start: 2024-09-26 | End: 2024-09-26

## 2024-09-26 RX ORDER — ACETAMINOPHEN 325 MG/1
650 TABLET ORAL EVERY 4 HOURS PRN
Status: DISCONTINUED | OUTPATIENT
Start: 2024-09-26 | End: 2024-09-29 | Stop reason: HOSPADM

## 2024-09-26 RX ORDER — SODIUM CHLORIDE 0.9 % (FLUSH) 0.9 %
10 SYRINGE (ML) INJECTION AS NEEDED
Status: DISCONTINUED | OUTPATIENT
Start: 2024-09-26 | End: 2024-09-29 | Stop reason: HOSPADM

## 2024-09-26 RX ADMIN — PANTOPRAZOLE SODIUM 8 MG/HR: 40 INJECTION, POWDER, FOR SOLUTION INTRAVENOUS at 16:29

## 2024-09-26 RX ADMIN — PANTOPRAZOLE SODIUM 80 MG: 40 INJECTION, POWDER, FOR SOLUTION INTRAVENOUS at 16:26

## 2024-09-26 NOTE — PROGRESS NOTES
Clinical Pharmacy Services: Medication History    Radha Noriega V is a 44 y.o. male presenting to [facility] for   Chief Complaint   Patient presents with    Rapid Heart Rate    Black or Bloody Stool       He  has a past medical history of Anemia, ETOH abuse, Tachycardia (06/23/2023), and Tobacco dependence.    Allergies as of 09/26/2024    (No Known Allergies)       Medication information was obtained from: Patient   Pharmacy and Phone Number:     Prior to Admission Medications       Prescriptions Last Dose Informant Patient Reported? Taking?    ferrous sulfate 325 (65 FE) MG tablet  Self No Yes    Take 1 tablet by mouth Daily With Breakfast.    pantoprazole (PROTONIX) 40 MG EC tablet  Self No Yes    Take 1 tablet by mouth Every Morning.    Patient taking differently:  Take 1 tablet by mouth Daily.              Medication notes:     This medication list is complete to the best of my knowledge as of 9/26/2024    Please call if questions.    Dora Barlow  Medication History Technician   [phone]    9/26/2024 17:10 EDT

## 2024-09-26 NOTE — H&P
[facility]  HISTORY AND PHYSICAL   [facility]        Patient Identification:  Name: Radha Noriega V  Age: 44 y.o.  Sex: male  :  1980  MRN: [id number]                     Primary Care Physician: Hellen Skelton APRN    Chief Complaint: Gastrointestinal bleed    History of Present Illness:   44-year-old male with a history of alcohol abuse, iron deficiency anemia, previous GI bleed who presented to the hospital with melena.    Patient was recently hospitalized in 2024 at which time he presented with nausea and vomiting and found to have GI bleed with hemoglobin of 5.5.  Was transfused IV iron and had endoscopy recommended however patient refused at that time.    Patient presented to the hospital with melena.  Found to be tachycardic into the 140s.  Blood pressure stable and 130 systolic, appropriate saturations on room air.  Laboratory evaluation demonstrated hemoglobin of 5.5 from 8.2, 2 months prior.  On evaluation patient, states that he has been having dark stools for the past few days.  Also feeling lightheaded.  Denies any shortness of breath or cough.  No chest pain or palpitations.  No nausea or vomiting or hematemesis.  Denies any use of NSAIDs.    Past Medical History:  Past Medical History:   Diagnosis Date    Anemia     ETOH abuse     Tachycardia 2023    Tobacco dependence      Past Surgical History:  Past Surgical History:   Procedure Laterality Date    APPENDECTOMY      COLONOSCOPY N/A 2023    Procedure: COLONOSCOPY TO CECUM AND TERMINAL ILEUM WITH BIOPSIES AND COLD BIOPSY POLYPECTOMIES;  Surgeon: Imtiaz Lee MD;  Location: [facility] ENDOSCOPY;  Service: Gastroenterology;  Laterality: N/A;  PRE- MELENA  POST- COLON POLYPS, HEMORRHOIDS    COLONOSCOPY N/A 2023    Procedure: COLONOSCOPY AT BEDSIDE to cecum;  Surgeon: Mani Celeste MD;  Location: [facility] ENDOSCOPY;  Service: Gastroenterology;  Laterality: N/A;  Pre: anemia, gi  bleed  Post: blood in colon, no active source    ENDOSCOPY Left 06/24/2023    Procedure: ESOPHAGOGASTRODUODENOSCOPY;  Surgeon: Imtiaz Lee MD;  Location:  [facility] ENDOSCOPY;  Service: Gastroenterology;  Laterality: Left;  small hiatal hernia, esophagitis    ENDOSCOPY N/A 07/15/2023    Procedure: ESOPHAGOGASTRODUODENOSCOPY;  Surgeon: Ermelinda Dixon MD;  Location:  [facility] ENDOSCOPY;  Service: Gastroenterology;  Laterality: N/A;  pre-anemia  post-hiatal hernia    ENTEROSCOPY SMALL BOWEL N/A 07/09/2023    Procedure: ENTEROSCOPY SMALL BOWEL;  Surgeon: Isrrael Gibson MD;  Location:  [facility] ENDOSCOPY;  Service: Gastroenterology;  Laterality: N/A;  PRE- GI BLEED  POST- NORMAL    ENTEROSCOPY SMALL BOWEL N/A 11/2/2023    Procedure: ENTEROSCOPY SMALL BOWEL AT BEDSIDE;  Surgeon: Mani Celeste MD;  Location:  [facility] ENDOSCOPY;  Service: Gastroenterology;  Laterality: N/A;  Pre: anemia. gi bleed  Post: hiatal hernia, gastritis, esophagitis    UPPER GASTROINTESTINAL ENDOSCOPY        Home Meds:  (Not in a hospital admission)      Allergies:  No Known Allergies  Immunizations:  Immunization History   Administered Date(s) Administered    Td (TDVAX) 09/18/1996    Tdap 07/13/2018     Social History:   Social History     Social History Narrative    Not on file     Social History     Tobacco Use    Smoking status: Every Day     Current packs/day: 1.00     Average packs/day: 1 pack/day for 20.0 years (20.0 ttl pk-yrs)     Types: Cigarettes    Smokeless tobacco: Never   Substance Use Topics    Alcohol use: Not Currently     Comment: hasnt had a drink in a month. States he is under the care of PCP to [facility].     Family History:  Family History   Problem Relation Age of Onset    Diabetes Father     Diabetes Sister     Hypertension Sister     Diabetes Brother     Diabetes Maternal Grandmother     Diabetes Maternal Grandfather     Diabetes Paternal Grandmother     Diabetes Paternal Grandfather     Heart attack Maternal Aunt 73    Colon  "cancer Neg Hx     Prostate cancer Neg Hx         Review of Systems  12 point review systems negative except as per HPI above    Objective:  tMax 24 hrs: Temp (24hrs), Av.1 °F (36.7 °C), Min:98.1 °F (36.7 °C), Max:98.1 °F (36.7 °C)    Vitals Ranges:   Temp:  [98.1 °F (36.7 °C)] 98.1 °F (36.7 °C)  Heart Rate:  [] 93  Resp:  [16] 16  BP: (118-135)/(72-79) 118/72      Exam:  /72   Pulse 93   Temp 98.1 °F (36.7 °C) (Tympanic)   Resp 16   Ht 180.3 cm (71\")   Wt 81.6 kg (180 lb)   SpO2 100%   BMI 25.10 kg/m²     General: Alert, nontoxic, NAD  HEENT: NC/AT, EOMI, MMM  Neck: Supple, trachea midline  Cardiac: RRR, no murmur, gallops, rubs  Pulmonary: Clear to auscultation bilaterally, no adventitious breath sounds, normal respiratory effort  GI: Soft, non-tender, non-distended, normal bowel sounds  Extremities: Warm, well perfused, no LE edema  Skin: no visible rash  Neuro: CN II - XII grossly intact  Psychiatry: Normal mood and affect      Data Review:    Labs:  Results from last 7 days   Lab Units 24  1517   WBC 10*3/mm3 7.68   HEMOGLOBIN g/dL 5.5*   PLATELETS 10*3/mm3 290     Results from last 7 days   Lab Units 24  1517   SODIUM mmol/L 139   POTASSIUM mmol/L 3.8   CHLORIDE mmol/L 107   CO2 mmol/L 24.9   BUN mg/dL 11   CREATININE mg/dL 1.26   GLUCOSE mg/dL 124*   CALCIUM mg/dL 8.8   Estimated Creatinine Clearance: 87.2 mL/min (by C-G formula based on SCr of 1.26 mg/dL).    Results from last 7 days   Lab Units 24  1517   AST (SGOT) U/L 17   ALT (SGPT) U/L 23   PLATELETS 10*3/mm3 290             Imaging:        Assessment / Plan:    Acute gastrointestinal bleed  Acute blood loss anemia on chronic anemia  Melena  History of alcohol abuse  Tachycardia    -Patient presented with dark stools and lightheadedness and found to have a hemoglobin of 5.5.  -Initiated on pantoprazole drip.  -GI consulted, appreciate recommendations  -Transfuse for hemoglobin greater than 7.  History of " antibodies so more difficult to acquire appropriate blood products  -Every 6 hour hemoglobin  -Was hospitalized back in July for GI bleed also and was offered endoscopy however patient refused.  Seeing that this is his second hospitalization regarding GI bleed and anemia in the past 2 months, it is prudent that he undergo endoscopy to ascertain a diagnosis.  -Hemodynamics have improved, previously tachycardic into the 140s  -Patient is in critical condition with severe blood loss anemia    GI prophylaxis: Pantoprazole drip  DVT prophylaxis: SCDs  Leary catheter: No  Bowel regimen: Ordered  Nutrition: N.p.o.    Disposition: ICU due to critical state    Critical care: 35 minutes    Raul Ashby MD  [facility], [facility]  Pulmonary and Critical Care Medicine, Interventional Pulmonology    9/26/2024  16:46 EDT

## 2024-09-26 NOTE — ED NOTES
Nursing report ED to floor  Radha Noriega V  44 y.o.  male    HPI :  HPI (Adult)  Stated Reason for Visit: Pt to ED from home via pv. Pt reports rapid heart rate x 1 day, denies CP. Also reports blood in stool x 2 days. Pt is AO4, ambulatory to desk.  History Obtained From: patient  Precipitating Event(s): none    Chief Complaint  Chief Complaint   Patient presents with    Rapid Heart Rate    Black or Bloody Stool       Admitting doctor:   Raul Ashby MD    Admitting diagnosis:   The primary encounter diagnosis was ABLA (acute blood loss anemia). A diagnosis of Upper GI bleed was also pertinent to this visit.    Code status:   Current Code Status       Date Active Code Status Order ID Comments User Context       9/26/2024 1655 CPR (Attempt to Resuscitate) [id number]  Raul Ashby MD ED        Question Answer    Code Status (Patient has no pulse and is not breathing) CPR (Attempt to Resuscitate)    Medical Interventions (Patient has pulse or is breathing) Full Support                    Allergies:   Patient has no known allergies.    Isolation:   No active isolations    Intake and Output  No intake or output data in the 24 hours ending 09/26/24 1702    Weight:       09/26/24  1452   Weight: 81.6 kg (180 lb)       Most recent vitals:   Vitals:    09/26/24 1626 09/26/24 1644 09/26/24 1654 09/26/24 1658   BP: 118/72   (P) 108/74   BP Location:    (P) Right arm   Patient Position:    (P) Lying   Pulse:  93 90 (P) 92   Resp:    (P) 16   Temp:    (P) 98.4 °F (36.9 °C)   TempSrc:    (P) Tympanic   SpO2:  100% 100% (P) 100%   Weight:       Height:           Active LDAs/IV Access:   Lines, Drains & Airways       Active LDAs       Name Placement date Placement time Site Days    Peripheral IV 09/26/24 1626 Anterior;Left Forearm 09/26/24 1626  Forearm  less than 1                    Labs (abnormal labs have a star):   Labs Reviewed   COMPREHENSIVE METABOLIC PANEL - Abnormal; Notable for the following components:        Result Value    Glucose 124 (*)     All other components within normal limits    Narrative:     GFR Normal >60  Chronic Kidney Disease <60  Kidney Failure <15     CBC WITH AUTO DIFFERENTIAL - Abnormal; Notable for the following components:    RBC 2.00 (*)     Hemoglobin 5.5 (*)     Hematocrit 17.8 (*)     MCHC 30.9 (*)     RDW 19.0 (*)     RDW-SD 58.7 (*)     All other components within normal limits   APTT - Abnormal; Notable for the following components:    PTT <20.0 (*)     All other components within normal limits   MANUAL DIFFERENTIAL - Abnormal; Notable for the following components:    Monocyte % 1.0 (*)     Monocytes Absolute 0.08 (*)     All other components within normal limits   TROPONIN - Normal    Narrative:     High Sensitive Troponin T Reference Range:  <14.0 ng/L- Negative Female for AMI  <22.0 ng/L- Negative Male for AMI  >=14 - Abnormal Female indicating possible myocardial injury.  >=22 - Abnormal Male indicating possible myocardial injury.   Clinicians would have to utilize clinical acumen, EKG, Troponin, and serial changes to determine if it is an Acute Myocardial Infarction or myocardial injury due to an underlying chronic condition.        PROTIME-INR - Normal   RAINBOW DRAW    Narrative:     The following orders were created for panel order [place] Draw.  Procedure                               Abnormality         Status                     ---------                               -----------         ------                     Green Top (Gel)[[id number]]                                  Final result               Lavender Top[[id number]]                                     Final result               Gold Top - SST[[id number]]                                   Final result               Light Blue Top[[id number]]                                   Final result                 Please view results for these tests on the individual orders.   HIGH SENSITIVITIY TROPONIN T 2HR   TYPE AND SCREEN   PREPARE  RBC   CBC AND DIFFERENTIAL    Narrative:     The following orders were created for panel order CBC & Differential.  Procedure                               Abnormality         Status                     ---------                               -----------         ------                     CBC Auto Differential[[id number]]        Abnormal            Final result                 Please view results for these tests on the individual orders.   GREEN TOP   LAVENDER TOP   GOLD TOP - SST   LIGHT BLUE TOP       EKG:   ECG 12 Lead ED Triage Standing Order; Chest Pain   Preliminary Result   HEART TURN=714  bpm   RR Pjzqhwts=945  ms   MO Drwfmwvv=296  ms   P Horizontal Axis=15  deg   P Front Axis=72  deg   QRSD Interval=82  ms   QT Interval=320  ms   WCcW=782  ms   QRS Axis=53  deg   T Wave Axis=5  deg   - OTHERWISE NORMAL ECG -   Sinus tachycardia   Date and Time of Study:2024-09-26 14:42:17          Meds given in ED:   Medications   sodium chloride 0.9 % flush 10 mL (has no administration in time range)   aspirin tablet 325 mg (0 mg Oral Hold 9/26/24 1506)   pantoprazole (PROTONIX) 40 mg in sodium chloride 0.9 % 100 mL (0.4 mg/mL) MBP (8 mg/hr Intravenous New Bag 9/26/24 1629)   pantoprazole (PROTONIX) injection 80 mg (80 mg Intravenous Given 9/26/24 1626)       Imaging results:  XR Chest 1 View    Result Date: 9/26/2024   No active disease in the chest.  This report was finalized on 9/26/2024 3:16 PM by Dr. Jeramy Castano M.D on Workstation: [id number]       Ambulatory status:   - up with standby      Social issues:   Social History     Socioeconomic History    Marital status:    Tobacco Use    Smoking status: Every Day     Current packs/day: 1.00     Average packs/day: 1 pack/day for 20.0 years (20.0 ttl pk-yrs)     Types: Cigarettes    Smokeless tobacco: Never   Vaping Use    Vaping status: Never Used   Substance and Sexual Activity    Alcohol use: Not Currently     Comment: hasnt had a drink in a month. States he  is under the care of PCP to [facility].    Drug use: Yes     Types: Marijuana     Comment: daily.    Sexual activity: Yes     Partners: Female       Peripheral Neurovascular  Peripheral Neurovascular (Adult)  Peripheral Neurovascular WDL: WDL    Neuro Cognitive  Neuro Cognitive (Adult)  Cognitive/Neuro/Behavioral WDL: WDL    Learning  Learning Assessment (Adult)  Learning Readiness and Ability: no barriers identified  Education Provided  Person Taught: patient  Teaching Method: verbal instruction  Teaching Focus: symptom/problem overview, risk factors/triggers, diagnostic test, medical device/equipment use, medication administration  Education Outcome Evaluation: acceptance expressed, verbalizes understanding    Respiratory  Respiratory WDL  Respiratory WDL: WDL    Abdominal Pain       Pain Assessments  Pain (Adult)  (0-10) Pain Rating: Rest: 0  (0-10) Pain Rating: Activity: 0    NIH Stroke Scale       Yaakov Agosto RN  09/26/24 17:02 EDT

## 2024-09-26 NOTE — ED PROVIDER NOTES
EMERGENCY DEPARTMENT ENCOUNTER    Room Number:  27/27  PCP: Hellen Skelton APRN  Historian: Patient      HPI:  Chief Complaint: GI bleed  A complete HPI/ROS/PMH/PSH/SH/FH are unobtainable due to: None  Context: Radha Noriega V is a 44 y.o. male who presents to the ED c/o GI bleed.  Patient has had intermittent GI bleeds in the past.  Patient states he has required transfusions in the past.  States he has felt lightheaded over the last few days.  Has had 2 days of dark stools.  Denies NSAID use.  Patient states he does feel palpitations.  Patient has had no vomiting or hematemesis.  Has had lightheadedness.            PAST MEDICAL HISTORY  Active Ambulatory Problems     Diagnosis Date Noted    Gastrointestinal hemorrhage with melena 06/23/2023    ETOH abuse 06/23/2023    Migraine 02/09/2021    Current every day smoker 06/23/2023    Tachycardia 06/23/2023    Acute blood loss anemia 06/23/2023    Family history of diabetes mellitus 06/29/2023    Iron deficiency anemia 07/08/2023    Sepsis 07/13/2023    Metabolic acidosis 07/14/2023    Enteritis of possible infectious origin 07/14/2023    Blood per rectum 07/12/2023    Arm DVT (deep venous thromboembolism), acute, left 08/24/2023    Complication associated with peripherally inserted central catheter (PICC) 08/24/2023    GI bleed 10/30/2023    Lower GI bleed 10/30/2023    Symptomatic anemia 07/24/2024     Resolved Ambulatory Problems     Diagnosis Date Noted    GI bleed 07/07/2023    ABLA (acute blood loss anemia) 07/12/2023    Lower GI bleed 10/30/2023     Past Medical History:   Diagnosis Date    Anemia     Tobacco dependence          PAST SURGICAL HISTORY  Past Surgical History:   Procedure Laterality Date    APPENDECTOMY      COLONOSCOPY N/A 06/25/2023    Procedure: COLONOSCOPY TO CECUM AND TERMINAL ILEUM WITH BIOPSIES AND COLD BIOPSY POLYPECTOMIES;  Surgeon: Imtiaz Lee MD;  Location: [facility] ENDOSCOPY;  Service: Gastroenterology;  Laterality: N/A;  PRE-  MELENA  POST- COLON POLYPS, HEMORRHOIDS    COLONOSCOPY N/A 11/2/2023    Procedure: COLONOSCOPY AT BEDSIDE to cecum;  Surgeon: Mani Celeste MD;  Location:  [facility] ENDOSCOPY;  Service: Gastroenterology;  Laterality: N/A;  Pre: anemia, gi bleed  Post: blood in colon, no active source    ENDOSCOPY Left 06/24/2023    Procedure: ESOPHAGOGASTRODUODENOSCOPY;  Surgeon: Imtiaz Lee MD;  Location:  [facility] ENDOSCOPY;  Service: Gastroenterology;  Laterality: Left;  small hiatal hernia, esophagitis    ENDOSCOPY N/A 07/15/2023    Procedure: ESOPHAGOGASTRODUODENOSCOPY;  Surgeon: Ermelinda Dixon MD;  Location:  [facility] ENDOSCOPY;  Service: Gastroenterology;  Laterality: N/A;  pre-anemia  post-hiatal hernia    ENTEROSCOPY SMALL BOWEL N/A 07/09/2023    Procedure: ENTEROSCOPY SMALL BOWEL;  Surgeon: Isrrael Gibson MD;  Location: [facility] ENDOSCOPY;  Service: Gastroenterology;  Laterality: N/A;  PRE- GI BLEED  POST- NORMAL    ENTEROSCOPY SMALL BOWEL N/A 11/2/2023    Procedure: ENTEROSCOPY SMALL BOWEL AT BEDSIDE;  Surgeon: Mani Celeste MD;  Location:  [facility] ENDOSCOPY;  Service: Gastroenterology;  Laterality: N/A;  Pre: anemia. gi bleed  Post: hiatal hernia, gastritis, esophagitis    UPPER GASTROINTESTINAL ENDOSCOPY           FAMILY HISTORY  Family History   Problem Relation Age of Onset    Diabetes Father     Diabetes Sister     Hypertension Sister     Diabetes Brother     Diabetes Maternal Grandmother     Diabetes Maternal Grandfather     Diabetes Paternal Grandmother     Diabetes Paternal Grandfather     Heart attack Maternal Aunt 73    Colon cancer Neg Hx     Prostate cancer Neg Hx          SOCIAL HISTORY  Social History     Socioeconomic History    Marital status:    Tobacco Use    Smoking status: Every Day     Current packs/day: 1.00     Average packs/day: 1 pack/day for 20.0 years (20.0 ttl pk-yrs)     Types: Cigarettes    Smokeless tobacco: Never   Vaping Use    Vaping status: Never Used   Substance and Sexual  Activity    Alcohol use: Not Currently     Comment: juani had a drink in a month. States he is under the care of PCP to reyna.    Drug use: Yes     Types: Marijuana     Comment: daily.    Sexual activity: Yes     Partners: Female         ALLERGIES  Patient has no known allergies.        REVIEW OF SYSTEMS  Review of Systems   GI bleed      PHYSICAL EXAM  ED Triage Vitals   Temp Heart Rate Resp BP SpO2   09/26/24 1438 09/26/24 1438 09/26/24 1438 09/26/24 1452 09/26/24 1438   98.1 °F (36.7 °C) (!) 147 16 135/79 100 %      Temp src Heart Rate Source Patient Position BP Location FiO2 (%)   09/26/24 1438 -- 09/26/24 1452 09/26/24 1452 --   Tympanic  Lying Right arm        Physical Exam      GENERAL: no acute distress  HENT: nares patent  EYES: no scleral icterus  CV: regular rhythm, tachycardic  RESPIRATORY: normal effort  ABDOMEN: soft  MUSCULOSKELETAL: no deformity  NEURO: alert, moves all extremities, follows commands  PSYCH:  calm, cooperative  SKIN: warm, dry    Vital signs and nursing notes reviewed.          LAB RESULTS  Recent Results (from the past 24 hour(s))   ECG 12 Lead ED Triage Standing Order; Chest Pain    Collection Time: 09/26/24  2:42 PM   Result Value Ref Range    QT Interval 320 ms    QTC Interval 431 ms   Comprehensive Metabolic Panel    Collection Time: 09/26/24  3:17 PM    Specimen: Arm, Right; Blood   Result Value Ref Range    Glucose 124 (H) 65 - 99 mg/dL    BUN 11 6 - 20 mg/dL    Creatinine 1.26 0.76 - 1.27 mg/dL    Sodium 139 136 - 145 mmol/L    Potassium 3.8 3.5 - 5.2 mmol/L    Chloride 107 98 - 107 mmol/L    CO2 24.9 22.0 - 29.0 mmol/L    Calcium 8.8 8.6 - 10.5 mg/dL    Total Protein 6.2 6.0 - 8.5 g/dL    Albumin 3.9 3.5 - 5.2 g/dL    ALT (SGPT) 23 1 - 41 U/L    AST (SGOT) 17 1 - 40 U/L    Alkaline Phosphatase 63 39 - 117 U/L    Total Bilirubin <0.2 0.0 - 1.2 mg/dL    Globulin 2.3 gm/dL    A/G Ratio 1.7 g/dL    BUN/Creatinine Ratio 8.7 7.0 - 25.0    Anion Gap 7.1 5.0 - 15.0 mmol/L    eGFR  72.6 >60.0 mL/min/1.73   High Sensitivity Troponin T    Collection Time: 09/26/24  3:17 PM    Specimen: Arm, Right; Blood   Result Value Ref Range    HS Troponin T 8 <22 ng/L   Green Top (Gel)    Collection Time: 09/26/24  3:17 PM   Result Value Ref Range    Extra Tube Hold for add-ons.    Lavender Top    Collection Time: 09/26/24  3:17 PM   Result Value Ref Range    Extra Tube hold for add-on    Gold Top - SST    Collection Time: 09/26/24  3:17 PM   Result Value Ref Range    Extra Tube Hold for add-ons.    Light Blue Top    Collection Time: 09/26/24  3:17 PM   Result Value Ref Range    Extra Tube Hold for add-ons.    CBC Auto Differential    Collection Time: 09/26/24  3:17 PM    Specimen: Arm, Right; Blood   Result Value Ref Range    WBC 7.68 3.40 - 10.80 10*3/mm3    RBC 2.00 (L) 4.14 - 5.80 10*6/mm3    Hemoglobin 5.5 (C) 13.0 - 17.7 g/dL    Hematocrit 17.8 (C) 37.5 - 51.0 %    MCV 89.0 79.0 - 97.0 fL    MCH 27.5 26.6 - 33.0 pg    MCHC 30.9 (L) 31.5 - 35.7 g/dL    RDW 19.0 (H) 12.3 - 15.4 %    RDW-SD 58.7 (H) 37.0 - 54.0 fl    MPV 10.6 6.0 - 12.0 fL    Platelets 290 140 - 450 10*3/mm3   Type & Screen    Collection Time: 09/26/24  3:17 PM    Specimen: Arm, Right; Blood   Result Value Ref Range    ABO Type B     RH type Positive     Antibody Screen Negative     T&S Expiration Date 9/29/2024 11:59:59 PM    Protime-INR    Collection Time: 09/26/24  3:17 PM    Specimen: Arm, Right; Blood   Result Value Ref Range    Protime 13.8 11.7 - 14.2 Seconds    INR 1.04 0.90 - 1.10   aPTT    Collection Time: 09/26/24  3:17 PM    Specimen: Arm, Right; Blood   Result Value Ref Range    PTT <20.0 (L) 22.7 - 35.4 seconds   Manual Differential    Collection Time: 09/26/24  3:17 PM    Specimen: Arm, Right; Blood   Result Value Ref Range    Neutrophil % 74.7 42.7 - 76.0 %    Lymphocyte % 22.2 19.6 - 45.3 %    Monocyte % 1.0 (L) 5.0 - 12.0 %    Eosinophil % 2.0 0.3 - 6.2 %    Basophil % 0.0 0.0 - 1.5 %    Neutrophils Absolute 5.74 1.70 -  7.00 10*3/mm3    Lymphocytes Absolute 1.70 0.70 - 3.10 10*3/mm3    Monocytes Absolute 0.08 (L) 0.10 - 0.90 10*3/mm3    Eosinophils Absolute 0.15 0.00 - 0.40 10*3/mm3    Basophils Absolute 0.00 0.00 - 0.20 10*3/mm3    Hypochromia Mod/2+ None Seen    WBC Morphology Normal Normal    Platelet Morphology Normal Normal   Prepare RBC, 2 Units    Collection Time: 09/26/24  4:49 PM   Result Value Ref Range    Product Code Z1323L24     Unit Number [id number]     UNIT  ABO B     UNIT  RH POS     Crossmatch Interpretation Compatible     Dispense Status XM     Blood Expiration Date 202410022359     Blood Type Barcode 7300     Product Code Z6378T75     Unit Number [id number]     UNIT  ABO B     UNIT  RH POS     Crossmatch Interpretation Compatible     Dispense Status XM     Blood Expiration Date 202410022359     Blood Type Barcode 7300        Ordered the above labs and reviewed the results.        RADIOLOGY  XR Chest 1 View    Result Date: 9/26/2024  EXAM:XR CHEST 1 VW-  CLINICAL HISTORY:Chest Pain Triage Protocol  FINDINGS:  The lungs are clear of acute infiltrates. The cardiac mediastinal silhouette is within normal limits. There is mild elevation of the left hemidiaphragm which was also seen on the prior exam dated 8/24/2023. There is a chronic appearing deformity within the lateral aspect of the right clavicle and mild arthritic changes are seen within the right AC joint.       No active disease in the chest.  This report was finalized on 9/26/2024 3:16 PM by Dr. Jeramy Castano M.D on Workstation: [id number]       Ordered the above noted radiological studies.  Chest x-ray independent interpreted by me and shows no evidence of pneumonia          PROCEDURES  Procedures      EKG          EKG time: 1442  Rhythm/Rate: Sinus tachycardia 109  P waves and LA: Normal P waves  QRS, axis: Normal QRS  ST and T waves: Normal ST-T wave    Interpreted Contemporaneously by me, independently viewed  No prior      MEDICATIONS GIVEN  IN ER  Medications   sodium chloride 0.9 % flush 10 mL (has no administration in time range)   aspirin tablet 325 mg (0 mg Oral Hold 9/26/24 1506)   pantoprazole (PROTONIX) 40 mg in sodium chloride 0.9 % 100 mL (0.4 mg/mL) MBP (8 mg/hr Intravenous New Bag 9/26/24 1629)   pantoprazole (PROTONIX) injection 80 mg (80 mg Intravenous Given 9/26/24 1626)                   MEDICAL DECISION MAKING, PROGRESS, and CONSULTS    All labs have been independently reviewed by me.  All radiology studies have been reviewed by me and I have also reviewed the radiology report.   EKG's independently viewed and interpreted by me.  Discussion below represents my analysis of pertinent findings related to patient's condition, differential diagnosis, treatment plan and final disposition.      Additional sources:  - Discussed/ obtained information from independent historians: None    - External (non-ED) record review: Epic reviewed patient last here in July 2024 for GI bleed    - Chronic or social conditions impacting care: None    - Shared decision making: None      Orders placed during this visit:  Orders Placed This Encounter   Procedures    XR Chest 1 View    [place] Draw    Comprehensive Metabolic Panel    High Sensitivity Troponin T    CBC Auto Differential    Protime-INR    aPTT    Manual Differential    High Sensitivity Troponin T 2Hr    NPO Diet NPO Type: Strict NPO    Undress & Gown    Continuous Pulse Oximetry    Orthostatic Vitals    Monitor Blood Pressure    Verify Informed Consent    Pulmonology (on-call MD unless specified)    Oxygen Therapy- Nasal Cannula; Titrate 1-6 LPM Per SpO2; 90 - 95%    ECG 12 Lead ED Triage Standing Order; Chest Pain    ECG 12 Lead ED Triage Standing Order; Chest Pain    Type & Screen    Prepare RBC, 2 Units    Insert Peripheral IV    Inpatient Admission    CBC & Differential    Green Top (Gel)    Lavender Top    Gold Top - SST    Light Blue Top         Additional orders considered but not  ordered:  None        Differential diagnosis includes but is not limited to:    Gastritis versus peptic ulcer disease versus varices      Independent interpretation of labs, radiology studies, and discussions with consultants:  ED Course as of 09/26/24 1654   Thu Sep 26, 2024   1528 On first look, patient presents complaining of 2 days of palpitations whenever he stands up with associated black stool.  Denies having any abdominal pain.  He has been using ibuprofen regularly for the past 2 weeks for headaches and muscle aches.  On exam, he is tachycardic with heart rate in the low 100s while laying down.  Abdomen is soft and nontender.   [TD]   1648 16:48 EDT  Patient with acute blood loss anemia.  Patient also apparently is going to be hard to type and cross.  Patient is gotten Protonix bolus and drip.  Patient has been discussed with Dr. Ashby will admit patient to the intensive care unit. [SL]      ED Course User Index  [SL] Srinivas Sharp MD  [TD] Tamir Harris II, MD                 DIAGNOSIS  Final diagnoses:   ABLA (acute blood loss anemia)   Upper GI bleed         DISPOSITION  admit            Latest Documented Vital Signs:  As of 16:54 EDT  BP- 118/72 HR- 93 Temp- 98.1 °F (36.7 °C) (Tympanic) O2 sat- 100%              --    Please note that portions of this were completed with a voice recognition program.       Note Disclaimer: At [facility], we believe that sharing information builds trust and better relationships. You are receiving this note because you are receiving care at [facility] or recently visited. It is possible you will see health information before a provider has talked with you about it. This kind of information can be easy to misunderstand. To help you fully understand what it means for your health, we urge you to discuss this note with your provider.            Srinivas Sharp MD  09/26/24 5347

## 2024-09-27 ENCOUNTER — ANESTHESIA EVENT (OUTPATIENT)
Dept: GASTROENTEROLOGY | Facility: HOSPITAL | Age: 44
End: 2024-09-27
Payer: MEDICAID

## 2024-09-27 ENCOUNTER — ANESTHESIA (OUTPATIENT)
Dept: GASTROENTEROLOGY | Facility: HOSPITAL | Age: 44
End: 2024-09-27
Payer: MEDICAID

## 2024-09-27 LAB
ANION GAP SERPL CALCULATED.3IONS-SCNC: 5.7 MMOL/L (ref 5–15)
BASOPHILS # BLD AUTO: 0.02 10*3/MM3 (ref 0–0.2)
BASOPHILS NFR BLD AUTO: 0.2 % (ref 0–1.5)
BH BB BLOOD EXPIRATION DATE: NORMAL
BH BB BLOOD EXPIRATION DATE: NORMAL
BH BB BLOOD TYPE BARCODE: 7300
BH BB BLOOD TYPE BARCODE: 7300
BH BB DISPENSE STATUS: NORMAL
BH BB DISPENSE STATUS: NORMAL
BH BB PRODUCT CODE: NORMAL
BH BB PRODUCT CODE: NORMAL
BH BB UNIT NUMBER: NORMAL
BH BB UNIT NUMBER: NORMAL
BUN SERPL-MCNC: 11 MG/DL (ref 6–20)
BUN/CREAT SERPL: 9.2 (ref 7–25)
CALCIUM SPEC-SCNC: 8.5 MG/DL (ref 8.6–10.5)
CHLORIDE SERPL-SCNC: 111 MMOL/L (ref 98–107)
CO2 SERPL-SCNC: 24.3 MMOL/L (ref 22–29)
CREAT SERPL-MCNC: 1.19 MG/DL (ref 0.76–1.27)
CROSSMATCH INTERPRETATION: NORMAL
CROSSMATCH INTERPRETATION: NORMAL
DEPRECATED RDW RBC AUTO: 50.7 FL (ref 37–54)
EGFRCR SERPLBLD CKD-EPI 2021: 77.7 ML/MIN/1.73
EOSINOPHIL # BLD AUTO: 0.17 10*3/MM3 (ref 0–0.4)
EOSINOPHIL NFR BLD AUTO: 1.6 % (ref 0.3–6.2)
ERYTHROCYTE [DISTWIDTH] IN BLOOD BY AUTOMATED COUNT: 16.5 % (ref 12.3–15.4)
GLUCOSE BLDC GLUCOMTR-MCNC: 102 MG/DL (ref 70–130)
GLUCOSE BLDC GLUCOMTR-MCNC: 105 MG/DL (ref 70–130)
GLUCOSE BLDC GLUCOMTR-MCNC: 111 MG/DL (ref 70–130)
GLUCOSE BLDC GLUCOMTR-MCNC: 147 MG/DL (ref 70–130)
GLUCOSE BLDC GLUCOMTR-MCNC: 89 MG/DL (ref 70–130)
GLUCOSE SERPL-MCNC: 95 MG/DL (ref 65–99)
HCT VFR BLD AUTO: 24 % (ref 37.5–51)
HCT VFR BLD AUTO: 24 % (ref 37.5–51)
HCT VFR BLD AUTO: 24.5 % (ref 37.5–51)
HCT VFR BLD AUTO: 24.5 % (ref 37.5–51)
HCT VFR BLD AUTO: 24.9 % (ref 37.5–51)
HGB BLD-MCNC: 7.8 G/DL (ref 13–17.7)
HGB BLD-MCNC: 7.8 G/DL (ref 13–17.7)
HGB BLD-MCNC: 8 G/DL (ref 13–17.7)
HGB BLD-MCNC: 8 G/DL (ref 13–17.7)
HGB BLD-MCNC: 8.1 G/DL (ref 13–17.7)
IMM GRANULOCYTES # BLD AUTO: 0.1 10*3/MM3 (ref 0–0.05)
IMM GRANULOCYTES NFR BLD AUTO: 0.9 % (ref 0–0.5)
LYMPHOCYTES # BLD AUTO: 1.67 10*3/MM3 (ref 0.7–3.1)
LYMPHOCYTES NFR BLD AUTO: 15.5 % (ref 19.6–45.3)
MAGNESIUM SERPL-MCNC: 2 MG/DL (ref 1.6–2.6)
MCH RBC QN AUTO: 28.7 PG (ref 26.6–33)
MCHC RBC AUTO-ENTMCNC: 32.5 G/DL (ref 31.5–35.7)
MCV RBC AUTO: 88.2 FL (ref 79–97)
MONOCYTES # BLD AUTO: 0.9 10*3/MM3 (ref 0.1–0.9)
MONOCYTES NFR BLD AUTO: 8.3 % (ref 5–12)
NEUTROPHILS NFR BLD AUTO: 7.92 10*3/MM3 (ref 1.7–7)
NEUTROPHILS NFR BLD AUTO: 73.5 % (ref 42.7–76)
NRBC BLD AUTO-RTO: 1.6 /100 WBC (ref 0–0.2)
PHOSPHATE SERPL-MCNC: 3.6 MG/DL (ref 2.5–4.5)
PLATELET # BLD AUTO: 288 10*3/MM3 (ref 140–450)
PMV BLD AUTO: 10.5 FL (ref 6–12)
POTASSIUM SERPL-SCNC: 4.1 MMOL/L (ref 3.5–5.2)
RBC # BLD AUTO: 2.72 10*6/MM3 (ref 4.14–5.8)
SODIUM SERPL-SCNC: 141 MMOL/L (ref 136–145)
UNIT  ABO: NORMAL
UNIT  ABO: NORMAL
UNIT  RH: NORMAL
UNIT  RH: NORMAL
WBC NRBC COR # BLD AUTO: 10.78 10*3/MM3 (ref 3.4–10.8)

## 2024-09-27 PROCEDURE — 85018 HEMOGLOBIN: CPT | Performed by: INTERNAL MEDICINE

## 2024-09-27 PROCEDURE — 85014 HEMATOCRIT: CPT | Performed by: HOSPITALIST

## 2024-09-27 PROCEDURE — 96366 THER/PROPH/DIAG IV INF ADDON: CPT

## 2024-09-27 PROCEDURE — 80048 BASIC METABOLIC PNL TOTAL CA: CPT | Performed by: HOSPITALIST

## 2024-09-27 PROCEDURE — 25810000003 LACTATED RINGERS PER 1000 ML: Performed by: NURSE ANESTHETIST, CERTIFIED REGISTERED

## 2024-09-27 PROCEDURE — 0DJ08ZZ INSPECTION OF UPPER INTESTINAL TRACT, VIA NATURAL OR ARTIFICIAL OPENING ENDOSCOPIC: ICD-10-PCS | Performed by: INTERNAL MEDICINE

## 2024-09-27 PROCEDURE — 85025 COMPLETE CBC W/AUTO DIFF WBC: CPT | Performed by: HOSPITALIST

## 2024-09-27 PROCEDURE — 83735 ASSAY OF MAGNESIUM: CPT | Performed by: HOSPITALIST

## 2024-09-27 PROCEDURE — 82948 REAGENT STRIP/BLOOD GLUCOSE: CPT

## 2024-09-27 PROCEDURE — 85018 HEMOGLOBIN: CPT | Performed by: HOSPITALIST

## 2024-09-27 PROCEDURE — 85014 HEMATOCRIT: CPT | Performed by: INTERNAL MEDICINE

## 2024-09-27 PROCEDURE — 25810000003 SODIUM CHLORIDE 0.9 % SOLUTION: Performed by: INTERNAL MEDICINE

## 2024-09-27 PROCEDURE — 84100 ASSAY OF PHOSPHORUS: CPT | Performed by: HOSPITALIST

## 2024-09-27 PROCEDURE — 44360 SMALL BOWEL ENDOSCOPY: CPT | Performed by: INTERNAL MEDICINE

## 2024-09-27 PROCEDURE — 99222 1ST HOSP IP/OBS MODERATE 55: CPT | Performed by: INTERNAL MEDICINE

## 2024-09-27 PROCEDURE — 25010000002 PROPOFOL 200 MG/20ML EMULSION: Performed by: NURSE ANESTHETIST, CERTIFIED REGISTERED

## 2024-09-27 RX ORDER — SODIUM CHLORIDE 9 MG/ML
30 INJECTION, SOLUTION INTRAVENOUS CONTINUOUS PRN
Status: DISCONTINUED | OUTPATIENT
Start: 2024-09-27 | End: 2024-09-27

## 2024-09-27 RX ORDER — LIDOCAINE HYDROCHLORIDE 20 MG/ML
INJECTION, SOLUTION INFILTRATION; PERINEURAL AS NEEDED
Status: DISCONTINUED | OUTPATIENT
Start: 2024-09-27 | End: 2024-09-27 | Stop reason: SURG

## 2024-09-27 RX ORDER — PROPOFOL 10 MG/ML
INJECTION, EMULSION INTRAVENOUS CONTINUOUS PRN
Status: DISCONTINUED | OUTPATIENT
Start: 2024-09-27 | End: 2024-09-27 | Stop reason: SURG

## 2024-09-27 RX ORDER — SODIUM CHLORIDE, SODIUM LACTATE, POTASSIUM CHLORIDE, CALCIUM CHLORIDE 600; 310; 30; 20 MG/100ML; MG/100ML; MG/100ML; MG/100ML
INJECTION, SOLUTION INTRAVENOUS CONTINUOUS PRN
Status: DISCONTINUED | OUTPATIENT
Start: 2024-09-27 | End: 2024-09-27 | Stop reason: SURG

## 2024-09-27 RX ADMIN — PROPOFOL 100 MG: 10 INJECTION, EMULSION INTRAVENOUS at 15:16

## 2024-09-27 RX ADMIN — PANTOPRAZOLE SODIUM 8 MG/HR: 40 INJECTION, POWDER, FOR SOLUTION INTRAVENOUS at 05:06

## 2024-09-27 RX ADMIN — LIDOCAINE HYDROCHLORIDE 100 MG: 20 INJECTION, SOLUTION INFILTRATION; PERINEURAL at 15:16

## 2024-09-27 RX ADMIN — PROPOFOL 100 MG: 10 INJECTION, EMULSION INTRAVENOUS at 15:18

## 2024-09-27 RX ADMIN — SODIUM CHLORIDE, POTASSIUM CHLORIDE, SODIUM LACTATE AND CALCIUM CHLORIDE: 600; 310; 30; 20 INJECTION, SOLUTION INTRAVENOUS at 15:04

## 2024-09-27 RX ADMIN — PANTOPRAZOLE SODIUM 8 MG/HR: 40 INJECTION, POWDER, FOR SOLUTION INTRAVENOUS at 10:14

## 2024-09-27 RX ADMIN — SODIUM CHLORIDE 30 ML/HR: 9 INJECTION, SOLUTION INTRAVENOUS at 14:44

## 2024-09-27 RX ADMIN — SENNOSIDES AND DOCUSATE SODIUM 2 TABLET: 50; 8.6 TABLET ORAL at 20:07

## 2024-09-27 RX ADMIN — PROPOFOL 200 MCG/KG/MIN: 10 INJECTION, EMULSION INTRAVENOUS at 15:15

## 2024-09-27 NOTE — PROGRESS NOTES
Consult Daily Progress Note  Psychiatric   09/27/24      Patient Name:  Radha Noriega V  MRN:  [id number]   YOB: 1980  Age: 44 y.o.  Sex: male  LOS: 1    Reason for Consult:  Gastrointestinal bleed    Hospital Course:   44-year-old male with a history of alcohol abuse, iron deficiency anemia and previous GI bleed who presented to the hospital with melena and found to have a hemoglobin of 5.5.  Admitted to the ICU for further monitoring.    Interval History:  No acute events overnight  Admitted for anemia and GI bleed  Transfused blood products, hemoglobin improved  No nausea or vomiting  No chest pain or palpitations  No shortness of breath or cough  On room air  Remains on PPI drip    Physical Exam:  Vitals:    09/27/24 0530   BP: 99/68   Pulse: 86   Resp:    Temp:    SpO2: 99%       Intake/Output    Intake/Output Summary (Last 24 hours) at 9/27/2024 0654  Last data filed at 9/26/2024 2259  Gross per 24 hour   Intake 1011.17 ml   Output 400 ml   Net 611.17 ml       General: Alert, nontoxic, NAD  HEENT: NC/AT, EOMI, MMM  Neck: Supple, trachea midline  Cardiac: RRR, no murmur, gallops, rubs  Pulmonary: Clear to auscultation bilaterally, no adventitious breath sounds, normal respiratory effort  GI: Soft, non-tender, non-distended, normal bowel sounds  Extremities: Warm, well perfused, no LE edema  Skin: no visible rash  Neuro: CN II - XII grossly intact  Psychiatry: Normal mood and affect      Data Review:  Results from last 7 days   Lab Units 09/27/24  0535 09/27/24  0001 09/26/24  1517   WBC 10*3/mm3 10.78  --  7.68   HEMOGLOBIN g/dL 7.8*  7.8* 8.0* 5.5*   PLATELETS 10*3/mm3 288  --  290     Results from last 7 days   Lab Units 09/27/24  0535 09/26/24  1517   SODIUM mmol/L 141 139   POTASSIUM mmol/L 4.1 3.8   CHLORIDE mmol/L 111* 107   CO2 mmol/L 24.3 24.9   BUN mg/dL 11 11   CREATININE mg/dL 1.19 1.26   GLUCOSE mg/dL 95 124*   CALCIUM mg/dL 8.5* 8.8   MAGNESIUM mg/dL 2.0  --     PHOSPHORUS mg/dL 3.6  --    Estimated Creatinine Clearance: 92.6 mL/min (by C-G formula based on SCr of 1.19 mg/dL).    Results from last 7 days   Lab Units 09/27/24  0535 09/26/24  1517   AST (SGOT) U/L  --  17   ALT (SGPT) U/L  --  23   PLATELETS 10*3/mm3 288 290               Imaging:  Reviewed chest images personally from past 3 days    ASSESSMENT  /  PLAN:    Acute gastrointestinal bleed  Acute blood loss anemia on chronic anemia  Melena  History of alcohol abuse  Tachycardia    -Patient presented to the hospital with melena and lightheadedness and found to have a hemoglobin of 5.5.  -Transfused 2 units of packed RBCs, now at 7.8.  Patient with a history of antibodies and requires further screening for his blood products.  -Initially admitted to the ICU for concerns for hemodynamic instability.  Overnight has remained hemodynamically stable, with appropriate blood pressures.  -Awaiting GI consult for further evaluation and consideration of endoscopy.  Was recently hospitalized in July for GI bleed and was offered endoscopy however refused.  It is prudent he undergo endoscopy now to ascertain a diagnosis seeing that this is a second occurrence.  -Hemoglobin goal greater than 7  -Every 6 hour hemoglobins  -Continue PPI drip    GI prophylaxis: Pantoprazole drip  DVT prophylaxis: SCDs  Leary catheter: No  Bowel regimen: Ordered  Nutrition: N.p.o.     Disposition: Transfer to floor.  Internal medicine consulted.  Pulmonary will sign off once hospitalist assumes care.    Raul Ashby MD  [facility]  Pulmonary and Critical Care Medicine, Interventional Pulmonology    Parts of this note may be an electronic transcription/translation of spoken language to printed text using the Dragon dictation system.

## 2024-09-27 NOTE — PLAN OF CARE
Goal Outcome Evaluation:           Progress: improving  Outcome Evaluation: Pt is A&Ox4, room air, makes needs known, H&H Q8, stable HGB, reg diet with no nausea, SR w/1 AVB on monitors vitals as charted

## 2024-09-27 NOTE — CONSULTS
[facility]  Initial Inpatient Consult Note    Referring Provider: Dr Ashby    Reason for Consultation: melena, anemia    Subjective     History of present illness:    44 y.o. male, known to our service from previous admissions for GI bleeding.  We are asked to see him for melena and symptomatic anemia.  Patient was admitted yesterday with a hemoglobin of 5.5.  Posttransfusion it is now up to 8.  He has an extensive GI history with multiple prior endoscopies.  Was here in July for similar circumstances and apparently refused endoscopy at the time.    He reports the onset of black stool a few days ago.  He reports he has been taking some intermittent ibuprofen.  No heartburn or difficulty swallowing.  No nausea or vomiting.  He reports that sometimes his stomach hurts if he does not eat.  He does feel dizzy when he stands up but only when he is bleeding.    endoscopic history-  June 2023-EGD LA class a esophagitis, colonoscopy-small polyps and hemorrhoids  July 2023 small bowel enteroscopy normal to mid jejunum  July 2023 EGD-small hiatal hernia  November 2023 EGD-grade a esophagitis, hiatal hernia, colonoscopy-blood entire colon.  CTA x 2 at admission were negative.  November 2023-capsule-normal    Past Medical History:  Past Medical History:   Diagnosis Date    Anemia     ETOH abuse     Tachycardia 06/23/2023    Tobacco dependence      Past Surgical History:  Past Surgical History:   Procedure Laterality Date    APPENDECTOMY      COLONOSCOPY N/A 06/25/2023    Procedure: COLONOSCOPY TO CECUM AND TERMINAL ILEUM WITH BIOPSIES AND COLD BIOPSY POLYPECTOMIES;  Surgeon: Imtiaz Lee MD;  Location: [facility] ENDOSCOPY;  Service: Gastroenterology;  Laterality: N/A;  PRE- MELENA  POST- COLON POLYPS, HEMORRHOIDS    COLONOSCOPY N/A 11/2/2023    Procedure: COLONOSCOPY AT BEDSIDE to cecum;  Surgeon: Mani Celeste MD;  Location: [facility] ENDOSCOPY;  Service: Gastroenterology;  Laterality: N/A;  Pre:  anemia, gi bleed  Post: blood in colon, no active source    ENDOSCOPY Left 06/24/2023    Procedure: ESOPHAGOGASTRODUODENOSCOPY;  Surgeon: Imtiaz Lee MD;  Location:  [facility] ENDOSCOPY;  Service: Gastroenterology;  Laterality: Left;  small hiatal hernia, esophagitis    ENDOSCOPY N/A 07/15/2023    Procedure: ESOPHAGOGASTRODUODENOSCOPY;  Surgeon: Ermelinda Dixon MD;  Location:  [facility] ENDOSCOPY;  Service: Gastroenterology;  Laterality: N/A;  pre-anemia  post-hiatal hernia    ENTEROSCOPY SMALL BOWEL N/A 07/09/2023    Procedure: ENTEROSCOPY SMALL BOWEL;  Surgeon: Isrrael Gibson MD;  Location:  [facility] ENDOSCOPY;  Service: Gastroenterology;  Laterality: N/A;  PRE- GI BLEED  POST- NORMAL    ENTEROSCOPY SMALL BOWEL N/A 11/2/2023    Procedure: ENTEROSCOPY SMALL BOWEL AT BEDSIDE;  Surgeon: Mani Celeste MD;  Location:  [facility] ENDOSCOPY;  Service: Gastroenterology;  Laterality: N/A;  Pre: anemia. gi bleed  Post: hiatal hernia, gastritis, esophagitis    UPPER GASTROINTESTINAL ENDOSCOPY        Social History:   Social History     Tobacco Use    Smoking status: Every Day     Current packs/day: 1.00     Average packs/day: 1 pack/day for 20.0 years (20.0 ttl pk-yrs)     Types: Cigarettes    Smokeless tobacco: Never   Substance Use Topics    Alcohol use: Not Currently     Comment: hasnt had a drink in a month. States he is under the care of PCP to [facility].      Family History:  Family History   Problem Relation Age of Onset    Diabetes Father     Diabetes Sister     Hypertension Sister     Diabetes Brother     Diabetes Maternal Grandmother     Diabetes Maternal Grandfather     Diabetes Paternal Grandmother     Diabetes Paternal Grandfather     Heart attack Maternal Aunt 73    Colon cancer Neg Hx     Prostate cancer Neg Hx        Home Meds:  Medications Prior to Admission   Medication Sig Dispense Refill Last Dose    ferrous sulfate 325 (65 FE) MG tablet Take 1 tablet by mouth Daily With Breakfast. 90 tablet 1     pantoprazole  (PROTONIX) 40 MG EC tablet Take 1 tablet by mouth Every Morning. (Patient taking differently: Take 1 tablet by mouth Daily.) 90 tablet 0      Current Meds:   senna-docusate sodium, 2 tablet, Oral, BID      Allergies:  No Known Allergies  Review of Systems  Pertinent items are noted in HPI     Objective     Vital Signs  Temp:  [97.9 °F (36.6 °C)-98.6 °F (37 °C)] 98 °F (36.7 °C)  Heart Rate:  [] 98  Resp:  [16-24] 18  BP: ()/(54-95) 107/74  Physical Exam:  General Appearance:    Alert, cooperative, in no acute distress   Head:    Normocephalic, without obvious abnormality, atraumatic   Eyes:          conjunctivae and sclerae normal, no   icterus   Throat:   no thrush, oral mucosa moist   Neck:   Supple, no adenopathy   Lungs:     Clear to auscultation bilaterally    Heart:    Regular rhythm and normal rate    Chest Wall:    No abnormalities observed   Abdomen:     Soft, nondistended, nontender; normal bowel sounds   Extremities:   no edema, no redness   Skin:   No bruising or rash   Psychiatric:  normal mood and insight     Results Review:   I reviewed the patient's new clinical results.    Results from last 7 days   Lab Units 09/27/24  1155 09/27/24  0535 09/27/24  0001 09/26/24  1517   WBC 10*3/mm3  --  10.78  --  7.68   HEMOGLOBIN g/dL 8.0* 7.8*  7.8* 8.0* 5.5*   HEMATOCRIT % 24.5* 24.0*  24.0* 24.5* 17.8*   PLATELETS 10*3/mm3  --  288  --  290     Results from last 7 days   Lab Units 09/27/24  0535 09/26/24  1517   SODIUM mmol/L 141 139   POTASSIUM mmol/L 4.1 3.8   CHLORIDE mmol/L 111* 107   CO2 mmol/L 24.3 24.9   BUN mg/dL 11 11   CREATININE mg/dL 1.19 1.26   CALCIUM mg/dL 8.5* 8.8   BILIRUBIN mg/dL  --  <0.2   ALK PHOS U/L  --  63   ALT (SGPT) U/L  --  23   AST (SGOT) U/L  --  17   GLUCOSE mg/dL 95 124*     Results from last 7 days   Lab Units 09/26/24  1517   INR  1.04     Lab Results   Lab Value Date/Time    LIPASE 36 07/24/2024 2017    LIPASE 11 (L) 07/12/2023 0612    LIPASE 26 06/23/2023 1201        Radiology:  XR Chest 1 View   Final Result       No active disease in the chest.       This report was finalized on 9/26/2024 3:16 PM by Dr. Jeramy Castano M.D   on Workstation: [id number]              Assessment & Plan   Active Hospital Problems    Diagnosis     ABLA (acute blood loss anemia)        Assessment:  Obscure GI bleed, recurrent  Melena  Acute blood loss anemia  History of NSAIDs    Plan:  Plan for EGD with enteroscopy today for further evaluation of his bleeding.  If this is negative and he has evidence of recurrent active bleeding, would get CTA.  Globin stable posttransfusion-continue to follow-transfuse for hemoglobin less than 7      I discussed the patients findings and my recommendations with patient.          Hellen Kern M.D.  [facility]  [phone]

## 2024-09-27 NOTE — CASE MANAGEMENT/SOCIAL WORK
Discharge Planning Assessment  [facility]     Patient Name: Radha Noriega V  MRN: [id number]  Today's Date: 9/27/2024    Admit Date: 9/26/2024       Discharge Plan       Row Name 09/27/24 0912       Plan    Patient/Family in Agreement with Plan yes    Plan Comments CCP notes no insurance listed, however, CCP cowork[facility] & email to [employer] to screen scooby Brito RN/EDGAR Porter RN

## 2024-09-28 LAB
ANION GAP SERPL CALCULATED.3IONS-SCNC: 6.5 MMOL/L (ref 5–15)
BASOPHILS # BLD AUTO: 0.03 10*3/MM3 (ref 0–0.2)
BASOPHILS NFR BLD AUTO: 0.5 % (ref 0–1.5)
BUN SERPL-MCNC: 11 MG/DL (ref 6–20)
BUN/CREAT SERPL: 11.1 (ref 7–25)
CALCIUM SPEC-SCNC: 8.5 MG/DL (ref 8.6–10.5)
CHLORIDE SERPL-SCNC: 106 MMOL/L (ref 98–107)
CO2 SERPL-SCNC: 24.5 MMOL/L (ref 22–29)
CREAT SERPL-MCNC: 0.99 MG/DL (ref 0.76–1.27)
DEPRECATED RDW RBC AUTO: 52.7 FL (ref 37–54)
EGFRCR SERPLBLD CKD-EPI 2021: 96.9 ML/MIN/1.73
EOSINOPHIL # BLD AUTO: 0.14 10*3/MM3 (ref 0–0.4)
EOSINOPHIL NFR BLD AUTO: 2.4 % (ref 0.3–6.2)
ERYTHROCYTE [DISTWIDTH] IN BLOOD BY AUTOMATED COUNT: 16.9 % (ref 12.3–15.4)
GLUCOSE BLDC GLUCOMTR-MCNC: 106 MG/DL (ref 70–130)
GLUCOSE BLDC GLUCOMTR-MCNC: 108 MG/DL (ref 70–130)
GLUCOSE BLDC GLUCOMTR-MCNC: 143 MG/DL (ref 70–130)
GLUCOSE BLDC GLUCOMTR-MCNC: 160 MG/DL (ref 70–130)
GLUCOSE SERPL-MCNC: 91 MG/DL (ref 65–99)
HCT VFR BLD AUTO: 24.2 % (ref 37.5–51)
HCT VFR BLD AUTO: 24.3 % (ref 37.5–51)
HCT VFR BLD AUTO: 26 % (ref 37.5–51)
HCT VFR BLD AUTO: 26.8 % (ref 37.5–51)
HGB BLD-MCNC: 7.9 G/DL (ref 13–17.7)
HGB BLD-MCNC: 8 G/DL (ref 13–17.7)
HGB BLD-MCNC: 8.3 G/DL (ref 13–17.7)
HGB BLD-MCNC: 8.8 G/DL (ref 13–17.7)
IMM GRANULOCYTES # BLD AUTO: 0.05 10*3/MM3 (ref 0–0.05)
IMM GRANULOCYTES NFR BLD AUTO: 0.9 % (ref 0–0.5)
LYMPHOCYTES # BLD AUTO: 1.54 10*3/MM3 (ref 0.7–3.1)
LYMPHOCYTES NFR BLD AUTO: 26.5 % (ref 19.6–45.3)
MAGNESIUM SERPL-MCNC: 2.1 MG/DL (ref 1.6–2.6)
MCH RBC QN AUTO: 29.2 PG (ref 26.6–33)
MCHC RBC AUTO-ENTMCNC: 33.1 G/DL (ref 31.5–35.7)
MCV RBC AUTO: 88.3 FL (ref 79–97)
MONOCYTES # BLD AUTO: 0.63 10*3/MM3 (ref 0.1–0.9)
MONOCYTES NFR BLD AUTO: 10.8 % (ref 5–12)
NEUTROPHILS NFR BLD AUTO: 3.42 10*3/MM3 (ref 1.7–7)
NEUTROPHILS NFR BLD AUTO: 58.9 % (ref 42.7–76)
NRBC BLD AUTO-RTO: 0.9 /100 WBC (ref 0–0.2)
PHOSPHATE SERPL-MCNC: 4.6 MG/DL (ref 2.5–4.5)
PLATELET # BLD AUTO: 298 10*3/MM3 (ref 140–450)
PMV BLD AUTO: 10.4 FL (ref 6–12)
POTASSIUM SERPL-SCNC: 3.8 MMOL/L (ref 3.5–5.2)
QT INTERVAL: 401 MS
QTC INTERVAL: 404 MS
RBC # BLD AUTO: 2.74 10*6/MM3 (ref 4.14–5.8)
SODIUM SERPL-SCNC: 137 MMOL/L (ref 136–145)
WBC NRBC COR # BLD AUTO: 5.81 10*3/MM3 (ref 3.4–10.8)

## 2024-09-28 PROCEDURE — 85018 HEMOGLOBIN: CPT | Performed by: INTERNAL MEDICINE

## 2024-09-28 PROCEDURE — 85014 HEMATOCRIT: CPT | Performed by: INTERNAL MEDICINE

## 2024-09-28 PROCEDURE — 85025 COMPLETE CBC W/AUTO DIFF WBC: CPT | Performed by: INTERNAL MEDICINE

## 2024-09-28 PROCEDURE — 84100 ASSAY OF PHOSPHORUS: CPT | Performed by: INTERNAL MEDICINE

## 2024-09-28 PROCEDURE — 82948 REAGENT STRIP/BLOOD GLUCOSE: CPT

## 2024-09-28 PROCEDURE — 93005 ELECTROCARDIOGRAM TRACING: CPT | Performed by: HOSPITALIST

## 2024-09-28 PROCEDURE — 83735 ASSAY OF MAGNESIUM: CPT | Performed by: HOSPITALIST

## 2024-09-28 PROCEDURE — 93010 ELECTROCARDIOGRAM REPORT: CPT | Performed by: INTERNAL MEDICINE

## 2024-09-28 PROCEDURE — 80048 BASIC METABOLIC PNL TOTAL CA: CPT | Performed by: INTERNAL MEDICINE

## 2024-09-28 PROCEDURE — 99232 SBSQ HOSP IP/OBS MODERATE 35: CPT | Performed by: INTERNAL MEDICINE

## 2024-09-28 NOTE — NURSING NOTE
Heart block reported by CTU at 0427am w saved telemetry strip, protocol dysrhythmia telemetry orders placed. Patient sleeping at this time, states he may have just turned over recently. Patient is A&Ox4, no complaints of chest pain or SOA.

## 2024-09-28 NOTE — PROGRESS NOTES
[facility]  Inpatient Progress Note    Reason for Follow Up: Melena and anemia    Subjective     Interval History:   Small bowel enteroscopy yesterday negative for source of melena or anemia    Current Facility-Administered Medications:     acetaminophen (TYLENOL) tablet 650 mg, 650 mg, Oral, Q4H PRN **OR** acetaminophen (TYLENOL) suppository 650 mg, 650 mg, Rectal, Q4H PRN, Hellen Kern MD    aluminum-magnesium hydroxide-simethicone (MAALOX MAX) 400-400-40 MG/5ML suspension 15 mL, 15 mL, Oral, Q6H PRN, Hellen Kern MD    sennosides-docusate (PERICOLACE) 8.6-50 MG per tablet 2 tablet, 2 tablet, Oral, BID, 2 tablet at 09/27/24 2007 **AND** polyethylene glycol (MIRALAX) packet 17 g, 17 g, Oral, Daily PRN **AND** bisacodyl (DULCOLAX) EC tablet 5 mg, 5 mg, Oral, Daily PRN **AND** bisacodyl (DULCOLAX) suppository 10 mg, 10 mg, Rectal, Daily PRN, Hellen Kern MD    Calcium Replacement - Follow Nurse / BPA Driven Protocol, , Does not apply, Concha PAYAN Lauren C., MD    Magnesium Standard Dose Replacement - Follow Nurse / BPA Driven Protocol, , Does not apply, Concha PAYAN Lauren C., MD    nitroglycerin (NITROSTAT) SL tablet 0.4 mg, 0.4 mg, Sublingual, Q5 Min PRNConcha Lauren C., MD    ondansetron ODT (ZOFRAN-ODT) disintegrating tablet 4 mg, 4 mg, Oral, Q6H PRN **OR** ondansetron (ZOFRAN) injection 4 mg, 4 mg, Intravenous, Q6H PRN, Hellen Kern MD    Phosphorus Replacement - Follow Nurse / BPA Driven Protocol, , Does not apply, Concha PAYAN Lauren C., MD    Potassium Replacement - Follow Nurse / BPA Driven Protocol, , Does not apply, Concha PAYAN Lauren C., MD    sodium chloride 0.9 % flush 10 mL, 10 mL, Intravenous, PRN, Hellen Kern MD  Review of Systems:    There is weakness and fatigue all other systems reviewed and negative    Objective     Vital Signs  Temp:  [97.7 °F (36.5 °C)-98.6 °F (37 °C)] 98.4 °F (36.9 °C)  Heart Rate:  [] 77  Resp:  [11-20]  18  BP: (100-113)/(58-97) 113/76  Body mass index is 25.44 kg/m².    Intake/Output Summary (Last 24 hours) at 9/28/2024 1141  Last data filed at 9/27/2024 1709  Gross per 24 hour   Intake 449 ml   Output 400 ml   Net 49 ml     No intake/output data recorded.     Physical Exam:   General: patient awake, alert and cooperative   Eyes: Normal lids and lashes, no scleral icterus   Neck: supple, normal ROM   Skin: warm and dry, not jaundiced   Cardiovascular: regular rhythm and rate, no murmurs auscultated   Pulm: clear to auscultation bilaterally, regular and unlabored   Abdomen: soft, nontender, nondistended; normal bowel sounds   Extremities: no rash or edema   Psychiatric: Normal mood and behavior; memory intact     Results Review:     I reviewed the patient's new clinical results.    Results from last 7 days   Lab Units 09/28/24  0503 09/27/24  2346 09/27/24  1808 09/27/24  1155 09/27/24  0535 09/27/24  0001 09/26/24  1517   WBC 10*3/mm3 5.81  --   --   --  10.78  --  7.68   HEMOGLOBIN g/dL 8.0* 7.9* 8.1*   < > 7.8*  7.8*   < > 5.5*   HEMATOCRIT % 24.2* 24.3* 24.9*   < > 24.0*  24.0*   < > 17.8*   PLATELETS 10*3/mm3 298  --   --   --  288  --  290    < > = values in this interval not displayed.     Results from last 7 days   Lab Units 09/28/24  0502 09/27/24  0535 09/26/24  1517   SODIUM mmol/L 137 141 139   POTASSIUM mmol/L 3.8 4.1 3.8   CHLORIDE mmol/L 106 111* 107   CO2 mmol/L 24.5 24.3 24.9   BUN mg/dL 11 11 11   CREATININE mg/dL 0.99 1.19 1.26   CALCIUM mg/dL 8.5* 8.5* 8.8   BILIRUBIN mg/dL  --   --  <0.2   ALK PHOS U/L  --   --  63   ALT (SGPT) U/L  --   --  23   AST (SGOT) U/L  --   --  17   GLUCOSE mg/dL 91 95 124*     Results from last 7 days   Lab Units 09/26/24  1517   INR  1.04     Lab Results   Lab Value Date/Time    LIPASE 36 07/24/2024 2017    LIPASE 11 (L) 07/12/2023 0612    LIPASE 26 06/23/2023 1201       Radiology:  XR Chest 1 View   Final Result       No active disease in the chest.       This  report was finalized on 9/26/2024 3:16 PM by Dr. Jeramy Castano M.D   on Workstation: [id number]              Assessment & Plan     Active Hospital Problems    Diagnosis     ABLA (acute blood loss anemia)        Assessment:  Obscure GI bleed, recurrent  Melena  Acute blood loss anemia  History of NSAIDs     Plan:  No source found on multiple exams for GI bleed  Follow hemoglobin  If He rebleeds we will do a CTA     I discussed the patients findings and my recommendations with patient and nursing staff.    Wojciech Loo MD

## 2024-09-28 NOTE — PROGRESS NOTES
[facility]  [phone]  Dr. Josesito Rivas    Subjective:  LOS: 2    Chief Complaint: GI bleed    Still with black-colored stools.  Denies dizziness lightheadedness or chest pain.  Was taking up to 4 tablets of ibuprofen daily but stopped a few weeks ago.    Objective   Vital Signs past 24hrs  Temp range: Temp (24hrs), Av.3 °F (36.8 °C), Min:97.7 °F (36.5 °C), Max:98.6 °F (37 °C)    BP range: BP: (100-124)/(58-76) 124/76  Pulse range: Heart Rate:  [] 72  Resp rate range: Resp:  [18-20] 18  Device (Oxygen Therapy): room airFlow (L/min):  [5] 5  Oxygen range:SpO2:  [98 %-100 %] 100 %   Mechanical Ventilator:     Physical Exam  Eyes:      Pupils: Pupils are equal, round, and reactive to light.   Cardiovascular:      Rate and Rhythm: Normal rate and regular rhythm.      Heart sounds: No murmur heard.  Pulmonary:      Effort: Pulmonary effort is normal.      Breath sounds: Normal breath sounds.   Abdominal:      General: Bowel sounds are normal.      Palpations: Abdomen is soft. There is no mass.      Tenderness: There is no abdominal tenderness.   Musculoskeletal:         General: No swelling.   Neurological:      Mental Status: He is alert.       Results Review:    I have reviewed the laboratory and imaging data since the last note by [facility] physician.  My annotations are noted in assessment and plan.      Result Review:  I have personally reviewed the results from last note by [facility] physician to 2024 15:00 EDT and agree with these findings:  [x]  Laboratory list / accordion  [x]  Microbiology  [x]  Radiology  []  EKG/Telemetry   []  Cardiology/Vascular   []  Pathology  []  Old records  []  Other:    Medication Review:  I have reviewed the current MAR.  My annotations are noted in assessment and plan.    senna-docusate sodium, 2 tablet, Oral, BID           Lines, Drains & Airways       Active LDAs       Name Placement date Placement time Site Days    Peripheral IV 24 1626 Anterior;Left  Forearm 09/26/24  1626  Forearm  1    Peripheral IV 09/26/24 1719 Anterior;Right Forearm 09/26/24  1719  Forearm  1                  Isolation status: No active isolations    Dietary Orders (From admission, onward)       Start     Ordered    09/27/24 1547  Diet: Regular/House; Fluid Consistency: Thin (IDDSI 0)  Diet Effective Now        References:    Diet Order Crosswalk   Question Answer Comment   Diets: Regular/House    Fluid Consistency: Thin (IDDSI 0)        09/27/24 1546                    PCCM Problems  Recurrent GI bleed from unknown source  Acute blood loss anemia  History of alcohol abuse  Recent regular use of NSAIDs        THESE ARE NEW MEDICAL PROBLEMS TO ME.    Plan of Treatment    No GI input.  Hemoglobin is stable.  So far workup for source is negative.  Recurrent GI bleed and plan is for CT angiogram of the abdomen if recurrent bleeding.  Will monitor for 24 hours or so.  Await GI input regarding suitability of discharging patient home.    Josesito Rivas MD  09/28/24  15:00 EDT      Part of this note may be an electronic transcription/translation of spoken language to printed text using the Dragon Dictation System.

## 2024-09-29 ENCOUNTER — READMISSION MANAGEMENT (OUTPATIENT)
Dept: CALL CENTER | Facility: HOSPITAL | Age: 44
End: 2024-09-29
Payer: MEDICAID

## 2024-09-29 VITALS
OXYGEN SATURATION: 100 % | BODY MASS INDEX: 25.24 KG/M2 | HEART RATE: 88 BPM | DIASTOLIC BLOOD PRESSURE: 68 MMHG | SYSTOLIC BLOOD PRESSURE: 114 MMHG | WEIGHT: 180.3 LBS | TEMPERATURE: 98.4 F | HEIGHT: 71 IN | RESPIRATION RATE: 18 BRPM

## 2024-09-29 PROBLEM — K92.2 GI BLEED: Status: ACTIVE | Noted: 2024-09-29

## 2024-09-29 PROBLEM — F19.90 EXCESSIVE USE OF NONSTEROIDAL ANTI-INFLAMMATORY DRUG (NSAID): Status: ACTIVE | Noted: 2024-09-29

## 2024-09-29 PROBLEM — T39.395A GI BLEED DUE TO NSAIDS: Status: ACTIVE | Noted: 2023-10-30

## 2024-09-29 LAB
ANION GAP SERPL CALCULATED.3IONS-SCNC: 8.1 MMOL/L (ref 5–15)
BASOPHILS # BLD AUTO: 0.02 10*3/MM3 (ref 0–0.2)
BASOPHILS NFR BLD AUTO: 0.4 % (ref 0–1.5)
BUN SERPL-MCNC: 9 MG/DL (ref 6–20)
BUN/CREAT SERPL: 7.4 (ref 7–25)
CALCIUM SPEC-SCNC: 8.7 MG/DL (ref 8.6–10.5)
CHLORIDE SERPL-SCNC: 107 MMOL/L (ref 98–107)
CO2 SERPL-SCNC: 22.9 MMOL/L (ref 22–29)
CREAT SERPL-MCNC: 1.22 MG/DL (ref 0.76–1.27)
DEPRECATED RDW RBC AUTO: 53.1 FL (ref 37–54)
EGFRCR SERPLBLD CKD-EPI 2021: 75.4 ML/MIN/1.73
EOSINOPHIL # BLD AUTO: 0.18 10*3/MM3 (ref 0–0.4)
EOSINOPHIL NFR BLD AUTO: 3.2 % (ref 0.3–6.2)
ERYTHROCYTE [DISTWIDTH] IN BLOOD BY AUTOMATED COUNT: 16.9 % (ref 12.3–15.4)
GLUCOSE SERPL-MCNC: 97 MG/DL (ref 65–99)
HBA1C MFR BLD: 4.7 % (ref 4.8–5.6)
HCT VFR BLD AUTO: 25.5 % (ref 37.5–51)
HCT VFR BLD AUTO: 26.9 % (ref 37.5–51)
HCT VFR BLD AUTO: 26.9 % (ref 37.5–51)
HCT VFR BLD AUTO: 28.4 % (ref 37.5–51)
HGB BLD-MCNC: 8.4 G/DL (ref 13–17.7)
HGB BLD-MCNC: 9.3 G/DL (ref 13–17.7)
IMM GRANULOCYTES # BLD AUTO: 0.02 10*3/MM3 (ref 0–0.05)
IMM GRANULOCYTES NFR BLD AUTO: 0.4 % (ref 0–0.5)
LYMPHOCYTES # BLD AUTO: 1.5 10*3/MM3 (ref 0.7–3.1)
LYMPHOCYTES NFR BLD AUTO: 26.8 % (ref 19.6–45.3)
MAGNESIUM SERPL-MCNC: 2 MG/DL (ref 1.6–2.6)
MCH RBC QN AUTO: 27.8 PG (ref 26.6–33)
MCHC RBC AUTO-ENTMCNC: 31.2 G/DL (ref 31.5–35.7)
MCV RBC AUTO: 89.1 FL (ref 79–97)
MONOCYTES # BLD AUTO: 0.67 10*3/MM3 (ref 0.1–0.9)
MONOCYTES NFR BLD AUTO: 12 % (ref 5–12)
NEUTROPHILS NFR BLD AUTO: 3.2 10*3/MM3 (ref 1.7–7)
NEUTROPHILS NFR BLD AUTO: 57.2 % (ref 42.7–76)
NRBC BLD AUTO-RTO: 0 /100 WBC (ref 0–0.2)
PHOSPHATE SERPL-MCNC: 4.2 MG/DL (ref 2.5–4.5)
PLATELET # BLD AUTO: 315 10*3/MM3 (ref 140–450)
PMV BLD AUTO: 10 FL (ref 6–12)
POTASSIUM SERPL-SCNC: 3.9 MMOL/L (ref 3.5–5.2)
RBC # BLD AUTO: 3.02 10*6/MM3 (ref 4.14–5.8)
SODIUM SERPL-SCNC: 138 MMOL/L (ref 136–145)
WBC NRBC COR # BLD AUTO: 5.59 10*3/MM3 (ref 3.4–10.8)

## 2024-09-29 PROCEDURE — 85025 COMPLETE CBC W/AUTO DIFF WBC: CPT | Performed by: INTERNAL MEDICINE

## 2024-09-29 PROCEDURE — 84100 ASSAY OF PHOSPHORUS: CPT | Performed by: INTERNAL MEDICINE

## 2024-09-29 PROCEDURE — 83735 ASSAY OF MAGNESIUM: CPT | Performed by: INTERNAL MEDICINE

## 2024-09-29 PROCEDURE — 85018 HEMOGLOBIN: CPT | Performed by: INTERNAL MEDICINE

## 2024-09-29 PROCEDURE — 85014 HEMATOCRIT: CPT | Performed by: INTERNAL MEDICINE

## 2024-09-29 PROCEDURE — 83036 HEMOGLOBIN GLYCOSYLATED A1C: CPT | Performed by: INTERNAL MEDICINE

## 2024-09-29 PROCEDURE — 80048 BASIC METABOLIC PNL TOTAL CA: CPT | Performed by: INTERNAL MEDICINE

## 2024-09-29 PROCEDURE — 99232 SBSQ HOSP IP/OBS MODERATE 35: CPT | Performed by: INTERNAL MEDICINE

## 2024-09-29 NOTE — PLAN OF CARE
Goal Outcome Evaluation:  Plan of Care Reviewed With: patient        Progress: no change  Outcome Evaluation: Pt has no c/o this shift, has order to d/c home, no bm this shift, no s/sx of bleeding, vss, sr on tele, room air, up ad ignacio.

## 2024-09-29 NOTE — DISCHARGE INSTRUCTIONS
- Follow up with PCP in 5-7 days  - Follow up with GI within 2 weeks for repeat capsule endoscopy  - Patient counseled to avoid NSAIDs

## 2024-09-29 NOTE — PROGRESS NOTES
[facility]  Inpatient Progress Note    Reason for Follow Up: Obscure GI bleeding    Subjective     Interval History:   No overt bleeding, hemoglobin stable    Current Facility-Administered Medications:     acetaminophen (TYLENOL) tablet 650 mg, 650 mg, Oral, Q4H PRN **OR** acetaminophen (TYLENOL) suppository 650 mg, 650 mg, Rectal, Q4H PRN, Hellen Kern MD    aluminum-magnesium hydroxide-simethicone (MAALOX MAX) 400-400-40 MG/5ML suspension 15 mL, 15 mL, Oral, Q6H PRN, Hellen Kren MD    sennosides-docusate (PERICOLACE) 8.6-50 MG per tablet 2 tablet, 2 tablet, Oral, BID, 2 tablet at 09/27/24 2007 **AND** polyethylene glycol (MIRALAX) packet 17 g, 17 g, Oral, Daily PRN **AND** bisacodyl (DULCOLAX) EC tablet 5 mg, 5 mg, Oral, Daily PRN **AND** bisacodyl (DULCOLAX) suppository 10 mg, 10 mg, Rectal, Daily PRN, Hellen Kern MD    Calcium Replacement - Follow Nurse / BPA Driven Protocol, , Does not apply, Concha PAYAN Lauren C., MD    Magnesium Standard Dose Replacement - Follow Nurse / BPA Driven Protocol, , Does not apply, Concha PAYAN Lauren C., MD    nitroglycerin (NITROSTAT) SL tablet 0.4 mg, 0.4 mg, Sublingual, Q5 Min PRNConcha Lauren C., MD    ondansetron ODT (ZOFRAN-ODT) disintegrating tablet 4 mg, 4 mg, Oral, Q6H PRN **OR** ondansetron (ZOFRAN) injection 4 mg, 4 mg, Intravenous, Q6H PRN, Hellen Kern MD    Phosphorus Replacement - Follow Nurse / BPA Driven Protocol, , Does not apply, Concha PAYAN Lauren C., MD    Potassium Replacement - Follow Nurse / BPA Driven Protocol, , Does not apply, Concha PAYAN Lauren C., MD    sodium chloride 0.9 % flush 10 mL, 10 mL, Intravenous, PRNConcha Lauren C. MD  Review of Systems:    Is weakness of fatigue all other systems reviewed and negative    Objective     Vital Signs  Temp:  [97.9 °F (36.6 °C)-98.4 °F (36.9 °C)] 98.1 °F (36.7 °C)  Heart Rate:  [66-72] 66  Resp:  [18] 18  BP: (113-124)/(69-76) 119/70  Body mass index  is 25.15 kg/m².    Intake/Output Summary (Last 24 hours) at 9/29/2024 1040  Last data filed at 9/29/2024 0000  Gross per 24 hour   Intake 120 ml   Output --   Net 120 ml     No intake/output data recorded.     Physical Exam:   General: patient awake, alert and cooperative   Eyes: Normal lids and lashes, no scleral icterus   Neck: supple, normal ROM   Skin: warm and dry, not jaundiced   Cardiovascular: regular rhythm and rate, no murmurs auscultated   Pulm: clear to auscultation bilaterally, regular and unlabored   Abdomen: soft, nontender, nondistended; normal bowel sounds   Extremities: no rash or edema   Psychiatric: Normal mood and behavior; memory intact     Results Review:     I reviewed the patient's new clinical results.    Results from last 7 days   Lab Units 09/29/24  0612 09/29/24  0013 09/28/24  1807 09/28/24  1214 09/28/24  0503 09/27/24  1155 09/27/24  0535   WBC 10*3/mm3 5.59  --   --   --  5.81  --  10.78   HEMOGLOBIN g/dL 8.4*  8.4* 8.4* 8.8*   < > 8.0*   < > 7.8*  7.8*   HEMATOCRIT % 26.9*  26.9* 25.5* 26.8*   < > 24.2*   < > 24.0*  24.0*   PLATELETS 10*3/mm3 315  --   --   --  298  --  288    < > = values in this interval not displayed.     Results from last 7 days   Lab Units 09/29/24  0612 09/28/24  0502 09/27/24  0535 09/26/24  1517   SODIUM mmol/L 138 137 141 139   POTASSIUM mmol/L 3.9 3.8 4.1 3.8   CHLORIDE mmol/L 107 106 111* 107   CO2 mmol/L 22.9 24.5 24.3 24.9   BUN mg/dL 9 11 11 11   CREATININE mg/dL 1.22 0.99 1.19 1.26   CALCIUM mg/dL 8.7 8.5* 8.5* 8.8   BILIRUBIN mg/dL  --   --   --  <0.2   ALK PHOS U/L  --   --   --  63   ALT (SGPT) U/L  --   --   --  23   AST (SGOT) U/L  --   --   --  17   GLUCOSE mg/dL 97 91 95 124*     Results from last 7 days   Lab Units 09/26/24  1517   INR  1.04     Lab Results   Lab Value Date/Time    LIPASE 36 07/24/2024 2017    LIPASE 11 (L) 07/12/2023 0612    LIPASE 26 06/23/2023 1201       Radiology:  XR Chest 1 View   Final Result       No active disease  in the chest.       This report was finalized on 9/26/2024 3:16 PM by Dr. Jeramy Castano M.D   on Workstation: [id number]              Assessment & Plan     Active Hospital Problems    Diagnosis     ABLA (acute blood loss anemia)        Assessment:  Obscure GI bleed, recurrent  Melena  Acute blood loss anemia  History of NSAIDs     Plan:  No source found on multiple exams for GI bleed  Follow hemoglobin  Okay for discharge, follow-up with GI APC in 2 weeks to discuss capsule endoscopy  I discussed the patients findings and my recommendations with patient and nursing staff.    Wojciech Loo MD

## 2024-09-29 NOTE — PLAN OF CARE
Goal Outcome Evaluation:              Outcome Evaluation: Plan to d/c home Sunday if HH remains stable, no complaints, adlib, RA, SR on monitor, call light within reach.

## 2024-09-29 NOTE — PROGRESS NOTES
"DAILY PROGRESS NOTE  [facility]    Patient Identification:  Name: Radha Noriega V  Age: 44 y.o.  Sex: male  :  1980  MRN: [id number]         Primary Care Physician: Hellen Skleton APRN    Subjective: patient is resting; he had dark stools; denies pain  Interval History: follow up for gi bleed, anemia,  alcohol dependence    Objective:    Scheduled Meds:senna-docusate sodium, 2 tablet, Oral, BID      Continuous Infusions:     Vital signs in last 24 hours:  Temp:  [97.9 °F (36.6 °C)-98.4 °F (36.9 °C)] 97.9 °F (36.6 °C)  Heart Rate:  [69-80] 69  Resp:  [18] 18  BP: (109-124)/(63-76) 113/69    Intake/Output:  No intake or output data in the 24 hours ending 24 2302    Exam:  /69 (BP Location: Left arm, Patient Position: Lying)   Pulse 69   Temp 97.9 °F (36.6 °C) (Oral)   Resp 18   Ht 180.3 cm (71\")   Wt 82.7 kg (182 lb 6.4 oz)   SpO2 100%   BMI 25.44 kg/m²     General Appearance:    Alert, cooperative, no distress, AAOx3                          Head:    Normocephalic, without obvious abnormality, atraumatic                           Eyes:    PERRL, conjunctivae/corneas clear, EOM's intact, both eyes                         Throat:   Lips, tongue, gums normal; oral mucosa pink and moist                           Neck:   Supple, symmetrical, trachea midline, no JVD                         Lungs:    Clear to auscultation bilaterally, respirations unlabored                 Chest Wall:    No tenderness or deformity                          Heart:    Regular rate and rhythm, S1 and S2 normal, no murmur,no  rub or gallop                  Abdomen:     Soft, nontender, bowel sounds active, no masses, no organomegaly                  Extremities:   Extremities normal, atraumatic, no cyanosis or edema                                Data Review:  Labs in chart were reviewed.  WBC   Date Value Ref Range Status   2024 5.81 3.40 - 10.80 10*3/mm3 Final     Hemoglobin   Date Value Ref " Range Status   09/28/2024 8.8 (L) 13.0 - 17.7 g/dL Final     Hematocrit   Date Value Ref Range Status   09/28/2024 26.8 (L) 37.5 - 51.0 % Final     Platelets   Date Value Ref Range Status   09/28/2024 298 140 - 450 10*3/mm3 Final     Sodium   Date Value Ref Range Status   09/28/2024 137 136 - 145 mmol/L Final     Potassium   Date Value Ref Range Status   09/28/2024 3.8 3.5 - 5.2 mmol/L Final     Chloride   Date Value Ref Range Status   09/28/2024 106 98 - 107 mmol/L Final     CO2   Date Value Ref Range Status   09/28/2024 24.5 22.0 - 29.0 mmol/L Final     BUN   Date Value Ref Range Status   09/28/2024 11 6 - 20 mg/dL Final     Creatinine   Date Value Ref Range Status   09/28/2024 0.99 0.76 - 1.27 mg/dL Final     Glucose   Date Value Ref Range Status   09/28/2024 91 65 - 99 mg/dL Final     Calcium   Date Value Ref Range Status   09/28/2024 8.5 (L) 8.6 - 10.5 mg/dL Final     Magnesium   Date Value Ref Range Status   09/28/2024 2.1 1.6 - 2.6 mg/dL Final     Phosphorus   Date Value Ref Range Status   09/28/2024 4.6 (H) 2.5 - 4.5 mg/dL Final         Assessment:  Active Hospital Problems    Diagnosis  POA    ABLA (acute blood loss anemia) [D62]  Yes      Resolved Hospital Problems   No resolved problems to display.   Gi bleed  Alcohol dependence    Plan:  Will follow with you  Trend hgb  Cta planned if he bleeds again  Endoscopy did not reveal a source  Notes and labs reviewed  Thanks for involving us in his care    Lola Killian MD  9/28/2024  23:02 EDT

## 2024-09-29 NOTE — DISCHARGE SUMMARY
Date of Admission: 9/26/2024  Date of Discharge:  9/29/2024    Discharge Diagnosis:    Recurrent GI bleed from unknown source  Acute blood loss anemia  History of alcohol abuse  Recent regular use of NSAIDs    Hospital Course    Presenting Problem/History of Present Illness    44-year-old male with a history of alcohol abuse, iron deficiency anemia, previous GI bleed who presented to the hospital with melena.     Patient was recently hospitalized in July 2024 at which time he presented with nausea and vomiting and found to have GI bleed with hemoglobin of 5.5.  Was transfused IV iron and had endoscopy recommended however patient refused at that time.     Patient presented to the hospital with melena.  Found to be tachycardic into the 140s.  Blood pressure stable and 130 systolic, appropriate saturations on room air.  Laboratory evaluation demonstrated hemoglobin of 5.5 from 8.2, 2 months prior.  On evaluation patient, states that he has been having dark stools for the past few days.  Also feeling lightheaded.  Denies any shortness of breath or cough.  No chest pain or palpitations.  No nausea or vomiting or hematemesis.  Denies any use of NSAIDs.    Subsequent Course of Management    -Patient presented to the hospital with melena and lightheadedness and found to have a hemoglobin of 5.5.  -Transfused 2 units of packed RBCs, now at 7.8.  Patient with a history of antibodies and requires further screening for his blood products.  -Initially admitted to the ICU for concerns for hemodynamic instability.  Overnight has remained hemodynamically stable, with appropriate blood pressures.  -Awaiting GI consult for further evaluation and consideration of endoscopy.  Was recently hospitalized in July for GI bleed and was offered endoscopy however refused.  It is prudent he undergo endoscopy now to ascertain a diagnosis seeing that this is a second occurrence.  Patient had scopes by GI with no source found. They recommend CTA  "if bleeding recurs. Patient strictly advised to avoid all NSAIDS. Per GI ok for discharge and fu with APC in 2 weeks.      Examination on Date of Discharge    /68 (BP Location: Right arm, Patient Position: Lying)   Pulse 88   Temp 98.4 °F (36.9 °C) (Oral)   Resp 18   Ht 180.3 cm (71\")   Wt 81.8 kg (180 lb 4.8 oz)   SpO2 100%   BMI 25.15 kg/m²     Physical Exam  Eyes:      Pupils: Pupils are equal, round, and reactive to light.   Cardiovascular:      Rate and Rhythm: Normal rate and regular rhythm.      Heart sounds: No murmur heard.  Pulmonary:      Effort: Pulmonary effort is normal.      Breath sounds: Normal breath sounds.   Abdominal:      General: Bowel sounds are normal.      Palpations: Abdomen is soft. There is no mass.      Tenderness: There is no abdominal tenderness.   Musculoskeletal:         General: No swelling.   Neurological:      Mental Status: He is alert.         Test Results    Surgical Procedures Since Admission:  Procedure(s):  ENTEROSCOPY SMALL BOWEL  ESOPHAGOGASTRODUODENOSCOPY  Surgeon:  Hellen Kern MD  Status:  2 Days Post-Op  -------------------                 Results from last 7 days   Lab Units 09/29/24  1150 09/29/24  0612 09/29/24  0013 09/28/24  1807 09/28/24  1214 09/28/24  0503 09/27/24  2346 09/27/24  1808 09/27/24  1155 09/27/24  0535 09/27/24  0001 09/26/24  1517   WBC 10*3/mm3  --  5.59  --   --   --  5.81  --   --   --  10.78  --  7.68   HEMOGLOBIN g/dL 9.3* 8.4*  8.4* 8.4* 8.8* 8.3* 8.0* 7.9* 8.1* 8.0* 7.8*  7.8* 8.0* 5.5*   HEMATOCRIT % 28.4* 26.9*  26.9* 25.5* 26.8* 26.0* 24.2* 24.3* 24.9* 24.5* 24.0*  24.0* 24.5* 17.8*   PLATELETS 10*3/mm3  --  315  --   --   --  298  --   --   --  288  --  290       Results from last 7 days   Lab Units 09/29/24  0612 09/28/24  0502 09/27/24  0535 09/26/24  1517   SODIUM mmol/L 138 137 141 139   POTASSIUM mmol/L 3.9 3.8 4.1 3.8   CHLORIDE mmol/L 107 106 111* 107   CO2 mmol/L 22.9 24.5 24.3 24.9   BUN mg/dL 9 11 11 11 "   CREATININE mg/dL 1.22 0.99 1.19 1.26       Results from last 7 days   Lab Units 09/26/24  1701 09/26/24  1517   HSTROP T ng/L 7 8       Microbiology Results (last 10 days)       ** No results found for the last 240 hours. **            XR Chest 1 View    Result Date: 9/26/2024  EXAM:XR CHEST 1 VW-  CLINICAL HISTORY:Chest Pain Triage Protocol  FINDINGS:  The lungs are clear of acute infiltrates. The cardiac mediastinal silhouette is within normal limits. There is mild elevation of the left hemidiaphragm which was also seen on the prior exam dated 8/24/2023. There is a chronic appearing deformity within the lateral aspect of the right clavicle and mild arthritic changes are seen within the right AC joint.       No active disease in the chest.  This report was finalized on 9/26/2024 3:16 PM by Dr. Jeramy Castano M.D on Workstation: [id number]        Consulting Physician(s)         Provider   Role Specialty     Raul Ashby MD      Consulting Physician Pulmonary Disease     Ruy Lainez DO      Consulting Physician Hospitalist            Procedure(s):  ENTEROSCOPY SMALL BOWEL  ESOPHAGOGASTRODUODENOSCOPY  09/27 1454 Ambulatory Esophageal PH Monitoring    Discharge Instructions      Dietary Orders (From admission, onward)       Start     Ordered    09/27/24 1547  Diet: Regular/House; Fluid Consistency: Thin (IDDSI 0)  Diet Effective Now        References:    Diet Order [place]   Question Answer Comment   Diets: Regular/House    Fluid Consistency: Thin (IDDSI 0)        09/27/24 1546                            Your medication list        CHANGE how you take these medications        Instructions Last Dose Given Next Dose Due   pantoprazole 40 MG EC tablet  Commonly known as: PROTONIX  What changed: when to take this      Take 1 tablet by mouth Every Morning.              CONTINUE taking these medications        Instructions Last Dose Given Next Dose Due   ferrous sulfate 325 (65 FE) MG tablet      Take 1  tablet by mouth Daily With Breakfast.                 Follow-up Information       Wojciech Loo MD. Schedule an appointment as soon as possible for a visit in 2 week(s).    Specialty: Gastroenterology  Contact information:  [address]  [facility] [address]  [facility] [id number] [phone]               Hellen Skelton APRN .    Specialties: Nurse Practitioner, Internal Medicine  Contact information:  [address]  [facility] [address]  [facility] [id number] [phone]                                 Condition on Discharge:  Stable     Josesito Rivas MD  09/29/24  14:51 EDT    Time: I spent over 30mins in the discharge planning of this patient.    Some of this encounter note is an electronic transcription/translation of spoken language to printed text.

## 2024-09-29 NOTE — DISCHARGE SUMMARY
Patient Name: Radha Noriega V  : 1980  MRN: [id number]    Date of Admission: 2024  Date of Discharge:  2024  Primary Care Physician: Hellen Skelton APRN      Chief Complaint:   Rapid Heart Rate and Black or Bloody Stool      Discharge Diagnoses     Active Hospital Problems    Diagnosis  POA    GI bleed [K92.2]  Unknown    ABLA (acute blood loss anemia) [D62]  Yes      Resolved Hospital Problems   No resolved problems to display.        Hospital Course     Mr. Noriega is a 44 y.o. male with a history of alcohol abuse, NSAID use, previous GIB presented to [facility] on 2024 with melena.  He was found to have hgb of 5.5 and transfused iron along with prbc.  Hgb improved to 8 Patient monitored in ICU. Patient with hx of multiple endoscopies.  GI consulted, Small bowel enteroscopy on 2024 was unremarkable.  Patient stable for transfer out of ICU.  Hgb remained stable and patient cleared by GI for discharge.  Patient discharged home on 2024 in agreement with plan below.       #Discharge plan  - Follow up with PCP in 5-7 days  - Follow up with GI within 2 weeks for repeat capsule endoscopy  - Patient counseled to avoid NSAIDs      Day of Discharge     Subjective:  No overnight events, hgb stable, denies melena or overt bleeding.  Agreeable for discharge home.     Physical Exam:  Temp:  [97.9 °F (36.6 °C)-98.4 °F (36.9 °C)] 98.1 °F (36.7 °C)  Heart Rate:  [66-72] 66  Resp:  [18] 18  BP: (113-124)/(69-76) 119/70  Body mass index is 25.15 kg/m².  Physical Exam  Constitutional:       General: He is not in acute distress.     Appearance: He is not ill-appearing or toxic-appearing.   HENT:      Head: Normocephalic and atraumatic.      Nose: Nose normal. No congestion.      Mouth/Throat:      Pharynx: Oropharynx is clear. No oropharyngeal exudate.   Eyes:      General: No scleral icterus.  Cardiovascular:      Rate and Rhythm: Normal rate and regular rhythm.      Heart sounds: No murmur  heard.     No friction rub. No gallop.   Pulmonary:      Effort: No respiratory distress.      Breath sounds: No wheezing or rales.   Abdominal:      General: There is no distension.      Tenderness: There is no abdominal tenderness. There is no guarding.   Musculoskeletal:         General: No swelling or deformity.      Cervical back: Normal range of motion. No rigidity.      Right lower leg: No edema.      Left lower leg: No edema.   Skin:     Coloration: Skin is not jaundiced.      Findings: No bruising or lesion.   Neurological:      General: No focal deficit present.      Mental Status: He is alert and oriented to person, place, and time.      Motor: No weakness.         Consultants     Consult Orders (all) (From admission, onward)       Start     Ordered    09/27/24 0655  Inpatient Hospitalist Consult  Once        Specialty:  Hospitalist  Provider:  Ran Esqueda MD    09/27/24 0654    09/27/24 0654  Inpatient Gastroenterology Consult  Once        Specialty:  Gastroenterology  Provider:  Hellen Kern MD    09/27/24 0653    09/26/24 1843  Inpatient Consult to Case Management   Once        Provider:  (Not yet assigned)    09/26/24 1842    09/26/24 1614  Pulmonology (on-call MD unless specified)  Once        Specialty:  Pulmonary Disease  Provider:  Raul Ashby MD    09/26/24 1613                  Procedures     ENTEROSCOPY SMALL BOWEL    Imaging Results (All)       Procedure Component Value Units Date/Time    XR Chest 1 View [[id number]] Collected: 09/26/24 1512     Updated: 09/26/24 1519    Narrative:      EXAM:XR CHEST 1 VW-     CLINICAL HISTORY:Chest Pain Triage Protocol     FINDINGS:     The lungs are clear of acute infiltrates. The cardiac mediastinal  silhouette is within normal limits. There is mild elevation of the left  hemidiaphragm which was also seen on the prior exam dated 8/24/2023.  There is a chronic appearing deformity within the lateral aspect of the  right  clavicle and mild arthritic changes are seen within the right AC  joint.       Impression:         No active disease in the chest.     This report was finalized on 9/26/2024 3:16 PM by Dr. Jeramy Castano M.D  on Workstation: [id number]             Results for orders placed during the hospital encounter of 08/24/23    Duplex Venous Upper Extremity - Left CAR    Interpretation Summary    Acute left upper extremity deep vein thrombosis noted in the subclavian, axillary and brachial.    Acute left upper extremity superficial thrombophlebitis noted in the basilic (upper arm) and cephalic (forearm).    All other left sided vessels appear normal.      Pertinent Labs     Results from last 7 days   Lab Units 09/29/24  1150 09/29/24  0612 09/29/24  0013 09/28/24  1807 09/28/24  1214 09/28/24  0503 09/27/24  1155 09/27/24  0535 09/27/24  0001 09/26/24  1517   WBC 10*3/mm3  --  5.59  --   --   --  5.81  --  10.78  --  7.68   HEMOGLOBIN g/dL 9.3* 8.4*  8.4* 8.4* 8.8*   < > 8.0*   < > 7.8*  7.8*   < > 5.5*   PLATELETS 10*3/mm3  --  315  --   --   --  298  --  288  --  290    < > = values in this interval not displayed.     Results from last 7 days   Lab Units 09/29/24  0612 09/28/24  0502 09/27/24  0535 09/26/24  1517   SODIUM mmol/L 138 137 141 139   POTASSIUM mmol/L 3.9 3.8 4.1 3.8   CHLORIDE mmol/L 107 106 111* 107   CO2 mmol/L 22.9 24.5 24.3 24.9   BUN mg/dL 9 11 11 11   CREATININE mg/dL 1.22 0.99 1.19 1.26   GLUCOSE mg/dL 97 91 95 124*   EGFR mL/min/1.73 75.4 96.9 77.7 72.6     Results from last 7 days   Lab Units 09/26/24  1517   ALBUMIN g/dL 3.9   BILIRUBIN mg/dL <0.2   ALK PHOS U/L 63   AST (SGOT) U/L 17   ALT (SGPT) U/L 23     Results from last 7 days   Lab Units 09/29/24  0612 09/28/24  0502 09/27/24  0535 09/26/24  1517   CALCIUM mg/dL 8.7 8.5* 8.5* 8.8   ALBUMIN g/dL  --   --   --  3.9   MAGNESIUM mg/dL 2.0 2.1 2.0  --    PHOSPHORUS mg/dL 4.2 4.6* 3.6  --        Results from last 7 days   Lab Units 09/26/24  6882  "09/26/24  1517   HSTROP T ng/L 7 8           Invalid input(s): \"LDLCALC\"          Test Results Pending at Discharge     Pending Results       Procedure [Order ID] Specimen - Date/Time    Hemoglobin & Hematocrit, Blood [[id number]]     Specimen: Blood               Discharge Details        Discharge Medications        Changes to Medications        Instructions Start Date   pantoprazole 40 MG EC tablet  Commonly known as: PROTONIX  What changed: when to take this   40 mg, Oral, Every Early Morning             Continue These Medications        Instructions Start Date   ferrous sulfate 325 (65 FE) MG tablet   325 mg, Oral, Daily With Breakfast               No Known Allergies    Discharge Disposition:  Home or Self Care      Discharge Diet:  Diet Order   Procedures    Diet: Regular/House; Fluid Consistency: Thin (IDDSI 0)       Discharge Activity:       CODE STATUS:    Code Status and Medical Interventions: CPR (Attempt to Resuscitate); Full Support   Ordered at: 09/26/24 1653     Code Status (Patient has no pulse and is not breathing):    CPR (Attempt to Resuscitate)     Medical Interventions (Patient has pulse or is breathing):    Full Support       No future appointments.   Follow-up Information       Wojciech Loo MD. Schedule an appointment as soon as possible for a visit in 2 week(s).    Specialty: Gastroenterology  Contact information:  [address]  [facility] [address]  [facility] [id number] [phone]               Hellen Skelton, APRN .    Specialties: Nurse Practitioner, Internal Medicine  Contact information:  [address]  [facility] [address]  [facility] [id number] [phone]                             Time Spent on Discharge:  35 minutes    Condition on discharge: RACHEL Lainez DO  [facility]  09/29/24  13:15 EDT   "

## 2024-09-30 ENCOUNTER — TRANSITIONAL CARE MANAGEMENT TELEPHONE ENCOUNTER (OUTPATIENT)
Dept: CALL CENTER | Facility: HOSPITAL | Age: 44
End: 2024-09-30
Payer: MEDICAID

## 2024-09-30 NOTE — CASE MANAGEMENT/SOCIAL WORK
Case Management Discharge Note      Final Note: home         Selected Continued Care - Discharged on 9/29/2024 Admission date: 9/26/2024 - Discharge disposition: Home or Self Care      Destination    No services have been selected for the patient.                Durable Medical Equipment    No services have been selected for the patient.                Dialysis/Infusion    No services have been selected for the patient.                Home Medical Care    No services have been selected for the patient.                Therapy    No services have been selected for the patient.                Community Resources    No services have been selected for the patient.                Community & DME    No services have been selected for the patient.                         Final Discharge Disposition Code: 01 - home or self-care

## 2024-09-30 NOTE — OUTREACH NOTE
Call Center TCM Note      Flowsheet Row Responses   [facility] patient discharged from? [place]   Does the patient have one of the following disease processes/diagnoses(primary or secondary)? Other   TCM attempt successful? No   Unsuccessful attempts Attempt 1  [NO one listed on PCP verbal release. Spouse answered patient phone number. Patient not available.]            Kasey Smith RN    9/30/2024, 16:08 EDT

## 2024-09-30 NOTE — OUTREACH NOTE
Call Center TCM Note      Flowsheet Row Responses   [facility] [place] patient discharged from? [place]   Does the patient have one of the following disease processes/diagnoses(primary or secondary)? Other   TCM attempt successful? No   Unsuccessful attempts Attempt 2  [NO one listed on PCP verbal release. Spouse answered patient phone number. Patient not available.]            Kasey Smith RN    9/30/2024, 16:30 EDT

## 2024-10-01 ENCOUNTER — TRANSITIONAL CARE MANAGEMENT TELEPHONE ENCOUNTER (OUTPATIENT)
Dept: CALL CENTER | Facility: HOSPITAL | Age: 44
End: 2024-10-01
Payer: MEDICAID

## 2024-10-01 NOTE — OUTREACH NOTE
Call Center TCM Note      Flowsheet Row Responses   [facility] patient discharged from? [place]   Does the patient have one of the following disease processes/diagnoses(primary or secondary)? Other   TCM attempt successful? Yes   Call start time 1437   Call end time 1439   Discharge diagnosis GI bleed from unknown source   Meds reviewed with patient/caregiver? Yes   Is the patient having any side effects they believe may be caused by any medication additions or changes? No   Does the patient have all medications ordered at discharge? Yes   Is the patient taking all medications as directed (includes completed medication regime)? Yes   Comments 10/4/2024  3:00 PM   Does the patient have an appointment with their PCP within 7-14 days of discharge? Yes   Has home health visited the patient within 72 hours of discharge? N/A   Psychosocial issues? No   Did the patient receive a copy of their discharge instructions? Yes   Nursing interventions Reviewed instructions with patient   What is the patient's perception of their health status since discharge? Improving   TCM call completed? Yes   Call end time 1439            Osiris Zeng RN    10/1/2024, 14:39 EDT

## 2024-10-04 ENCOUNTER — LAB (OUTPATIENT)
Dept: LAB | Facility: HOSPITAL | Age: 44
End: 2024-10-04
Payer: MEDICAID

## 2024-10-04 ENCOUNTER — OFFICE VISIT (OUTPATIENT)
Dept: INTERNAL MEDICINE | Facility: CLINIC | Age: 44
End: 2024-10-04

## 2024-10-04 VITALS
SYSTOLIC BLOOD PRESSURE: 110 MMHG | WEIGHT: 186.3 LBS | DIASTOLIC BLOOD PRESSURE: 68 MMHG | OXYGEN SATURATION: 99 % | HEIGHT: 71 IN | BODY MASS INDEX: 26.08 KG/M2 | TEMPERATURE: 98 F | HEART RATE: 91 BPM

## 2024-10-04 DIAGNOSIS — Z09 HOSPITAL DISCHARGE FOLLOW-UP: Primary | ICD-10-CM

## 2024-10-04 DIAGNOSIS — K92.1 GASTROINTESTINAL HEMORRHAGE WITH MELENA: ICD-10-CM

## 2024-10-04 DIAGNOSIS — D62 ABLA (ACUTE BLOOD LOSS ANEMIA): ICD-10-CM

## 2024-10-04 LAB
ALBUMIN SERPL-MCNC: 4.3 G/DL (ref 3.5–5.2)
ALBUMIN/GLOB SERPL: 1.7 G/DL
ALP SERPL-CCNC: 84 U/L (ref 39–117)
ALT SERPL W P-5'-P-CCNC: 15 U/L (ref 1–41)
ANION GAP SERPL CALCULATED.3IONS-SCNC: 10 MMOL/L (ref 5–15)
AST SERPL-CCNC: 12 U/L (ref 1–40)
BASOPHILS # BLD AUTO: 0.03 10*3/MM3 (ref 0–0.2)
BASOPHILS NFR BLD AUTO: 0.5 % (ref 0–1.5)
BILIRUB SERPL-MCNC: 0.2 MG/DL (ref 0–1.2)
BUN SERPL-MCNC: 10 MG/DL (ref 6–20)
BUN/CREAT SERPL: 9 (ref 7–25)
CALCIUM SPEC-SCNC: 9.4 MG/DL (ref 8.6–10.5)
CHLORIDE SERPL-SCNC: 105 MMOL/L (ref 98–107)
CO2 SERPL-SCNC: 23 MMOL/L (ref 22–29)
CREAT SERPL-MCNC: 1.11 MG/DL (ref 0.76–1.27)
DEPRECATED RDW RBC AUTO: 52.7 FL (ref 37–54)
EGFRCR SERPLBLD CKD-EPI 2021: 84.5 ML/MIN/1.73
EOSINOPHIL # BLD AUTO: 0.18 10*3/MM3 (ref 0–0.4)
EOSINOPHIL NFR BLD AUTO: 2.9 % (ref 0.3–6.2)
ERYTHROCYTE [DISTWIDTH] IN BLOOD BY AUTOMATED COUNT: 16.4 % (ref 12.3–15.4)
FERRITIN SERPL-MCNC: 45.8 NG/ML (ref 30–400)
GLOBULIN UR ELPH-MCNC: 2.6 GM/DL
GLUCOSE SERPL-MCNC: 89 MG/DL (ref 65–99)
HCT VFR BLD AUTO: 31.5 % (ref 37.5–51)
HGB BLD-MCNC: 9.7 G/DL (ref 13–17.7)
IMM GRANULOCYTES # BLD AUTO: 0.02 10*3/MM3 (ref 0–0.05)
IMM GRANULOCYTES NFR BLD AUTO: 0.3 % (ref 0–0.5)
IRON 24H UR-MRATE: 371 MCG/DL (ref 59–158)
IRON SATN MFR SERPL: 74 % (ref 20–50)
LYMPHOCYTES # BLD AUTO: 1.82 10*3/MM3 (ref 0.7–3.1)
LYMPHOCYTES NFR BLD AUTO: 29.4 % (ref 19.6–45.3)
MCH RBC QN AUTO: 27.9 PG (ref 26.6–33)
MCHC RBC AUTO-ENTMCNC: 30.8 G/DL (ref 31.5–35.7)
MCV RBC AUTO: 90.5 FL (ref 79–97)
MONOCYTES # BLD AUTO: 0.76 10*3/MM3 (ref 0.1–0.9)
MONOCYTES NFR BLD AUTO: 12.3 % (ref 5–12)
NEUTROPHILS NFR BLD AUTO: 3.37 10*3/MM3 (ref 1.7–7)
NEUTROPHILS NFR BLD AUTO: 54.6 % (ref 42.7–76)
NRBC BLD AUTO-RTO: 0 /100 WBC (ref 0–0.2)
PLATELET # BLD AUTO: 420 10*3/MM3 (ref 140–450)
PMV BLD AUTO: 9.4 FL (ref 6–12)
POTASSIUM SERPL-SCNC: 4.1 MMOL/L (ref 3.5–5.2)
PROT SERPL-MCNC: 6.9 G/DL (ref 6–8.5)
RBC # BLD AUTO: 3.48 10*6/MM3 (ref 4.14–5.8)
SODIUM SERPL-SCNC: 138 MMOL/L (ref 136–145)
TIBC SERPL-MCNC: 504 MCG/DL (ref 298–536)
TRANSFERRIN SERPL-MCNC: 338 MG/DL (ref 200–360)
WBC NRBC COR # BLD AUTO: 6.18 10*3/MM3 (ref 3.4–10.8)

## 2024-10-04 PROCEDURE — 82728 ASSAY OF FERRITIN: CPT | Performed by: NURSE PRACTITIONER

## 2024-10-04 PROCEDURE — 83540 ASSAY OF IRON: CPT | Performed by: NURSE PRACTITIONER

## 2024-10-04 PROCEDURE — 85025 COMPLETE CBC W/AUTO DIFF WBC: CPT | Performed by: NURSE PRACTITIONER

## 2024-10-04 PROCEDURE — 84466 ASSAY OF TRANSFERRIN: CPT | Performed by: NURSE PRACTITIONER

## 2024-10-04 PROCEDURE — 80053 COMPREHEN METABOLIC PANEL: CPT | Performed by: NURSE PRACTITIONER

## 2024-10-04 PROCEDURE — 99495 TRANSJ CARE MGMT MOD F2F 14D: CPT | Performed by: NURSE PRACTITIONER

## 2024-10-04 NOTE — PROGRESS NOTES
Subjective   Radha Noriega V is a 44 y.o. male.     Chief Complaint   Patient presents with    Anemia     Pt is here as a hospital follow up for anemia.        History of Present Illness   He is here today for hospital follow-up for acute blood loss anemia and recurrent GI bleed.  TCM note reviewed by me today.  He presented to [facility] ER on 9/26 with complaints of melena.  He was found to be tachycardic in the 140s but blood pressure remained stable.  Labs showed severe anemia with hemoglobin of 5.5.  He was transfused with 2 units of packed red blood cells with improvement in hemoglobin up to 7.8.  He was initially admitted to the ICU for close monitoring for hemodynamic stability.  He remained stable overnight.  He was consulted by GI and underwent endoscopy with small bowel enteroscopy and EGD unremarkable except for evidence of gastritis.  He was started on pantoprazole 40 mg daily and ferrous sulfate 325 mg daily with breakfast.  He was discharged on 9/29 to follow-up with PCP and GI. He was counseled on avoiding all NSAIDs.  He notes symptoms have improved some since hospitalization. He still notes some fatigue, SOB with activity and lightheadedness with position changes. He denies any melena, BRBPR, nausea, vomiting. He denies any NSAIDs. He is taking protonix 40 mg daily and ferrous sulfate 325 mg daily. He is scheudled to see GI on 10/18.     The following portions of the patient's history were reviewed and updated as appropriate: allergies, current medications, past family history, past medical history, past social history, past surgical history, and problem list.    Review of Systems   Constitutional:  Positive for fatigue. Negative for chills and fever.   Respiratory:  Positive for shortness of breath (with activity). Negative for cough, chest tightness and wheezing.    Cardiovascular: Negative.    Gastrointestinal: Negative.    Neurological:  Positive for light-headedness. Negative for dizziness,  tremors, seizures, syncope, facial asymmetry, speech difficulty, weakness, numbness, headache, memory problem and confusion.       Objective   Physical Exam  Constitutional:       Appearance: He is well-developed.   Neck:      Thyroid: No thyroid mass, thyromegaly or thyroid tenderness.      Vascular: No carotid bruit.      Trachea: Trachea normal.   Cardiovascular:      Rate and Rhythm: Normal rate and regular rhythm.      Chest Wall: PMI is not displaced.      Pulses:           Radial pulses are 2+ on the right side and 2+ on the left side.        Dorsalis pedis pulses are 2+ on the right side and 2+ on the left side.        Posterior tibial pulses are 2+ on the right side and 2+ on the left side.      Heart sounds: S1 normal and S2 normal.   Pulmonary:      Effort: Pulmonary effort is normal.      Breath sounds: Normal breath sounds.   Abdominal:      General: Bowel sounds are normal. There is no distension or abdominal bruit. There are no signs of injury.      Palpations: Abdomen is soft. There is no hepatomegaly or splenomegaly.      Tenderness: There is no abdominal tenderness. There is no guarding or rebound.   Musculoskeletal:      Right lower leg: No edema.      Left lower leg: No edema.   Lymphadenopathy:      Head:      Right side of head: No submental, submandibular, tonsillar or occipital adenopathy.      Left side of head: No submental, submandibular, tonsillar or occipital adenopathy.      Cervical: No cervical adenopathy.   Skin:     General: Skin is warm and dry.      Capillary Refill: Capillary refill takes less than 2 seconds.      Nails: There is no clubbing.   Neurological:      Mental Status: He is alert and oriented to person, place, and time.   Psychiatric:         Attention and Perception: Attention normal.         Mood and Affect: Mood and affect normal.         Speech: Speech normal.         Behavior: Behavior normal.         Thought Content: Thought content normal.         Cognition and  Memory: Cognition normal.         Vitals:    10/04/24 1520   BP: 110/68   Pulse: 91   Temp: 98 °F (36.7 °C)   SpO2: 99%      Body mass index is 25.98 kg/m².    Assessment & Plan   Problems Addressed this Visit       Gastrointestinal hemorrhage with melena    ABLA (acute blood loss anemia)     Other Visit Diagnoses       Hospital discharge follow-up    -  Primary          Diagnoses         Codes Comments    Hospital discharge follow-up    -  Primary ICD-10-CM: Z09  ICD-9-CM: V67.59     Gastrointestinal hemorrhage with melena     ICD-10-CM: K92.1  ICD-9-CM: 578.1     ABLA (acute blood loss anemia)     ICD-10-CM: D62  ICD-9-CM: 285.1           1.  Hospital discharge follow-up for GI hemorrhage with melena/acute blood loss anemia-symptoms are improving overall.  Encouraged him to continue pantoprazole 40 mg daily and ferrous sulfate 325 mg daily.  Discussed with him the importance of avoiding all NSAIDs and alcohol.  He can use Tylenol as needed for analgesia.  Check labs today including CBC, iron profile and ferritin.  Instructed him to follow-up with gastroenterology as scheduled on 10/18.  Notify immediately for any melena or BRBPR.    He will follow-up in 6 to 8 weeks for CPE.

## 2024-10-08 ENCOUNTER — TELEPHONE (OUTPATIENT)
Dept: GASTROENTEROLOGY | Facility: CLINIC | Age: 44
End: 2024-10-08

## 2024-10-08 NOTE — TELEPHONE ENCOUNTER
Pt has an appt on 10/18 to see Merly for a hospital follow up but he was consulted by Dr Gibson on 6/24/23 by Dr Gibson so he is a pt of Dr Gibson's.  He needs to be rescheduled with either Kenia or Dr Gibson but there are no appts in the time frame that he needs to be seen.  Can one of you work him in or is it ok for him to say with Merly?

## 2024-10-09 ENCOUNTER — READMISSION MANAGEMENT (OUTPATIENT)
Dept: CALL CENTER | Facility: HOSPITAL | Age: 44
End: 2024-10-09
Payer: MEDICAID

## 2024-10-09 DIAGNOSIS — D50.0 IRON DEFICIENCY ANEMIA DUE TO CHRONIC BLOOD LOSS: ICD-10-CM

## 2024-10-09 DIAGNOSIS — Z00.00 HEALTHCARE MAINTENANCE: Primary | ICD-10-CM

## 2024-10-09 NOTE — OUTREACH NOTE
Medical Week 2 Survey      Flowsheet Row Responses   [facility] patient discharged from? [place]   Does the patient have one of the following disease processes/diagnoses(primary or secondary)? Other   Week 2 attempt successful? Yes   Call start time 1549   Discharge diagnosis GI bleed from unknown source   Call end time 1552   Is the patient taking all medications as directed (includes completed medication regime)? Yes   Does the patient have a primary care provider?  Yes   Does the patient have an appointment with their PCP within 7 days of discharge? Yes   Psychosocial issues? No   What is the patient's perception of their health status since discharge? Improving   Is the patient/caregiver able to teach back signs and symptoms related to disease process for when to call PCP? Yes   Is the patient/caregiver able to teach back signs and symptoms related to disease process for when to call 911? Yes   Is the patient/caregiver able to teach back the hierarchy of who to call/visit for symptoms/problems? PCP, Specialist, Home health nurse, Urgent Care, ED, 911 Yes   Additional teach back comments States he is doing well and has appt with PCP do have labs done.   Week 2 Call Completed? Yes   Graduated Yes   Graduated/Revoked comments No questions or needs at this time.   Call end time 1552            Trisha EMMANUEL - Licensed Nurse

## 2025-01-21 ENCOUNTER — TELEPHONE (OUTPATIENT)
Dept: INTERNAL MEDICINE | Facility: CLINIC | Age: 45
End: 2025-01-21
Payer: COMMERCIAL

## 2025-01-21 NOTE — TELEPHONE ENCOUNTER
----- Message from Sahra ECHEVARRIA sent at 1/20/2025  9:22 AM EST -----  Phoned patient to schedule had to leave voice message.  ----- Message -----  From: Maryjane Olsen MA  Sent: 1/17/2025   5:05 PM EST  To: [place] [id number]  Pool    Please call and schedule patient for a physical with fasting labs prior.

## (undated) DEVICE — BLCK/BITE BLOX W/DENTL/RIM W/STRAP 54F

## (undated) DEVICE — TUBING, SUCTION, 1/4" X 10', STRAIGHT: Brand: MEDLINE

## (undated) DEVICE — MSK PROC CURAPLEX O2 2/ADAPT 7FT

## (undated) DEVICE — SENSR O2 OXIMAX FNGR A/ 18IN NONSTR

## (undated) DEVICE — CANN O2 ETCO2 FITS ALL CONN CO2 SMPL A/ 7IN DISP LF

## (undated) DEVICE — Device: Brand: DEFENDO AIR/WATER/SUCTION AND BIOPSY VALVE

## (undated) DEVICE — LN SMPL CO2 SHTRM SD STREAM W/M LUER

## (undated) DEVICE — DEFENDO AIR WATER SUCTION AND BIOPSY VALVE KIT FOR  OLYMPUS: Brand: DEFENDO AIR/WATER/SUCTION AND BIOPSY VALVE

## (undated) DEVICE — BITEBLOCK OMNI BLOC

## (undated) DEVICE — ADAPT CLN BIOGUARD AIR/H2O DISP

## (undated) DEVICE — SNAR POLYP CAPTIVATOR RND STFF 2.4 240CM 10MM 1P/U

## (undated) DEVICE — KT ORCA ORCAPOD DISP STRL

## (undated) DEVICE — FLEXIBLE YANKAUER,MEDIUM TIP, NO VACUUM CONTROL: Brand: ARGYLE